# Patient Record
Sex: MALE | Race: BLACK OR AFRICAN AMERICAN | NOT HISPANIC OR LATINO | Employment: OTHER | URBAN - METROPOLITAN AREA
[De-identification: names, ages, dates, MRNs, and addresses within clinical notes are randomized per-mention and may not be internally consistent; named-entity substitution may affect disease eponyms.]

---

## 2017-07-17 ENCOUNTER — HISTORICAL (OUTPATIENT)
Dept: ADMINISTRATIVE | Facility: HOSPITAL | Age: 67
End: 2017-07-17

## 2017-07-17 LAB
ABS NEUT (OLG): 2.11 X10(3)/MCL (ref 2.1–9.2)
ALBUMIN SERPL-MCNC: 3.4 GM/DL (ref 3.4–5)
ALBUMIN/GLOB SERPL: 1 RATIO (ref 1–2)
ALP SERPL-CCNC: 128 UNIT/L (ref 45–117)
ALT SERPL-CCNC: 29 UNIT/L (ref 12–78)
APPEARANCE, UA: CLEAR
AST SERPL-CCNC: 21 UNIT/L (ref 15–37)
BACTERIA #/AREA URNS AUTO: ABNORMAL /[HPF]
BASOPHILS # BLD AUTO: 0.03 X10(3)/MCL
BASOPHILS NFR BLD AUTO: 0 % (ref 0–1)
BILIRUB SERPL-MCNC: 0.3 MG/DL (ref 0.2–1)
BILIRUB UR QL STRIP: NEGATIVE
BILIRUBIN DIRECT+TOT PNL SERPL-MCNC: <0.1 MG/DL
BILIRUBIN DIRECT+TOT PNL SERPL-MCNC: >0.2 MG/DL
BUN SERPL-MCNC: 12 MG/DL (ref 7–18)
CALCIUM SERPL-MCNC: 8.9 MG/DL (ref 8.5–10.1)
CHLORIDE SERPL-SCNC: 104 MMOL/L (ref 98–107)
CHOLEST SERPL-MCNC: 192 MG/DL
CHOLEST/HDLC SERPL: 3.1 {RATIO} (ref 0–5)
CO2 SERPL-SCNC: 25 MMOL/L (ref 21–32)
COLOR UR: ABNORMAL
CREAT SERPL-MCNC: 1.1 MG/DL (ref 0.6–1.3)
CREAT UR-MCNC: 81 MG/DL
EOSINOPHIL # BLD AUTO: 0.09 X10(3)/MCL
EOSINOPHIL NFR BLD AUTO: 1 % (ref 0–5)
ERYTHROCYTE [DISTWIDTH] IN BLOOD BY AUTOMATED COUNT: 11.9 % (ref 11.5–14.5)
EST. AVERAGE GLUCOSE BLD GHB EST-MCNC: 266 MG/DL
GLOBULIN SER-MCNC: 4.5 GM/ML (ref 2.3–3.5)
GLUCOSE (UA): >1000 MG/DL
GLUCOSE SERPL-MCNC: 353 MG/DL (ref 74–106)
HBA1C MFR BLD: 10.9 % (ref 4.2–6.3)
HCT VFR BLD AUTO: 37.1 % (ref 40–51)
HDLC SERPL-MCNC: 61 MG/DL
HGB BLD-MCNC: 13.2 GM/DL (ref 13.5–17.5)
HGB UR QL STRIP: NEGATIVE
HYALINE CASTS #/AREA URNS LPF: ABNORMAL /[LPF]
IMM GRANULOCYTES # BLD AUTO: 0.02 10*3/UL
IMM GRANULOCYTES NFR BLD AUTO: 0 %
KETONES UR QL STRIP: NEGATIVE
LDLC SERPL CALC-MCNC: 81 MG/DL (ref 0–130)
LEUKOCYTE ESTERASE UR QL STRIP: NEGATIVE
LYMPHOCYTES # BLD AUTO: 3.68 X10(3)/MCL
LYMPHOCYTES NFR BLD AUTO: 57 % (ref 15–40)
MCH RBC QN AUTO: 31.4 PG (ref 26–34)
MCHC RBC AUTO-ENTMCNC: 35.6 GM/DL (ref 31–37)
MCV RBC AUTO: 88.1 FL (ref 80–100)
MICROALBUMIN UR-MCNC: 12.7 MG/L (ref 0–19)
MICROALBUMIN/CREAT RATIO PNL UR: 15.7 MCG/MG CR (ref 0–29)
MONOCYTES # BLD AUTO: 0.51 X10(3)/MCL
MONOCYTES NFR BLD AUTO: 8 % (ref 4–12)
NEUTROPHILS # BLD AUTO: 2.11 X10(3)/MCL
NEUTROPHILS NFR BLD AUTO: 33 X10(3)/MCL
NITRITE UR QL STRIP: NEGATIVE
PH UR STRIP: 6 [PH] (ref 4.5–8)
PLATELET # BLD AUTO: 311 X10(3)/MCL (ref 130–400)
PMV BLD AUTO: 10 FL (ref 7.4–10.4)
POTASSIUM SERPL-SCNC: 4 MMOL/L (ref 3.5–5.1)
PROT SERPL-MCNC: 7.9 GM/DL (ref 6.4–8.2)
PROT UR QL STRIP: NEGATIVE
RBC # BLD AUTO: 4.21 X10(6)/MCL (ref 4.5–5.9)
RBC #/AREA URNS AUTO: ABNORMAL /[HPF]
SODIUM SERPL-SCNC: 137 MMOL/L (ref 136–145)
SP GR UR STRIP: 1.03 (ref 1–1.03)
SQUAMOUS #/AREA URNS LPF: ABNORMAL /[LPF]
TRIGL SERPL-MCNC: 250 MG/DL
TSH SERPL-ACNC: 1.55 MIU/L (ref 0.36–3.74)
UROBILINOGEN UR STRIP-ACNC: NORMAL
VLDLC SERPL CALC-MCNC: 50 MG/DL
WBC # SPEC AUTO: 6.4 X10(3)/MCL (ref 4.5–11)
WBC #/AREA URNS AUTO: ABNORMAL /HPF

## 2017-10-04 ENCOUNTER — HISTORICAL (OUTPATIENT)
Dept: INTERNAL MEDICINE | Facility: CLINIC | Age: 67
End: 2017-10-04

## 2017-10-04 LAB
BUN SERPL-MCNC: 12 MG/DL (ref 7–18)
CALCIUM SERPL-MCNC: 8.7 MG/DL (ref 8.5–10.1)
CHLORIDE SERPL-SCNC: 107 MMOL/L (ref 98–107)
CHOLEST SERPL-MCNC: 187 MG/DL
CHOLEST/HDLC SERPL: 3 {RATIO} (ref 0–5)
CO2 SERPL-SCNC: 26 MMOL/L (ref 21–32)
CREAT SERPL-MCNC: 1 MG/DL (ref 0.6–1.3)
EST. AVERAGE GLUCOSE BLD GHB EST-MCNC: 229 MG/DL
GLUCOSE SERPL-MCNC: 234 MG/DL (ref 74–106)
HBA1C MFR BLD: 9.6 % (ref 4.2–6.3)
HDLC SERPL-MCNC: 62 MG/DL
LDLC SERPL CALC-MCNC: 82 MG/DL (ref 0–130)
POTASSIUM SERPL-SCNC: 3.7 MMOL/L (ref 3.5–5.1)
SODIUM SERPL-SCNC: 139 MMOL/L (ref 136–145)
TRIGL SERPL-MCNC: 216 MG/DL
TSH SERPL-ACNC: 1.59 MIU/L (ref 0.36–3.74)
VLDLC SERPL CALC-MCNC: 43 MG/DL

## 2018-01-16 ENCOUNTER — HISTORICAL (OUTPATIENT)
Dept: INTERNAL MEDICINE | Facility: CLINIC | Age: 68
End: 2018-01-16

## 2018-01-16 LAB
ABS NEUT (OLG): 2.08 X10(3)/MCL (ref 2.1–9.2)
ALBUMIN SERPL-MCNC: 3.4 GM/DL (ref 3.4–5)
ALBUMIN/GLOB SERPL: 1 RATIO (ref 1–2)
ALP SERPL-CCNC: 117 UNIT/L (ref 45–117)
ALT SERPL-CCNC: 23 UNIT/L (ref 12–78)
AST SERPL-CCNC: 18 UNIT/L (ref 15–37)
BASOPHILS # BLD AUTO: 0.03 X10(3)/MCL
BASOPHILS NFR BLD AUTO: 0 % (ref 0–1)
BILIRUB SERPL-MCNC: 0.2 MG/DL (ref 0.2–1)
BILIRUBIN DIRECT+TOT PNL SERPL-MCNC: 0.1 MG/DL
BILIRUBIN DIRECT+TOT PNL SERPL-MCNC: 0.1 MG/DL
BUN SERPL-MCNC: 10 MG/DL (ref 7–18)
CALCIUM SERPL-MCNC: 8.8 MG/DL (ref 8.5–10.1)
CHLORIDE SERPL-SCNC: 106 MMOL/L (ref 98–107)
CO2 SERPL-SCNC: 26 MMOL/L (ref 21–32)
CREAT SERPL-MCNC: 0.9 MG/DL (ref 0.6–1.3)
CREAT UR-MCNC: 238 MG/DL
EOSINOPHIL # BLD AUTO: 0.15 10*3/UL
EOSINOPHIL NFR BLD AUTO: 2 % (ref 0–5)
ERYTHROCYTE [DISTWIDTH] IN BLOOD BY AUTOMATED COUNT: 12.2 % (ref 11.5–14.5)
EST. AVERAGE GLUCOSE BLD GHB EST-MCNC: 258 MG/DL
GLOBULIN SER-MCNC: 4.4 GM/ML (ref 2.3–3.5)
GLUCOSE SERPL-MCNC: 217 MG/DL (ref 74–106)
HBA1C MFR BLD: 10.6 % (ref 4.2–6.3)
HCT VFR BLD AUTO: 36.6 % (ref 40–51)
HGB BLD-MCNC: 13.1 GM/DL (ref 13.5–17.5)
IMM GRANULOCYTES # BLD AUTO: 0.01 10*3/UL
IMM GRANULOCYTES NFR BLD AUTO: 0 %
LYMPHOCYTES # BLD AUTO: 3.9 X10(3)/MCL
LYMPHOCYTES NFR BLD AUTO: 58 % (ref 15–40)
MCH RBC QN AUTO: 32.2 PG (ref 26–34)
MCHC RBC AUTO-ENTMCNC: 35.8 GM/DL (ref 31–37)
MCV RBC AUTO: 89.9 FL (ref 80–100)
MICROALBUMIN UR-MCNC: 55.7 MG/L (ref 0–19)
MICROALBUMIN/CREAT RATIO PNL UR: 23.4 MCG/MG CR (ref 0–29)
MONOCYTES # BLD AUTO: 0.51 X10(3)/MCL
MONOCYTES NFR BLD AUTO: 8 % (ref 4–12)
NEUTROPHILS # BLD AUTO: 2.08 X10(3)/MCL
NEUTROPHILS NFR BLD AUTO: 31 X10(3)/MCL
PLATELET # BLD AUTO: 360 X10(3)/MCL (ref 130–400)
PMV BLD AUTO: 9.6 FL (ref 7.4–10.4)
POTASSIUM SERPL-SCNC: 3.8 MMOL/L (ref 3.5–5.1)
PROT SERPL-MCNC: 7.8 GM/DL (ref 6.4–8.2)
PSA SERPL-MCNC: 1.2 NG/ML
RBC # BLD AUTO: 4.07 X10(6)/MCL (ref 4.5–5.9)
SODIUM SERPL-SCNC: 139 MMOL/L (ref 136–145)
WBC # SPEC AUTO: 6.7 X10(3)/MCL (ref 4.5–11)

## 2018-06-01 ENCOUNTER — HISTORICAL (OUTPATIENT)
Dept: INTERNAL MEDICINE | Facility: CLINIC | Age: 68
End: 2018-06-01

## 2018-06-01 LAB
ABS NEUT (OLG): 1.52 X10(3)/MCL
BASOPHILS NFR BLD MANUAL: 1 %
CHOLEST SERPL-MCNC: 183 MG/DL
CHOLEST/HDLC SERPL: 3.3 {RATIO} (ref 0–5)
EOSINOPHIL NFR BLD MANUAL: 3 %
ERYTHROCYTE [DISTWIDTH] IN BLOOD BY AUTOMATED COUNT: 12 % (ref 11.5–14.5)
EST. AVERAGE GLUCOSE BLD GHB EST-MCNC: 275 MG/DL
FOLATE SERPL-MCNC: 19.7 NG/ML (ref 3.1–17.5)
GRANULOCYTES NFR BLD MANUAL: 41 % (ref 43–75)
HBA1C MFR BLD: 11.2 % (ref 4.2–6.3)
HCT VFR BLD AUTO: 36.9 % (ref 40–51)
HDLC SERPL-MCNC: 56 MG/DL
HGB BLD-MCNC: 13.2 GM/DL (ref 13.5–17.5)
HYPOCHROMIA BLD QL SMEAR: ABNORMAL
IRON SATN MFR SERPL: 27.6 % (ref 15–50)
IRON SERPL-MCNC: 94 MCG/DL (ref 65–175)
LDLC SERPL CALC-MCNC: 93 MG/DL (ref 0–130)
LYMPHOCYTES NFR BLD MANUAL: 51 % (ref 20.5–51.1)
MCH RBC QN AUTO: 32.4 PG (ref 26–34)
MCHC RBC AUTO-ENTMCNC: 35.8 GM/DL (ref 31–37)
MCV RBC AUTO: 90.7 FL (ref 80–100)
MONOCYTES NFR BLD MANUAL: 3 % (ref 2–9)
NEUTS BAND NFR BLD MANUAL: 1 % (ref 0–10)
PLATELET # BLD AUTO: 316 X10(3)/MCL (ref 130–400)
PLATELET # BLD EST: NORMAL 10*3/UL
PMV BLD AUTO: 9.7 FL (ref 7.4–10.4)
RBC # BLD AUTO: 4.07 X10(6)/MCL (ref 4.5–5.9)
RBC MORPH BLD: ABNORMAL
RET# (OHS): 0.05
RETICULOCYTE COUNT AUTOMATED (OLG): 1.1 % (ref 0.5–1.5)
TIBC SERPL-MCNC: 340 MCG/DL (ref 250–450)
TRANSFERRIN SERPL-MCNC: 274 MG/DL (ref 200–360)
TRIGL SERPL-MCNC: 172 MG/DL
VIT B12 SERPL-MCNC: 397 PG/ML (ref 193–986)
VLDLC SERPL CALC-MCNC: 34 MG/DL
WBC # SPEC AUTO: 5.7 X10(3)/MCL (ref 4.5–11)

## 2018-06-06 ENCOUNTER — HISTORICAL (OUTPATIENT)
Dept: ADMINISTRATIVE | Facility: HOSPITAL | Age: 68
End: 2018-06-06

## 2018-06-06 LAB
CREAT UR-MCNC: 132 MG/DL
MICROALBUMIN UR-MCNC: 37 MG/L (ref 0–19)
MICROALBUMIN/CREAT RATIO PNL UR: 28 MCG/MG CR (ref 0–29)

## 2018-09-04 ENCOUNTER — HISTORICAL (OUTPATIENT)
Dept: INTERNAL MEDICINE | Facility: CLINIC | Age: 68
End: 2018-09-04

## 2018-09-04 LAB
ALBUMIN SERPL-MCNC: 3.5 GM/DL (ref 3.4–5)
ALBUMIN/GLOB SERPL: 1 RATIO (ref 1–2)
ALP SERPL-CCNC: 88 UNIT/L (ref 45–117)
ALT SERPL-CCNC: 21 UNIT/L (ref 12–78)
APPEARANCE, UA: CLEAR
AST SERPL-CCNC: 18 UNIT/L (ref 15–37)
BACTERIA #/AREA URNS AUTO: ABNORMAL /[HPF]
BILIRUB SERPL-MCNC: 0.3 MG/DL (ref 0.2–1)
BILIRUB UR QL STRIP: NEGATIVE
BILIRUBIN DIRECT+TOT PNL SERPL-MCNC: 0.1 MG/DL
BILIRUBIN DIRECT+TOT PNL SERPL-MCNC: 0.2 MG/DL
BUN SERPL-MCNC: 7 MG/DL (ref 7–18)
CALCIUM SERPL-MCNC: 8.6 MG/DL (ref 8.5–10.1)
CHLORIDE SERPL-SCNC: 108 MMOL/L (ref 98–107)
CO2 SERPL-SCNC: 31 MMOL/L (ref 21–32)
COLOR UR: YELLOW
CREAT SERPL-MCNC: 0.9 MG/DL (ref 0.6–1.3)
CREAT UR-MCNC: 205 MG/DL
EST. AVERAGE GLUCOSE BLD GHB EST-MCNC: 177 MG/DL
GLOBULIN SER-MCNC: 4.2 GM/ML (ref 2.3–3.5)
GLUCOSE (UA): NORMAL
GLUCOSE SERPL-MCNC: 104 MG/DL (ref 74–106)
HAV IGM SERPL QL IA: NONREACTIVE
HBA1C MFR BLD: 7.8 % (ref 4.2–6.3)
HBV CORE IGM SERPL QL IA: NONREACTIVE
HBV SURFACE AG SERPL QL IA: NEGATIVE
HCV AB SERPL QL IA: NONREACTIVE
HGB UR QL STRIP: NEGATIVE
HIV 1+2 AB+HIV1 P24 AG SERPL QL IA: NONREACTIVE
HYALINE CASTS #/AREA URNS LPF: ABNORMAL /[LPF]
KETONES UR QL STRIP: NEGATIVE
LEUKOCYTE ESTERASE UR QL STRIP: NEGATIVE
MICROALBUMIN UR-MCNC: 45 MG/L (ref 0–19)
MICROALBUMIN/CREAT RATIO PNL UR: 22 MCG/MG CR (ref 0–29)
NITRITE UR QL STRIP: NEGATIVE
PH UR STRIP: 6.5 [PH] (ref 4.5–8)
POTASSIUM SERPL-SCNC: 3.7 MMOL/L (ref 3.5–5.1)
PROT SERPL-MCNC: 7.7 GM/DL (ref 6.4–8.2)
PROT UR QL STRIP: 10 MG/DL
RBC #/AREA URNS AUTO: ABNORMAL /[HPF]
SODIUM SERPL-SCNC: 143 MMOL/L (ref 136–145)
SP GR UR STRIP: 1.02 (ref 1–1.03)
SQUAMOUS #/AREA URNS LPF: ABNORMAL /[LPF]
UROBILINOGEN UR STRIP-ACNC: 2 MG/DL
WBC #/AREA URNS AUTO: ABNORMAL /HPF

## 2019-01-03 ENCOUNTER — HISTORICAL (OUTPATIENT)
Dept: INTERNAL MEDICINE | Facility: CLINIC | Age: 69
End: 2019-01-03

## 2019-01-03 LAB
BUN SERPL-MCNC: 11 MG/DL (ref 7–18)
CALCIUM SERPL-MCNC: 9 MG/DL (ref 8.5–10.1)
CHLORIDE SERPL-SCNC: 103 MMOL/L (ref 98–107)
CHOLEST SERPL-MCNC: 189 MG/DL
CHOLEST/HDLC SERPL: 3.1 {RATIO} (ref 0–5)
CO2 SERPL-SCNC: 29 MMOL/L (ref 21–32)
CREAT SERPL-MCNC: 1 MG/DL (ref 0.6–1.3)
CREAT UR-MCNC: 209 MG/DL
CREAT/UREA NIT SERPL: 11
EST. AVERAGE GLUCOSE BLD GHB EST-MCNC: 258 MG/DL
GLUCOSE SERPL-MCNC: 170 MG/DL (ref 74–106)
HBA1C MFR BLD: 10.6 % (ref 4.2–6.3)
HDLC SERPL-MCNC: 61 MG/DL
LDLC SERPL CALC-MCNC: 84 MG/DL (ref 0–130)
MICROALBUMIN UR-MCNC: 71.8 MG/L (ref 0–19)
MICROALBUMIN/CREAT RATIO PNL UR: 34.4 MCG/MG CR (ref 0–29)
POTASSIUM SERPL-SCNC: 3.5 MMOL/L (ref 3.5–5.1)
SODIUM SERPL-SCNC: 139 MMOL/L (ref 136–145)
TRIGL SERPL-MCNC: 218 MG/DL
TSH SERPL-ACNC: 2.61 MIU/L (ref 0.36–3.74)
VLDLC SERPL CALC-MCNC: 44 MG/DL

## 2019-04-03 ENCOUNTER — HISTORICAL (OUTPATIENT)
Dept: INTERNAL MEDICINE | Facility: CLINIC | Age: 69
End: 2019-04-03

## 2019-04-03 LAB
ABS NEUT (OLG): 3.43 X10(3)/MCL (ref 2.1–9.2)
ALBUMIN SERPL-MCNC: 3.5 GM/DL (ref 3.4–5)
ALBUMIN/GLOB SERPL: 0.8 RATIO (ref 1.1–2)
ALP SERPL-CCNC: 95 UNIT/L (ref 45–117)
ALT SERPL-CCNC: 24 UNIT/L (ref 12–78)
AST SERPL-CCNC: 17 UNIT/L (ref 15–37)
BASOPHILS # BLD AUTO: 0.02 X10(3)/MCL
BASOPHILS NFR BLD AUTO: 0 %
BILIRUB SERPL-MCNC: 0.4 MG/DL (ref 0.2–1)
BILIRUBIN DIRECT+TOT PNL SERPL-MCNC: 0.1 MG/DL
BILIRUBIN DIRECT+TOT PNL SERPL-MCNC: 0.3 MG/DL
BUN SERPL-MCNC: 8 MG/DL (ref 7–18)
CALCIUM SERPL-MCNC: 8.5 MG/DL (ref 8.5–10.1)
CHLORIDE SERPL-SCNC: 110 MMOL/L (ref 98–107)
CHOLEST SERPL-MCNC: 137 MG/DL
CHOLEST/HDLC SERPL: 2.3 {RATIO} (ref 0–5)
CO2 SERPL-SCNC: 28 MMOL/L (ref 21–32)
CREAT SERPL-MCNC: 1 MG/DL (ref 0.6–1.3)
CRP SERPL-MCNC: 0.3 MG/DL
EOSINOPHIL # BLD AUTO: 0.12 X10(3)/MCL
EOSINOPHIL NFR BLD AUTO: 1 %
ERYTHROCYTE [DISTWIDTH] IN BLOOD BY AUTOMATED COUNT: 12.6 % (ref 11.5–14.5)
ERYTHROCYTE [SEDIMENTATION RATE] IN BLOOD: 20 MM/HR (ref 0–15)
EST. AVERAGE GLUCOSE BLD GHB EST-MCNC: 226 MG/DL
GLOBULIN SER-MCNC: 4.3 GM/ML (ref 2.3–3.5)
GLUCOSE SERPL-MCNC: 73 MG/DL (ref 74–106)
HBA1C MFR BLD: 9.5 % (ref 4.2–6.3)
HCT VFR BLD AUTO: 36.8 % (ref 40–51)
HDLC SERPL-MCNC: 59 MG/DL
HGB BLD-MCNC: 12.9 GM/DL (ref 13.5–17.5)
IMM GRANULOCYTES # BLD AUTO: 0.02 10*3/UL
IMM GRANULOCYTES NFR BLD AUTO: 0 %
LDLC SERPL CALC-MCNC: 58 MG/DL (ref 0–130)
LYMPHOCYTES # BLD AUTO: 4.26 X10(3)/MCL
LYMPHOCYTES NFR BLD AUTO: 50 % (ref 13–40)
MCH RBC QN AUTO: 32.2 PG (ref 26–34)
MCHC RBC AUTO-ENTMCNC: 35.1 GM/DL (ref 31–37)
MCV RBC AUTO: 91.8 FL (ref 80–100)
MONOCYTES # BLD AUTO: 0.65 X10(3)/MCL
MONOCYTES NFR BLD AUTO: 8 % (ref 4–12)
NEUTROPHILS # BLD AUTO: 3.43 X10(3)/MCL
NEUTROPHILS NFR BLD AUTO: 40 X10(3)/MCL
PLATELET # BLD AUTO: 330 X10(3)/MCL (ref 130–400)
PMV BLD AUTO: 9.6 FL (ref 7.4–10.4)
POTASSIUM SERPL-SCNC: 3.5 MMOL/L (ref 3.5–5.1)
PROT SERPL-MCNC: 7.3 GM/DL
PROT SERPL-MCNC: 7.8 GM/DL (ref 6.4–8.2)
PSA SERPL-MCNC: 1.6 NG/ML
RBC # BLD AUTO: 4.01 X10(6)/MCL (ref 4.5–5.9)
RET# (OHS): 0.07 X10(6)/MCL (ref 0.02–0.09)
RETICULOCYTE COUNT AUTOMATED (OLG): 1.7 % (ref 0.5–1.5)
SODIUM SERPL-SCNC: 142 MMOL/L (ref 136–145)
TRIGL SERPL-MCNC: 99 MG/DL
VLDLC SERPL CALC-MCNC: 20 MG/DL
WBC # SPEC AUTO: 8.5 X10(3)/MCL (ref 4.5–11)

## 2019-07-03 ENCOUNTER — HISTORICAL (OUTPATIENT)
Dept: INTERNAL MEDICINE | Facility: CLINIC | Age: 69
End: 2019-07-03

## 2019-07-03 LAB
BUN SERPL-MCNC: 13 MG/DL (ref 7–18)
CALCIUM SERPL-MCNC: 9.2 MG/DL (ref 8.5–10.1)
CHLORIDE SERPL-SCNC: 108 MMOL/L (ref 98–107)
CO2 SERPL-SCNC: 26 MMOL/L (ref 21–32)
CREAT SERPL-MCNC: 1.1 MG/DL (ref 0.6–1.3)
CREAT UR-MCNC: 101 MG/DL
CREAT/UREA NIT SERPL: 12
EST. AVERAGE GLUCOSE BLD GHB EST-MCNC: 194 MG/DL
GLUCOSE SERPL-MCNC: 219 MG/DL (ref 74–106)
HBA1C MFR BLD: 8.4 % (ref 4.2–6.3)
MICROALBUMIN UR-MCNC: 30 MG/L (ref 0–19)
MICROALBUMIN/CREAT RATIO PNL UR: 29.7 MCG/MG CR (ref 0–29)
POTASSIUM SERPL-SCNC: 3.8 MMOL/L (ref 3.5–5.1)
SODIUM SERPL-SCNC: 140 MMOL/L (ref 136–145)

## 2019-09-09 ENCOUNTER — HISTORICAL (OUTPATIENT)
Dept: CARDIOLOGY | Facility: HOSPITAL | Age: 69
End: 2019-09-09

## 2019-10-09 ENCOUNTER — HISTORICAL (OUTPATIENT)
Dept: RADIOLOGY | Facility: HOSPITAL | Age: 69
End: 2019-10-09

## 2019-10-15 ENCOUNTER — HISTORICAL (OUTPATIENT)
Dept: CARDIOLOGY | Facility: HOSPITAL | Age: 69
End: 2019-10-15

## 2019-10-15 LAB
ABS NEUT (OLG): 2.25 X10(3)/MCL (ref 2.1–9.2)
APTT PPP: 30.5 SECOND(S) (ref 23.3–37)
BASOPHILS # BLD AUTO: 0 X10(3)/MCL (ref 0–0.2)
BASOPHILS NFR BLD AUTO: 0 %
BUN SERPL-MCNC: 11 MG/DL (ref 7–18)
CALCIUM SERPL-MCNC: 8.7 MG/DL (ref 8.5–10.1)
CHLORIDE SERPL-SCNC: 106 MMOL/L (ref 98–107)
CO2 SERPL-SCNC: 26 MMOL/L (ref 21–32)
CREAT SERPL-MCNC: 0.9 MG/DL (ref 0.6–1.3)
CREAT/UREA NIT SERPL: 12
EOSINOPHIL # BLD AUTO: 0.1 X10(3)/MCL (ref 0–0.9)
EOSINOPHIL NFR BLD AUTO: 2 %
ERYTHROCYTE [DISTWIDTH] IN BLOOD BY AUTOMATED COUNT: 12.8 % (ref 11.5–14.5)
GLUCOSE SERPL-MCNC: 176 MG/DL (ref 74–106)
HCT VFR BLD AUTO: 36.3 % (ref 40–51)
HGB BLD-MCNC: 12.2 GM/DL (ref 13.5–17.5)
IMM GRANULOCYTES # BLD AUTO: 0.02 10*3/UL
IMM GRANULOCYTES NFR BLD AUTO: 0 %
INR PPP: 1.08 (ref 0.9–1.2)
LYMPHOCYTES # BLD AUTO: 3 X10(3)/MCL (ref 0.6–4.6)
LYMPHOCYTES NFR BLD AUTO: 50 %
MCH RBC QN AUTO: 31.7 PG (ref 26–34)
MCHC RBC AUTO-ENTMCNC: 33.6 GM/DL (ref 31–37)
MCV RBC AUTO: 94.3 FL (ref 80–100)
MONOCYTES # BLD AUTO: 0.6 X10(3)/MCL (ref 0.1–1.3)
MONOCYTES NFR BLD AUTO: 10 %
NEUTROPHILS # BLD AUTO: 2.25 X10(3)/MCL (ref 2.1–9.2)
NEUTROPHILS NFR BLD AUTO: 37 %
PLATELET # BLD AUTO: 277 X10(3)/MCL (ref 130–400)
PMV BLD AUTO: 9.6 FL (ref 7.4–10.4)
POTASSIUM SERPL-SCNC: 3.8 MMOL/L (ref 3.5–5.1)
PROTHROMBIN TIME: 13.9 SECOND(S) (ref 11.9–14.4)
RBC # BLD AUTO: 3.85 X10(6)/MCL (ref 4.5–5.9)
SODIUM SERPL-SCNC: 138 MMOL/L (ref 136–145)
WBC # SPEC AUTO: 6.1 X10(3)/MCL (ref 4.5–11)

## 2019-10-28 ENCOUNTER — HISTORICAL (OUTPATIENT)
Dept: CARDIOLOGY | Facility: HOSPITAL | Age: 69
End: 2019-10-28

## 2019-12-04 ENCOUNTER — HISTORICAL (OUTPATIENT)
Dept: INTERNAL MEDICINE | Facility: CLINIC | Age: 69
End: 2019-12-04

## 2019-12-04 LAB
APPEARANCE, UA: CLEAR
BACTERIA #/AREA URNS AUTO: ABNORMAL /HPF
BILIRUB UR QL STRIP: NEGATIVE
COLOR UR: ABNORMAL
CREAT UR-MCNC: 138 MG/DL
EST. AVERAGE GLUCOSE BLD GHB EST-MCNC: 183 MG/DL
GLUCOSE (UA): NEGATIVE
HAV IGM SERPL QL IA: NONREACTIVE
HBA1C MFR BLD: 8 % (ref 4.2–6.3)
HBV CORE IGM SERPL QL IA: NONREACTIVE
HBV SURFACE AG SERPL QL IA: NEGATIVE
HCV AB SERPL QL IA: NONREACTIVE
HGB UR QL STRIP: NEGATIVE
HIV 1+2 AB+HIV1 P24 AG SERPL QL IA: NONREACTIVE
HYALINE CASTS #/AREA URNS LPF: ABNORMAL /LPF
KETONES UR QL STRIP: NEGATIVE
LEUKOCYTE ESTERASE UR QL STRIP: NEGATIVE
MICROALBUMIN UR-MCNC: 67.8 MG/L (ref 0–19)
MICROALBUMIN/CREAT RATIO PNL UR: 49.1 MCG/MG CR (ref 0–29)
NITRITE UR QL STRIP: NEGATIVE
PH UR STRIP: 6 [PH] (ref 4.5–8)
PROT UR QL STRIP: 10 MG/DL
RBC #/AREA URNS AUTO: ABNORMAL /HPF
SP GR UR STRIP: 1.01 (ref 1–1.03)
SQUAMOUS #/AREA URNS LPF: ABNORMAL /LPF
UROBILINOGEN UR STRIP-ACNC: NORMAL
WBC #/AREA URNS AUTO: ABNORMAL /HPF

## 2020-03-06 ENCOUNTER — HISTORICAL (OUTPATIENT)
Dept: INTERNAL MEDICINE | Facility: CLINIC | Age: 70
End: 2020-03-06

## 2020-03-06 LAB
ABS NEUT (OLG): 2.26 X10(3)/MCL (ref 2.1–9.2)
ALBUMIN SERPL-MCNC: 3.4 GM/DL (ref 3.4–5)
ALBUMIN/GLOB SERPL: 0.8 RATIO (ref 1.1–2)
ALP SERPL-CCNC: 98 UNIT/L (ref 45–117)
ALT SERPL-CCNC: 20 UNIT/L (ref 12–78)
AST SERPL-CCNC: 15 UNIT/L (ref 15–37)
BASOPHILS # BLD AUTO: 0 X10(3)/MCL (ref 0–0.2)
BASOPHILS NFR BLD AUTO: 1 %
BILIRUB SERPL-MCNC: 0.3 MG/DL (ref 0.2–1)
BILIRUBIN DIRECT+TOT PNL SERPL-MCNC: <0.1 MG/DL (ref 0–0.2)
BILIRUBIN DIRECT+TOT PNL SERPL-MCNC: ABNORMAL MG/DL
BUN SERPL-MCNC: 12 MG/DL (ref 7–18)
CALCIUM SERPL-MCNC: 8.6 MG/DL (ref 8.5–10.1)
CHLORIDE SERPL-SCNC: 109 MMOL/L (ref 98–107)
CO2 SERPL-SCNC: 24 MMOL/L (ref 21–32)
CREAT SERPL-MCNC: 0.9 MG/DL (ref 0.6–1.3)
CREAT UR-MCNC: 172 MG/DL
EOSINOPHIL # BLD AUTO: 0.1 X10(3)/MCL (ref 0–0.9)
EOSINOPHIL NFR BLD AUTO: 2 %
ERYTHROCYTE [DISTWIDTH] IN BLOOD BY AUTOMATED COUNT: 14 % (ref 11.5–14.5)
EST. AVERAGE GLUCOSE BLD GHB EST-MCNC: 169 MG/DL
GLOBULIN SER-MCNC: 4.4 GM/ML (ref 2.3–3.5)
GLUCOSE SERPL-MCNC: 82 MG/DL (ref 74–106)
HBA1C MFR BLD: 7.5 % (ref 4.2–6.3)
HCT VFR BLD AUTO: 30.8 % (ref 40–51)
HGB BLD-MCNC: 9.9 GM/DL (ref 13.5–17.5)
IMM GRANULOCYTES # BLD AUTO: 0.01 10*3/UL
IMM GRANULOCYTES NFR BLD AUTO: 0 %
LYMPHOCYTES # BLD AUTO: 4 X10(3)/MCL (ref 0.6–4.6)
LYMPHOCYTES NFR BLD AUTO: 57 %
MCH RBC QN AUTO: 27.3 PG (ref 26–34)
MCHC RBC AUTO-ENTMCNC: 32.1 GM/DL (ref 31–37)
MCV RBC AUTO: 84.8 FL (ref 80–100)
MICROALBUMIN UR-MCNC: 140 MG/L (ref 0–19)
MICROALBUMIN/CREAT RATIO PNL UR: 81.4 MCG/MG CR (ref 0–29)
MONOCYTES # BLD AUTO: 0.6 X10(3)/MCL (ref 0.1–1.3)
MONOCYTES NFR BLD AUTO: 8 %
NEUTROPHILS # BLD AUTO: 2.26 X10(3)/MCL (ref 2.1–9.2)
NEUTROPHILS NFR BLD AUTO: 32 %
PLATELET # BLD AUTO: 342 X10(3)/MCL (ref 130–400)
PMV BLD AUTO: 9.4 FL (ref 7.4–10.4)
POTASSIUM SERPL-SCNC: 3.6 MMOL/L (ref 3.5–5.1)
PROT SERPL-MCNC: 7.8 GM/DL (ref 6.4–8.2)
RBC # BLD AUTO: 3.63 X10(6)/MCL (ref 4.5–5.9)
SODIUM SERPL-SCNC: 139 MMOL/L (ref 136–145)
TSH SERPL-ACNC: 1.78 MIU/L (ref 0.36–3.74)
WBC # SPEC AUTO: 7 X10(3)/MCL (ref 4.5–11)

## 2020-05-19 ENCOUNTER — HISTORICAL (OUTPATIENT)
Dept: SLEEP MEDICINE | Facility: HOSPITAL | Age: 70
End: 2020-05-19

## 2020-05-28 ENCOUNTER — HISTORICAL (OUTPATIENT)
Dept: ADMINISTRATIVE | Facility: HOSPITAL | Age: 70
End: 2020-05-28

## 2020-05-28 ENCOUNTER — HISTORICAL (OUTPATIENT)
Dept: SLEEP MEDICINE | Facility: HOSPITAL | Age: 70
End: 2020-05-28

## 2020-09-04 ENCOUNTER — HISTORICAL (OUTPATIENT)
Dept: INTERNAL MEDICINE | Facility: CLINIC | Age: 70
End: 2020-09-04

## 2020-09-04 LAB
ABS NEUT (OLG): 1.88 X10(3)/MCL (ref 2.1–9.2)
ALBUMIN SERPL-MCNC: 3.4 GM/DL (ref 3.4–5)
ALBUMIN/GLOB SERPL: 0.8 RATIO (ref 1.1–2)
ALP SERPL-CCNC: 99 UNIT/L (ref 45–117)
ALT SERPL-CCNC: 26 UNIT/L (ref 12–78)
AST SERPL-CCNC: 20 UNIT/L (ref 15–37)
BASOPHILS # BLD AUTO: 0 X10(3)/MCL (ref 0–0.2)
BASOPHILS NFR BLD AUTO: 0 %
BILIRUB SERPL-MCNC: 0.4 MG/DL (ref 0.2–1)
BILIRUBIN DIRECT+TOT PNL SERPL-MCNC: 0.1 MG/DL (ref 0–0.2)
BILIRUBIN DIRECT+TOT PNL SERPL-MCNC: 0.3 MG/DL
BUN SERPL-MCNC: 16 MG/DL (ref 7–18)
CALCIUM SERPL-MCNC: 8.6 MG/DL (ref 8.5–10.1)
CHLORIDE SERPL-SCNC: 108 MMOL/L (ref 98–107)
CHOLEST SERPL-MCNC: 133 MG/DL
CHOLEST/HDLC SERPL: 2.6 {RATIO} (ref 0–5)
CO2 SERPL-SCNC: 23 MMOL/L (ref 21–32)
CREAT SERPL-MCNC: 1.1 MG/DL (ref 0.6–1.3)
CREAT UR-MCNC: 185 MG/DL
EOSINOPHIL # BLD AUTO: 0.1 X10(3)/MCL (ref 0–0.9)
EOSINOPHIL NFR BLD AUTO: 2 %
ERYTHROCYTE [DISTWIDTH] IN BLOOD BY AUTOMATED COUNT: 14.8 % (ref 11.5–14.5)
EST. AVERAGE GLUCOSE BLD GHB EST-MCNC: 186 MG/DL
GLOBULIN SER-MCNC: 4.5 GM/ML (ref 2.3–3.5)
GLUCOSE SERPL-MCNC: 166 MG/DL (ref 74–106)
HBA1C MFR BLD: 8.1 % (ref 4.2–6.3)
HCT VFR BLD AUTO: 30.6 % (ref 40–51)
HDLC SERPL-MCNC: 51 MG/DL (ref 40–59)
HGB BLD-MCNC: 9.7 GM/DL (ref 13.5–17.5)
IMM GRANULOCYTES # BLD AUTO: 0.01 10*3/UL
IMM GRANULOCYTES NFR BLD AUTO: 0 %
LDLC SERPL CALC-MCNC: 59 MG/DL
LYMPHOCYTES # BLD AUTO: 3.3 X10(3)/MCL (ref 0.6–4.6)
LYMPHOCYTES NFR BLD AUTO: 55 %
MCH RBC QN AUTO: 27.5 PG (ref 26–34)
MCHC RBC AUTO-ENTMCNC: 31.7 GM/DL (ref 31–37)
MCV RBC AUTO: 86.7 FL (ref 80–100)
MICROALBUMIN UR-MCNC: 92.8 MG/L (ref 0–19)
MICROALBUMIN/CREAT RATIO PNL UR: 50.2 MCG/MG CR (ref 0–29)
MONOCYTES # BLD AUTO: 0.6 X10(3)/MCL (ref 0.1–1.3)
MONOCYTES NFR BLD AUTO: 10 %
NEUTROPHILS # BLD AUTO: 1.88 X10(3)/MCL (ref 2.1–9.2)
NEUTROPHILS NFR BLD AUTO: 31 %
PLATELET # BLD AUTO: 301 X10(3)/MCL (ref 130–400)
PMV BLD AUTO: 9.6 FL (ref 7.4–10.4)
POTASSIUM SERPL-SCNC: 4 MMOL/L (ref 3.5–5.1)
PROT SERPL-MCNC: 7.9 GM/DL (ref 6.4–8.2)
RBC # BLD AUTO: 3.53 X10(6)/MCL (ref 4.5–5.9)
SODIUM SERPL-SCNC: 139 MMOL/L (ref 136–145)
TRIGL SERPL-MCNC: 116 MG/DL
VLDLC SERPL CALC-MCNC: 23 MG/DL
WBC # SPEC AUTO: 6 X10(3)/MCL (ref 4.5–11)

## 2020-11-02 ENCOUNTER — HISTORICAL (OUTPATIENT)
Dept: GASTROENTEROLOGY | Facility: CLINIC | Age: 70
End: 2020-11-02

## 2020-12-30 ENCOUNTER — HISTORICAL (OUTPATIENT)
Dept: INTERNAL MEDICINE | Facility: CLINIC | Age: 70
End: 2020-12-30

## 2020-12-30 LAB
ALBUMIN SERPL-MCNC: 3.6 GM/DL (ref 3.4–4.8)
ALBUMIN/GLOB SERPL: 0.9 RATIO (ref 1.1–2)
ALP SERPL-CCNC: 98 UNIT/L (ref 40–150)
ALT SERPL-CCNC: 21 UNIT/L (ref 0–55)
AST SERPL-CCNC: 20 UNIT/L (ref 5–34)
BILIRUB SERPL-MCNC: 0.4 MG/DL
BILIRUBIN DIRECT+TOT PNL SERPL-MCNC: 0.2 MG/DL (ref 0–0.5)
BILIRUBIN DIRECT+TOT PNL SERPL-MCNC: 0.2 MG/DL (ref 0–0.8)
BUN SERPL-MCNC: 12 MG/DL (ref 8.4–25.7)
CALCIUM SERPL-MCNC: 9.1 MG/DL (ref 8.8–10)
CHLORIDE SERPL-SCNC: 105 MMOL/L (ref 98–107)
CO2 SERPL-SCNC: 25 MMOL/L (ref 23–31)
CREAT SERPL-MCNC: 0.98 MG/DL (ref 0.73–1.18)
CREAT UR-MCNC: 207 MG/DL (ref 58–161)
EST. AVERAGE GLUCOSE BLD GHB EST-MCNC: 185.8 MG/DL
GLOBULIN SER-MCNC: 4 GM/DL (ref 2.4–3.5)
GLUCOSE SERPL-MCNC: 129 MG/DL (ref 82–115)
HBA1C MFR BLD: 8.1 %
MICROALBUMIN UR-MCNC: 164.7 UG/ML
MICROALBUMIN/CREAT RATIO PNL UR: 79.6 MG/GM CR (ref 0–30)
POTASSIUM SERPL-SCNC: 4.1 MMOL/L (ref 3.5–5.1)
PROT SERPL-MCNC: 7.6 GM/DL (ref 5.8–7.6)
PSA SERPL-MCNC: 2.57 NG/ML
SODIUM SERPL-SCNC: 140 MMOL/L (ref 136–145)

## 2021-04-01 ENCOUNTER — HISTORICAL (OUTPATIENT)
Dept: INTERNAL MEDICINE | Facility: CLINIC | Age: 71
End: 2021-04-01

## 2021-04-01 LAB
ABS NEUT (OLG): 2.46 X10(3)/MCL (ref 2.1–9.2)
ALBUMIN SERPL-MCNC: 3.5 GM/DL (ref 3.4–4.8)
ALBUMIN/GLOB SERPL: 0.8 RATIO (ref 1.1–2)
ALP SERPL-CCNC: 121 UNIT/L (ref 40–150)
ALT SERPL-CCNC: 15 UNIT/L (ref 0–55)
APPEARANCE, UA: CLEAR
AST SERPL-CCNC: 14 UNIT/L (ref 5–34)
BACTERIA #/AREA URNS AUTO: ABNORMAL /HPF
BASOPHILS # BLD AUTO: 0 X10(3)/MCL (ref 0–0.2)
BASOPHILS NFR BLD AUTO: 1 %
BILIRUB SERPL-MCNC: 0.4 MG/DL
BILIRUB UR QL STRIP: NEGATIVE
BILIRUBIN DIRECT+TOT PNL SERPL-MCNC: 0.2 MG/DL (ref 0–0.5)
BILIRUBIN DIRECT+TOT PNL SERPL-MCNC: 0.2 MG/DL (ref 0–0.8)
BUN SERPL-MCNC: 8.4 MG/DL (ref 8.4–25.7)
CALCIUM SERPL-MCNC: 8.6 MG/DL (ref 8.8–10)
CHLORIDE SERPL-SCNC: 103 MMOL/L (ref 98–107)
CHOLEST SERPL-MCNC: 151 MG/DL
CHOLEST/HDLC SERPL: 3 {RATIO} (ref 0–5)
CO2 SERPL-SCNC: 25 MMOL/L (ref 23–31)
COLOR UR: ABNORMAL
CREAT SERPL-MCNC: 1.02 MG/DL (ref 0.73–1.18)
EOSINOPHIL # BLD AUTO: 0.2 X10(3)/MCL (ref 0–0.9)
EOSINOPHIL NFR BLD AUTO: 3 %
ERYTHROCYTE [DISTWIDTH] IN BLOOD BY AUTOMATED COUNT: 15.3 % (ref 11.5–14.5)
EST. AVERAGE GLUCOSE BLD GHB EST-MCNC: 254.6 MG/DL
GLOBULIN SER-MCNC: 4.2 GM/DL (ref 2.4–3.5)
GLUCOSE (UA): >1000 MG/DL
GLUCOSE SERPL-MCNC: 246 MG/DL (ref 82–115)
HBA1C MFR BLD: 10.5 %
HCT VFR BLD AUTO: 32.2 % (ref 40–51)
HDLC SERPL-MCNC: 49 MG/DL (ref 35–60)
HGB BLD-MCNC: 9.8 GM/DL (ref 13.5–17.5)
HGB UR QL STRIP: NEGATIVE
HYALINE CASTS #/AREA URNS LPF: ABNORMAL /LPF
IMM GRANULOCYTES # BLD AUTO: 0.02 10*3/UL
IMM GRANULOCYTES NFR BLD AUTO: 0 %
KETONES UR QL STRIP: NEGATIVE
LDLC SERPL CALC-MCNC: 74 MG/DL (ref 50–140)
LEUKOCYTE ESTERASE UR QL STRIP: NEGATIVE
LYMPHOCYTES # BLD AUTO: 3.3 X10(3)/MCL (ref 0.6–4.6)
LYMPHOCYTES NFR BLD AUTO: 50 %
MCH RBC QN AUTO: 24.7 PG (ref 26–34)
MCHC RBC AUTO-ENTMCNC: 30.4 GM/DL (ref 31–37)
MCV RBC AUTO: 81.1 FL (ref 80–100)
MONOCYTES # BLD AUTO: 0.6 X10(3)/MCL (ref 0.1–1.3)
MONOCYTES NFR BLD AUTO: 9 %
NEUTROPHILS # BLD AUTO: 2.46 X10(3)/MCL (ref 2.1–9.2)
NEUTROPHILS NFR BLD AUTO: 37 %
NITRITE UR QL STRIP: NEGATIVE
PH UR STRIP: 6.5 [PH] (ref 4.5–8)
PLATELET # BLD AUTO: 335 X10(3)/MCL (ref 130–400)
PMV BLD AUTO: 9.6 FL (ref 7.4–10.4)
POTASSIUM SERPL-SCNC: 3.7 MMOL/L (ref 3.5–5.1)
PROT SERPL-MCNC: 7.7 GM/DL (ref 5.8–7.6)
PROT UR QL STRIP: 30 MG/DL
RBC # BLD AUTO: 3.97 X10(6)/MCL (ref 4.5–5.9)
RBC #/AREA URNS AUTO: ABNORMAL /HPF
SODIUM SERPL-SCNC: 138 MMOL/L (ref 136–145)
SP GR UR STRIP: 1.02 (ref 1–1.03)
SQUAMOUS #/AREA URNS LPF: ABNORMAL /LPF
TRIGL SERPL-MCNC: 140 MG/DL (ref 34–140)
UROBILINOGEN UR STRIP-ACNC: 2 MG/DL
VLDLC SERPL CALC-MCNC: 28 MG/DL
WBC # SPEC AUTO: 6.6 X10(3)/MCL (ref 4.5–11)
WBC #/AREA URNS AUTO: ABNORMAL /HPF

## 2021-05-25 ENCOUNTER — HISTORICAL (OUTPATIENT)
Dept: ADMINISTRATIVE | Facility: HOSPITAL | Age: 71
End: 2021-05-25

## 2021-05-25 LAB — SARS-COV-2 RNA RESP QL NAA+PROBE: NEGATIVE

## 2021-05-27 LAB — FINAL CULTURE: NORMAL

## 2021-06-25 ENCOUNTER — HISTORICAL (OUTPATIENT)
Dept: INTERNAL MEDICINE | Facility: CLINIC | Age: 71
End: 2021-06-25

## 2021-06-25 LAB
ABS NEUT (OLG): 2.67 X10(3)/MCL (ref 2.1–9.2)
ALBUMIN SERPL-MCNC: 3.4 GM/DL (ref 3.4–4.8)
ALBUMIN/GLOB SERPL: 0.8 RATIO (ref 1.1–2)
ALP SERPL-CCNC: 85 UNIT/L (ref 40–150)
ALT SERPL-CCNC: 13 UNIT/L (ref 0–55)
APPEARANCE, UA: CLEAR
AST SERPL-CCNC: 18 UNIT/L (ref 5–34)
BACTERIA #/AREA URNS AUTO: ABNORMAL /HPF
BASOPHILS # BLD AUTO: 0 X10(3)/MCL (ref 0–0.2)
BASOPHILS NFR BLD AUTO: 0 %
BILIRUB SERPL-MCNC: 0.4 MG/DL
BILIRUB UR QL STRIP: NEGATIVE
BILIRUBIN DIRECT+TOT PNL SERPL-MCNC: 0.2 MG/DL (ref 0–0.5)
BILIRUBIN DIRECT+TOT PNL SERPL-MCNC: 0.2 MG/DL (ref 0–0.8)
BUN SERPL-MCNC: 9 MG/DL (ref 8.4–25.7)
CALCIUM SERPL-MCNC: 9 MG/DL (ref 8.8–10)
CHLORIDE SERPL-SCNC: 107 MMOL/L (ref 98–107)
CHOLEST SERPL-MCNC: 126 MG/DL
CHOLEST/HDLC SERPL: 3 {RATIO} (ref 0–5)
CO2 SERPL-SCNC: 27 MMOL/L (ref 23–31)
COLOR UR: ABNORMAL
CREAT SERPL-MCNC: 0.94 MG/DL (ref 0.73–1.18)
CREAT UR-MCNC: 99 MG/DL (ref 58–161)
EOSINOPHIL # BLD AUTO: 0.1 X10(3)/MCL (ref 0–0.9)
EOSINOPHIL NFR BLD AUTO: 2 %
ERYTHROCYTE [DISTWIDTH] IN BLOOD BY AUTOMATED COUNT: 16.8 % (ref 11.5–14.5)
EST. AVERAGE GLUCOSE BLD GHB EST-MCNC: 214.5 MG/DL
GLOBULIN SER-MCNC: 4.3 GM/DL (ref 2.4–3.5)
GLUCOSE (UA): NEGATIVE
GLUCOSE SERPL-MCNC: 70 MG/DL (ref 82–115)
HBA1C MFR BLD: 9.1 %
HCT VFR BLD AUTO: 31.4 % (ref 40–51)
HDLC SERPL-MCNC: 48 MG/DL (ref 35–60)
HGB BLD-MCNC: 9.6 GM/DL (ref 13.5–17.5)
HGB UR QL STRIP: NEGATIVE
HYALINE CASTS #/AREA URNS LPF: ABNORMAL /LPF
IMM GRANULOCYTES # BLD AUTO: 0.01 10*3/UL
IMM GRANULOCYTES NFR BLD AUTO: 0 %
KETONES UR QL STRIP: NEGATIVE
LDLC SERPL CALC-MCNC: 61 MG/DL (ref 50–140)
LEUKOCYTE ESTERASE UR QL STRIP: NEGATIVE
LYMPHOCYTES # BLD AUTO: 3.4 X10(3)/MCL (ref 0.6–4.6)
LYMPHOCYTES NFR BLD AUTO: 50 %
MCH RBC QN AUTO: 25.1 PG (ref 26–34)
MCHC RBC AUTO-ENTMCNC: 30.6 GM/DL (ref 31–37)
MCV RBC AUTO: 82 FL (ref 80–100)
MICROALBUMIN UR-MCNC: 78.2 MG/L
MICROALBUMIN/CREAT RATIO PNL UR: 79 MG/GM CR (ref 0–30)
MONOCYTES # BLD AUTO: 0.6 X10(3)/MCL (ref 0.1–1.3)
MONOCYTES NFR BLD AUTO: 8 %
NEUTROPHILS # BLD AUTO: 2.67 X10(3)/MCL (ref 2.1–9.2)
NEUTROPHILS NFR BLD AUTO: 39 %
NITRITE UR QL STRIP: NEGATIVE
NRBC BLD AUTO-RTO: 0 % (ref 0–0.2)
PH UR STRIP: 6.5 [PH] (ref 4.5–8)
PLATELET # BLD AUTO: 312 X10(3)/MCL (ref 130–400)
PMV BLD AUTO: 9.6 FL (ref 7.4–10.4)
POTASSIUM SERPL-SCNC: 3.9 MMOL/L (ref 3.5–5.1)
PROT SERPL-MCNC: 7.7 GM/DL (ref 5.8–7.6)
PROT UR QL STRIP: 10 MG/DL
RBC # BLD AUTO: 3.83 X10(6)/MCL (ref 4.5–5.9)
RBC #/AREA URNS AUTO: ABNORMAL /HPF
SODIUM SERPL-SCNC: 142 MMOL/L (ref 136–145)
SP GR UR STRIP: 1.01 (ref 1–1.03)
SQUAMOUS #/AREA URNS LPF: ABNORMAL /LPF
T4 FREE SERPL-MCNC: 0.77 NG/DL (ref 0.7–1.48)
TRIGL SERPL-MCNC: 87 MG/DL (ref 34–140)
TSH SERPL-ACNC: 1.32 UIU/ML (ref 0.35–4.94)
UROBILINOGEN UR STRIP-ACNC: NORMAL
VLDLC SERPL CALC-MCNC: 17 MG/DL
WBC # SPEC AUTO: 6.8 X10(3)/MCL (ref 4.5–11)
WBC #/AREA URNS AUTO: ABNORMAL /HPF

## 2021-07-20 ENCOUNTER — HISTORICAL (OUTPATIENT)
Dept: CARDIOLOGY | Facility: HOSPITAL | Age: 71
End: 2021-07-20

## 2021-08-31 ENCOUNTER — HISTORICAL (OUTPATIENT)
Dept: ADMINISTRATIVE | Facility: HOSPITAL | Age: 71
End: 2021-08-31

## 2021-08-31 LAB
FERRITIN SERPL-MCNC: 26.59 NG/ML (ref 21.81–274.66)
IRON SATN MFR SERPL: 7 % (ref 20–50)
IRON SERPL-MCNC: 26 UG/DL (ref 65–175)
TIBC SERPL-MCNC: 325 UG/DL (ref 69–240)
TIBC SERPL-MCNC: 351 UG/DL (ref 250–450)
TRANSFERRIN SERPL-MCNC: 308 MG/DL (ref 163–344)

## 2021-09-05 ENCOUNTER — HISTORICAL (OUTPATIENT)
Dept: SLEEP MEDICINE | Facility: HOSPITAL | Age: 71
End: 2021-09-05

## 2021-09-15 ENCOUNTER — HISTORICAL (OUTPATIENT)
Dept: INTERNAL MEDICINE | Facility: CLINIC | Age: 71
End: 2021-09-15

## 2021-09-15 LAB
ALBUMIN SERPL-MCNC: 3.4 GM/DL (ref 3.4–4.8)
ALBUMIN/GLOB SERPL: 0.8 RATIO (ref 1.1–2)
ALP SERPL-CCNC: 98 UNIT/L (ref 40–150)
ALT SERPL-CCNC: 16 UNIT/L (ref 0–55)
AST SERPL-CCNC: 16 UNIT/L (ref 5–34)
BILIRUB SERPL-MCNC: 0.2 MG/DL
BILIRUBIN DIRECT+TOT PNL SERPL-MCNC: 0.1 MG/DL (ref 0–0.5)
BILIRUBIN DIRECT+TOT PNL SERPL-MCNC: 0.1 MG/DL (ref 0–0.8)
BUN SERPL-MCNC: 9.6 MG/DL (ref 8.4–25.7)
CALCIUM SERPL-MCNC: 9.5 MG/DL (ref 8.8–10)
CHLORIDE SERPL-SCNC: 108 MMOL/L (ref 98–107)
CO2 SERPL-SCNC: 26 MMOL/L (ref 23–31)
CREAT SERPL-MCNC: 0.91 MG/DL (ref 0.73–1.18)
GLOBULIN SER-MCNC: 4.4 GM/DL (ref 2.4–3.5)
GLUCOSE SERPL-MCNC: 104 MG/DL (ref 82–115)
POTASSIUM SERPL-SCNC: 3.8 MMOL/L (ref 3.5–5.1)
PROT SERPL-MCNC: 7.8 GM/DL (ref 5.8–7.6)
SODIUM SERPL-SCNC: 141 MMOL/L (ref 136–145)

## 2021-10-12 ENCOUNTER — HISTORICAL (OUTPATIENT)
Dept: ADMINISTRATIVE | Facility: HOSPITAL | Age: 71
End: 2021-10-12

## 2021-10-12 LAB
ABS NEUT (OLG): 1.92 X10(3)/MCL (ref 2.1–9.2)
ALBUMIN SERPL-MCNC: 3.4 GM/DL (ref 3.4–4.8)
ALBUMIN/GLOB SERPL: 0.9 RATIO (ref 1.1–2)
ALP SERPL-CCNC: 91 UNIT/L (ref 40–150)
ALT SERPL-CCNC: 12 UNIT/L (ref 0–55)
AST SERPL-CCNC: 15 UNIT/L (ref 5–34)
BASOPHILS # BLD AUTO: 0 X10(3)/MCL (ref 0–0.2)
BASOPHILS NFR BLD AUTO: 0 %
BILIRUB SERPL-MCNC: 0.2 MG/DL
BILIRUBIN DIRECT+TOT PNL SERPL-MCNC: 0.1 MG/DL (ref 0–0.5)
BILIRUBIN DIRECT+TOT PNL SERPL-MCNC: 0.1 MG/DL (ref 0–0.8)
BUN SERPL-MCNC: 11.6 MG/DL (ref 8.4–25.7)
CALCIUM SERPL-MCNC: 9.1 MG/DL (ref 8.8–10)
CHLORIDE SERPL-SCNC: 109 MMOL/L (ref 98–107)
CO2 SERPL-SCNC: 24 MMOL/L (ref 23–31)
CREAT SERPL-MCNC: 1 MG/DL (ref 0.73–1.18)
CREAT UR-MCNC: 282.4 MG/DL (ref 58–161)
EOSINOPHIL # BLD AUTO: 0.2 X10(3)/MCL (ref 0–0.9)
EOSINOPHIL NFR BLD AUTO: 3 %
ERYTHROCYTE [DISTWIDTH] IN BLOOD BY AUTOMATED COUNT: 16.5 % (ref 11.5–14.5)
EST. AVERAGE GLUCOSE BLD GHB EST-MCNC: 157.1 MG/DL
GLOBULIN SER-MCNC: 3.8 GM/DL (ref 2.4–3.5)
GLUCOSE SERPL-MCNC: 145 MG/DL (ref 82–115)
HBA1C MFR BLD: 7.1 %
HCT VFR BLD AUTO: 31.9 % (ref 40–51)
HGB BLD-MCNC: 10 GM/DL (ref 13.5–17.5)
IMM GRANULOCYTES # BLD AUTO: 0.01 10*3/UL
IMM GRANULOCYTES NFR BLD AUTO: 0 %
LYMPHOCYTES # BLD AUTO: 2.7 X10(3)/MCL (ref 0.6–4.6)
LYMPHOCYTES NFR BLD AUTO: 51 %
MCH RBC QN AUTO: 27 PG (ref 26–34)
MCHC RBC AUTO-ENTMCNC: 31.3 GM/DL (ref 31–37)
MCV RBC AUTO: 86.2 FL (ref 80–100)
MICROALBUMIN UR-MCNC: 275.5 MG/L
MICROALBUMIN/CREAT RATIO PNL UR: 97.6 MG/GM CR (ref 0–30)
MONOCYTES # BLD AUTO: 0.5 X10(3)/MCL (ref 0.1–1.3)
MONOCYTES NFR BLD AUTO: 10 %
NEUTROPHILS # BLD AUTO: 1.92 X10(3)/MCL (ref 2.1–9.2)
NEUTROPHILS NFR BLD AUTO: 36 %
NRBC BLD AUTO-RTO: 0 % (ref 0–0.2)
PLATELET # BLD AUTO: 295 X10(3)/MCL (ref 130–400)
PMV BLD AUTO: 9.6 FL (ref 7.4–10.4)
POTASSIUM SERPL-SCNC: 3.9 MMOL/L (ref 3.5–5.1)
PROT SERPL-MCNC: 7.2 GM/DL (ref 5.8–7.6)
RBC # BLD AUTO: 3.7 X10(6)/MCL (ref 4.5–5.9)
SODIUM SERPL-SCNC: 139 MMOL/L (ref 136–145)
WBC # SPEC AUTO: 5.4 X10(3)/MCL (ref 4.5–11)

## 2021-10-20 LAB — CRC RECOMMENDATION EXT: NORMAL

## 2022-01-12 ENCOUNTER — HISTORICAL (OUTPATIENT)
Dept: ADMINISTRATIVE | Facility: HOSPITAL | Age: 72
End: 2022-01-12

## 2022-01-12 LAB
ABS NEUT (OLG): 2.68 X10(3)/MCL (ref 2.1–9.2)
ALBUMIN SERPL-MCNC: 3.4 GM/DL (ref 3.4–4.8)
ALBUMIN/GLOB SERPL: 0.7 RATIO (ref 1.1–2)
ALP SERPL-CCNC: 95 UNIT/L (ref 40–150)
ALT SERPL-CCNC: 15 UNIT/L (ref 0–55)
AST SERPL-CCNC: 16 UNIT/L (ref 5–34)
BASOPHILS # BLD AUTO: 0 X10(3)/MCL (ref 0–0.2)
BASOPHILS NFR BLD AUTO: 1 %
BILIRUB SERPL-MCNC: 0.3 MG/DL
BILIRUBIN DIRECT+TOT PNL SERPL-MCNC: 0.1 MG/DL (ref 0–0.5)
BILIRUBIN DIRECT+TOT PNL SERPL-MCNC: 0.2 MG/DL (ref 0–0.8)
BUN SERPL-MCNC: 10.2 MG/DL (ref 8.4–25.7)
CALCIUM SERPL-MCNC: 9.1 MG/DL (ref 8.7–10.5)
CHLORIDE SERPL-SCNC: 110 MMOL/L (ref 98–107)
CO2 SERPL-SCNC: 24 MMOL/L (ref 23–31)
CREAT SERPL-MCNC: 1.05 MG/DL (ref 0.73–1.18)
CREAT UR-MCNC: 267.3 MG/DL (ref 58–161)
EOSINOPHIL # BLD AUTO: 0.1 X10(3)/MCL (ref 0–0.9)
EOSINOPHIL NFR BLD AUTO: 1 %
ERYTHROCYTE [DISTWIDTH] IN BLOOD BY AUTOMATED COUNT: 14.5 % (ref 11.5–14.5)
EST. AVERAGE GLUCOSE BLD GHB EST-MCNC: 197.2 MG/DL
GLOBULIN SER-MCNC: 4.6 GM/DL (ref 2.4–3.5)
GLUCOSE SERPL-MCNC: 61 MG/DL (ref 82–115)
HBA1C MFR BLD: 8.5 %
HCT VFR BLD AUTO: 34.7 % (ref 40–51)
HGB BLD-MCNC: 11.1 GM/DL (ref 13.5–17.5)
IMM GRANULOCYTES # BLD AUTO: 0.02 10*3/UL
IMM GRANULOCYTES NFR BLD AUTO: 0 %
LYMPHOCYTES # BLD AUTO: 4.1 X10(3)/MCL (ref 0.6–4.6)
LYMPHOCYTES NFR BLD AUTO: 55 %
MCH RBC QN AUTO: 28.5 PG (ref 26–34)
MCHC RBC AUTO-ENTMCNC: 32 GM/DL (ref 31–37)
MCV RBC AUTO: 89.2 FL (ref 80–100)
MICROALBUMIN UR-MCNC: 412.7 MG/L
MICROALBUMIN/CREAT RATIO PNL UR: 154.4 MG/GM CR (ref 0–30)
MONOCYTES # BLD AUTO: 0.6 X10(3)/MCL (ref 0.1–1.3)
MONOCYTES NFR BLD AUTO: 8 %
NEUTROPHILS # BLD AUTO: 2.68 X10(3)/MCL (ref 2.1–9.2)
NEUTROPHILS NFR BLD AUTO: 35 %
NRBC BLD AUTO-RTO: 0 % (ref 0–0.2)
PLATELET # BLD AUTO: 546 X10(3)/MCL (ref 130–400)
PMV BLD AUTO: 9 FL (ref 7.4–10.4)
POTASSIUM SERPL-SCNC: 3.6 MMOL/L (ref 3.5–5.1)
PROT SERPL-MCNC: 8 GM/DL (ref 5.8–7.6)
PSA SERPL-MCNC: 5.13 NG/ML
RBC # BLD AUTO: 3.89 X10(6)/MCL (ref 4.5–5.9)
SODIUM SERPL-SCNC: 143 MMOL/L (ref 136–145)
WBC # SPEC AUTO: 7.6 X10(3)/MCL (ref 4.5–11)

## 2022-01-18 ENCOUNTER — HISTORICAL (OUTPATIENT)
Dept: ADMINISTRATIVE | Facility: HOSPITAL | Age: 72
End: 2022-01-18

## 2022-01-22 LAB — FINAL CULTURE: NORMAL

## 2022-01-24 ENCOUNTER — HISTORICAL (OUTPATIENT)
Dept: ADMINISTRATIVE | Facility: HOSPITAL | Age: 72
End: 2022-01-24

## 2022-01-24 LAB
APPEARANCE, UA: CLEAR
BACTERIA SPEC CULT: ABNORMAL /HPF
BILIRUB UR QL STRIP: NEGATIVE
COLOR UR: ABNORMAL
GLUCOSE (UA): ABNORMAL /UL
HGB UR QL STRIP: NEGATIVE /HPF
HYALINE CASTS #/AREA URNS LPF: ABNORMAL /LPF
KETONES UR QL STRIP: NEGATIVE /UL
LEUKOCYTE ESTERASE UR QL STRIP: NEGATIVE
NITRITE UR QL STRIP: NEGATIVE
PH UR STRIP: 6.5 /UL (ref 4.5–8)
PROT UR QL STRIP: ABNORMAL /UL
RBC #/AREA URNS HPF: ABNORMAL /HPF
SP GR UR STRIP: 1.03 (ref 1–1.03)
SQUAMOUS EPITHELIAL, UA: ABNORMAL /HPF
UROBILINOGEN UR STRIP-ACNC: NORMAL /HPF
WBC #/AREA URNS HPF: ABNORMAL /HPF

## 2022-04-10 ENCOUNTER — HISTORICAL (OUTPATIENT)
Dept: ADMINISTRATIVE | Facility: HOSPITAL | Age: 72
End: 2022-04-10
Payer: MEDICARE

## 2022-04-18 ENCOUNTER — HISTORICAL (OUTPATIENT)
Dept: WOUND CARE | Facility: HOSPITAL | Age: 72
End: 2022-04-18

## 2022-04-18 LAB
ABS NEUT (OLG): 3.27 (ref 2.1–9.2)
ALBUMIN SERPL-MCNC: 3.5 G/DL (ref 3.4–4.8)
ALBUMIN/GLOB SERPL: 0.8 {RATIO} (ref 1.1–2)
ALP SERPL-CCNC: 97 U/L (ref 40–150)
ALT SERPL-CCNC: 14 U/L (ref 0–55)
AST SERPL-CCNC: 17 U/L (ref 5–34)
BASOPHILS # BLD AUTO: 0 10*3/UL (ref 0–0.2)
BASOPHILS NFR BLD AUTO: 0 %
BILIRUB SERPL-MCNC: 0.7 MG/DL
BILIRUBIN DIRECT+TOT PNL SERPL-MCNC: 0.3 (ref 0–0.5)
BILIRUBIN DIRECT+TOT PNL SERPL-MCNC: 0.4 (ref 0–0.8)
BUN SERPL-MCNC: 15.9 MG/DL (ref 8.4–25.7)
CALCIUM SERPL-MCNC: 9.1 MG/DL (ref 8.7–10.5)
CHLORIDE SERPL-SCNC: 108 MMOL/L (ref 98–107)
CO2 SERPL-SCNC: 18 MMOL/L (ref 23–31)
CREAT SERPL-MCNC: 1.15 MG/DL (ref 0.73–1.18)
EOSINOPHIL # BLD AUTO: 0 10*3/UL (ref 0–0.9)
EOSINOPHIL NFR BLD AUTO: 1 %
ERYTHROCYTE [DISTWIDTH] IN BLOOD BY AUTOMATED COUNT: 14.7 % (ref 11.5–14.5)
EST. AVERAGE GLUCOSE BLD GHB EST-MCNC: 131.2 MG/DL
FLAG2 (OHS): 70
FLAG3 (OHS): 80
FLAGS (OHS): 80
GLOBULIN SER-MCNC: 4.6 G/DL (ref 2.4–3.5)
GLUCOSE SERPL-MCNC: 172 MG/DL (ref 82–115)
HBA1C MFR BLD: 6.2 %
HCT VFR BLD AUTO: 41.9 % (ref 40–51)
HEMOLYSIS INTERF INDEX SERPL-ACNC: 1
HGB BLD-MCNC: 13.7 G/DL (ref 13.5–17.5)
ICTERIC INTERF INDEX SERPL-ACNC: 1
IMM GRANULOCYTES # BLD AUTO: 0.03 10*3/UL
IMM GRANULOCYTES NFR BLD AUTO: 0 %
IMM. NE 1 SUSPECT FLAG (OHS): 10
LIPEMIC INTERF INDEX SERPL-ACNC: 3
LOW EVENT # SUSPECT FLAG (OHS): 80
LYMPHOCYTES # BLD AUTO: 2.2 10*3/UL (ref 0.6–4.6)
LYMPHOCYTES NFR BLD AUTO: 36 %
MANUAL DIFF? (OHS): NO
MCH RBC QN AUTO: 29.5 PG (ref 26–34)
MCHC RBC AUTO-ENTMCNC: 32.7 G/DL (ref 31–37)
MCV RBC AUTO: 90.1 FL (ref 80–100)
MO BLASTS SUSPECT FLAG (OHS): 100
MONOCYTES # BLD AUTO: 0.5 10*3/UL (ref 0.1–1.3)
MONOCYTES NFR BLD AUTO: 8 %
NEUTROPHILS # BLD AUTO: 3.27 10*3/UL (ref 2.1–9.2)
NEUTROPHILS NFR BLD AUTO: 54 %
NRBC BLD AUTO-RTO: 0 % (ref 0–0.2)
PLATELET # BLD AUTO: 296 10*3/UL (ref 130–400)
PLATELET CLUMPS SUSPECT FLAG (OHS): 20
PMV BLD AUTO: 10.1 FL (ref 7.4–10.4)
POTASSIUM SERPL-SCNC: 3.2 MMOL/L (ref 3.5–5.1)
PROT SERPL-MCNC: 8.1 G/DL (ref 5.8–7.6)
RBC # BLD AUTO: 4.65 10*6/UL (ref 4.5–5.9)
SODIUM SERPL-SCNC: 136 MMOL/L (ref 136–145)
WBC # SPEC AUTO: 6 10*3/UL (ref 4.5–11)

## 2022-04-26 VITALS
OXYGEN SATURATION: 100 % | BODY MASS INDEX: 28.9 KG/M2 | SYSTOLIC BLOOD PRESSURE: 169 MMHG | WEIGHT: 213.38 LBS | HEIGHT: 72 IN | DIASTOLIC BLOOD PRESSURE: 78 MMHG

## 2022-05-04 NOTE — HISTORICAL [facility] CERNER
This is a historical note converted from [employer]. Formatting and pictures may have been removed.  Please reference [employer] for original formatting and attached multimedia. Chief Complaint  boil on back, denies drainage, states painful  History of Present Illness  Patient is a 71-year-old male, here today for abscess to back x3 days.? Patient states?his daughters are in the medical field, tried squeezing it?but did not get any drainage out. ?States that the area is painful. ?States he does have some oxycodone at home?from her previous surgery, has not taken anything for pain today.? Rates pain as 9/10.  Review of Systems  Constitutional: negative except as stated in HPI  Eye: negative except as stated in HPI  ENT: negative except as stated in HPI  Respiratory: negative except as stated in HPI  Cardiovascular: negative except as stated in HPI  Gastrointestinal: negative except as stated in HPI  Genitourinary: negative except as stated in HPI  Hema/Lymph: negative except as stated in HPI  Endocrine: negative except as stated in HPI  Immunologic: negative except as stated in HPI  Musculoskeletal: negative except as stated in HPI  Integumentary: negative except as stated in HPI  Neurologic: negative except as stated in HPI  All Other ROS_ negative except as stated in HPI  ?  ?  Physical Exam  Vitals & Measurements  T:?36.8? ?C (Oral)? HR:?70(Peripheral)? RR:?18? BP:?169/78? SpO2:?100%?  HT:?183.00?cm? WT:?96.800?kg? BMI:?28.91?  General: Alert and oriented, No acute distress.  HENT: Normocephalic.?  Respiratory: Lungs are clear to auscultation, Respirations are non-labored, Breath sounds are equal, Symmetrical chest wall expansion.  Cardiovascular: Normal rate, Regular rhythm, No murmur.  Musculoskeletal: Normal range of motion.  Integumentary: Warm, Dry, abscess noted to L upper back, approx 3.5 cm x 3.5 cm, fluctuant.  Neurologic: No focal deficits, Cranial Nerves II-XII are grossly  intact.  ??????Description?  ??????Location:? L upper back  ??????Anesthesia: 1% lidocaine.?  ??????Preparation: sterile field established, skin prepped with betadine.?  ??????Incision: 1 cm incision was made, using a # 11 blade scalpel.?  ??????Technique: wound probed, loculations decompressed.?  ??????Drainage:?moderate amount, purulent.?  ??????Wound: packing placed in wound cavity,?iodoform gauze.?  ?????Post procedure exam:?Circulation, motor, sensory examination intact. ?  ?????Patient tolerated:?Well. ?  ?????Complications:?None. ?  ?????Follow-up:?PCP, In 2 days, Antibiotic:?Bactrim  ?????Performed by:?Self. ?  ?  ?  ?  Assessment/Plan  1.?Abscess of back?L02.212  ?Medication as ordered.??Leave iodoform packing in?for 24 to 48 hours, then take out. ?Cleanse area twice a day,?leave open to air to dry completely.? May cover?as needed,?change any dressings twice daily?and as needed.? If no improvement in symptoms in 2 to 3 days or if symptoms worsen?then return to clinic or go to ER.  Ordered:  sulfamethoxazole-trimethoprim, 1 tab(s), Oral, BID, X 10 day(s), # 20 tab(s), 0 Refill(s), Pharmacy: [facility]/pharmacy #[id number], 183, cm, Height/Length Dosing, 01/18/22 20:00:00 CST, 96.8, kg, Weight Dosing, 01/18/22 20:00:00 CST  I&D SubQ Abscess simple - Incision/Drain [id number] PC, 01/18/22 20:44:00 CST, 01/18/22 20:44:00 CST, Abscess of back  Office/Outpatient Visit Level 4 Established 64890 PC, Abscess of back  Back pain, 01/18/22 20:44:00 CST  Wound Culture, Stat collect, 01/18/22 20:41:00 CST, Abscess, Back, Nurse collect, Stop date 01/18/22 20:41:00 CST, Abscess of back  ?  2.?Back pain?M54.9  Continue oxycodone as previously prescribed, may also use Tylenol over-the-counter as directed. ?POC as above  Ordered:  Office/Outpatient Visit Level 4 Established 14164 PC, Abscess of back  Back pain, 01/18/22 20:44:00 CST  ?   Problem List/Past Medical History  Ongoing  AVB - Atrioventricular block  Benign prostate  hyperplasia  CAD - Coronary artery disease  Chronic low back pain  Colon polyps  Diabetes  Diabetes  Dyslipidemia  Erectile dysfunction(  Confirmed  )  HTN (hypertension)  HTN (hypertension)  Normocytic normochromic anemia  TRIP (obstructive sleep apnea)  Historical  CABG x 4 - Coronary artery bypass grafts x 4  CAD - Coronary artery disease  Hypercholesterolemia  Procedure/Surgical History  Basic metabolic panel This panel must include the following: Calcium (68679) Carbon dioxide (92175) Chloride (99786) Creatinine (66126) Glucose (30278) Potassium (80879) Sodium (24135) Urea nitrogen (BUN) (97793) (12/31/2021)  Blood count; complete (CBC), automated (Hgb, Hct, RBC, WBC and platelet count) and automated differential WBC count (12/31/2021)  CBC AND DIFFERENTIAL (12/31/2021)  Glucose, blood by glucose monitoring device(s) cleared by the FDA specifically for home use (12/31/2021)  Basic metabolic panel This panel must include the following: Calcium (28727) Carbon dioxide (40162) Chloride (13819) Creatinine (69012) Glucose (05035) Potassium (82967) Sodium (01744) Urea nitrogen (BUN) (94201) (12/30/2021)  Blood count; complete (CBC), automated (Hgb, Hct, RBC, WBC and platelet count) and automated differential WBC count (12/30/2021)  CBC AND DIFFERENTIAL (12/30/2021)  Glucose, blood by glucose monitoring device(s) cleared by the FDA specifically for home use (12/30/2021)  Basic metabolic panel This panel must include the following: Calcium (90196) Carbon dioxide (89367) Chloride (38143) Creatinine (49452) Glucose (25518) Potassium (66995) Sodium (46476) Urea nitrogen (BUN) (91875) (12/29/2021)  Blood count; complete (CBC), automated (Hgb, Hct, RBC, WBC and platelet count) and automated differential WBC count (12/29/2021)  CBC AND DIFFERENTIAL (12/29/2021)  Glucose, blood by glucose monitoring device(s) cleared by the FDA specifically for home use (12/29/2021)  ANESTHESIA INTUBATION (12/28/2021)  Antibody screen, RBC,  each serum technique (2021)  BIOPSY/SURGICAL PATHOLOGY EXAMBIOPSY/SURGICAL - PATHOLOGY REQUEST (2021)  Blood count; hemoglobin (Hgb) (2021)  Blood typing; ABO (2021)  Blood typing; Rh (D) (2021)  Colon Resection Right (2021)  Glucose, blood by glucose monitoring device(s) cleared by the FDA specifically for home use (2021)  HEMOGLOBIN; GLYCATED (2021)  Level VI - Surgical pathology, gross and microscopic examination Bone Resection Breast, Mastectomy - with Regional Lymph Nodes Colon, Segmental Resection for Tumor Colon, Total Resection Esophagus, Partial/Total Resection Extremity, Disarticulation Fetus, (2021)  RESECTION LAPAROSCOPY LARGE INTESTINESURGICAL IMAGING (2021)  Basic metabolic panel This panel must include the following: Calcium (09075) Carbon dioxide (15985) Chloride (74876) Creatinine (31943) Glucose (82418) Potassium (92151) Sodium (41524) Urea nitrogen (BUN) (02118) (2021)  Blood count; complete (CBC), automated (Hgb, Hct, RBC, WBC and platelet count) and automated differential WBC count (2021)  CBC AND DIFFERENTIAL (2021)  Electrocardiogram, routine ECG with at least 12 leads; interpretation and report only (2021)  Electrocardiogram, routine ECG with at least 12 leads; tracing only, without interpretation and report (2021)  Radiologic examination, chest; 2 views (2021)  Colonoscopy, flexible, proximal to splenic flexure; diagnostic, with or without collection of specimen(s) by brushing or washing, with or without colon decompression (separate procedure) (10/20/2021)  Colonoscopy, flexible, proximal to splenic flexure; with directed submucosal injection(s), any substance (10/20/2021)  Glucose, blood by glucose monitoring device(s) cleared by the FDA specifically for home use (10/20/2021)  Level IV - Surgical pathology, gross and microscopic examination  - Spontaneous/Missed Artery, Biopsy Bone  Marrow, Biopsy Bone Exostosis Brain/Meninges, Other than for Tumor Resection Breast, Biopsy, Not Requiring Microscopic Evaluation of Surgica (10/20/2021)  Colonoscopy (09/01/2021)  Catheter placement in coronary artery(s) for coronary angiography, including intraprocedural injection(s) for coronary angiography, imaging supervision and interpretation; with left heart catheterization including intraprocedural injection(s) for left abdulaziz (07/20/2021)  Fluoroscopy of Left Heart using Low Osmolar Contrast (07/20/2021)  Fluoroscopy of Left Internal Mammary Bypass Graft using Low Osmolar Contrast (07/20/2021)  Fluoroscopy of Multiple Coronary Artery Bypass Grafts using Low Osmolar Contrast (07/20/2021)  Measurement of Cardiac Sampling and Pressure, Left Heart, Percutaneous Approach (07/20/2021)  Bypass CABG (.) (11/04/2019)  Bypass Coronary Artery, One Artery from Left Internal Mammary, Open Approach (11/04/2019)  Bypass Coronary Artery, Two Arteries from Aorta with Autologous Venous Tissue, Open Approach (11/04/2019)  Excision of Left Saphenous Vein, Percutaneous Endoscopic Approach (11/04/2019)  Excision of Right Saphenous Vein, Percutaneous Endoscopic Approach (11/04/2019)  Performance of Cardiac Output, Continuous (11/04/2019)  Catheter placement in coronary artery(s) for coronary angiography, including intraprocedural injection(s) for coronary angiography, imaging supervision and interpretation; with left heart catheterization including intraprocedural injection(s) for left abdulaziz (10/28/2019)  Fluoroscopy of Left Internal Mammary Bypass Graft using Low Osmolar Contrast (10/28/2019)  Fluoroscopy of Other Bypass Graft using Low Osmolar Contrast (10/28/2019)  Measurement of Cardiac Sampling and Pressure, Left Heart, Percutaneous Approach (10/28/2019)  Colonoscopy, flexible; with removal of tumor(s), polyp(s), or other lesion(s) by hot biopsy forceps (07/05/2016)  Excision of Ascending Colon, Via Natural or Artificial Opening  Endoscopic, Diagnostic (07/05/2016)  Excision of Rectum, Via Natural or Artificial Opening Endoscopic, Diagnostic (07/05/2016)  Excision of Sigmoid Colon, Via Natural or Artificial Opening Endoscopic, Diagnostic (07/05/2016)  Angiogram   Medications  acetaminophen-oxycodone 325 mg-5 mg oral tablet, 1 tab(s), Oral, q6hr  amlodipine 5 mg oral tablet, See Instructions, 2 refills  aspirin 81 mg oral tablet, 81 mg= 1 tab(s), Oral, Daily, 1 refills  atorvastatin 40 mg oral tablet, 40 mg= 1 tab(s), Oral, At Bedtime, 2 refills  Bactrim  mg-160 mg oral tablet, 1 tab(s), Oral, BID  Dulcolax Stool Softener 100 mg oral capsule, 100 mg= 1 cap(s), Oral, Daily, PRN, 3 refills  empagliflozin 10 mg oral tablet, 10 mg= 1 tab(s), Oral, qAM, 1 refills  ferrous sulfate 325 mg (65 mg elemental iron) oral delayed release tablet, 325 mg= 1 tab(s), Oral, Daily  finasteride 5 mg oral tablet, 5 mg= 1 tab(s), Oral, Daily, 2 refills  Fish Oil 1200 mg oral capsule, 1200 mg= 1 cap(s), Oral, BID, 1 refills  Glucometer, See Instructions  Glucometer Test Strips, See Instructions, 3 refills  Lancets, See Instructions, 3 refills  Levemir FlexPen 100 units/mL subcutaneous solution, See Instructions, 2 refills  losartan 100 mg oral tablet, 100 mg= 1 tab(s), Oral, Daily, 2 refills  metFORMIN 1000 mg oral tablet, 1000 mg= 1 tab(s), Oral, BID, 2 refills  nitroglycerin 0.4 mg sublingual TAB, 0.4 mg= 1 tab(s), SL, q5min  ondansetron 4 mg oral tablet, disintegrating, 4 mg= 1 tab(s), Oral, q8hr  Pen Needles, See Instructions, 3 refills  Tradjenta 5 mg oral tablet, 5 mg= 1 tab(s), Oral, Daily, 2 refills  Allergies  No Known Allergies  No Known Medication Allergies  Social History  Abuse/Neglect  No, 01/12/2022  Alcohol - Denies Alcohol Use, 08/06/2014  Liquor, 1-2 times per year, 06/30/2016  Employment/School  Retired, Highest education level: High school., 07/14/2015  Exercise  Exercise duration: 60. Exercise frequency: 3-4 times/week. Self assessment:  Fair condition. Exercise type: Weight lifting., 07/14/2015  Home/Environment  Lives with Spouse. Living situation: Home/Independent. Alcohol abuse in household: No. Substance abuse in household: No. Injuries/Abuse/Neglect in household: No. Safe place to go: Yes. Agency(s)/Others notified: No. Family/Friends available for support: No. Concern for family members at home: No. Major illness in household: No. Financial concerns: No. TV/Computer concerns: No., 07/14/2015  Nutrition/Health  Type of diet: low sodium. Diabetic, Caffeine intake amount: one cups of coffee. Wants to lose weight: No. Sleeping concerns: No. Feels highly stressed: No., 06/20/2018  Sexual  Gender Identity Identifies as male., 09/19/2019  Spiritual/Cultural  [employer], 05/25/2021  Substance Use - Denies Substance Abuse, 08/06/2014  Never, 07/06/2021  Tobacco - Denies Tobacco Use, 08/06/2014  Former smoker, quit more than 30 days ago, N/A, 01/12/2022  Family History  Cataract.: Father.  Colon cancer: Mother.  Diabetes mellitus type 1.: Mother.  Heart attack: Father.  Heart disease: Father.  Hypertension.: Mother.  Immunizations  Vaccine Date Status Comments   COVID-19 mRNA, LNP-S, PF - Moderna 12/14/2021 Recorded    influenza virus vaccine, inactivated 11/05/2021 Given    zoster vaccine, inactivated 10/12/2021 Given    zoster vaccine, inactivated 07/16/2021 Given    zoster vaccine, inactivated - Not Given Patient Refuses  Patient report he has cardiology appointment soon   COVID-19 mRNA, LNP-S, PF - Moderna 03/02/2021 Recorded    COVID-19 mRNA, LNP-S, PF - Moderna 02/02/2021 Recorded    influenza virus vaccine, inactivated 11/16/2020 Recorded    influenza virus vaccine, inactivated - Not Given Patient Refuses     tetanus/diphtheria/pertussis, acel(Tdap) 06/06/2018 Given    pneumococcal 13-valent conjugate vaccine 06/06/2018 Given    influenza virus vaccine, inactivated 10/17/2017 Given    influenza virus vaccine, inactivated 10/11/2016 Given     pneumococcal 23-polyvalent vaccine 10/11/2016 Given    influenza virus vaccine, inactivated 11/20/2015 Given    influenza virus vaccine, inactivated 10/06/2011 Recorded    Health Maintenance  Health Maintenance  ???Pending?(in the next year)  ??? ??Due?  ??? ? ? ?Alcohol Misuse Screening due??01/02/22??and every 1??year(s)  ??? ? ? ?Medicare Annual Wellness Exam due??01/18/22??and every 1??year(s)  ??? ??Due In Future?  ??? ? ? ?Aspirin Therapy for CVD Prevention not due until??04/06/22??and every 1??year(s)  ??? ? ? ?Diabetes Maintenance-Fasting Lipid Profile not due until??06/25/22??and every 1??year(s)  ??? ? ? ?Diabetes Maintenance-Foot Exam not due until??07/06/22??and every 1??year(s)  ??? ? ? ?Coronary Artery Disease Maintenance-Electrocardiogram not due until??07/20/22??and every 1??year(s)  ??? ? ? ?Influenza Vaccine not due until??10/01/22??and every 1??day(s)  ??? ? ? ?ADL Screening not due until??10/12/22??and every 1??year(s)  ??? ? ? ?Diabetes Maintenance-Eye Exam not due until??11/05/22??and every 1??year(s)  ??? ? ? ?Obesity Screening not due until??01/01/23??and every 1??year(s)  ??? ? ? ?Advance Directive not due until??01/02/23??and every 1??year(s)  ??? ? ? ?Cognitive Screening not due until??01/02/23??and every 1??year(s)  ??? ? ? ?Fall Risk Assessment not due until??01/02/23??and every 1??year(s)  ??? ? ? ?Functional Assessment not due until??01/02/23??and every 1??year(s)  ??? ? ? ?Diabetes Maintenance-HgbA1c not due until??01/12/23??and every 1??year(s)  ??? ? ? ?Hypertension Management-BMP not due until??01/12/23??and every 1??year(s)  ??? ? ? ?Coronary Artery Disease Maintenance-BMP not due until??01/12/23??and every 1??year(s)  ??? ? ? ?Diabetes Maintenance-Serum Creatinine not due until??01/12/23??and every 1??year(s)  ???Satisfied?(in the past 1 year)  ??? ??Satisfied?  ??? ? ? ?ADL Screening on??10/12/21.??Satisfied by Neris Lucio LPN  ??? ? ? ?Advance Directive  on??01/12/22.??Satisfied by Neris Lucio LPN  ??? ? ? ?Alcohol Misuse Screening on??04/06/21.??Satisfied by Deepa Pascual  ??? ? ? ?Aspirin Therapy for CVD Prevention on??04/06/21.??Satisfied by Deepa Pascual  ??? ? ? ?Blood Pressure Screening on??01/18/22.??Satisfied by Agustin Roche LPN  ??? ? ? ?Body Mass Index Check on??01/18/22.??Satisfied by Agustin Roche LPN  ??? ? ? ?Cognitive Screening on??01/12/22.??Satisfied by Neris Lucio LPN  ??? ? ? ?Colorectal Screening on??09/01/21.??Satisfied by Iva Muniz  ??? ? ? ?Coronary Artery Disease Maintenance-BMP on??01/12/22.??Satisfied by Jerri Robb MT  ??? ? ? ?Depression Screening on??01/18/22.??Satisfied by Agustin Roche LPN  ??? ? ? ?Diabetes Maintenance-Medication Prescribed on??01/18/22.??Satisfied by Yolanda Marques  ??? ? ? ?Diabetes Maintenance-HgbA1c on??01/12/22.??Satisfied by Jerri Robb MT  ??? ? ? ?Diabetes Maintenance-Serum Creatinine on??01/12/22.??Satisfied by Jerri Robb MT  ??? ? ? ?Diabetes Maintenance-Microalbumin on??01/12/22.??Satisfied by Letitia Danielle  ??? ? ? ?Diabetes Maintenance-Eye Exam on??11/06/21.??Satisfied by Benson Hernandez MD  ??? ? ? ?Diabetes Maintenance-Foot Exam on??07/06/21.??Satisfied by Yolanda Marques  ??? ? ? ?Diabetes Maintenance-Fasting Lipid Profile on??06/25/21.??Satisfied by Richard Rowland  ??? ? ? ?Diabetes Screening on??01/12/22.??Satisfied by Jerri Robb MT.  ??? ? ? ?Fall Risk Assessment on??01/18/22.??Satisfied by Agustin Roche LPN  ??? ? ? ?Functional Assessment on??01/12/22.??Satisfied by Neris Lucio LPN  ??? ? ? ?Hypertension Management-Blood Pressure on??01/18/22.??Satisfied by Agustin Roche LPN  ??? ? ? ?Hypertension Management-BMP on??01/12/22.??Satisfied by Jerri Robb MT.  ??? ? ? ?Influenza Vaccine on??11/05/21.??Satisfied by Neris Lucio LPN  ??? ? ? ?Lipid Screening on??06/25/21.??Satisfied by Richard Rowland  ??? ? ? ?Obesity  Screening on??01/18/22.??Satisfied by Agustin Roche LPN  ??? ? ? ?Zoster Vaccine on??10/12/21.??Satisfied by Neris Lucio LPN  ?

## 2022-10-07 ENCOUNTER — LAB VISIT (OUTPATIENT)
Dept: LAB | Facility: HOSPITAL | Age: 72
End: 2022-10-07
Attending: NURSE PRACTITIONER
Payer: MEDICARE

## 2022-10-07 DIAGNOSIS — E11.9 DM (DIABETES MELLITUS): ICD-10-CM

## 2022-10-07 DIAGNOSIS — G47.33 OSA (OBSTRUCTIVE SLEEP APNEA): Primary | ICD-10-CM

## 2022-10-07 DIAGNOSIS — I10 HYPERTENSION, UNSPECIFIED TYPE: ICD-10-CM

## 2022-10-07 DIAGNOSIS — I25.10 CORONARY ARTERY DISEASE, UNSPECIFIED VESSEL OR LESION TYPE, UNSPECIFIED WHETHER ANGINA PRESENT, UNSPECIFIED WHETHER NATIVE OR TRANSPLANTED HEART: ICD-10-CM

## 2022-10-07 DIAGNOSIS — E78.5 DYSLIPIDEMIA: ICD-10-CM

## 2022-10-07 LAB
BASOPHILS # BLD AUTO: 0.03 X10(3)/MCL (ref 0–0.2)
BASOPHILS NFR BLD AUTO: 0.5 %
CHOLEST SERPL-MCNC: 141 MG/DL
CHOLEST/HDLC SERPL: 3 {RATIO} (ref 0–5)
CREAT UR-MCNC: 145.8 MG/DL (ref 63–166)
EOSINOPHIL # BLD AUTO: 0.1 X10(3)/MCL (ref 0–0.9)
EOSINOPHIL NFR BLD AUTO: 1.5 %
ERYTHROCYTE [DISTWIDTH] IN BLOOD BY AUTOMATED COUNT: 13 % (ref 11.5–17)
EST. AVERAGE GLUCOSE BLD GHB EST-MCNC: 122.6 MG/DL
HBA1C MFR BLD: 5.9 %
HCT VFR BLD AUTO: 42.4 % (ref 42–52)
HDLC SERPL-MCNC: 47 MG/DL (ref 35–60)
HGB BLD-MCNC: 14.3 GM/DL (ref 14–18)
IMM GRANULOCYTES # BLD AUTO: 0.01 X10(3)/MCL (ref 0–0.04)
IMM GRANULOCYTES NFR BLD AUTO: 0.2 %
LDLC SERPL CALC-MCNC: 70 MG/DL (ref 50–140)
LYMPHOCYTES # BLD AUTO: 2.9 X10(3)/MCL (ref 0.6–4.6)
LYMPHOCYTES NFR BLD AUTO: 44.6 %
MCH RBC QN AUTO: 31.4 PG (ref 27–31)
MCHC RBC AUTO-ENTMCNC: 33.7 MG/DL (ref 33–36)
MCV RBC AUTO: 93.2 FL (ref 80–94)
MICROALBUMIN UR-MCNC: 279.8 UG/ML
MICROALBUMIN/CREAT RATIO PNL UR: 191.9 MG/GM CR (ref 0–30)
MONOCYTES # BLD AUTO: 0.58 X10(3)/MCL (ref 0.1–1.3)
MONOCYTES NFR BLD AUTO: 8.9 %
NEUTROPHILS # BLD AUTO: 2.9 X10(3)/MCL (ref 2.1–9.2)
NEUTROPHILS NFR BLD AUTO: 44.3 %
NRBC BLD AUTO-RTO: 0 %
PLATELET # BLD AUTO: 316 X10(3)/MCL (ref 130–400)
PMV BLD AUTO: 9.6 FL (ref 7.4–10.4)
RBC # BLD AUTO: 4.55 X10(6)/MCL (ref 4.7–6.1)
TRIGL SERPL-MCNC: 118 MG/DL (ref 34–140)
TSH SERPL-ACNC: 1.51 UIU/ML (ref 0.35–4.94)
VLDLC SERPL CALC-MCNC: 24 MG/DL
WBC # SPEC AUTO: 6.5 X10(3)/MCL (ref 4.5–11.5)

## 2022-10-07 PROCEDURE — 82043 UR ALBUMIN QUANTITATIVE: CPT

## 2022-10-07 PROCEDURE — 36415 COLL VENOUS BLD VENIPUNCTURE: CPT

## 2022-10-07 PROCEDURE — 83036 HEMOGLOBIN GLYCOSYLATED A1C: CPT

## 2022-10-07 PROCEDURE — 85025 COMPLETE CBC W/AUTO DIFF WBC: CPT

## 2022-10-07 PROCEDURE — 84443 ASSAY THYROID STIM HORMONE: CPT

## 2022-10-07 PROCEDURE — 80061 LIPID PANEL: CPT

## 2022-10-10 ENCOUNTER — TELEPHONE (OUTPATIENT)
Dept: INTERNAL MEDICINE | Facility: CLINIC | Age: 72
End: 2022-10-10

## 2022-10-10 ENCOUNTER — CLINICAL SUPPORT (OUTPATIENT)
Dept: INTERNAL MEDICINE | Facility: CLINIC | Age: 72
End: 2022-10-10
Attending: NURSE PRACTITIONER
Payer: MEDICARE

## 2022-10-10 ENCOUNTER — OFFICE VISIT (OUTPATIENT)
Dept: INTERNAL MEDICINE | Facility: CLINIC | Age: 72
End: 2022-10-10
Payer: MEDICARE

## 2022-10-10 VITALS
SYSTOLIC BLOOD PRESSURE: 139 MMHG | HEART RATE: 60 BPM | TEMPERATURE: 98 F | BODY MASS INDEX: 27.85 KG/M2 | HEIGHT: 72 IN | WEIGHT: 205.63 LBS | RESPIRATION RATE: 20 BRPM | DIASTOLIC BLOOD PRESSURE: 71 MMHG

## 2022-10-10 DIAGNOSIS — Z23 FLU VACCINE NEED: ICD-10-CM

## 2022-10-10 DIAGNOSIS — Z79.4 TYPE 2 DIABETES MELLITUS WITHOUT COMPLICATION, WITH LONG-TERM CURRENT USE OF INSULIN: ICD-10-CM

## 2022-10-10 DIAGNOSIS — I25.10 ARTERIOSCLEROSIS OF CORONARY ARTERY: ICD-10-CM

## 2022-10-10 DIAGNOSIS — E11.9 TYPE 2 DIABETES MELLITUS WITHOUT COMPLICATION, WITH LONG-TERM CURRENT USE OF INSULIN: Primary | ICD-10-CM

## 2022-10-10 DIAGNOSIS — Z79.4 TYPE 2 DIABETES MELLITUS WITHOUT COMPLICATION, WITH LONG-TERM CURRENT USE OF INSULIN: Primary | ICD-10-CM

## 2022-10-10 DIAGNOSIS — E11.9 TYPE 2 DIABETES MELLITUS WITHOUT COMPLICATION, WITH LONG-TERM CURRENT USE OF INSULIN: ICD-10-CM

## 2022-10-10 DIAGNOSIS — I10 HYPERTENSION, UNSPECIFIED TYPE: ICD-10-CM

## 2022-10-10 DIAGNOSIS — D50.9 IRON DEFICIENCY ANEMIA, UNSPECIFIED IRON DEFICIENCY ANEMIA TYPE: ICD-10-CM

## 2022-10-10 DIAGNOSIS — E78.5 DYSLIPIDEMIA: ICD-10-CM

## 2022-10-10 PROBLEM — K63.5 POLYP OF COLON: Status: ACTIVE | Noted: 2022-10-10

## 2022-10-10 PROBLEM — M54.50 CHRONIC LOW BACK PAIN: Status: ACTIVE | Noted: 2022-10-10

## 2022-10-10 PROBLEM — N40.0 BENIGN PROSTATIC HYPERPLASIA: Status: ACTIVE | Noted: 2022-10-10

## 2022-10-10 PROBLEM — G89.29 CHRONIC LOW BACK PAIN: Status: ACTIVE | Noted: 2022-10-10

## 2022-10-10 PROBLEM — N52.9 ERECTILE DYSFUNCTION: Status: ACTIVE | Noted: 2022-10-10

## 2022-10-10 PROBLEM — G47.33 OBSTRUCTIVE SLEEP APNEA SYNDROME: Status: ACTIVE | Noted: 2022-10-10

## 2022-10-10 PROBLEM — I44.30 ATRIOVENTRICULAR BLOCK: Status: ACTIVE | Noted: 2022-10-10

## 2022-10-10 PROBLEM — D64.9 NORMOCYTIC NORMOCHROMIC ANEMIA: Status: ACTIVE | Noted: 2022-10-10

## 2022-10-10 PROBLEM — D12.2 ADENOMATOUS POLYP OF ASCENDING COLON: Status: ACTIVE | Noted: 2021-10-20

## 2022-10-10 PROCEDURE — 4010F PR ACE/ARB THEARPY RXD/TAKEN: ICD-10-PCS | Mod: CPTII,,, | Performed by: NURSE PRACTITIONER

## 2022-10-10 PROCEDURE — 3078F PR MOST RECENT DIASTOLIC BLOOD PRESSURE < 80 MM HG: ICD-10-PCS | Mod: CPTII,,, | Performed by: NURSE PRACTITIONER

## 2022-10-10 PROCEDURE — 1159F PR MEDICATION LIST DOCUMENTED IN MEDICAL RECORD: ICD-10-PCS | Mod: CPTII,,, | Performed by: NURSE PRACTITIONER

## 2022-10-10 PROCEDURE — 3060F POS MICROALBUMINURIA REV: CPT | Mod: CPTII,,, | Performed by: NURSE PRACTITIONER

## 2022-10-10 PROCEDURE — 3008F BODY MASS INDEX DOCD: CPT | Mod: CPTII,,, | Performed by: NURSE PRACTITIONER

## 2022-10-10 PROCEDURE — 1101F PR PT FALLS ASSESS DOC 0-1 FALLS W/OUT INJ PAST YR: ICD-10-PCS | Mod: CPTII,,, | Performed by: NURSE PRACTITIONER

## 2022-10-10 PROCEDURE — 3060F PR POS MICROALBUMINURIA RESULT DOCUMENTED/REVIEW: ICD-10-PCS | Mod: CPTII,,, | Performed by: NURSE PRACTITIONER

## 2022-10-10 PROCEDURE — 99214 OFFICE O/P EST MOD 30 MIN: CPT | Mod: PBBFAC | Performed by: NURSE PRACTITIONER

## 2022-10-10 PROCEDURE — 3066F NEPHROPATHY DOC TX: CPT | Mod: CPTII,,, | Performed by: NURSE PRACTITIONER

## 2022-10-10 PROCEDURE — 1160F PR REVIEW ALL MEDS BY PRESCRIBER/CLIN PHARMACIST DOCUMENTED: ICD-10-PCS | Mod: CPTII,,, | Performed by: NURSE PRACTITIONER

## 2022-10-10 PROCEDURE — 1160F RVW MEDS BY RX/DR IN RCRD: CPT | Mod: CPTII,,, | Performed by: NURSE PRACTITIONER

## 2022-10-10 PROCEDURE — 1126F AMNT PAIN NOTED NONE PRSNT: CPT | Mod: CPTII,,, | Performed by: NURSE PRACTITIONER

## 2022-10-10 PROCEDURE — 1159F MED LIST DOCD IN RCRD: CPT | Mod: CPTII,,, | Performed by: NURSE PRACTITIONER

## 2022-10-10 PROCEDURE — 3078F DIAST BP <80 MM HG: CPT | Mod: CPTII,,, | Performed by: NURSE PRACTITIONER

## 2022-10-10 PROCEDURE — 3288F PR FALLS RISK ASSESSMENT DOCUMENTED: ICD-10-PCS | Mod: CPTII,,, | Performed by: NURSE PRACTITIONER

## 2022-10-10 PROCEDURE — 3075F SYST BP GE 130 - 139MM HG: CPT | Mod: CPTII,,, | Performed by: NURSE PRACTITIONER

## 2022-10-10 PROCEDURE — 3008F PR BODY MASS INDEX (BMI) DOCUMENTED: ICD-10-PCS | Mod: CPTII,,, | Performed by: NURSE PRACTITIONER

## 2022-10-10 PROCEDURE — 3066F PR DOCUMENTATION OF TREATMENT FOR NEPHROPATHY: ICD-10-PCS | Mod: CPTII,,, | Performed by: NURSE PRACTITIONER

## 2022-10-10 PROCEDURE — G0008 ADMIN INFLUENZA VIRUS VAC: HCPCS | Mod: PBBFAC

## 2022-10-10 PROCEDURE — 99214 PR OFFICE/OUTPT VISIT, EST, LEVL IV, 30-39 MIN: ICD-10-PCS | Mod: S$PBB,,, | Performed by: NURSE PRACTITIONER

## 2022-10-10 PROCEDURE — 1101F PT FALLS ASSESS-DOCD LE1/YR: CPT | Mod: CPTII,,, | Performed by: NURSE PRACTITIONER

## 2022-10-10 PROCEDURE — 3075F PR MOST RECENT SYSTOLIC BLOOD PRESS GE 130-139MM HG: ICD-10-PCS | Mod: CPTII,,, | Performed by: NURSE PRACTITIONER

## 2022-10-10 PROCEDURE — 4010F ACE/ARB THERAPY RXD/TAKEN: CPT | Mod: CPTII,,, | Performed by: NURSE PRACTITIONER

## 2022-10-10 PROCEDURE — 99214 OFFICE O/P EST MOD 30 MIN: CPT | Mod: S$PBB,,, | Performed by: NURSE PRACTITIONER

## 2022-10-10 PROCEDURE — 3288F FALL RISK ASSESSMENT DOCD: CPT | Mod: CPTII,,, | Performed by: NURSE PRACTITIONER

## 2022-10-10 PROCEDURE — 92228 IMG RTA DETC/MNTR DS PHY/QHP: CPT | Mod: 59,PBBFAC

## 2022-10-10 PROCEDURE — 1126F PR PAIN SEVERITY QUANTIFIED, NO PAIN PRESENT: ICD-10-PCS | Mod: CPTII,,, | Performed by: NURSE PRACTITIONER

## 2022-10-10 RX ORDER — EMPAGLIFLOZIN 10 MG/1
10 TABLET, FILM COATED ORAL EVERY MORNING
COMMUNITY
Start: 2022-07-19 | End: 2022-10-10 | Stop reason: SDUPTHER

## 2022-10-10 RX ORDER — FINASTERIDE 5 MG/1
5 TABLET, FILM COATED ORAL DAILY
Qty: 90 TABLET | Refills: 2 | Status: SHIPPED | OUTPATIENT
Start: 2022-10-10 | End: 2023-05-03 | Stop reason: SDUPTHER

## 2022-10-10 RX ORDER — MELATONIN 3 MG
3 CAPSULE ORAL
Status: ON HOLD | COMMUNITY
End: 2023-10-13

## 2022-10-10 RX ORDER — ATORVASTATIN CALCIUM 40 MG/1
40 TABLET, FILM COATED ORAL NIGHTLY
Qty: 90 TABLET | Refills: 2 | Status: SHIPPED | OUTPATIENT
Start: 2022-10-10 | End: 2023-05-03 | Stop reason: SDUPTHER

## 2022-10-10 RX ORDER — AMOXICILLIN 500 MG
2 CAPSULE ORAL
Status: ON HOLD | COMMUNITY
End: 2023-10-13

## 2022-10-10 RX ORDER — ASPIRIN 81 MG/1
81 TABLET ORAL
Status: ON HOLD | COMMUNITY
End: 2023-10-15 | Stop reason: SDUPTHER

## 2022-10-10 RX ORDER — AMLODIPINE BESYLATE 5 MG/1
5 TABLET ORAL DAILY
Qty: 90 TABLET | Refills: 2 | Status: SHIPPED | OUTPATIENT
Start: 2022-10-10 | End: 2023-05-03 | Stop reason: SDUPTHER

## 2022-10-10 RX ORDER — AMLODIPINE BESYLATE 5 MG/1
5 TABLET ORAL DAILY
COMMUNITY
Start: 2022-08-05 | End: 2022-10-10 | Stop reason: SDUPTHER

## 2022-10-10 RX ORDER — INSULIN DETEMIR 100 [IU]/ML
INJECTION, SOLUTION SUBCUTANEOUS
COMMUNITY
Start: 2022-06-22 | End: 2022-10-10 | Stop reason: SDUPTHER

## 2022-10-10 RX ORDER — LINAGLIPTIN 5 MG/1
5 TABLET, FILM COATED ORAL DAILY
COMMUNITY
Start: 2022-08-05 | End: 2022-10-10 | Stop reason: SDUPTHER

## 2022-10-10 RX ORDER — LINAGLIPTIN 5 MG/1
5 TABLET, FILM COATED ORAL DAILY
Qty: 90 TABLET | Refills: 2 | Status: SHIPPED | OUTPATIENT
Start: 2022-10-10 | End: 2023-05-03 | Stop reason: SDUPTHER

## 2022-10-10 RX ORDER — EMPAGLIFLOZIN 10 MG/1
10 TABLET, FILM COATED ORAL EVERY MORNING
Qty: 90 TABLET | Refills: 2 | Status: SHIPPED | OUTPATIENT
Start: 2022-10-10 | End: 2023-05-03 | Stop reason: SDUPTHER

## 2022-10-10 RX ORDER — POTASSIUM CHLORIDE 20 MEQ/1
TABLET, EXTENDED RELEASE ORAL
COMMUNITY
Start: 2022-04-18 | End: 2022-10-10

## 2022-10-10 RX ORDER — BACLOFEN 10 MG/1
10 TABLET ORAL
Status: ON HOLD | COMMUNITY
Start: 2022-04-07 | End: 2023-10-13

## 2022-10-10 RX ORDER — NITROGLYCERIN 0.4 MG/1
0.4 TABLET SUBLINGUAL
Status: ON HOLD | COMMUNITY
End: 2023-10-13

## 2022-10-10 RX ORDER — LOSARTAN POTASSIUM 50 MG/1
100 TABLET ORAL DAILY
COMMUNITY
Start: 2022-08-05 | End: 2022-10-10 | Stop reason: SDUPTHER

## 2022-10-10 RX ORDER — LOSARTAN POTASSIUM 50 MG/1
100 TABLET ORAL DAILY
Qty: 90 TABLET | Refills: 2 | Status: SHIPPED | OUTPATIENT
Start: 2022-10-10 | End: 2023-05-03 | Stop reason: SDUPTHER

## 2022-10-10 RX ORDER — DOCUSATE SODIUM 100 MG/1
100 CAPSULE, LIQUID FILLED ORAL
COMMUNITY

## 2022-10-10 RX ORDER — CARVEDILOL 6.25 MG/1
6.25 TABLET ORAL 2 TIMES DAILY
Status: ON HOLD | COMMUNITY
Start: 2022-08-24 | End: 2023-10-13

## 2022-10-10 RX ORDER — FERROUS SULFATE 325(65) MG
325 TABLET ORAL
COMMUNITY
End: 2022-10-10 | Stop reason: SDUPTHER

## 2022-10-10 RX ORDER — FERROUS SULFATE 325(65) MG
325 TABLET ORAL DAILY
Qty: 90 TABLET | Refills: 2 | Status: SHIPPED | OUTPATIENT
Start: 2022-10-10 | End: 2023-05-03 | Stop reason: SDUPTHER

## 2022-10-10 RX ORDER — INSULIN DETEMIR 100 [IU]/ML
INJECTION, SOLUTION SUBCUTANEOUS
Qty: 120 ML | Refills: 6 | Status: SHIPPED | OUTPATIENT
Start: 2022-10-10 | End: 2023-05-03 | Stop reason: SDUPTHER

## 2022-10-10 RX ORDER — ATORVASTATIN CALCIUM 40 MG/1
40 TABLET, FILM COATED ORAL NIGHTLY
COMMUNITY
Start: 2022-07-19 | End: 2022-10-10 | Stop reason: SDUPTHER

## 2022-10-10 RX ORDER — FINASTERIDE 5 MG/1
5 TABLET, FILM COATED ORAL DAILY
COMMUNITY
Start: 2022-09-01 | End: 2022-10-10 | Stop reason: SDUPTHER

## 2022-10-10 RX ORDER — METFORMIN HYDROCHLORIDE 1000 MG/1
1000 TABLET ORAL 2 TIMES DAILY WITH MEALS
Qty: 180 TABLET | Refills: 2 | Status: SHIPPED | OUTPATIENT
Start: 2022-10-10 | End: 2023-05-03 | Stop reason: SDUPTHER

## 2022-10-10 RX ORDER — METFORMIN HYDROCHLORIDE 1000 MG/1
1000 TABLET ORAL 2 TIMES DAILY WITH MEALS
COMMUNITY
Start: 2022-07-19 | End: 2022-10-10 | Stop reason: SDUPTHER

## 2022-10-10 NOTE — TELEPHONE ENCOUNTER
Please let patient know diabetic eye results did not show any findings of retinopathy. It is recommended to repeat screening in a year.     Thank you

## 2022-10-10 NOTE — ASSESSMENT & PLAN NOTE
LDL 70, Trig 118, HLD 47, Total 141  Avoid tobacco/ alcohol  Follow low fat/low cholesterol diet such as avoid/ decrease bean, sausage, fried foods, cookies, cakes, chips, cheese, whole milk, butter, mayonnaise. Add olive oil, avocados, lean meats, fresh fruits/ vegetables, heart healthy nuts to diet.  Educated on health benefits of exercise 5 days/ week of at least 30 minutes moderate intensity exercise (brisk walking) and 2 days/ week of muscle strength activities  Daily ASA 81 mg for CV prevention  Continue current medication regimen

## 2022-10-10 NOTE — ASSESSMENT & PLAN NOTE
/71  Follow low sodium diet, < 2 gm/day (avoid high salty foods such as processed meats/ sausage/bean/ sandwich meat, chips, pickles, cheese, crackers and soft drinks/ electrolyte replacement drinks).  Avoid tobacco/ alcohol use  Educated on health benefits of at least 5 days/ week of 30 minutes moderate intensity exercise (brisk walking) and 2 or more days/ week of muscle strength activities  Daily ASA 81 mg for CV prevention  Continue current medication regimen

## 2022-10-10 NOTE — PROGRESS NOTES
Rogelio Johnson is a 72 y.o. male here for a diabetic eye screening with non-dilated fundus photos per KIMBERLY Rivera.    Patient cooperative?: Yes  Small pupils?: No  Last eye exam: unknown    For exam results, see Encounter Report.

## 2022-10-10 NOTE — ASSESSMENT & PLAN NOTE
A1c improved 5.9%; Prior A1c 6.2%  Hypoglycemia: denies symptoms  Microalbumin/ Cr ratio: 191.9  Educated on ADA diet:  1. Avoid/ decrease high carbohydrate foods (rice, pasta, bread, candy, sweets, carbonated beverages)  Educated on health benefits of at least 5 days/ week of 30 minutes moderate intensity exercise (brisk walking) and 2 or more days/ week of muscle strength activities  Avoid alcohol or tobacco use if applicable  Eye exam: 10/12/21 no DR, order placed today   Foot exam: 10/10/22  ACEI d/c s/t cough; ARB  Continue daily ASA and statin  Continue with current regimen

## 2022-10-10 NOTE — PROGRESS NOTES
Yolanda L Reji, NP   [facility]  [address] [facility] [address]      PATIENT NAME: Rogelio Johnson  : 1950  DATE: 10/10/22  MRN: [id number]      Billing Provider: Yolanda Mendoza NP  Level of Service: [facility] OFFICE/OUTPT VISIT, EST, LEVL IV, 30-39 MIN  Patient PCP Information       Provider PCP Type    Yolanda Mendoza NP General            Reason for Visit / Chief Complaint: Follow-up (Lab result), Diabetes, and Flu Vaccine       History of Present Illness / Problem Focused Workflow     Rogelio Johnson presents to the clinic with Follow-up (Lab result), Diabetes, and Flu Vaccine     71 yo AAM for 6 mo follow up/ lab results. PMH includes CAD, CABG 19, type 2 AV block, HTN, HLD, type 2 DM, BPH, elevated PSA, ED, anemia, colon polyps, TRIP, and obesity. /71. Labs completed with improved A1c 5.9%. LDL 70. Medication refills needed. Wants flu vaccine today. Denies any fever, chills, HA, dizziness, sore throat, cough, SOB, CP, abdominal pain, n/v/d, hematochezia.    Other providers   Dr. Mathew, Cardiology- was referred to Dr. Sánchez  [facility]  [facility] Dr. DONATO Rodriguez, Dr. Quintana      Review of Systems     Review of Systems   Constitutional:  Negative for appetite change, chills, fatigue, fever and unexpected weight change.   HENT:  Negative for congestion, ear pain, hearing loss, sinus pressure, sore throat and tinnitus.    Respiratory:  Negative for cough, chest tightness, shortness of breath and wheezing.    Cardiovascular:  Negative for chest pain, palpitations and leg swelling.   Gastrointestinal:  Negative for abdominal distention, abdominal pain, blood in stool, constipation, diarrhea, nausea and vomiting.   Genitourinary:  Negative for dysuria and hematuria.   Musculoskeletal:  Negative for arthralgias and myalgias.   Skin:  Negative for pallor.   Allergic/Immunologic: Negative for environmental allergies.   Neurological:   Negative for dizziness, syncope, weakness, numbness and headaches.   Hematological:  Negative for adenopathy. Does not bruise/bleed easily.   Psychiatric/Behavioral:  Negative for agitation, behavioral problems, confusion, hallucinations, sleep disturbance and suicidal ideas. The patient is not nervous/anxious.      Medical / Social / Family History   History reviewed. No pertinent past medical history.    Past Surgical History:   Procedure Laterality Date    COLONOSCOPY      CORONARY ARTERY BYPASS GRAFT (CABG)         Social History  Mr. Mercado  reports that he has never smoked. He has never used smokeless tobacco. He reports that he does not currently use alcohol. He reports that he does not use drugs.    Family History  Mr. Mercado's family history includes Cataracts in his father; Colon cancer in his mother; Diabetes Mellitus in his mother; Heart attack in his father; Heart disease in his father; Hypertension in his mother.    Medications and Allergies     Medications  Medication List with Changes/Refills   Current Medications    ASPIRIN (ECOTRIN) 81 MG EC TABLET    Take 81 mg by mouth.    BACLOFEN (LIORESAL) 10 MG TABLET    Take 10 mg by mouth.    CARVEDILOL (COREG) 6.25 MG TABLET    Take 6.25 mg by mouth 2 (two) times daily.    CYANOCOBALAMIN, VITAMIN B-12, 1,000 MCG/ML DROP    Take 5 drops by mouth.    DOCUSATE SODIUM (COLACE) 100 MG CAPSULE    Take 100 mg by mouth.    MELATONIN 3 MG CAP    Take 3 mg by mouth.    NITROGLYCERIN (NITROSTAT) 0.4 MG SL TABLET    Place 0.4 mg under the tongue.    OMEGA-3 FATTY ACIDS/FISH OIL (FISH OIL-OMEGA-3 FATTY ACIDS) 300-1,000 MG CAPSULE    Take 2 g by mouth.   Changed and/or Refilled Medications    Modified Medication Previous Medication    AMLODIPINE (NORVASC) 5 MG TABLET amLODIPine (NORVASC) 5 MG tablet       Take 1 tablet (5 mg total) by mouth once daily.    Take 5 mg by mouth once daily.    ATORVASTATIN (LIPITOR) 40 MG TABLET atorvastatin (LIPITOR) 40 MG tablet       Take  1 tablet (40 mg total) by mouth every evening.    Take 40 mg by mouth every evening.    FERROUS SULFATE (FEROSUL) 325 MG (65 MG IRON) TAB TABLET FEROSUL 325 mg (65 mg iron) Tab tablet       Take 1 tablet (325 mg total) by mouth once daily.    TAKE 1 TABLET BY MOUTH DAILY; MAY take WITH FOOD TO minimize abdominal DISCOMFORT    FINASTERIDE (PROSCAR) 5 MG TABLET finasteride (PROSCAR) 5 mg tablet       Take 1 tablet (5 mg total) by mouth once daily.    Take 5 mg by mouth once daily.    JARDIANCE 10 MG TABLET JARDIANCE 10 mg tablet       Take 1 tablet (10 mg total) by mouth every morning.    Take 10 mg by mouth every morning.    LEVEMIR FLEXTOUCH U-100 INSULN 100 UNIT/ML (3 ML) INPN PEN LEVEMIR FLEXTOUCH U-100 INSULN 100 unit/mL (3 mL) InPn pen       Administer 68 units SC q AM and 62 units SC q HS. Rotate injection sites.    INJECT 68 UNITS UNDER THE SKIN EVERY MORNING AND 62 UNITS AT BEDTIME DAILY (ROTATE INJECTION SITES)    LOSARTAN (COZAAR) 50 MG TABLET losartan (COZAAR) 50 MG tablet       Take 2 tablets (100 mg total) by mouth once daily.    Take 100 mg by mouth once daily.    METFORMIN (GLUCOPHAGE) 1000 MG TABLET metFORMIN (GLUCOPHAGE) 1000 MG tablet       Take 1 tablet (1,000 mg total) by mouth 2 (two) times daily with meals.    Take 1,000 mg by mouth 2 (two) times daily with meals.    TRADJENTA 5 MG TAB TABLET TRADJENTA 5 mg Tab tablet       Take 1 tablet (5 mg total) by mouth once daily.    Take 5 mg by mouth once daily.   Discontinued Medications    FERROUS SULFATE (FEOSOL) 325 MG (65 MG IRON) TAB TABLET    Take 325 mg by mouth.    POTASSIUM CHLORIDE SA (K-DUR,KLOR-CON) 20 MEQ TABLET    TAKE 1 TABLET BY MOUTH TWICE DAILY WITH FOOD AND FULL GLASS OF WATER DO NOT CRUSH OR CHEW         Allergies  Review of patient's allergies indicates:  No Known Allergies    Physical Examination     Vitals:    10/10/22 0907   BP: 139/71   Pulse: 60   Resp: 20   Temp: 97.5 °F (36.4 °C)     Physical Exam  Vitals and nursing note  reviewed.   Constitutional:       Appearance: Normal appearance. He is not ill-appearing.   HENT:      Head: Normocephalic.      Right Ear: Tympanic membrane normal.      Left Ear: Tympanic membrane normal.      Nose: Nose normal.      Mouth/Throat:      Mouth: Mucous membranes are moist.   Eyes:      Extraocular Movements: Extraocular movements intact.      Conjunctiva/sclera: Conjunctivae normal.      Pupils: Pupils are equal, round, and reactive to light.   Cardiovascular:      Rate and Rhythm: Normal rate and regular rhythm.      Pulses: Normal pulses.           Dorsalis pedis pulses are 2+ on the right side and 2+ on the left side.        Posterior tibial pulses are 2+ on the right side and 2+ on the left side.   Pulmonary:      Effort: Pulmonary effort is normal. No respiratory distress.      Breath sounds: Normal breath sounds.   Abdominal:      General: Bowel sounds are normal. There is no distension.      Palpations: Abdomen is soft. There is no mass.      Tenderness: There is no abdominal tenderness.      Hernia: No hernia is present.   Musculoskeletal:         General: Normal range of motion.      Cervical back: Normal range of motion and neck supple.      Right lower leg: No edema.      Left lower leg: No edema.   Feet:      Right foot:      Protective Sensation: 5 sites tested.  5 sites sensed.      Skin integrity: Skin integrity normal.      Toenail Condition: Right toenails are normal.      Left foot:      Protective Sensation: 5 sites tested.  5 sites sensed.      Skin integrity: Skin integrity normal.      Toenail Condition: Left toenails are normal.   Skin:     General: Skin is warm and dry.      Capillary Refill: Capillary refill takes less than 2 seconds.   Neurological:      Mental Status: He is alert and oriented to person, place, and time. Mental status is at baseline.      Motor: No weakness.   Psychiatric:         Mood and Affect: Mood normal.         Behavior: Behavior normal.          Thought Content: Thought content normal.         Judgment: Judgment normal.         Results     Lab Results   Component Value Date    WBC 6.5 10/07/2022    RBC 4.55 (L) 10/07/2022    HGB 14.3 10/07/2022    HCT 42.4 10/07/2022    MCV 93.2 10/07/2022    MCH 31.4 (H) 10/07/2022    MCHC 33.7 10/07/2022    RDW 13.0 10/07/2022     10/07/2022    MPV 9.6 10/07/2022     Sodium Level   Date Value Ref Range Status   04/18/2022 136 136 - 145      Potassium Level   Date Value Ref Range Status   04/18/2022 3.2 3.5 - 5.1      Carbon Dioxide   Date Value Ref Range Status   04/18/2022 18 23 - 31      Blood Urea Nitrogen   Date Value Ref Range Status   04/18/2022 15.9 8.4 - 25.7      Creatinine   Date Value Ref Range Status   04/18/2022 1.15 0.73 - 1.18      Calcium Level Total   Date Value Ref Range Status   04/18/2022 9.1 8.7 - 10.5      Albumin Level   Date Value Ref Range Status   04/18/2022 3.5 3.4 - 4.8      Bilirubin Total   Date Value Ref Range Status   04/18/2022 0.7 <=1.5      Alkaline Phosphatase   Date Value Ref Range Status   04/18/2022 97 40 - 150      Aspartate Aminotransferase   Date Value Ref Range Status   04/18/2022 17 5 - 34      Alanine Aminotransferase   Date Value Ref Range Status   04/18/2022 14 0 - 55      Estimated GFR-Non    Date Value Ref Range Status   04/18/2022 66 >=90      Lab Results   Component Value Date    CHOL 141 10/07/2022     Lab Results   Component Value Date    HDL 47 10/07/2022     No results found for: LDLCALC  Lab Results   Component Value Date    TRIG 118 10/07/2022     No results found for: CHOLHDL  Lab Results   Component Value Date    TSH 1.5150 10/07/2022     Lab Results   Component Value Date    PHUR 6.5 01/24/2022    PROTEINUA Trace (A) 01/24/2022    GLUCUA 4+ (A) 01/24/2022    KETONESU Negative 01/24/2022    OCCULTUA Negative 01/24/2022    NITRITE Negative 01/24/2022    LEUKOCYTESUR Negative 01/24/2022     Lab Results   Component Value Date    HGBA1C 5.9  10/07/2022    HGBA1C 6.2 04/18/2022    HGBA1C 8.5 (H) 01/12/2022     No results found for: MEGAN, [id number]   No results found for this or any previous visit.         Assessment and Plan (including Health Maintenance)     Plan:         Health Maintenance Due   Topic Date Due    COVID-19 Vaccine (1) 1950    Influenza Vaccine (1) 09/01/2022       Problem List Items Addressed This Visit          Cardiac/Vascular    Arteriosclerosis of coronary artery    Current Assessment & Plan     Avoid tobacco/ alcohol.  Regular exercise as tolerated.  Continue medications as prescribed, anticoagulation as prescribed, keep f/u with Cardiologist.  ER precautions for CP, SOB, palpitations, dizziness, syncope, weakness.            Relevant Orders    CBC Auto Differential    Comprehensive Metabolic Panel    Hemoglobin A1C    Dyslipidemia    Current Assessment & Plan     LDL 70, Trig 118, HLD 47, Total 141  Avoid tobacco/ alcohol  Follow low fat/low cholesterol diet such as avoid/ decrease bean, sausage, fried foods, cookies, cakes, chips, cheese, whole milk, butter, mayonnaise. Add olive oil, avocados, lean meats, fresh fruits/ vegetables, heart healthy nuts to diet.  Educated on health benefits of exercise 5 days/ week of at least 30 minutes moderate intensity exercise (brisk walking) and 2 days/ week of muscle strength activities  Daily ASA 81 mg for CV prevention  Continue current medication regimen           Relevant Medications    atorvastatin (LIPITOR) 40 MG tablet    Other Relevant Orders    CBC Auto Differential    Comprehensive Metabolic Panel    Hemoglobin A1C    Hypertension    Current Assessment & Plan     /71  Follow low sodium diet, < 2 gm/day (avoid high salty foods such as processed meats/ sausage/bean/ sandwich meat, chips, pickles, cheese, crackers and soft drinks/ electrolyte replacement drinks).  Avoid tobacco/ alcohol use  Educated on health benefits of at least 5 days/ week of 30 minutes moderate  intensity exercise (brisk walking) and 2 or more days/ week of muscle strength activities  Daily ASA 81 mg for CV prevention  Continue current medication regimen           Relevant Medications    losartan (COZAAR) 50 MG tablet    amLODIPine (NORVASC) 5 MG tablet    Other Relevant Orders    CBC Auto Differential    Comprehensive Metabolic Panel    Hemoglobin A1C    Microalbumin/Creatinine Ratio, Urine       Endocrine    Type 2 diabetes mellitus without complication, with long-term current use of insulin - Primary    Current Assessment & Plan     A1c improved 5.9%; Prior A1c 6.2%  Hypoglycemia: denies symptoms  Microalbumin/ Cr ratio: 191.9  Educated on ADA diet:  1. Avoid/ decrease high carbohydrate foods (rice, pasta, bread, candy, sweets, carbonated beverages)  Educated on health benefits of at least 5 days/ week of 30 minutes moderate intensity exercise (brisk walking) and 2 or more days/ week of muscle strength activities  Avoid alcohol or tobacco use if applicable  Eye exam: 10/12/21 no DR, order placed today   Foot exam: 10/10/22  ACEI d/c s/t cough; ARB  Continue daily ASA and statin  Continue with current regimen             Relevant Medications    atorvastatin (LIPITOR) 40 MG tablet    JARDIANCE 10 mg tablet    LEVEMIR FLEXTOUCH U-100 INSULN 100 unit/mL (3 mL) InPn pen    losartan (COZAAR) 50 MG tablet    metFORMIN (GLUCOPHAGE) 1000 MG tablet    TRADJENTA 5 mg Tab tablet    Other Relevant Orders    Diabetic Eye Screening Photo    CBC Auto Differential    Comprehensive Metabolic Panel    Hemoglobin A1C    Microalbumin/Creatinine Ratio, Urine     Other Visit Diagnoses       Flu vaccine need        Relevant Orders    Influenza (FLUAD) - Quadrivalent (Adjuvanted) *Preferred* (65+) (PF)    Anemia, unspecified        Relevant Medications    ferrous sulfate (FEROSUL) 325 mg (65 mg iron) Tab tablet            Health Maintenance Topics with due status: Not Due       Topic Last Completion Date    TETANUS VACCINE  06/06/2018    Colorectal Cancer Screening 10/20/2021    Lipid Panel 10/07/2022    High Dose Statin 10/10/2022       Follow up 6 months virtual audio with labs.        Signature:  Yolanda Mendoza NP  [facility]  [address] [facility] [address]    Date of encounter: 10/10/22

## 2022-12-10 ENCOUNTER — HOSPITAL ENCOUNTER (EMERGENCY)
Facility: HOSPITAL | Age: 72
Discharge: HOME | End: 2022-12-10
Attending: INTERNAL MEDICINE
Payer: MEDICARE

## 2022-12-10 VITALS
HEART RATE: 85 BPM | TEMPERATURE: 98 F | BODY MASS INDEX: 27.8 KG/M2 | WEIGHT: 205 LBS | SYSTOLIC BLOOD PRESSURE: 161 MMHG | OXYGEN SATURATION: 100 % | RESPIRATION RATE: 18 BRPM | DIASTOLIC BLOOD PRESSURE: 79 MMHG

## 2022-12-10 DIAGNOSIS — K13.70 MOUTH LESION: Primary | ICD-10-CM

## 2022-12-10 DIAGNOSIS — Z97.2: ICD-10-CM

## 2022-12-10 DIAGNOSIS — K12.1: ICD-10-CM

## 2022-12-10 PROCEDURE — 99282 EMERGENCY DEPT VISIT SF MDM: CPT

## 2022-12-10 NOTE — ED PROVIDER NOTES
"Encounter Date: 12/10/2022       History     Chief Complaint   Patient presents with    Mouth Lesions     Reports "canker sores" to mouth x several months     Pt is a 71 y/o male presenting to [facility] ED with c/o "canker sore" in his mouth for the past several months. Pt wears upper and lower dentures, but has had to stop wearing his lower dentures due to an intermittent sore on his lower gum line. This area is painful, but does not drain or bleed. States he has tried OTC ulcer medications without relief. Has not been to the dentist in several years.    The history is provided by the patient.   Review of patient's allergies indicates:  No Known Allergies  No past medical history on file.  Past Surgical History:   Procedure Laterality Date    COLONOSCOPY      CORONARY ARTERY BYPASS GRAFT (CABG)       Family History   Problem Relation Age of Onset    Colon cancer Mother     Diabetes Mellitus Mother     Hypertension Mother     Cataracts Father     Heart attack Father     Heart disease Father      Social History     Tobacco Use    Smoking status: Never    Smokeless tobacco: Never   Substance Use Topics    Alcohol use: Not Currently    Drug use: Never     Review of Systems   Constitutional:  Negative for chills, fever and unexpected weight change.   HENT:  Positive for dental problem and mouth sores. Negative for facial swelling, rhinorrhea, sinus pain, sore throat, tinnitus and trouble swallowing.    Respiratory:  Negative for cough, chest tightness and shortness of breath.    Cardiovascular:  Negative for chest pain.   Gastrointestinal:  Negative for abdominal pain, blood in stool, diarrhea, nausea and vomiting.   Genitourinary:  Negative for difficulty urinating and hematuria.   Musculoskeletal:  Negative for arthralgias and myalgias.   Skin:  Negative for rash.   Neurological:  Negative for dizziness, light-headedness and headaches.   All other systems reviewed and are negative.    Physical Exam     Initial Vitals " [12/10/22 1355]   BP Pulse Resp Temp SpO2   (!) 161/79 85 18 98.2 °F (36.8 °C) 100 %      MAP       --         Physical Exam    Nursing note and vitals reviewed.  Constitutional: He appears well-developed.   HENT:   Head: Normocephalic and atraumatic.   Mouth/Throat: Oropharynx is clear and moist. No oropharyngeal exudate.   Two polypoid lesions present at lower gum line, no erythema, bleeding or purulent drainage from either lesion. Pt wearing upper dentures.   Eyes: Conjunctivae and EOM are normal. Pupils are equal, round, and reactive to light.   Neck: Neck supple.   Normal range of motion.  Cardiovascular:  Normal rate, regular rhythm and intact distal pulses.           Pulmonary/Chest: Breath sounds normal. No respiratory distress.   Abdominal: Abdomen is soft. He exhibits no distension. There is no abdominal tenderness. There is no rebound and no guarding.   Musculoskeletal:         General: No edema. Normal range of motion.      Cervical back: Normal range of motion and neck supple.     Neurological: He is alert and oriented to person, place, and time. He has normal strength.   Skin: Skin is warm and dry. No rash noted.   Psychiatric: His behavior is normal.       ED Course   Procedures  Labs Reviewed - No data to display       Imaging Results    None          Medications - No data to display                    Provided pt with list of dentists in the area, discussed the importance of regular follow-up with a dentist when using dentures.       Clinical Impression:   Final diagnoses:  [K13.70] Mouth lesion (Primary)  [K12.1, Z97.2] Irritant contact stomatitis due to denture      ED Disposition Condition    Discharge Stable          ED Prescriptions       Medication Sig Dispense Start Date End Date Auth. Provider    diphenhydrAMINE-aluminum-magnesium hydroxide-simethicone-LIDOcaine HCl 2% Swish and spit 15 mLs every 6 (six) hours as needed (Pain). 420 each 12/10/2022 12/17/2022 Boston Fonseca MD           Follow-up Information       Follow up With Specialties Details Why Contact Info    Yolanda Mendoza NP Nurse Practitioner In 2 weeks  [address] [facility] [address]  [phone]      [facility] Emergency Medicine  If symptoms worsen [address] [facility] [address] [phone]             Boston Fonseca MD  12/10/22 1534       Boston Fonseca MD  12/10/22 1534

## 2022-12-15 ENCOUNTER — DOCUMENTATION ONLY (OUTPATIENT)
Dept: ADMINISTRATIVE | Facility: HOSPITAL | Age: 72
End: 2022-12-15
Payer: MEDICARE

## 2023-05-03 ENCOUNTER — OFFICE VISIT (OUTPATIENT)
Dept: INTERNAL MEDICINE | Facility: CLINIC | Age: 73
End: 2023-05-03
Payer: MEDICARE

## 2023-05-03 VITALS
HEIGHT: 72 IN | TEMPERATURE: 98 F | DIASTOLIC BLOOD PRESSURE: 70 MMHG | BODY MASS INDEX: 27.68 KG/M2 | WEIGHT: 204.38 LBS | RESPIRATION RATE: 20 BRPM | HEART RATE: 69 BPM | SYSTOLIC BLOOD PRESSURE: 144 MMHG

## 2023-05-03 DIAGNOSIS — E11.9 TYPE 2 DIABETES MELLITUS WITHOUT COMPLICATION, WITH LONG-TERM CURRENT USE OF INSULIN: ICD-10-CM

## 2023-05-03 DIAGNOSIS — N40.0 BENIGN PROSTATIC HYPERPLASIA, UNSPECIFIED WHETHER LOWER URINARY TRACT SYMPTOMS PRESENT: ICD-10-CM

## 2023-05-03 DIAGNOSIS — Z79.4 TYPE 2 DIABETES MELLITUS WITHOUT COMPLICATION, WITH LONG-TERM CURRENT USE OF INSULIN: ICD-10-CM

## 2023-05-03 DIAGNOSIS — I25.10 ARTERIOSCLEROSIS OF CORONARY ARTERY: ICD-10-CM

## 2023-05-03 DIAGNOSIS — D50.9 IRON DEFICIENCY ANEMIA, UNSPECIFIED IRON DEFICIENCY ANEMIA TYPE: ICD-10-CM

## 2023-05-03 DIAGNOSIS — G47.33 OBSTRUCTIVE SLEEP APNEA SYNDROME: ICD-10-CM

## 2023-05-03 DIAGNOSIS — F43.9 STRESS AT HOME: ICD-10-CM

## 2023-05-03 DIAGNOSIS — E78.5 DYSLIPIDEMIA: ICD-10-CM

## 2023-05-03 DIAGNOSIS — I10 HYPERTENSION, UNSPECIFIED TYPE: Primary | ICD-10-CM

## 2023-05-03 PROCEDURE — 4010F PR ACE/ARB THEARPY RXD/TAKEN: ICD-10-PCS | Mod: CPTII,,, | Performed by: NURSE PRACTITIONER

## 2023-05-03 PROCEDURE — 3077F PR MOST RECENT SYSTOLIC BLOOD PRESSURE >= 140 MM HG: ICD-10-PCS | Mod: CPTII,,, | Performed by: NURSE PRACTITIONER

## 2023-05-03 PROCEDURE — 1126F AMNT PAIN NOTED NONE PRSNT: CPT | Mod: CPTII,,, | Performed by: NURSE PRACTITIONER

## 2023-05-03 PROCEDURE — 99215 OFFICE O/P EST HI 40 MIN: CPT | Mod: PBBFAC | Performed by: NURSE PRACTITIONER

## 2023-05-03 PROCEDURE — 4010F ACE/ARB THERAPY RXD/TAKEN: CPT | Mod: CPTII,,, | Performed by: NURSE PRACTITIONER

## 2023-05-03 PROCEDURE — 99214 PR OFFICE/OUTPT VISIT, EST, LEVL IV, 30-39 MIN: ICD-10-PCS | Mod: S$PBB,,, | Performed by: NURSE PRACTITIONER

## 2023-05-03 PROCEDURE — 3288F PR FALLS RISK ASSESSMENT DOCUMENTED: ICD-10-PCS | Mod: CPTII,,, | Performed by: NURSE PRACTITIONER

## 2023-05-03 PROCEDURE — 1159F PR MEDICATION LIST DOCUMENTED IN MEDICAL RECORD: ICD-10-PCS | Mod: CPTII,,, | Performed by: NURSE PRACTITIONER

## 2023-05-03 PROCEDURE — 1160F RVW MEDS BY RX/DR IN RCRD: CPT | Mod: CPTII,,, | Performed by: NURSE PRACTITIONER

## 2023-05-03 PROCEDURE — 3077F SYST BP >= 140 MM HG: CPT | Mod: CPTII,,, | Performed by: NURSE PRACTITIONER

## 2023-05-03 PROCEDURE — 3078F PR MOST RECENT DIASTOLIC BLOOD PRESSURE < 80 MM HG: ICD-10-PCS | Mod: CPTII,,, | Performed by: NURSE PRACTITIONER

## 2023-05-03 PROCEDURE — 1126F PR PAIN SEVERITY QUANTIFIED, NO PAIN PRESENT: ICD-10-PCS | Mod: CPTII,,, | Performed by: NURSE PRACTITIONER

## 2023-05-03 PROCEDURE — 99214 OFFICE O/P EST MOD 30 MIN: CPT | Mod: S$PBB,,, | Performed by: NURSE PRACTITIONER

## 2023-05-03 PROCEDURE — 3078F DIAST BP <80 MM HG: CPT | Mod: CPTII,,, | Performed by: NURSE PRACTITIONER

## 2023-05-03 PROCEDURE — 1159F MED LIST DOCD IN RCRD: CPT | Mod: CPTII,,, | Performed by: NURSE PRACTITIONER

## 2023-05-03 PROCEDURE — 1101F PR PT FALLS ASSESS DOC 0-1 FALLS W/OUT INJ PAST YR: ICD-10-PCS | Mod: CPTII,,, | Performed by: NURSE PRACTITIONER

## 2023-05-03 PROCEDURE — 3008F BODY MASS INDEX DOCD: CPT | Mod: CPTII,,, | Performed by: NURSE PRACTITIONER

## 2023-05-03 PROCEDURE — 3288F FALL RISK ASSESSMENT DOCD: CPT | Mod: CPTII,,, | Performed by: NURSE PRACTITIONER

## 2023-05-03 PROCEDURE — 1101F PT FALLS ASSESS-DOCD LE1/YR: CPT | Mod: CPTII,,, | Performed by: NURSE PRACTITIONER

## 2023-05-03 PROCEDURE — 1160F PR REVIEW ALL MEDS BY PRESCRIBER/CLIN PHARMACIST DOCUMENTED: ICD-10-PCS | Mod: CPTII,,, | Performed by: NURSE PRACTITIONER

## 2023-05-03 PROCEDURE — 3008F PR BODY MASS INDEX (BMI) DOCUMENTED: ICD-10-PCS | Mod: CPTII,,, | Performed by: NURSE PRACTITIONER

## 2023-05-03 RX ORDER — ATORVASTATIN CALCIUM 40 MG/1
40 TABLET, FILM COATED ORAL NIGHTLY
Qty: 90 TABLET | Refills: 2 | Status: SHIPPED | OUTPATIENT
Start: 2023-05-03 | End: 2023-11-07 | Stop reason: SDUPTHER

## 2023-05-03 RX ORDER — LOSARTAN POTASSIUM 50 MG/1
100 TABLET ORAL DAILY
Qty: 90 TABLET | Refills: 2 | Status: ON HOLD | OUTPATIENT
Start: 2023-05-03 | End: 2023-11-09 | Stop reason: HOSPADM

## 2023-05-03 RX ORDER — LINAGLIPTIN 5 MG/1
5 TABLET, FILM COATED ORAL DAILY
Qty: 90 TABLET | Refills: 2 | Status: ON HOLD | OUTPATIENT
Start: 2023-05-03 | End: 2023-10-13

## 2023-05-03 RX ORDER — FINASTERIDE 5 MG/1
5 TABLET, FILM COATED ORAL DAILY
Qty: 90 TABLET | Refills: 2 | Status: SHIPPED | OUTPATIENT
Start: 2023-05-03 | End: 2023-11-07 | Stop reason: SDUPTHER

## 2023-05-03 RX ORDER — FERROUS SULFATE 325(65) MG
325 TABLET ORAL DAILY
Qty: 90 TABLET | Refills: 2 | Status: SHIPPED | OUTPATIENT
Start: 2023-05-03 | End: 2023-11-07 | Stop reason: SDUPTHER

## 2023-05-03 RX ORDER — AMLODIPINE BESYLATE 5 MG/1
5 TABLET ORAL DAILY
Qty: 90 TABLET | Refills: 2 | Status: SHIPPED | OUTPATIENT
Start: 2023-05-03 | End: 2023-11-01 | Stop reason: SDUPTHER

## 2023-05-03 RX ORDER — METFORMIN HYDROCHLORIDE 1000 MG/1
1000 TABLET ORAL 2 TIMES DAILY WITH MEALS
Qty: 180 TABLET | Refills: 2 | Status: SHIPPED | OUTPATIENT
Start: 2023-05-03 | End: 2023-11-07 | Stop reason: SDUPTHER

## 2023-05-03 RX ORDER — INSULIN DETEMIR 100 [IU]/ML
INJECTION, SOLUTION SUBCUTANEOUS
Qty: 120 ML | Refills: 6 | Status: SHIPPED | OUTPATIENT
Start: 2023-05-03 | End: 2023-11-07 | Stop reason: SDUPTHER

## 2023-05-03 NOTE — PROGRESS NOTES
Yolanda Mendoza, NP   [facility]  [address] [facility] [address]      PATIENT NAME: Rogelio Johnson  : 1950  DATE: 5/3/23  MRN: [id number]        Reason for Visit / Chief Complaint: Follow-up (Lab results)       History of Present Illness / Problem Focused Workflow     Rogelio Johnson presents to the clinic with Follow-up (Lab results)     73 yo AAM for 6 mo follow up/ lab results/ medication refills. PMH includes CAD, CABG 19, type 2 AV block, HTN, HLD, type 2 DM, BPH, elevated PSA, ED, anemia, colon polyps, TRIP, and obesity. /70. Asymptomatic. Took medications about 1 hour ago. Completed labs this morning before visit; unable to review results at this time. He needs medication refills. He has upcoming visit with Dr. Whyte's office for eye exam. He states he has a lot going on personally with his family. He denies any SI/HI. Unable to tolerate CPAP due to sinus congestion with application. He otherwise, denies any other concerns/ complaints.    Other providers   Dr. Mathew, Cardiology- was referred to Dr. Sánchez  [facility]  [facility] Dr. DONATO Rodriguez, Dr. Quintana      Review of Systems     Review of Systems   Constitutional:  Negative for appetite change, chills, fatigue, fever and unexpected weight change.   HENT:  Negative for congestion, ear pain, hearing loss, sinus pressure, sore throat and tinnitus.    Respiratory:  Negative for cough, chest tightness, shortness of breath and wheezing.    Cardiovascular:  Negative for chest pain, palpitations and leg swelling.   Gastrointestinal:  Negative for abdominal distention, abdominal pain, blood in stool, constipation, diarrhea, nausea and vomiting.   Genitourinary:  Negative for dysuria and hematuria.   Musculoskeletal:  Negative for arthralgias and myalgias.   Skin:  Negative for pallor.   Allergic/Immunologic: Negative for environmental allergies.   Neurological:  Negative for  dizziness, syncope, weakness, numbness and headaches.   Hematological:  Negative for adenopathy. Does not bruise/bleed easily.   Psychiatric/Behavioral:  Positive for sleep disturbance. Negative for agitation, behavioral problems, confusion, hallucinations and suicidal ideas. The patient is not nervous/anxious.         Stressed     Medical / Social / Family History     ----------------------------  Coronary artery disease  Hyperlipidemia  Hypertension  Personal history of colonic polyps  Sleep apnea, unspecified     Past Surgical History:   Procedure Laterality Date    COLONOSCOPY      CORONARY ARTERY BYPASS GRAFT (CABG)         Social History     Socioeconomic History    Marital status:    Tobacco Use    Smoking status: Never    Smokeless tobacco: Never   Substance and Sexual Activity    Alcohol use: Not Currently    Drug use: Never     Social Determinants of Health     Physical Activity: Sufficiently Active    Days of Exercise per Week: 5 days    Minutes of Exercise per Session: 30 min        Family History   Problem Relation Age of Onset    Colon cancer Mother     Diabetes Mellitus Mother     Hypertension Mother     Cataracts Father     Heart attack Father     Heart disease Father         Medications and Allergies     Medications  Current Outpatient Medications   Medication Instructions    amLODIPine (NORVASC) 5 mg, Oral, Daily    aspirin (ECOTRIN) 81 mg, Oral    atorvastatin (LIPITOR) 40 mg, Oral, Nightly    baclofen (LIORESAL) 10 mg, Oral    carvediloL (COREG) 6.25 mg, Oral, 2 times daily    cyanocobalamin, vitamin B-12, 1,000 mcg/mL Drop 5 drops, Oral    docusate sodium (COLACE) 100 mg, Oral    ferrous sulfate (FEROSUL) 325 mg, Oral, Daily    finasteride (PROSCAR) 5 mg, Oral, Daily    JARDIANCE 10 mg, Oral, Every morning    LEVEMIR FLEXTOUCH U-100 INSULN 100 unit/mL (3 mL) InPn pen Administer 68 units SC q AM and 62 units SC q HS. Rotate injection sites.    losartan (COZAAR) 100 mg, Oral, Daily     melatonin 3 mg, Oral    metFORMIN (GLUCOPHAGE) 1,000 mg, Oral, 2 times daily with meals    nitroGLYCERIN (NITROSTAT) 0.4 mg, Sublingual    omega-3 fatty acids/fish oil (FISH OIL-OMEGA-3 FATTY ACIDS) 300-1,000 mg capsule 2 g, Oral    TRADJENTA 5 mg, Oral, Daily       Allergies  Review of patient's allergies indicates:  No Known Allergies    Physical Examination     Visit Vitals  BP (!) 144/70 (BP Location: Left arm, Patient Position: Sitting, BP Method: X-Large (Manual))   Pulse 69   Temp 97.8 °F (36.6 °C) (Oral)   Resp 20   Ht 6' (1.829 m)   Wt 92.7 kg (204 lb 6.4 oz)   BMI 27.72 kg/m²       Physical Exam  Vitals and nursing note reviewed.   Constitutional:       Appearance: Normal appearance. He is not ill-appearing.   HENT:      Head: Normocephalic.   Neck:      Vascular: No carotid bruit.   Cardiovascular:      Rate and Rhythm: Normal rate and regular rhythm.      Pulses: Normal pulses.           Dorsalis pedis pulses are 2+ on the right side and 2+ on the left side.        Posterior tibial pulses are 2+ on the right side and 2+ on the left side.   Pulmonary:      Effort: Pulmonary effort is normal. No respiratory distress.      Breath sounds: Normal breath sounds.   Abdominal:      General: Bowel sounds are normal. There is no distension.      Palpations: Abdomen is soft. There is no mass.      Tenderness: There is no abdominal tenderness.      Hernia: No hernia is present.   Musculoskeletal:         General: Normal range of motion.      Cervical back: Normal range of motion and neck supple. No tenderness.      Right lower leg: No edema.      Left lower leg: No edema.   Lymphadenopathy:      Cervical: No cervical adenopathy.   Skin:     General: Skin is warm and dry.      Capillary Refill: Capillary refill takes less than 2 seconds.   Neurological:      Mental Status: He is alert and oriented to person, place, and time. Mental status is at baseline.      Motor: No weakness.   Psychiatric:         Attention and  Perception: Attention and perception normal.         Mood and Affect: Mood normal.         Speech: Speech normal.         Behavior: Behavior normal. Behavior is cooperative.         Thought Content: Thought content normal.         Cognition and Memory: Cognition and memory normal.         Judgment: Judgment normal.         Results     Lab Results   Component Value Date    WBC 6.09 05/03/2023    RBC 4.31 (L) 05/03/2023    HGB 13.9 (L) 05/03/2023    HCT 40.3 (L) 05/03/2023    MCV 93.5 05/03/2023    MCH 32.3 (H) 05/03/2023    MCHC 34.5 05/03/2023    RDW 12.3 05/03/2023     05/03/2023    MPV 10.3 05/03/2023     Sodium Level   Date Value Ref Range Status   05/03/2023 143 136 - 145 mmol/L Final     Potassium Level   Date Value Ref Range Status   05/03/2023 3.5 3.5 - 5.1 mmol/L Final     Carbon Dioxide   Date Value Ref Range Status   05/03/2023 24 23 - 31 mmol/L Final     Blood Urea Nitrogen   Date Value Ref Range Status   05/03/2023 9.4 8.4 - 25.7 mg/dL Final     Creatinine   Date Value Ref Range Status   05/03/2023 0.99 0.73 - 1.18 mg/dL Final     Calcium Level Total   Date Value Ref Range Status   05/03/2023 9.1 8.8 - 10.0 mg/dL Final     Albumin Level   Date Value Ref Range Status   05/03/2023 3.8 3.4 - 4.8 g/dL Final     Bilirubin Total   Date Value Ref Range Status   05/03/2023 0.4 <=1.5 mg/dL Final     Alkaline Phosphatase   Date Value Ref Range Status   05/03/2023 112 40 - 150 unit/L Final     Aspartate Aminotransferase   Date Value Ref Range Status   05/03/2023 16 5 - 34 unit/L Final     Alanine Aminotransferase   Date Value Ref Range Status   05/03/2023 12 0 - 55 unit/L Final     Estimated GFR-Non    Date Value Ref Range Status   04/18/2022 66 >=90      Lab Results   Component Value Date    CHOL 141 10/07/2022     Lab Results   Component Value Date    HDL 47 10/07/2022     No results found for: LDLCALC  Lab Results   Component Value Date    TRIG 118 10/07/2022     No results found for:  CHOLHDL  Lab Results   Component Value Date    TSH 1.5150 10/07/2022     Lab Results   Component Value Date    PHUR 6.5 01/24/2022    PROTEINUA Trace (A) 01/24/2022    GLUCUA 4+ (A) 01/24/2022    KETONESU Negative 01/24/2022    OCCULTUA Negative 01/24/2022    NITRITE Negative 01/24/2022    LEUKOCYTESUR Negative 01/24/2022     Lab Results   Component Value Date    HGBA1C 6.0 05/03/2023    HGBA1C 5.9 10/07/2022    HGBA1C 6.2 04/18/2022     No results found for: MICROALBUR, [id number]   Results for orders placed in visit on 10/10/22    Diabetic Eye Screening Photo         Assessment       ICD-10-CM ICD-9-CM   1. Hypertension, unspecified type  I10 401.9   2. Dyslipidemia  E78.5 272.4   3. Arteriosclerosis of coronary artery  I25.10 414.00   4. Benign prostatic hyperplasia, unspecified whether lower urinary tract symptoms present  N40.0 600.00   5. Type 2 diabetes mellitus without complication, with long-term current use of insulin  E11.9 250.00    Z79.4 V58.67   6. Obstructive sleep apnea syndrome  G47.33 327.23       Plan       1. Hypertension, unspecified type  /70. Asymptomatic.  Follow low sodium diet, < 2 gm/day (avoid high salty foods such as processed meats/ sausage/bean/ sandwich meat, chips, pickles, cheese, crackers and soft drinks/ electrolyte replacement drinks).  Avoid tobacco/ alcohol use  Educated on health benefits of at least 5 days/ week of 30 minutes moderate intensity exercise (brisk walking) and 2 or more days/ week of muscle strength activities  Daily ASA 81 mg for CV prevention  Continue current medication regimen      2. Dyslipidemia  LDL 70, Trig 118, HLD 47, Total 141- October 2022   Avoid tobacco/ alcohol  Follow low fat/low cholesterol diet such as avoid/ decrease bean, sausage, fried foods, cookies, cakes, chips, cheese, whole milk, butter, mayonnaise. Add olive oil, avocados, lean meats, fresh fruits/ vegetables, heart healthy nuts to diet.  Educated on health benefits of exercise 5  days/ week of at least 30 minutes moderate intensity exercise (brisk walking) and 2 days/ week of muscle strength activities  Daily ASA 81 mg for CV prevention  Continue current medication regimen      3. Arteriosclerosis of coronary artery  Avoid tobacco/ alcohol.  Regular exercise as tolerated.  Continue medications as prescribed, anticoagulation as prescribed, keep f/u with Cardiologist.  ER precautions for CP, SOB, palpitations, dizziness, syncope, weakness.       4. Benign prostatic hyperplasia, unspecified whether lower urinary tract symptoms present  Established with [facility]; keep f/u and continue medications.    5. Type 2 diabetes mellitus without complication, with long-term current use of insulin    A1c 5.9% October 2022; pending results from today   Hypoglycemia: denies symptoms  Microalbumin/ Cr ratio: 191.9  Educated on ADA diet:  1. Avoid/ decrease high carbohydrate foods (rice, pasta, bread, candy, sweets, carbonated beverages)  Educated on health benefits of at least 5 days/ week of 30 minutes moderate intensity exercise (brisk walking) and 2 or more days/ week of muscle strength activities  Avoid alcohol or tobacco use if applicable  Eye exam: 10/12/21 no DR- poor image; consider re-imaging; he has appt with Dr. Whyte's office  Foot exam: 10/10/22  ACEI d/c s/t cough; ARB  Continue daily ASA and statin  Continue with current regimen     6. Obstructive sleep apnea syndrome  Patient continues with TRIP symptoms and would benefit from use.   Referral for sleep lab for titration and ENT for further evaluation due to intolerance and sinusitis symptoms associated with use          Follow up in about 2 weeks (around 5/17/2023).    Signature:  Yolanda Mendoza NP  [facility]  [address] [facility] [address]    Date of encounter: 5/3/23

## 2023-05-09 ENCOUNTER — TELEPHONE (OUTPATIENT)
Dept: INTERNAL MEDICINE | Facility: CLINIC | Age: 73
End: 2023-05-09
Payer: MEDICARE

## 2023-05-09 NOTE — TELEPHONE ENCOUNTER
Please let patient know lab results reviewed and overall with stable findings.  Continue medications as prescribed.    Thank you

## 2023-05-12 LAB
LEFT EYE DM RETINOPATHY: NEGATIVE
RIGHT EYE DM RETINOPATHY: NEGATIVE

## 2023-07-12 ENCOUNTER — OFFICE VISIT (OUTPATIENT)
Dept: OTOLARYNGOLOGY | Facility: CLINIC | Age: 73
End: 2023-07-12
Payer: MEDICARE

## 2023-07-12 VITALS
RESPIRATION RATE: 16 BRPM | WEIGHT: 204 LBS | SYSTOLIC BLOOD PRESSURE: 101 MMHG | HEIGHT: 72 IN | DIASTOLIC BLOOD PRESSURE: 63 MMHG | TEMPERATURE: 98 F | HEART RATE: 67 BPM | BODY MASS INDEX: 27.63 KG/M2

## 2023-07-12 DIAGNOSIS — G47.33 OBSTRUCTIVE SLEEP APNEA SYNDROME: ICD-10-CM

## 2023-07-12 DIAGNOSIS — J31.0 CHRONIC RHINITIS: Primary | ICD-10-CM

## 2023-07-12 PROCEDURE — 99214 OFFICE O/P EST MOD 30 MIN: CPT | Mod: PBBFAC | Performed by: OTOLARYNGOLOGY

## 2023-07-12 RX ORDER — FLUTICASONE PROPIONATE 50 MCG
2 SPRAY, SUSPENSION (ML) NASAL DAILY
Qty: 15.8 ML | Refills: 3 | Status: SHIPPED | OUTPATIENT
Start: 2023-07-12 | End: 2023-09-13 | Stop reason: SDUPTHER

## 2023-07-12 NOTE — PROGRESS NOTES
Patient Name: Rogelio Johnson   YOB: 1950     Chief Complaint: Mucous in the nose       History of Present Illness:  July 12, 2023:   73-year-old male with obstructive sleep apnea which was diagnosed initially in May of 2020 which has been treated with nasal CPAP is referred because of the development of mucus and postnasal drainage when he uses the nasal CPAP.  This is not really associated with any significant nasal obstruction.  The patient does not have any of these symptoms during the day.  Patient does not feel that this is a very bothersome problem.  Patient does have a humidifier attached to his nasal CPAP.  The patient did not really feel that he had any sleeping problems prior to being diagnosed with obstructive sleep apnea.  Patient does have a history of snoring and also has a history of cardiac disease.  Review of his sleep study from May 20, 2020 showed him to have an apnea-hypopnea index of 36 with is lowest O2 saturation being 86%.  He does not have history of chronic rhinosinusitis otherwise.  He is a nonsmoker.    Past Medical History:  Past Medical History:   Diagnosis Date    Coronary artery disease     Hyperlipidemia     Hypertension     Personal history of colonic polyps     Sleep apnea, unspecified      Past Surgical History:   Procedure Laterality Date    COLONOSCOPY      CORONARY ARTERY BYPASS GRAFT (CABG)         Review of Systems:  Unremarkable except as mentioned above.    Current Medications:  Current Outpatient Medications   Medication Sig    amLODIPine (NORVASC) 5 MG tablet Take 1 tablet (5 mg total) by mouth once daily.    aspirin (ECOTRIN) 81 MG EC tablet Take 81 mg by mouth.    atorvastatin (LIPITOR) 40 MG tablet Take 1 tablet (40 mg total) by mouth every evening.    baclofen (LIORESAL) 10 MG tablet Take 10 mg by mouth.    carvediloL (COREG) 6.25 MG tablet Take 6.25 mg by mouth 2 (two) times daily.    cyanocobalamin, vitamin B-12, 1,000 mcg/mL Drop Take 5 drops by  mouth.    docusate sodium (COLACE) 100 MG capsule Take 100 mg by mouth.    empagliflozin (JARDIANCE) 10 mg tablet Take 1 tablet (10 mg total) by mouth every morning.    ferrous sulfate (FEROSUL) 325 mg (65 mg iron) Tab tablet Take 1 tablet (325 mg total) by mouth once daily.    finasteride (PROSCAR) 5 mg tablet Take 1 tablet (5 mg total) by mouth once daily.    LEVEMIR FLEXTOUCH U-100 INSULN 100 unit/mL (3 mL) InPn pen Administer 68 units SC q AM and 62 units SC q HS. Rotate injection sites.    linaGLIPtin (TRADJENTA) 5 mg Tab tablet Take 1 tablet (5 mg total) by mouth once daily.    losartan (COZAAR) 50 MG tablet Take 2 tablets (100 mg total) by mouth once daily.    melatonin 3 mg Cap Take 3 mg by mouth.    metFORMIN (GLUCOPHAGE) 1000 MG tablet Take 1 tablet (1,000 mg total) by mouth 2 (two) times daily with meals.    omega-3 fatty acids/fish oil (FISH OIL-OMEGA-3 FATTY ACIDS) 300-1,000 mg capsule Take 2 g by mouth.    fluticasone propionate (FLONASE) 50 mcg/actuation nasal spray 2 sprays (100 mcg total) by Each Nostril route once daily.    nitroGLYCERIN (NITROSTAT) 0.4 MG SL tablet Place 0.4 mg under the tongue.     No current facility-administered medications for this visit.        Allergies:  Review of patient's allergies indicates:  No Known Allergies     Physical Exam:  Vital signs:   Vitals:    07/12/23 1015   BP: 101/63   BP Location: Right arm   Patient Position: Sitting   BP Method: Medium (Automatic)   Pulse: 67   Resp: 16   Temp: 97.5 °F (36.4 °C)   TempSrc: Oral   Weight: 92.5 kg (204 lb)   Height: 6' (1.829 m)   General:  Well-developed well-nourished male in no acute distress.  Voice is normal.  Head and face:  Normocephalic.  No facial lesions.  No temporomandibular joint tenderness or click.  Ears:  Right ear-auricle is normally developed.  External auditory canal is clear.  Tympanic membrane is nonerythematous.  No middle ear effusion.  Left ear-auricle is normally developed.  External auditory  canal is clear.  Tympanic membrane is nonerythematous.  No middle ear effusion.  Nose:  Nasal dorsum unremarkable.  He has mild congestion with mucoid secretions in the nose.  There is some spurring of the left maxillary crest with partial obstruction.    Oral cavity and oropharynx:  Tongue floor mouth without lesions.  He does have some elongation of the soft palate with some webbing of the posterior tonsillar pillars.  Mallampati class 3.    Neck:  Supple without adenopathy or thyromegaly.  Trachea is in the midline.  Parotid and submandibular glands are normal to palpation without masses or tenderness.  Eyes:  Extraocular muscles intact.  No nystagmus.  No exophthalmos or enophthalmos.  Neurologic:  Alert and oriented.  Cranial nerves 2-12 are grossly normal.      Assessment/Plan:  73-year-old male with obstructive sleep apnea and rhinitis.      Plan:  Continue nasal CPAP.    Fluticasone nasal spray 2 puffs each nostril q.h.s. on a routine basis.    Saline nasal spray 2 puffs each nostril 2-3 times per day.    Follow-up in 6 weeks.      Salvador Sinclair M.D.

## 2023-08-09 DIAGNOSIS — G47.33 OSA ON CPAP: Primary | ICD-10-CM

## 2023-09-08 ENCOUNTER — PROCEDURE VISIT (OUTPATIENT)
Dept: SLEEP MEDICINE | Facility: HOSPITAL | Age: 73
End: 2023-09-08
Attending: NURSE PRACTITIONER
Payer: MEDICARE

## 2023-09-08 DIAGNOSIS — G47.33 OSA ON CPAP: ICD-10-CM

## 2023-09-08 PROCEDURE — 95811 POLYSOM 6/>YRS CPAP 4/> PARM: CPT

## 2023-09-13 ENCOUNTER — OFFICE VISIT (OUTPATIENT)
Dept: OTOLARYNGOLOGY | Facility: CLINIC | Age: 73
End: 2023-09-13
Payer: MEDICARE

## 2023-09-13 DIAGNOSIS — J31.0 CHRONIC RHINITIS: Primary | ICD-10-CM

## 2023-09-13 DIAGNOSIS — G47.33 OBSTRUCTIVE SLEEP APNEA SYNDROME: ICD-10-CM

## 2023-09-13 PROCEDURE — 99213 OFFICE O/P EST LOW 20 MIN: CPT | Mod: PBBFAC | Performed by: OTOLARYNGOLOGY

## 2023-09-13 RX ORDER — FLUTICASONE PROPIONATE 50 MCG
2 SPRAY, SUSPENSION (ML) NASAL DAILY
Qty: 15.8 ML | Refills: 6 | Status: SHIPPED | OUTPATIENT
Start: 2023-09-13

## 2023-09-13 NOTE — PROGRESS NOTES
Patient Name: Rogelio Johnson   YOB: 1950     Chief Complaint: Mucous in the nose       History of Present Illness:  July 12, 2023:   73-year-old male with obstructive sleep apnea which was diagnosed initially in May of 2020 which has been treated with nasal CPAP is referred because of the development of mucus and postnasal drainage when he uses the nasal CPAP.  This is not really associated with any significant nasal obstruction.  The patient does not have any of these symptoms during the day.  Patient does not feel that this is a very bothersome problem.  Patient does have a humidifier attached to his nasal CPAP.  The patient did not really feel that he had any sleeping problems prior to being diagnosed with obstructive sleep apnea.  Patient does have a history of snoring and also has a history of cardiac disease.  Review of his sleep study from May 20, 2020 showed him to have an apnea-hypopnea index of 36 with is lowest O2 saturation being 86%.  He does not have history of chronic rhinosinusitis otherwise.  He is a nonsmoker.    September 13, 2023:   Patient presents today for follow-up of his rhinitis and obstructive sleep apnea.  Since his last appointment the patient had undergone a retitration sleep study and his face mass has been changed to what sounds to be nasal pillows.  He does tolerate this better.  The patient has found that the fluticasone nasal spray on a routine basis does help with his postnasal drainage.  He feels that it is adequately controlled at this time.  He would not been using saline nasal spray.  He is not had any problems as far as nosebleeds her headaches in regards to so the fluticasone nasal spray.  He has no other new problems today.    Past Medical History:  Past Medical History:   Diagnosis Date    Coronary artery disease     Hyperlipidemia     Hypertension     Personal history of colonic polyps     Sleep apnea, unspecified      Past Surgical History:   Procedure  Laterality Date    COLONOSCOPY      CORONARY ARTERY BYPASS GRAFT (CABG)         Review of Systems:  Unremarkable except as mentioned above.    Current Medications:  Current Outpatient Medications   Medication Sig    amLODIPine (NORVASC) 5 MG tablet Take 1 tablet (5 mg total) by mouth once daily.    aspirin (ECOTRIN) 81 MG EC tablet Take 81 mg by mouth.    atorvastatin (LIPITOR) 40 MG tablet Take 1 tablet (40 mg total) by mouth every evening.    baclofen (LIORESAL) 10 MG tablet Take 10 mg by mouth.    carvediloL (COREG) 6.25 MG tablet Take 6.25 mg by mouth 2 (two) times daily.    cyanocobalamin, vitamin B-12, 1,000 mcg/mL Drop Take 5 drops by mouth.    docusate sodium (COLACE) 100 MG capsule Take 100 mg by mouth.    empagliflozin (JARDIANCE) 10 mg tablet Take 1 tablet (10 mg total) by mouth every morning.    ferrous sulfate (FEROSUL) 325 mg (65 mg iron) Tab tablet Take 1 tablet (325 mg total) by mouth once daily.    finasteride (PROSCAR) 5 mg tablet Take 1 tablet (5 mg total) by mouth once daily.    fluticasone propionate (FLONASE) 50 mcg/actuation nasal spray 2 sprays (100 mcg total) by Each Nostril route once daily.    LEVEMIR FLEXTOUCH U-100 INSULN 100 unit/mL (3 mL) InPn pen Administer 68 units SC q AM and 62 units SC q HS. Rotate injection sites.    linaGLIPtin (TRADJENTA) 5 mg Tab tablet Take 1 tablet (5 mg total) by mouth once daily.    losartan (COZAAR) 50 MG tablet Take 2 tablets (100 mg total) by mouth once daily.    melatonin 3 mg Cap Take 3 mg by mouth.    metFORMIN (GLUCOPHAGE) 1000 MG tablet Take 1 tablet (1,000 mg total) by mouth 2 (two) times daily with meals.    nitroGLYCERIN (NITROSTAT) 0.4 MG SL tablet Place 0.4 mg under the tongue.    omega-3 fatty acids/fish oil (FISH OIL-OMEGA-3 FATTY ACIDS) 300-1,000 mg capsule Take 2 g by mouth.     No current facility-administered medications for this visit.        Allergies:  Review of patient's allergies indicates:  No Known Allergies     Physical  Exam:  Vital signs:   There were no vitals filed for this visit.  General:  Well-developed well-nourished male in no acute distress.  Voice is normal.  Head and face:  Normocephalic.  No facial lesions.  No temporomandibular joint tenderness or click.  Nose:  Nasal dorsum unremarkable.  He has mild congestion with mucoid secretions in the nose.  There is some spurring of the left maxillary crest with partial obstruction.    Oral cavity and oropharynx:  Tongue floor mouth without lesions.  He does have some elongation of the soft palate with some webbing of the posterior tonsillar pillars.  Mallampati class 3.    Eyes:  Extraocular muscles intact.  No nystagmus.  No exophthalmos or enophthalmos.  Neurologic:  Alert and oriented.  Cranial nerves 2-12 are grossly normal.      Assessment/Plan:  73-year-old male with obstructive sleep apnea and rhinitis-improved.      Plan:  Continue nasal CPAP.    Continue fluticasone nasal spray on a routine basis.    Follow-up in 6 months.    Salvador Sinclair M.D.

## 2023-09-27 ENCOUNTER — PATIENT MESSAGE (OUTPATIENT)
Dept: INTERNAL MEDICINE | Facility: CLINIC | Age: 73
End: 2023-09-27
Payer: MEDICARE

## 2023-10-02 ENCOUNTER — OFFICE VISIT (OUTPATIENT)
Dept: INTERNAL MEDICINE | Facility: CLINIC | Age: 73
End: 2023-10-02
Payer: MEDICARE

## 2023-10-02 VITALS
WEIGHT: 204.19 LBS | BODY MASS INDEX: 27.66 KG/M2 | HEART RATE: 67 BPM | DIASTOLIC BLOOD PRESSURE: 62 MMHG | RESPIRATION RATE: 20 BRPM | TEMPERATURE: 98 F | HEIGHT: 72 IN | SYSTOLIC BLOOD PRESSURE: 118 MMHG

## 2023-10-02 DIAGNOSIS — I10 PRIMARY HYPERTENSION: ICD-10-CM

## 2023-10-02 DIAGNOSIS — F43.9 STRESS AT HOME: Primary | ICD-10-CM

## 2023-10-02 DIAGNOSIS — E11.9 TYPE 2 DIABETES MELLITUS WITHOUT COMPLICATION, WITH LONG-TERM CURRENT USE OF INSULIN: ICD-10-CM

## 2023-10-02 DIAGNOSIS — D64.9 NORMOCYTIC NORMOCHROMIC ANEMIA: ICD-10-CM

## 2023-10-02 DIAGNOSIS — Z79.4 TYPE 2 DIABETES MELLITUS WITHOUT COMPLICATION, WITH LONG-TERM CURRENT USE OF INSULIN: ICD-10-CM

## 2023-10-02 PROCEDURE — 1159F PR MEDICATION LIST DOCUMENTED IN MEDICAL RECORD: ICD-10-PCS | Mod: CPTII,,, | Performed by: NURSE PRACTITIONER

## 2023-10-02 PROCEDURE — 99213 PR OFFICE/OUTPT VISIT, EST, LEVL III, 20-29 MIN: ICD-10-PCS | Mod: S$PBB,,, | Performed by: NURSE PRACTITIONER

## 2023-10-02 PROCEDURE — 1159F MED LIST DOCD IN RCRD: CPT | Mod: CPTII,,, | Performed by: NURSE PRACTITIONER

## 2023-10-02 PROCEDURE — 3044F HG A1C LEVEL LT 7.0%: CPT | Mod: CPTII,,, | Performed by: NURSE PRACTITIONER

## 2023-10-02 PROCEDURE — 3044F PR MOST RECENT HEMOGLOBIN A1C LEVEL <7.0%: ICD-10-PCS | Mod: CPTII,,, | Performed by: NURSE PRACTITIONER

## 2023-10-02 PROCEDURE — 4010F ACE/ARB THERAPY RXD/TAKEN: CPT | Mod: CPTII,,, | Performed by: NURSE PRACTITIONER

## 2023-10-02 PROCEDURE — 1101F PT FALLS ASSESS-DOCD LE1/YR: CPT | Mod: CPTII,,, | Performed by: NURSE PRACTITIONER

## 2023-10-02 PROCEDURE — 3074F PR MOST RECENT SYSTOLIC BLOOD PRESSURE < 130 MM HG: ICD-10-PCS | Mod: CPTII,,, | Performed by: NURSE PRACTITIONER

## 2023-10-02 PROCEDURE — 3008F BODY MASS INDEX DOCD: CPT | Mod: CPTII,,, | Performed by: NURSE PRACTITIONER

## 2023-10-02 PROCEDURE — 3074F SYST BP LT 130 MM HG: CPT | Mod: CPTII,,, | Performed by: NURSE PRACTITIONER

## 2023-10-02 PROCEDURE — 3008F PR BODY MASS INDEX (BMI) DOCUMENTED: ICD-10-PCS | Mod: CPTII,,, | Performed by: NURSE PRACTITIONER

## 2023-10-02 PROCEDURE — 99215 OFFICE O/P EST HI 40 MIN: CPT | Mod: PBBFAC | Performed by: NURSE PRACTITIONER

## 2023-10-02 PROCEDURE — 3078F PR MOST RECENT DIASTOLIC BLOOD PRESSURE < 80 MM HG: ICD-10-PCS | Mod: CPTII,,, | Performed by: NURSE PRACTITIONER

## 2023-10-02 PROCEDURE — 3288F FALL RISK ASSESSMENT DOCD: CPT | Mod: CPTII,,, | Performed by: NURSE PRACTITIONER

## 2023-10-02 PROCEDURE — 4010F PR ACE/ARB THEARPY RXD/TAKEN: ICD-10-PCS | Mod: CPTII,,, | Performed by: NURSE PRACTITIONER

## 2023-10-02 PROCEDURE — 99213 OFFICE O/P EST LOW 20 MIN: CPT | Mod: S$PBB,,, | Performed by: NURSE PRACTITIONER

## 2023-10-02 PROCEDURE — 3288F PR FALLS RISK ASSESSMENT DOCUMENTED: ICD-10-PCS | Mod: CPTII,,, | Performed by: NURSE PRACTITIONER

## 2023-10-02 PROCEDURE — 3078F DIAST BP <80 MM HG: CPT | Mod: CPTII,,, | Performed by: NURSE PRACTITIONER

## 2023-10-02 PROCEDURE — 1101F PR PT FALLS ASSESS DOC 0-1 FALLS W/OUT INJ PAST YR: ICD-10-PCS | Mod: CPTII,,, | Performed by: NURSE PRACTITIONER

## 2023-10-02 RX ORDER — KETOROLAC TROMETHAMINE 5 MG/ML
1 SOLUTION OPHTHALMIC 2 TIMES DAILY
Status: ON HOLD | COMMUNITY
Start: 2023-07-26 | End: 2023-10-13

## 2023-10-02 RX ORDER — CARVEDILOL 12.5 MG/1
12.5 TABLET ORAL 2 TIMES DAILY
Status: ON HOLD | COMMUNITY
Start: 2023-07-03 | End: 2023-10-13

## 2023-10-02 NOTE — PROGRESS NOTES
Yolanda L Reji, NP   [facility]  [address] [facility] [address]      PATIENT NAME: Rogelio Johnson  : 1950  DATE: 10/2/23  MRN: [id number]        Reason for Visit / Chief Complaint: Anxiety and Flu Vaccine       History of Present Illness / Problem Focused Workflow     Rogelio Johnson presents to the clinic with Anxiety and Flu Vaccine     74 yo AAM here upon request by his daughter to discuss anxiety. He states he has a lot going on at home. Personal things. Legal issues. Financial issues. He states he is stressed but he feels he is managing things well. His primary concern is taking care of his family, he and his wife, and keeping them safe. He states he has set boundaries with his children/ grandchildren recently and thinks this has brought on concerns by his daughter. He states he has been working a lot on repairs on his home. He is staying busy. He states he is sleeping fair/ his normal state. He states he is an early riser and wakes up around same time daily despite what time he may fall asleep at night. He is eating. He denies any outbursts or difficulty managing his stress. He takes a break/ deep breaths and rests when he feels overwhelmed. He denies any thoughts of SI/HI. He does not feel he needs medications at this time. He overall feels well and in good health. He declined flu vaccine today as he plans to receive with his wife at their local pharmacy. No other concerns at this time. He is due for routine follow up and will return in a few weeks with labs.     Other providers:   Dr. Mathew/Dr. Sánchez  [facility]  [facility] Dr. DONATO Rodriguez, Dr. Quintana        Review of Systems     Review of Systems   Constitutional:  Negative for appetite change, chills, fatigue, fever and unexpected weight change.   HENT:  Negative for congestion, ear pain, hearing loss, sinus pressure, sore throat and tinnitus.    Respiratory:  Negative for cough,  chest tightness, shortness of breath and wheezing.    Cardiovascular:  Negative for chest pain, palpitations and leg swelling.   Gastrointestinal:  Negative for abdominal distention, abdominal pain, blood in stool, constipation, diarrhea, nausea and vomiting.   Genitourinary:  Negative for dysuria and hematuria.   Musculoskeletal:  Negative for arthralgias and myalgias.   Skin:  Negative for pallor.   Allergic/Immunologic: Negative for environmental allergies.   Neurological:  Negative for dizziness, syncope, weakness, numbness and headaches.   Hematological:  Negative for adenopathy. Does not bruise/bleed easily.   Psychiatric/Behavioral:  Negative for agitation, behavioral problems, confusion, hallucinations, sleep disturbance and suicidal ideas. The patient is not nervous/anxious.        Medical / Social / Family History     ----------------------------  Coronary artery disease  Hyperlipidemia  Hypertension  Personal history of colonic polyps  Sleep apnea, unspecified     Past Surgical History:   Procedure Laterality Date    COLONOSCOPY      CORONARY ARTERY BYPASS GRAFT (CABG)         Social History     Socioeconomic History    Marital status:    Tobacco Use    Smoking status: Never    Smokeless tobacco: Never   Substance and Sexual Activity    Alcohol use: Not Currently    Drug use: Never     Social Determinants of Health     Physical Activity: Sufficiently Active (5/3/2023)    Exercise Vital Sign     Days of Exercise per Week: 5 days     Minutes of Exercise per Session: 30 min        Family History   Problem Relation Age of Onset    Colon cancer Mother     Diabetes Mellitus Mother     Hypertension Mother     Cataracts Father     Heart attack Father     Heart disease Father         Medications and Allergies     Medications  Current Outpatient Medications   Medication Instructions    amLODIPine (NORVASC) 5 mg, Oral, Daily    aspirin (ECOTRIN) 81 mg, Oral    atorvastatin (LIPITOR) 40 mg, Oral, Nightly     baclofen (LIORESAL) 10 mg, Oral    carvediloL (COREG) 6.25 mg, Oral, 2 times daily    carvediloL (COREG) 12.5 mg, Oral, 2 times daily    cyanocobalamin, vitamin B-12, 1,000 mcg/mL Drop 5 drops, Oral    docusate sodium (COLACE) 100 mg, Oral    empagliflozin (JARDIANCE) 10 mg, Oral, Every morning    ferrous sulfate (FEROSUL) 325 mg, Oral, Daily    finasteride (PROSCAR) 5 mg, Oral, Daily    fluticasone propionate (FLONASE) 100 mcg, Each Nostril, Daily    ketorolac 0.5% (ACULAR) 0.5 % Drop 1 drop, Left Eye, 2 times daily    LEVEMIR FLEXTOUCH U-100 INSULN 100 unit/mL (3 mL) InPn pen Administer 68 units SC q AM and 62 units SC q HS. Rotate injection sites.    losartan (COZAAR) 100 mg, Oral, Daily    melatonin 3 mg, Oral    metFORMIN (GLUCOPHAGE) 1,000 mg, Oral, 2 times daily with meals    nitroGLYCERIN (NITROSTAT) 0.4 mg, Sublingual    omega-3 fatty acids/fish oil (FISH OIL-OMEGA-3 FATTY ACIDS) 300-1,000 mg capsule 2 g, Oral    TRADJENTA 5 mg, Oral, Daily       Allergies  Review of patient's allergies indicates:  No Known Allergies    Physical Examination     Visit Vitals  /62 (BP Location: Left arm, Patient Position: Sitting, BP Method: Large (Manual))   Pulse 67   Temp 97.6 °F (36.4 °C) (Oral)   Resp 20   Ht 6' (1.829 m)   Wt 92.6 kg (204 lb 3.2 oz)   BMI 27.69 kg/m²       Physical Exam  Vitals and nursing note reviewed.   Constitutional:       Appearance: Normal appearance. He is not ill-appearing.   HENT:      Head: Normocephalic.   Skin:     General: Skin is warm and dry.   Neurological:      Mental Status: He is alert and oriented to person, place, and time. Mental status is at baseline.      Motor: No weakness.   Psychiatric:         Mood and Affect: Mood normal.         Behavior: Behavior normal.         Thought Content: Thought content normal.         Judgment: Judgment normal.           Results   to         Assessment       ICD-10-CM ICD-9-CM   1. Stress at home  F43.9 V61.9   2. Normocytic normochromic  anemia  D64.9 285.9   3. Type 2 diabetes mellitus without complication, with long-term current use of insulin  E11.9 250.00    Z79.4 V58.67   4. Primary hypertension  I10 401.9       Plan       1. Stress at home  Patient with personal stressors at home but feels he is managing things well. He does not feel his stress is overwhelming him. He denies any anger outbursts, physical aggression or SI/HI. He does not feel he needs medications. He states his daughters are concerned because he recently set boundaries and expectations with what he expects. He does not feel he needs any medication or treatment. Advised if stress worsens and patient feels he needs assistance, contact provider/ call 911 or report to ER if needed.    2. Normocytic normochromic anemia  - CBC Auto Differential; Future  - Comprehensive Metabolic Panel; Future  - Hemoglobin A1C; Future  - TSH; Future    3. Type 2 diabetes mellitus without complication, with long-term current use of insulin  - CBC Auto Differential; Future  - Comprehensive Metabolic Panel; Future  - Hemoglobin A1C; Future  - Lipid Panel; Future  - Microalbumin/Creatinine Ratio, Urine; Future  - TSH; Future    4. Primary hypertension  - CBC Auto Differential; Future  - Comprehensive Metabolic Panel; Future  - Hemoglobin A1C; Future  - Lipid Panel; Future  - Microalbumin/Creatinine Ratio, Urine; Future  - TSH; Future      Future Appointments   Date Time Provider Department Center   12/6/2023  8:15 AM Yolanda Mendoza NP [facility]   3/13/2024  8:00 AM Salvador Sinclair MD [facility]        Follow up in about 6 weeks (around 11/13/2023) for with fasting labs before visit.    Signature:  Yolanda Mendoza NP  [facility]  [address] [facility] [address]    Date of encounter: 10/2/23

## 2023-10-12 ENCOUNTER — HOSPITAL ENCOUNTER (INPATIENT)
Facility: HOSPITAL | Age: 73
LOS: 2 days | Discharge: HOME | DRG: 310 | End: 2023-10-15
Attending: EMERGENCY MEDICINE | Admitting: INTERNAL MEDICINE
Payer: MEDICARE

## 2023-10-12 DIAGNOSIS — R07.9 CHEST PAIN: ICD-10-CM

## 2023-10-12 DIAGNOSIS — E11.9 TYPE 2 DIABETES MELLITUS WITHOUT COMPLICATION, WITH LONG-TERM CURRENT USE OF INSULIN: ICD-10-CM

## 2023-10-12 DIAGNOSIS — F43.9 STRESS AT HOME: ICD-10-CM

## 2023-10-12 DIAGNOSIS — R00.1 SYMPTOMATIC BRADYCARDIA: Primary | ICD-10-CM

## 2023-10-12 DIAGNOSIS — Z79.4 TYPE 2 DIABETES MELLITUS WITHOUT COMPLICATION, WITH LONG-TERM CURRENT USE OF INSULIN: ICD-10-CM

## 2023-10-12 DIAGNOSIS — R00.1 BRADYCARDIA: ICD-10-CM

## 2023-10-12 DIAGNOSIS — I44.1 MOBITZ TYPE 1 SECOND DEGREE AV BLOCK: ICD-10-CM

## 2023-10-12 DIAGNOSIS — I49.9 ABNORMAL HEART RHYTHM: ICD-10-CM

## 2023-10-12 DIAGNOSIS — R07.89 CHEST DISCOMFORT: ICD-10-CM

## 2023-10-12 LAB
ALBUMIN SERPL-MCNC: 3.6 G/DL (ref 3.4–4.8)
ALBUMIN/GLOB SERPL: 0.9 RATIO (ref 1.1–2)
ALP SERPL-CCNC: 97 UNIT/L (ref 40–150)
ALT SERPL-CCNC: 18 UNIT/L (ref 0–55)
APPEARANCE UR: CLEAR
APTT PPP: 31.6 SECONDS (ref 23.4–33.9)
AST SERPL-CCNC: 15 UNIT/L (ref 5–34)
BASOPHILS # BLD AUTO: 0.03 X10(3)/MCL
BASOPHILS NFR BLD AUTO: 0.5 %
BILIRUB SERPL-MCNC: 0.3 MG/DL
BILIRUB UR QL STRIP.AUTO: NEGATIVE
BNP BLD-MCNC: 153.5 PG/ML
BUN SERPL-MCNC: 10.6 MG/DL (ref 8.4–25.7)
CALCIUM SERPL-MCNC: 9 MG/DL (ref 8.8–10)
CHLORIDE SERPL-SCNC: 106 MMOL/L (ref 98–107)
CO2 SERPL-SCNC: 22 MMOL/L (ref 23–31)
COLOR UR AUTO: ABNORMAL
CREAT SERPL-MCNC: 1.01 MG/DL (ref 0.73–1.18)
EOSINOPHIL # BLD AUTO: 0.13 X10(3)/MCL (ref 0–0.9)
EOSINOPHIL NFR BLD AUTO: 2 %
ERYTHROCYTE [DISTWIDTH] IN BLOOD BY AUTOMATED COUNT: 12.5 % (ref 11.5–17)
GFR SERPLBLD CREATININE-BSD FMLA CKD-EPI: >60 MLS/MIN/1.73/M2
GLOBULIN SER-MCNC: 4.2 GM/DL (ref 2.4–3.5)
GLUCOSE SERPL-MCNC: 156 MG/DL (ref 82–115)
GLUCOSE UR QL STRIP.AUTO: >=1000
HCT VFR BLD AUTO: 37.7 % (ref 42–52)
HGB BLD-MCNC: 13.2 G/DL (ref 14–18)
IMM GRANULOCYTES # BLD AUTO: 0.01 X10(3)/MCL (ref 0–0.04)
IMM GRANULOCYTES NFR BLD AUTO: 0.2 %
INR PPP: 1 (ref 2–3)
KETONES UR QL STRIP.AUTO: NEGATIVE
LEUKOCYTE ESTERASE UR QL STRIP.AUTO: NEGATIVE
LYMPHOCYTES # BLD AUTO: 3.82 X10(3)/MCL (ref 0.6–4.6)
LYMPHOCYTES NFR BLD AUTO: 59 %
MAGNESIUM SERPL-MCNC: 1.7 MG/DL (ref 1.6–2.6)
MCH RBC QN AUTO: 32.9 PG (ref 27–31)
MCHC RBC AUTO-ENTMCNC: 35 G/DL (ref 33–36)
MCV RBC AUTO: 94 FL (ref 80–94)
MONOCYTES # BLD AUTO: 0.58 X10(3)/MCL (ref 0.1–1.3)
MONOCYTES NFR BLD AUTO: 9 %
NEUTROPHILS # BLD AUTO: 1.91 X10(3)/MCL (ref 2.1–9.2)
NEUTROPHILS NFR BLD AUTO: 29.3 %
NITRITE UR QL STRIP.AUTO: NEGATIVE
NRBC BLD AUTO-RTO: 0 %
PH UR STRIP.AUTO: 6.5 [PH]
PLATELET # BLD AUTO: 319 X10(3)/MCL (ref 130–400)
PMV BLD AUTO: 9.7 FL (ref 7.4–10.4)
POCT GLUCOSE: 165 MG/DL (ref 70–110)
POTASSIUM SERPL-SCNC: 3.7 MMOL/L (ref 3.5–5.1)
PROT SERPL-MCNC: 7.8 GM/DL (ref 5.8–7.6)
PROT UR QL STRIP.AUTO: NEGATIVE
PROTHROMBIN TIME: 13.5 SECONDS (ref 11.7–14.5)
RBC # BLD AUTO: 4.01 X10(6)/MCL (ref 4.7–6.1)
RBC UR QL AUTO: NEGATIVE
SODIUM SERPL-SCNC: 145 MMOL/L (ref 136–145)
SP GR UR STRIP.AUTO: 1.01 (ref 1–1.03)
TROPONIN I SERPL-MCNC: <0.01 NG/ML (ref 0–0.04)
TSH SERPL-ACNC: 1.57 UIU/ML (ref 0.35–4.94)
UROBILINOGEN UR STRIP-ACNC: 1
WBC # SPEC AUTO: 6.48 X10(3)/MCL (ref 4.5–11.5)

## 2023-10-12 PROCEDURE — 80053 COMPREHEN METABOLIC PANEL: CPT | Performed by: EMERGENCY MEDICINE

## 2023-10-12 PROCEDURE — G0378 HOSPITAL OBSERVATION PER HR: HCPCS

## 2023-10-12 PROCEDURE — 93010 EKG 12-LEAD: ICD-10-PCS | Mod: ,,, | Performed by: INTERNAL MEDICINE

## 2023-10-12 PROCEDURE — 93005 ELECTROCARDIOGRAM TRACING: CPT

## 2023-10-12 PROCEDURE — 83735 ASSAY OF MAGNESIUM: CPT | Performed by: EMERGENCY MEDICINE

## 2023-10-12 PROCEDURE — 82962 GLUCOSE BLOOD TEST: CPT

## 2023-10-12 PROCEDURE — 96372 THER/PROPH/DIAG INJ SC/IM: CPT | Performed by: EMERGENCY MEDICINE

## 2023-10-12 PROCEDURE — 96365 THER/PROPH/DIAG IV INF INIT: CPT

## 2023-10-12 PROCEDURE — 81003 URINALYSIS AUTO W/O SCOPE: CPT | Performed by: EMERGENCY MEDICINE

## 2023-10-12 PROCEDURE — 96375 TX/PRO/DX INJ NEW DRUG ADDON: CPT

## 2023-10-12 PROCEDURE — 84443 ASSAY THYROID STIM HORMONE: CPT | Performed by: EMERGENCY MEDICINE

## 2023-10-12 PROCEDURE — 99285 EMERGENCY DEPT VISIT HI MDM: CPT | Mod: 25

## 2023-10-12 PROCEDURE — 25000003 PHARM REV CODE 250: Performed by: EMERGENCY MEDICINE

## 2023-10-12 PROCEDURE — 83880 ASSAY OF NATRIURETIC PEPTIDE: CPT | Performed by: EMERGENCY MEDICINE

## 2023-10-12 PROCEDURE — 85730 THROMBOPLASTIN TIME PARTIAL: CPT | Performed by: EMERGENCY MEDICINE

## 2023-10-12 PROCEDURE — 93010 ELECTROCARDIOGRAM REPORT: CPT | Mod: ,,, | Performed by: INTERNAL MEDICINE

## 2023-10-12 PROCEDURE — 85025 COMPLETE CBC W/AUTO DIFF WBC: CPT | Performed by: EMERGENCY MEDICINE

## 2023-10-12 PROCEDURE — 84484 ASSAY OF TROPONIN QUANT: CPT | Performed by: EMERGENCY MEDICINE

## 2023-10-12 PROCEDURE — 85610 PROTHROMBIN TIME: CPT | Performed by: EMERGENCY MEDICINE

## 2023-10-12 PROCEDURE — 63600175 PHARM REV CODE 636 W HCPCS: Performed by: EMERGENCY MEDICINE

## 2023-10-12 RX ORDER — TALC
6 POWDER (GRAM) TOPICAL NIGHTLY PRN
Status: DISCONTINUED | OUTPATIENT
Start: 2023-10-13 | End: 2023-10-15 | Stop reason: HOSPADM

## 2023-10-12 RX ORDER — GLUCAGON 1 MG
1 KIT INJECTION
Status: DISCONTINUED | OUTPATIENT
Start: 2023-10-13 | End: 2023-10-15 | Stop reason: HOSPADM

## 2023-10-12 RX ORDER — IBUPROFEN 200 MG
16 TABLET ORAL
Status: DISCONTINUED | OUTPATIENT
Start: 2023-10-13 | End: 2023-10-15 | Stop reason: HOSPADM

## 2023-10-12 RX ORDER — GLUCAGON 1 MG
1 KIT INJECTION
Status: COMPLETED | OUTPATIENT
Start: 2023-10-12 | End: 2023-10-12

## 2023-10-12 RX ORDER — MAG HYDROX/ALUMINUM HYD/SIMETH 200-200-20
30 SUSPENSION, ORAL (FINAL DOSE FORM) ORAL 4 TIMES DAILY PRN
Status: DISCONTINUED | OUTPATIENT
Start: 2023-10-13 | End: 2023-10-15 | Stop reason: HOSPADM

## 2023-10-12 RX ORDER — ATROPINE SULFATE 0.1 MG/ML
0.5 INJECTION INTRAVENOUS
Status: COMPLETED | OUTPATIENT
Start: 2023-10-12 | End: 2023-10-12

## 2023-10-12 RX ORDER — ONDANSETRON 2 MG/ML
4 INJECTION INTRAMUSCULAR; INTRAVENOUS EVERY 4 HOURS PRN
Status: DISCONTINUED | OUTPATIENT
Start: 2023-10-13 | End: 2023-10-15 | Stop reason: HOSPADM

## 2023-10-12 RX ORDER — ACETAMINOPHEN 325 MG/1
650 TABLET ORAL EVERY 6 HOURS PRN
Status: DISCONTINUED | OUTPATIENT
Start: 2023-10-13 | End: 2023-10-15 | Stop reason: HOSPADM

## 2023-10-12 RX ORDER — IBUPROFEN 200 MG
24 TABLET ORAL
Status: DISCONTINUED | OUTPATIENT
Start: 2023-10-13 | End: 2023-10-15 | Stop reason: HOSPADM

## 2023-10-12 RX ORDER — PROCHLORPERAZINE EDISYLATE 5 MG/ML
5 INJECTION INTRAMUSCULAR; INTRAVENOUS EVERY 6 HOURS PRN
Status: DISCONTINUED | OUTPATIENT
Start: 2023-10-13 | End: 2023-10-15 | Stop reason: HOSPADM

## 2023-10-12 RX ORDER — POLYETHYLENE GLYCOL 3350 17 G/17G
17 POWDER, FOR SOLUTION ORAL 2 TIMES DAILY PRN
Status: DISCONTINUED | OUTPATIENT
Start: 2023-10-13 | End: 2023-10-15 | Stop reason: HOSPADM

## 2023-10-12 RX ORDER — NALOXONE HCL 0.4 MG/ML
0.02 VIAL (ML) INJECTION
Status: DISCONTINUED | OUTPATIENT
Start: 2023-10-13 | End: 2023-10-15 | Stop reason: HOSPADM

## 2023-10-12 RX ORDER — SIMETHICONE 80 MG
1 TABLET,CHEWABLE ORAL 4 TIMES DAILY PRN
Status: DISCONTINUED | OUTPATIENT
Start: 2023-10-13 | End: 2023-10-15 | Stop reason: HOSPADM

## 2023-10-12 RX ORDER — ENOXAPARIN SODIUM 100 MG/ML
40 INJECTION SUBCUTANEOUS EVERY 24 HOURS
Status: DISCONTINUED | OUTPATIENT
Start: 2023-10-13 | End: 2023-10-15 | Stop reason: HOSPADM

## 2023-10-12 RX ORDER — ASPIRIN 325 MG
325 TABLET, DELAYED RELEASE (ENTERIC COATED) ORAL
Status: COMPLETED | OUTPATIENT
Start: 2023-10-12 | End: 2023-10-12

## 2023-10-12 RX ORDER — INSULIN ASPART 100 [IU]/ML
0-10 INJECTION, SOLUTION INTRAVENOUS; SUBCUTANEOUS
Status: DISCONTINUED | OUTPATIENT
Start: 2023-10-12 | End: 2023-10-15 | Stop reason: HOSPADM

## 2023-10-12 RX ADMIN — GLUCAGON 1 MG: KIT at 08:10

## 2023-10-12 RX ADMIN — CALCIUM GLUCONATE 1 G: 98 INJECTION, SOLUTION INTRAVENOUS at 08:10

## 2023-10-12 RX ADMIN — ASPIRIN 325 MG: 325 TABLET, COATED ORAL at 07:10

## 2023-10-12 RX ADMIN — ATROPINE SULFATE 0.5 MG: 0.1 INJECTION INTRAVENOUS at 08:10

## 2023-10-12 NOTE — Clinical Note
Diagnosis: Mobitz type 1 second degree AV block [486894]   Future Attending Provider: KESHAWN TILLEY [[id number]]   Admitting Provider:: KESHAWN TILLEY [[id number]]   Special Needs:: No Special Needs [1]

## 2023-10-13 LAB
ALBUMIN SERPL-MCNC: 3.4 G/DL (ref 3.4–4.8)
ALBUMIN/GLOB SERPL: 1 RATIO (ref 1.1–2)
ALP SERPL-CCNC: 102 UNIT/L (ref 40–150)
ALT SERPL-CCNC: 16 UNIT/L (ref 0–55)
AST SERPL-CCNC: 16 UNIT/L (ref 5–34)
AV INDEX (PROSTH): 0.55
AV MEAN GRADIENT: 3 MMHG
AV PEAK GRADIENT: 6 MMHG
AV VELOCITY RATIO: 0.7
BASOPHILS # BLD AUTO: 0.03 X10(3)/MCL
BASOPHILS NFR BLD AUTO: 0.4 %
BILIRUB SERPL-MCNC: 0.3 MG/DL
BSA FOR ECHO PROCEDURE: 2.17 M2
BUN SERPL-MCNC: 11.2 MG/DL (ref 8.4–25.7)
CALCIUM SERPL-MCNC: 9 MG/DL (ref 8.8–10)
CHLORIDE SERPL-SCNC: 109 MMOL/L (ref 98–107)
CK MB SERPL-MCNC: 1.3 NG/ML
CK MB SERPL-MCNC: 1.3 NG/ML
CK MB SERPL-MCNC: 1.4 NG/ML
CK SERPL-CCNC: 121 U/L (ref 30–200)
CK SERPL-CCNC: 129 U/L (ref 30–200)
CK SERPL-CCNC: 130 U/L (ref 30–200)
CO2 SERPL-SCNC: 22 MMOL/L (ref 23–31)
CREAT SERPL-MCNC: 0.93 MG/DL (ref 0.73–1.18)
CV ECHO LV RWT: 0.44 CM
DOP CALC AO PEAK VEL: 1.2 M/S
DOP CALC AO VTI: 34.1 CM
DOP CALC LVOT PEAK VEL: 0.84 M/S
DOP CALCLVOT PEAK VEL VTI: 18.8 CM
E WAVE DECELERATION TIME: 217 MSEC
E/A RATIO: 1.09
E/E' RATIO: 14.59 M/S
ECHO LV POSTERIOR WALL: 1.14 CM (ref 0.6–1.1)
EOSINOPHIL # BLD AUTO: 0.09 X10(3)/MCL (ref 0–0.9)
EOSINOPHIL NFR BLD AUTO: 1.2 %
ERYTHROCYTE [DISTWIDTH] IN BLOOD BY AUTOMATED COUNT: 12.6 % (ref 11.5–17)
EST. AVERAGE GLUCOSE BLD GHB EST-MCNC: 128.4 MG/DL
FRACTIONAL SHORTENING: 29 % (ref 28–44)
GFR SERPLBLD CREATININE-BSD FMLA CKD-EPI: >60 MLS/MIN/1.73/M2
GLOBULIN SER-MCNC: 3.4 GM/DL (ref 2.4–3.5)
GLUCOSE SERPL-MCNC: 163 MG/DL (ref 82–115)
HBA1C MFR BLD: 6.1 %
HCT VFR BLD AUTO: 36.2 % (ref 42–52)
HGB BLD-MCNC: 12.7 G/DL (ref 14–18)
IMM GRANULOCYTES # BLD AUTO: 0.03 X10(3)/MCL (ref 0–0.04)
IMM GRANULOCYTES NFR BLD AUTO: 0.4 %
INTERVENTRICULAR SEPTUM: 1.16 CM (ref 0.6–1.1)
LEFT ATRIUM SIZE: 3.8 CM
LEFT ATRIUM VOLUME INDEX MOD: 32.6 ML/M2
LEFT ATRIUM VOLUME MOD: 70.1 CM3
LEFT INTERNAL DIMENSION IN SYSTOLE: 3.71 CM (ref 2.1–4)
LEFT VENTRICLE DIASTOLIC VOLUME INDEX: 60 ML/M2
LEFT VENTRICLE DIASTOLIC VOLUME: 129 ML
LEFT VENTRICLE MASS INDEX: 109 G/M2
LEFT VENTRICLE SYSTOLIC VOLUME INDEX: 27.2 ML/M2
LEFT VENTRICLE SYSTOLIC VOLUME: 58.5 ML
LEFT VENTRICULAR INTERNAL DIMENSION IN DIASTOLE: 5.19 CM (ref 3.5–6)
LEFT VENTRICULAR MASS: 233.89 G
LV LATERAL E/E' RATIO: 12.4 M/S
LV SEPTAL E/E' RATIO: 17.71 M/S
LVOT MG: 1 MMHG
LVOT MV: 0.52 CM/S
LYMPHOCYTES # BLD AUTO: 3.68 X10(3)/MCL (ref 0.6–4.6)
LYMPHOCYTES NFR BLD AUTO: 51 %
MAGNESIUM SERPL-MCNC: 1.8 MG/DL (ref 1.6–2.6)
MCH RBC QN AUTO: 32.4 PG (ref 27–31)
MCHC RBC AUTO-ENTMCNC: 35.1 G/DL (ref 33–36)
MCV RBC AUTO: 92.3 FL (ref 80–94)
MONOCYTES # BLD AUTO: 0.56 X10(3)/MCL (ref 0.1–1.3)
MONOCYTES NFR BLD AUTO: 7.8 %
MV PEAK A VEL: 1.14 M/S
MV PEAK E VEL: 1.24 M/S
NEUTROPHILS # BLD AUTO: 2.83 X10(3)/MCL (ref 2.1–9.2)
NEUTROPHILS NFR BLD AUTO: 39.2 %
NRBC BLD AUTO-RTO: 0 %
OHS LV EJECTION FRACTION SIMPSONS BIPLANE MOD: 65 %
PHOSPHATE SERPL-MCNC: 4.1 MG/DL (ref 2.3–4.7)
PLATELET # BLD AUTO: 313 X10(3)/MCL (ref 130–400)
PMV BLD AUTO: 9.9 FL (ref 7.4–10.4)
POCT GLUCOSE: 128 MG/DL (ref 70–110)
POCT GLUCOSE: 169 MG/DL (ref 70–110)
POCT GLUCOSE: 76 MG/DL (ref 70–110)
POCT GLUCOSE: 94 MG/DL (ref 70–110)
POTASSIUM SERPL-SCNC: 3.6 MMOL/L (ref 3.5–5.1)
PROT SERPL-MCNC: 6.8 GM/DL (ref 5.8–7.6)
PV PEAK GRADIENT: 2 MMHG
PV PEAK VELOCITY: 0.77 M/S
RBC # BLD AUTO: 3.92 X10(6)/MCL (ref 4.7–6.1)
RIGHT VENTRICULAR END-DIASTOLIC DIMENSION: 4.14 CM
SODIUM SERPL-SCNC: 142 MMOL/L (ref 136–145)
TDI LATERAL: 0.1 M/S
TDI SEPTAL: 0.07 M/S
TDI: 0.09 M/S
TRICUSPID ANNULAR PLANE SYSTOLIC EXCURSION: 2.09 CM
TROPONIN I SERPL-MCNC: <0.01 NG/ML (ref 0–0.04)
WBC # SPEC AUTO: 7.22 X10(3)/MCL (ref 4.5–11.5)
Z-SCORE OF LEFT VENTRICULAR DIMENSION IN END DIASTOLE: -2.96
Z-SCORE OF LEFT VENTRICULAR DIMENSION IN END SYSTOLE: -1.08

## 2023-10-13 PROCEDURE — 93005 ELECTROCARDIOGRAM TRACING: CPT

## 2023-10-13 PROCEDURE — 25000003 PHARM REV CODE 250: Performed by: NURSE PRACTITIONER

## 2023-10-13 PROCEDURE — 82553 CREATINE MB FRACTION: CPT | Performed by: EMERGENCY MEDICINE

## 2023-10-13 PROCEDURE — 83036 HEMOGLOBIN GLYCOSYLATED A1C: CPT | Performed by: NURSE PRACTITIONER

## 2023-10-13 PROCEDURE — 93010 EKG 12-LEAD: ICD-10-PCS | Mod: ,,, | Performed by: INTERNAL MEDICINE

## 2023-10-13 PROCEDURE — 85025 COMPLETE CBC W/AUTO DIFF WBC: CPT | Performed by: NURSE PRACTITIONER

## 2023-10-13 PROCEDURE — 63600175 PHARM REV CODE 636 W HCPCS: Performed by: NURSE PRACTITIONER

## 2023-10-13 PROCEDURE — 84484 ASSAY OF TROPONIN QUANT: CPT | Performed by: NURSE PRACTITIONER

## 2023-10-13 PROCEDURE — 93010 ELECTROCARDIOGRAM REPORT: CPT | Mod: ,,, | Performed by: INTERNAL MEDICINE

## 2023-10-13 PROCEDURE — 84484 ASSAY OF TROPONIN QUANT: CPT | Mod: 91 | Performed by: EMERGENCY MEDICINE

## 2023-10-13 PROCEDURE — 21400001 HC TELEMETRY ROOM

## 2023-10-13 PROCEDURE — 82550 ASSAY OF CK (CPK): CPT | Performed by: EMERGENCY MEDICINE

## 2023-10-13 PROCEDURE — 84100 ASSAY OF PHOSPHORUS: CPT | Performed by: NURSE PRACTITIONER

## 2023-10-13 PROCEDURE — 83735 ASSAY OF MAGNESIUM: CPT | Performed by: NURSE PRACTITIONER

## 2023-10-13 PROCEDURE — 80053 COMPREHEN METABOLIC PANEL: CPT | Performed by: NURSE PRACTITIONER

## 2023-10-13 RX ORDER — ATROPINE SULFATE 0.1 MG/ML
0.5 INJECTION INTRAVENOUS EVERY 12 HOURS PRN
Status: DISCONTINUED | OUTPATIENT
Start: 2023-10-13 | End: 2023-10-15 | Stop reason: HOSPADM

## 2023-10-13 RX ORDER — ONDANSETRON 2 MG/ML
4 INJECTION INTRAMUSCULAR; INTRAVENOUS EVERY 8 HOURS PRN
Status: DISCONTINUED | OUTPATIENT
Start: 2023-10-13 | End: 2023-10-15 | Stop reason: HOSPADM

## 2023-10-13 RX ORDER — AMLODIPINE BESYLATE 5 MG/1
5 TABLET ORAL DAILY
Status: DISCONTINUED | OUTPATIENT
Start: 2023-10-13 | End: 2023-10-15 | Stop reason: HOSPADM

## 2023-10-13 RX ORDER — LOSARTAN POTASSIUM 50 MG/1
100 TABLET ORAL DAILY
Status: DISCONTINUED | OUTPATIENT
Start: 2023-10-13 | End: 2023-10-15 | Stop reason: HOSPADM

## 2023-10-13 RX ORDER — ATORVASTATIN CALCIUM 40 MG/1
40 TABLET, FILM COATED ORAL NIGHTLY
Status: DISCONTINUED | OUTPATIENT
Start: 2023-10-13 | End: 2023-10-15 | Stop reason: HOSPADM

## 2023-10-13 RX ORDER — HYDRALAZINE HYDROCHLORIDE 50 MG/1
50 TABLET, FILM COATED ORAL EVERY 8 HOURS
Status: DISCONTINUED | OUTPATIENT
Start: 2023-10-13 | End: 2023-10-15 | Stop reason: HOSPADM

## 2023-10-13 RX ORDER — ASPIRIN 81 MG/1
81 TABLET ORAL DAILY
Status: DISCONTINUED | OUTPATIENT
Start: 2023-10-13 | End: 2023-10-15 | Stop reason: HOSPADM

## 2023-10-13 RX ORDER — SODIUM CHLORIDE 0.9 % (FLUSH) 0.9 %
10 SYRINGE (ML) INJECTION
Status: DISCONTINUED | OUTPATIENT
Start: 2023-10-13 | End: 2023-10-15 | Stop reason: HOSPADM

## 2023-10-13 RX ADMIN — LOSARTAN POTASSIUM 100 MG: 50 TABLET, FILM COATED ORAL at 09:10

## 2023-10-13 RX ADMIN — ATORVASTATIN CALCIUM 40 MG: 40 TABLET, FILM COATED ORAL at 08:10

## 2023-10-13 RX ADMIN — AMLODIPINE BESYLATE 5 MG: 5 TABLET ORAL at 09:10

## 2023-10-13 RX ADMIN — HYDRALAZINE HYDROCHLORIDE 50 MG: 50 TABLET, FILM COATED ORAL at 08:10

## 2023-10-13 RX ADMIN — INSULIN ASPART 1 UNITS: 100 INJECTION, SOLUTION INTRAVENOUS; SUBCUTANEOUS at 08:10

## 2023-10-13 RX ADMIN — ENOXAPARIN SODIUM 40 MG: 40 INJECTION SUBCUTANEOUS at 05:10

## 2023-10-13 RX ADMIN — ASPIRIN 81 MG: 81 TABLET, COATED ORAL at 09:10

## 2023-10-13 RX ADMIN — HYDRALAZINE HYDROCHLORIDE 50 MG: 50 TABLET, FILM COATED ORAL at 02:10

## 2023-10-13 NOTE — PROGRESS NOTES
8:03 pm I returned a page to Dr. Celestin at [facility] ortho ER regarding this 72 yo male who is known to [facility], sees Jazmyn Sánchez and [place] with history of CAD, CABG, HTN, HLD, DM, TRIP, 2nd degree AVB type I presents to ER with c/o vibration in chest and weakness; no CP or SOB; no distress; HR as low as 37 in 2nd degree AVB type I, /74. Takes coreg 12.5 mg po BID. Dr. Celestin will give calcium, glucagon and atropine and hold coreg. [facility] consulted for bradycardia.

## 2023-10-13 NOTE — NURSING
Nurses Note -- 4 Eyes      10/13/2023   12:08 AM      Skin assessed during: Transfer      [x] No Altered Skin Integrity Present    []Prevention Measures Documented      [] Yes- Altered Skin Integrity Present or Discovered   [] LDA Added if Not in Epic (Describe Wound)   [] New Altered Skin Integrity was Present on Admit and Documented in LDA   [] Wound Image Taken    Wound Care Consulted? No    Attending Nurse:  Sameera Lombardo RN/Staff Member:   LINA Pham

## 2023-10-13 NOTE — H&P
[facility] Hospital Medicine History & Physical Examination       Patient Name: Rogelio Johnson  MRN: [id number]  Patient Class: OP- Observation   Admission Date: 10/12/2023   Admitting Physician: Dileep Abraham MD   Length of Stay: 0  Attending Physician: Adonis Chan MD  Primary Care Provider: Yolanda Mendoza NP  Face-to-Face encounter date: 10/13/2023  Code Status: Full Code  Chief Complaint: Spasms (Pt to er c/o spasm type pain to left upper chest onset today.)        Patient information was obtained from patient, patient's family, past medical records and ER records.     HISTORY OF PRESENT ILLNESS:   Rogelio Johnson is a 73 y.o. Black or  male with a past medical history of hypertension, hyperlipidemia, coronary artery disease status post CABG, diabetes mellitus type 2, obstructive sleep apnea. The patient presented to [facility] on 10/12/2023 with a primary complaint of vibrating sensation to the left upper chest which has been ongoing for the last 3 days.  Patient reports yesterday sensation became more frequent.  He denies complaints of chest pain who reports similar vibration sensation prior to having his CABG.  Patient denies complaints of lightheadedness care syncope, shortness a breath, fever, chills, abdominal pain, nausea, vomiting and diarrhea.  Patient denies history of any cardiac arrhythmias.    Upon presentation to the ED, temperature 97.9° F, heart rate 69, blood pressure 150/77, respiratory rate 18 and SpO2 100% on room air.  Labs significant for normocytic anemia, glucose 156, .5, troponin less than 0.01 x 3.  UA negative for infection.  Initial EKG with second-degree AV block Mobitz type 1 with a 2-1 AV conduction, ventricular rate of 37 and unable to rule out age-undetermined infarct.  Repeat EKG same as initial but ventricular rate of 44. Chest x-ray with no acute cardiopulmonary process.  [facility] was consulted and patient was given calcium  glucagon, glucagon and atropine.  Coreg was held. Heart rate increased to the 40-50s. Patient also received full-dose aspirin.  Patient is to hospital medicine services for further medical management.    PAST MEDICAL HISTORY:   Hypertension  Hyperlipidemia  Coronary artery disease status post CABG   Diabetes mellitus type 2   Obstructive sleep apnea    PAST SURGICAL HISTORY:     Past Surgical History:   Procedure Laterality Date    COLONOSCOPY      CORONARY ARTERY BYPASS GRAFT (CABG)         ALLERGIES:   Patient has no known allergies.    FAMILY HISTORY:   Mother: Gallbladder/bile cancer, hypertension, diabetes mellitus  Father: Hypertension, diabetes mellitus    SOCIAL HISTORY:   Denies tobacco, alcohol and illicit drugs    HOME MEDICATIONS:     Prior to Admission medications    Medication Sig Start Date End Date Taking? Authorizing Provider   amLODIPine (NORVASC) 5 MG tablet Take 1 tablet (5 mg total) by mouth once daily. 5/3/23   Yolanda Mendoza NP   aspirin (ECOTRIN) 81 MG EC tablet Take 81 mg by mouth.    Provider, Historical   atorvastatin (LIPITOR) 40 MG tablet Take 1 tablet (40 mg total) by mouth every evening. 5/3/23   Yolanda Mendoza NP   baclofen (LIORESAL) 10 MG tablet Take 10 mg by mouth. 4/7/22   Provider, Historical   carvediloL (COREG) 12.5 MG tablet Take 12.5 mg by mouth 2 (two) times daily. 7/3/23   Provider, Historical   carvediloL (COREG) 6.25 MG tablet Take 6.25 mg by mouth 2 (two) times daily. 8/24/22   Provider, Historical   cyanocobalamin, vitamin B-12, 1,000 mcg/mL Drop Take 5 drops by mouth.    Provider, Historical   docusate sodium (COLACE) 100 MG capsule Take 100 mg by mouth.    Provider, Historical   empagliflozin (JARDIANCE) 10 mg tablet Take 1 tablet (10 mg total) by mouth every morning. 5/3/23   Yolanda Mendoza NP   ferrous sulfate (FEROSUL) 325 mg (65 mg iron) Tab tablet Take 1 tablet (325 mg total) by mouth once daily. 5/3/23   Yolanda Mendoza NP   finasteride (PROSCAR) 5 mg  tablet Take 1 tablet (5 mg total) by mouth once daily. 5/3/23   Yolanda Mendoza NP   fluticasone propionate (FLONASE) 50 mcg/actuation nasal spray 2 sprays (100 mcg total) by Each Nostril route once daily. 9/13/23   Salvador Sinclair MD   ketorolac 0.5% (ACULAR) 0.5 % Drop Place 1 drop into the left eye 2 (two) times daily. 7/26/23   Provider, Historical   LEVEMIR FLEXTOUCH U-100 INSULN 100 unit/mL (3 mL) InPn pen Administer 68 units SC q AM and 62 units SC q HS. Rotate injection sites. 5/3/23   Yolanda Mendoza NP   linaGLIPtin (TRADJENTA) 5 mg Tab tablet Take 1 tablet (5 mg total) by mouth once daily. 5/3/23   Yolanda Mendoaz NP   losartan (COZAAR) 50 MG tablet Take 2 tablets (100 mg total) by mouth once daily. 5/3/23   Yolanda Mendoza NP   melatonin 3 mg Cap Take 3 mg by mouth.    Provider, Historical   metFORMIN (GLUCOPHAGE) 1000 MG tablet Take 1 tablet (1,000 mg total) by mouth 2 (two) times daily with meals. 5/3/23   Yolanda Mendoza NP   nitroGLYCERIN (NITROSTAT) 0.4 MG SL tablet Place 0.4 mg under the tongue.    Provider, Historical   omega-3 fatty acids/fish oil (FISH OIL-OMEGA-3 FATTY ACIDS) 300-1,000 mg capsule Take 2 g by mouth.    Provider, Historical       REVIEW OF SYSTEMS:   Except as documented, all other systems reviewed and negative     PHYSICAL EXAM:     VITAL SIGNS: 24 HRS MIN & MAX LAST   Temp  Min: 97.6 °F (36.4 °C)  Max: 97.9 °F (36.6 °C) 97.6 °F (36.4 °C)   BP  Min: 141/71  Max: 171/78 (!) 141/71   Pulse  Min: 39  Max: 69  (!) 41   Resp  Min: 16  Max: 25 20   SpO2  Min: 96 %  Max: 100 % 99 %       General appearance: Well-developed, well-nourished male in no apparent distress. Daughter at bedside.  HEENT: Atraumatic head. Moist mucous membranes of oral cavity.  Lungs: Clear to auscultation bilaterally.   Heart: Bradycardic rate and rhythm. No edema to bilateral lower extremities.   Abdomen: Soft, non-distended, non-tender. Bowel sounds are normal.   Extremities: No cyanosis,  clubbing. No deformities.  Skin: No Rash. Warm and dry.  Neuro: Awake, alert and oriented. Motor and sensory exams grossly intact.  Psych/mental status: Appropriate mood and affect. Cooperative. Responds appropriately to questions.       LABS AND IMAGING:     Recent Labs   Lab 10/12/23  1938 10/13/23  0216   WBC 6.48 7.22   RBC 4.01* 3.92*   HGB 13.2* 12.7*   HCT 37.7* 36.2*   MCV 94.0 92.3   MCH 32.9* 32.4*   MCHC 35.0 35.1   RDW 12.5 12.6    313   MPV 9.7 9.9       Recent Labs   Lab 10/12/23  1938 10/13/23  0216    142   K 3.7 3.6   CO2 22* 22*   BUN 10.6 11.2   CREATININE 1.01 0.93   CALCIUM 9.0 9.0   MG 1.70 1.80   ALBUMIN 3.6 3.4   ALKPHOS 97 102   ALT 18 16   AST 15 16   BILITOT 0.3 0.3       Microbiology Results (last 7 days)       ** No results found for the last 168 hours. **             X-Ray Chest 1 View  Narrative: EXAMINATION:  XR CHEST 1 VIEW    CLINICAL HISTORY:  chest discomfort;    TECHNIQUE:  One    COMPARISON:  December 26, 2021.    FINDINGS:  Cardiopericardial silhouette is within upper limits of normal.  Sternotomy changes.  Lungs are without dense focal or segmental consolidation, congestive process, pleural effusions or pneumothorax.  Impression: NO ACUTE CARDIOPULMONARY PROCESS IDENTIFIED.    Electronically signed by: Hari Singh  Date:    10/12/2023  Time:    19:34        ASSESSMENT & PLAN:   Assessment:  Symptomatic bradycardia secondary to second degree AV block Mobitz type 1  Normocytic anemia, stable   History of hypertension, hyperlipidemia, coronary artery disease status post CABG, diabetes mellitus type 2, obstructive sleep apnea    Plan:  - Cardiology consulted.  Appreciate recommendations  - Telemetry monitoring   - Accu-Cheks and sliding scale  - Resume appropriate home medications when deemed necessary   - Labs in AM    VTE Prophylaxis: will be placed on Lovenox for DVT prophylaxis and will be advised to be as mobile as possible and sit in a chair as  tolerated      __________________________________________________________________________  INPATIENT LIST OF MEDICATIONS     Current Facility-Administered Medications:     acetaminophen tablet 650 mg, 650 mg, Oral, Q6H PRN, Nela Perez, AGACNP-BC    aluminum-magnesium hydroxide-simethicone 200-200-20 mg/5 mL suspension 30 mL, 30 mL, Oral, QID PRN, Nela Perez, AGACNP-BC    atropine injection 0.5 mg, 0.5 mg, Intravenous, Q12H PRN, Samantha Celestin MD    dextrose 10% bolus 125 mL 125 mL, 12.5 g, Intravenous, PRN, Nela Perez, AGACNP-BC    dextrose 10% bolus 250 mL 250 mL, 25 g, Intravenous, PRN, Nela Perez, AGACNP-BC    enoxaparin injection 40 mg, 40 mg, Subcutaneous, Daily, Nela Perez, AGACNP-BC    glucagon (human recombinant) injection 1 mg, 1 mg, Intramuscular, PRN, Nela Perez, AGACNP-BC    glucose chewable tablet 16 g, 16 g, Oral, PRN, Nela Perez, AGACNP-BC    glucose chewable tablet 24 g, 24 g, Oral, PRN, Nela Perez, AGACNP-BC    insulin aspart U-100 injection 0-10 Units, 0-10 Units, Subcutaneous, QID (AC + HS) PRN, Nela Perez, AGACNP-BC    melatonin tablet 6 mg, 6 mg, Oral, Nightly PRN, Nela Perez, AGACNP-BC    naloxone 0.4 mg/mL injection 0.02 mg, 0.02 mg, Intravenous, PRN, Nela Perez, AGACNP-BC    ondansetron injection 4 mg, 4 mg, Intravenous, Q8H PRN, Samantha Celestin MD    ondansetron injection 4 mg, 4 mg, Intravenous, Q4H PRN, Nela Perez AGACNP-BC    polyethylene glycol packet 17 g, 17 g, Oral, BID PRN, Nela Perez AGACNP-BC    prochlorperazine injection Soln 5 mg, 5 mg, Intravenous, Q6H PRN, Nela Perez AGACNP-BC    simethicone chewable tablet 80 mg, 1 tablet, Oral, QID PRN, Nela Perez AGACNP-BC    sodium chloride 0.9% flush 10 mL, 10 mL, Intravenous, PRN, Samantha Celestin MD      Scheduled Meds:   enoxparin  40 mg Subcutaneous Daily     Continuous Infusions:  PRN Meds:.acetaminophen, aluminum-magnesium  hydroxide-simethicone, atropine, dextrose 10%, dextrose 10%, glucagon (human recombinant), glucose, glucose, insulin aspart U-100, melatonin, naloxone, ondansetron, ondansetron, polyethylene glycol, prochlorperazine, simethicone, sodium chloride 0.9%      Discharge Planning and Disposition: Anticipated discharge to be determined.    IIsabella PA, have reviewed and discussed the case with Dr. Adonis Chan.    Please see the following addendum for further assessment and plan from there attending MD.    Isabella Abraham PA-C  10/13/2023

## 2023-10-13 NOTE — ED PROVIDER NOTES
Encounter Date: 10/12/2023       History     Chief Complaint   Patient presents with    Spasms     Pt to er c/o spasm type pain to left upper chest onset today.     73 year male with a history of CAD status post CABG 4 years ago, HTN, HLD, sleep apnea, complains spasm or vibrating type sensation in his left chest that has been off and on for 3 days.  Today it has been more continuous.  He states that this is the sensation he had in his chest before he had to have open heart surgery.  He said it was not really a pain or pressure sensation but more of a vibrating type discomfort.  He denies fever, chills, cough, shortness of breath.  Denies any falls, injuries, or activity changes.  He states he is otherwise feeling well.        Review of patient's allergies indicates:  No Known Allergies  Past Medical History:   Diagnosis Date    Coronary artery disease     Hyperlipidemia     Hypertension     Personal history of colonic polyps     Sleep apnea, unspecified      Past Surgical History:   Procedure Laterality Date    COLONOSCOPY      CORONARY ARTERY BYPASS GRAFT (CABG)       Family History   Problem Relation Age of Onset    Colon cancer Mother     Diabetes Mellitus Mother     Hypertension Mother     Cataracts Father     Heart attack Father     Heart disease Father      Social History     Tobacco Use    Smoking status: Never    Smokeless tobacco: Never   Substance Use Topics    Alcohol use: Not Currently    Drug use: Never     Review of Systems   Cardiovascular:  Positive for chest pain.   All other systems reviewed and are negative.      Physical Exam     Initial Vitals [10/12/23 1845]   BP Pulse Resp Temp SpO2   (!) 150/77 69 18 97.9 °F (36.6 °C) 100 %      MAP       --         Physical Exam    Nursing note and vitals reviewed.  Constitutional: Vital signs are normal. He appears well-developed and well-nourished.   HENT:   Head: Normocephalic and atraumatic.   Mouth/Throat: Oropharynx is clear and moist.   Eyes: Pupils  are equal, round, and reactive to light.   Neck: Neck supple. No JVD present.   Cardiovascular:  Normal rate, regular rhythm and normal heart sounds.           Pulmonary/Chest: Breath sounds normal. No respiratory distress.   Abdominal: Abdomen is soft. There is no abdominal tenderness.   Musculoskeletal:         General: No edema.      Cervical back: Neck supple. No edema or erythema.     Lymphadenopathy:     He has no cervical adenopathy.   Neurological: He is alert and oriented to person, place, and time. No cranial nerve deficit. GCS score is 15. GCS eye subscore is 4. GCS verbal subscore is 5. GCS motor subscore is 6.   Skin: Skin is warm and dry. Capillary refill takes less than 2 seconds.         ED Course   Critical Care    Date/Time: 10/12/2023 8:59 PM    Performed by: Samantha Celestin MD  Authorized by: Dileep Abraham MD  Direct patient critical care time: 40 minutes  Additional history critical care time: 5 minutes  Ordering / reviewing critical care time: 6 minutes  Documentation critical care time: 6 minutes  Consulting other physicians critical care time: 6 minutes  Consult with family critical care time: 5 minutes  Total critical care time (exclusive of procedural time) : 68 minutes  Critical care time was exclusive of separately billable procedures and treating other patients.  Critical care was necessary to treat or prevent imminent or life-threatening deterioration of the following conditions: cardiac failure.  Critical care was time spent personally by me on the following activities: blood draw for specimens, development of treatment plan with patient or surrogate, discussions with consultants, interpretation of cardiac output measurements, evaluation of patient's response to treatment, examination of patient, obtaining history from patient or surrogate, ordering and performing treatments and interventions, ordering and review of laboratory studies, ordering and review of radiographic  studies, pulse oximetry, re-evaluation of patient's condition and review of old charts.        Labs Reviewed   CBC WITH DIFFERENTIAL - Abnormal; Notable for the following components:       Result Value    RBC 4.01 (*)     Hgb 13.2 (*)     Hct 37.7 (*)     MCH 32.9 (*)     Neut # 1.91 (*)     All other components within normal limits   COMPREHENSIVE METABOLIC PANEL - Abnormal; Notable for the following components:    Carbon Dioxide 22 (*)     Glucose Level 156 (*)     Protein Total 7.8 (*)     Globulin 4.2 (*)     Albumin/Globulin Ratio 0.9 (*)     All other components within normal limits   B-TYPE NATRIURETIC PEPTIDE - Abnormal; Notable for the following components:    Natriuretic Peptide 153.5 (*)     All other components within normal limits   PROTIME-INR - Abnormal; Notable for the following components:    INR 1.0 (*)     All other components within normal limits   POCT GLUCOSE - Abnormal; Notable for the following components:    POCT Glucose 165 (*)     All other components within normal limits   TROPONIN I - Normal   TSH - Normal   APTT - Normal   MAGNESIUM - Normal   URINALYSIS, REFLEX TO URINE CULTURE   POCT GLUCOSE, HAND-HELD DEVICE     EKG Readings: (Independently Interpreted)   Initial Reading: No STEMI. Heart Rate: 44. Ectopy: No Ectopy. Conduction: 2nd Degree AV Block - Type I. ST Segments: Normal ST Segments. T Waves: Normal. Clinical Impression: AV Block - 2nd Degree - Mobitz I Other Impression: Second-degree AV block type 1, rate is 44, only old EKG I can find in the computer was normal sinus rhythm with a rate of 72       Imaging Results              X-Ray Chest 1 View (Final result)  Result time 10/12/23 19:34:22      Final result by Hari Singh MD (10/12/23 19:34:22)                   Impression:      NO ACUTE CARDIOPULMONARY PROCESS IDENTIFIED.      Electronically signed by: Hari Singh  Date:    10/12/2023  Time:    19:34               Narrative:    EXAMINATION:  XR CHEST 1 VIEW    CLINICAL  HISTORY:  chest discomfort;    TECHNIQUE:  One    COMPARISON:  December 26, 2021.    FINDINGS:  Cardiopericardial silhouette is within upper limits of normal.  Sternotomy changes.  Lungs are without dense focal or segmental consolidation, congestive process, pleural effusions or pneumothorax.                                       Medications   calcium gluconate 1 g in dextrose 5 % in water (D5W) 50 mL IVPB (MB+) (1 g Intravenous New Bag 10/12/23 2014)   aspirin EC tablet 325 mg (325 mg Oral Given 10/12/23 1951)   glucagon (human recombinant) injection 1 mg (1 mg Intramuscular Given 10/12/23 2014)   atropine injection 0.5 mg (0.5 mg Intravenous Given 10/12/23 2009)     Medical Decision Making  73 year male with a history of CAD status post CABG 4 years ago, HTN, HLD, sleep apnea, complains spasm or vibrating type sensation in his left chest that has been off and on for 3 days.  Today it has been more continuous.  He states that this is the sensation he had in his chest before he had to have open heart surgery.  He said it was not really a pain or pressure sensation but more of a vibrating type discomfort.  He denies fever, chills, cough, shortness of breath.  Denies any falls, injuries, or activity changes.  He states he is otherwise feeling well.      Differential diagnosis includes but is not limited to acute coronary syndrome, cardiac arrhythmia, pneumonia    Amount and/or Complexity of Data Reviewed  Labs: ordered.  Radiology: ordered.  Discussion of management or test interpretation with external provider(s): 2010 Case discussed with Samantha Buckley, nurse practitioner for [facility].  She states that this patient does have a history of Mobitz type 1 second-degree AV block but the heart rate usually is not this low.  He has no history of tachyarrhythmias so she feels that should be safe to the atropine, glucagon, calcium that I have ordered.  She requested admission to the hospitalist service with consultation to  [facility]. Troponin and Chemistry tests are still pending    2045  Case discussed with Nela FERRARA on call for Dr Abraham and pt will be admitted to the Hospitalist service for further evaluation and treatment.  Cardiology consultation placed.    Risk  Drug therapy requiring intensive monitoring for toxicity.  Decision regarding hospitalization.               ED Course as of 10/12/23 2059   Thu Oct 12, 2023   1945 Patient noted to have second-degree AV block, Mobitz type 1, rate in the 40s.  His heart rate fluctuates briefly down to 37 and mostly stays in the lower 40s.  Is asymptomatic other than occasionally having that abnormal sensation his chest which I suspect is feeling the arrhythmia in his chest.  He has no chest pain, shortness of breath, tightness, heaviness.  He is not yet taken an aspirin today so will give him an aspirin.  As soon as his lab work is back I will contact the cardiology service for hospital admission [SH]   2001 HR staying in the upper 30s.  Pt is asymptomatic other than feeling the palpitations which he says feels like vibration in his chest.  Calcium, glucagon and atropine ordered and [facility] paged. [SH]   2021 Heart rate now is fluctuating between 48 and 65, still Mobitz type 1 second-degree AV block.  Patient is asymptomatic. [SH]   2034 HR staying in the 60s now. Still Mobitz Type 2 Second degree AV block.  /74 O2sat 95% on RA, asymptomatic [SH]      ED Course User Index  [SH] Samantha Celestin MD                    Clinical Impression:   Final diagnoses:  [R07.89] Chest discomfort  [I44.1] Mobitz type 1 second degree AV block        ED Disposition Condition    Transfer to Another Facility Stable                Samantha Celestin MD  10/12/23 2059

## 2023-10-13 NOTE — CLINICAL REVIEW
pa review       In Progress   Last Updated by Keisha Valerio MD on 10/13/2023 1152     Review Status Created By   In Primary Keisha Valerio MD      Criteria Review      73 year old male with CAD with CABG, hypertension, hyperlipidemia, presented with chest pain.  It was noted the patient was bradycardic.  Heart rate was in the 30s.  The patient did receive atropine, calcium gluconate.  EKG demonstrated second-degree AV block..  Cardiology were consulted for Mobitz type 1.  The patient continues to be bradycardic on hospital day 2.  Continues on telemetry, electrolyte monitoring, kidney function monitoring.  In the setting of worsening bradycardia, necessitating atropine, continued need for telemetry monitoring, continued bradycardia, multiple comorbidities, electrolyte monitoring, kidney function monitoring, the patient is at a increased risk for major adverse outcomes, would pursue inpatient level of care           Keisha Valerio MD  Utilization Management  Physician Advisor  [facility]  10/13/2023 '

## 2023-10-13 NOTE — CONSULTS
Inpatient consult to Cardiology  Consult performed by: Divya Casey FNP  Consult ordered by: Samantha Celestin MD      [facility] - [facility] Tele[facility]    Cardiology  Consult Note    Patient Name: Rogelio Johnson  MRN: [id number]  Admission Date: 10/12/2023  Hospital Length of Stay: 0 days  Code Status: Full Code   Attending Provider: Dileep Abraham MD   Consulting Provider: KIMBERLY Lawton  Primary Care Physician: Yolanda Mendoza NP  Principal Problem:<principal problem not specified>    Patient information was obtained from patient, past medical records, and ER records.     Subjective:     Chief Complaint:  chest pain     HPI:   MATTHEW Johnson is a 72y/o male, followed by Dr. Mathew/Gonzalo, who presented to ED with c/o 3 day hx of spasm in left chest. . Troponin negative x 3 . Hypertensive with SBP up to 160s. SB 40s with Mobitz 1, (Known hx). He was treated with aspirin, atropine, and calcium gluconate and admitted to hospital medicine. CIS consulted for Mobitz I.       PMH: HTN, HLD, CAD, T2DM, TRIP, Family Hx of CAD, Mobitz I, chronic anemia, aortic root dilation  PSH: LHC, CABG 11/2019 (LIMA-LAD, rSVG-D1, rSVG-OM2, rSVG-OM3), EVH Bilateral GSVs, colonoscopy  Family History: father- MI; Mother- HTN, DM type I, colon cancer  Social History: Denies tobacco use; occasional ETOH and marijuana    Previous Cardiac Diagnostics:   TTE 06.26.23:  The left ventricle is normal in size.  Global left ventricular systolic function is normal.  The left ventricular ejection fraction is 60%. The left ventricle diastolic function is impaired (Grade I) with normal left atrial pressure. Mild Concentric left ventricular hypertrophy is present. Right ventricular systolic function is mildly decreased.  Mild (1+) mitral regurgitation.     ETT 05.11.22:  1. Stress EKG is abnormal. Cortez treadmill score is 1, this is suggestive of moderate risk treadmill test. During peak exercise and  recovery there was 1.0 mm horizontal ST depression is noted in the Lead 2, Lead 3, AVF, V4, V5 and V6.   2. Maximal exercise treadmill test (MPHR : 87 %).  3. The functional capacity is fair (7.1 METs).  4. The heart rate recovery is normal.   5. The study quality is good.     [facility] 07.20.21:  Severe native CAD. LM patent. Flows into proximal LAD which supplies septal branches along the proximal circumflex. Competitive flow is noted in the circumflex system  RCA nondominant  SVG-Diagonal 1 widely patent  SVG-OM1 widely patent  SVG-OM2 widely patent   LIMA-LAD widely patent  LVEDP 13mmHg  No AS        [facility] 10.28.19:  LM- no LM, has separate ostia coming off aorta for the LAD and circumflex  LAD: large vessel; 80% stenosis. Sequential 30% distal stenosis.  D1: tiny vessel. D2: Small vessel  Lcx: large vessel. 20% proximal stenosis. 80% distal stenosis  OM1 and 2: small to tiny vessels. OM3: small to medium sized vessel  L PDA: medium sized vessel  RCA: nondominant. Medium to small sized vessel. 90% proximal stenosis  Collaterals from RCA to distal LAD  Recommendations: CABG evaluation    Past Medical History:   Diagnosis Date    Coronary artery disease     Hyperlipidemia     Hypertension     Personal history of colonic polyps     Sleep apnea, unspecified        Past Surgical History:   Procedure Laterality Date    COLONOSCOPY      CORONARY ARTERY BYPASS GRAFT (CABG)         Review of patient's allergies indicates:  No Known Allergies    No current facility-administered medications on file prior to encounter.     Current Outpatient Medications on File Prior to Encounter   Medication Sig    amLODIPine (NORVASC) 5 MG tablet Take 1 tablet (5 mg total) by mouth once daily.    aspirin (ECOTRIN) 81 MG EC tablet Take 81 mg by mouth.    atorvastatin (LIPITOR) 40 MG tablet Take 1 tablet (40 mg total) by mouth every evening.    baclofen (LIORESAL) 10 MG tablet Take 10 mg by mouth.    carvediloL (COREG) 12.5 MG tablet Take 12.5 mg  by mouth 2 (two) times daily.    carvediloL (COREG) 6.25 MG tablet Take 6.25 mg by mouth 2 (two) times daily.    cyanocobalamin, vitamin B-12, 1,000 mcg/mL Drop Take 5 drops by mouth.    docusate sodium (COLACE) 100 MG capsule Take 100 mg by mouth.    empagliflozin (JARDIANCE) 10 mg tablet Take 1 tablet (10 mg total) by mouth every morning.    ferrous sulfate (FEROSUL) 325 mg (65 mg iron) Tab tablet Take 1 tablet (325 mg total) by mouth once daily.    finasteride (PROSCAR) 5 mg tablet Take 1 tablet (5 mg total) by mouth once daily.    fluticasone propionate (FLONASE) 50 mcg/actuation nasal spray 2 sprays (100 mcg total) by Each Nostril route once daily.    ketorolac 0.5% (ACULAR) 0.5 % Drop Place 1 drop into the left eye 2 (two) times daily.    LEVEMIR FLEXTOUCH U-100 INSULN 100 unit/mL (3 mL) InPn pen Administer 68 units SC q AM and 62 units SC q HS. Rotate injection sites.    linaGLIPtin (TRADJENTA) 5 mg Tab tablet Take 1 tablet (5 mg total) by mouth once daily.    losartan (COZAAR) 50 MG tablet Take 2 tablets (100 mg total) by mouth once daily.    melatonin 3 mg Cap Take 3 mg by mouth.    metFORMIN (GLUCOPHAGE) 1000 MG tablet Take 1 tablet (1,000 mg total) by mouth 2 (two) times daily with meals.    nitroGLYCERIN (NITROSTAT) 0.4 MG SL tablet Place 0.4 mg under the tongue.    omega-3 fatty acids/fish oil (FISH OIL-OMEGA-3 FATTY ACIDS) 300-1,000 mg capsule Take 2 g by mouth.     Family History       Problem Relation (Age of Onset)    Cataracts Father    Colon cancer Mother    Diabetes Mellitus Mother    Heart attack Father    Heart disease Father    Hypertension Mother          Tobacco Use    Smoking status: Never    Smokeless tobacco: Never   Substance and Sexual Activity    Alcohol use: Not Currently    Drug use: Never    Sexual activity: Not on file       Review of Systems   Constitutional: Negative.    Respiratory:  Negative for shortness of breath.    Cardiovascular:  Positive for chest pain.    Gastrointestinal: Negative.    Musculoskeletal: Negative.    Psychiatric/Behavioral: Negative.         Objective:     Vital Signs (Most Recent):  Temp: 97.6 °F (36.4 °C) (10/12/23 2245)  Pulse: 65 (10/12/23 2245)  Resp: 18 (10/12/23 2245)  BP: (!) 171/78 (10/12/23 2245)  SpO2: 100 % (10/12/23 2245) Vital Signs (24h Range):  Temp:  [97.6 °F (36.4 °C)-97.9 °F (36.6 °C)] 97.6 °F (36.4 °C)  Pulse:  [39-69] 65  Resp:  [16-25] 18  SpO2:  [96 %-100 %] 100 %  BP: (142-171)/(60-80) 171/78     Weight: 92.5 kg (203 lb 14.8 oz)  Body mass index is 27.66 kg/m².    SpO2: 100 %         Intake/Output Summary (Last 24 hours) at 10/13/2023 0618  Last data filed at 10/12/2023 2123  Gross per 24 hour   Intake 50 ml   Output --   Net 50 ml       Lines/Drains/Airways       Peripheral Intravenous Line  Duration                  Peripheral IV - Single Lumen 10/12/23 1938 20 G Anterior;Distal;Right Forearm <1 day         Peripheral IV - Single Lumen 10/12/23 2012 18 G Anterior;Left Forearm <1 day                    Significant Labs:  Recent Results (from the past 72 hour(s))   CBC with Differential    Collection Time: 10/12/23  7:38 PM   Result Value Ref Range    WBC 6.48 4.50 - 11.50 x10(3)/mcL    RBC 4.01 (L) 4.70 - 6.10 x10(6)/mcL    Hgb 13.2 (L) 14.0 - 18.0 g/dL    Hct 37.7 (L) 42.0 - 52.0 %    MCV 94.0 80.0 - 94.0 fL    MCH 32.9 (H) 27.0 - 31.0 pg    MCHC 35.0 33.0 - 36.0 g/dL    RDW 12.5 11.5 - 17.0 %    Platelet 319 130 - 400 x10(3)/mcL    MPV 9.7 7.4 - 10.4 fL    Neut % 29.3 %    Lymph % 59.0 %    Mono % 9.0 %    Eos % 2.0 %    Basophil % 0.5 %    Lymph # 3.82 0.6 - 4.6 x10(3)/mcL    Neut # 1.91 (L) 2.1 - 9.2 x10(3)/mcL    Mono # 0.58 0.1 - 1.3 x10(3)/mcL    Eos # 0.13 0 - 0.9 x10(3)/mcL    Baso # 0.03 <=0.2 x10(3)/mcL    IG# 0.01 0 - 0.04 x10(3)/mcL    IG% 0.2 %    NRBC% 0.0 %   Comprehensive Metabolic Panel    Collection Time: 10/12/23  7:38 PM   Result Value Ref Range    Sodium Level 145 136 - 145 mmol/L    Potassium Level 3.7  3.5 - 5.1 mmol/L    Chloride 106 98 - 107 mmol/L    Carbon Dioxide 22 (L) 23 - 31 mmol/L    Glucose Level 156 (H) 82 - 115 mg/dL    Blood Urea Nitrogen 10.6 8.4 - 25.7 mg/dL    Creatinine 1.01 0.73 - 1.18 mg/dL    Calcium Level Total 9.0 8.8 - 10.0 mg/dL    Protein Total 7.8 (H) 5.8 - 7.6 gm/dL    Albumin Level 3.6 3.4 - 4.8 g/dL    Globulin 4.2 (H) 2.4 - 3.5 gm/dL    Albumin/Globulin Ratio 0.9 (L) 1.1 - 2.0 ratio    Bilirubin Total 0.3 <=1.5 mg/dL    Alkaline Phosphatase 97 40 - 150 unit/L    Alanine Aminotransferase 18 0 - 55 unit/L    Aspartate Aminotransferase 15 5 - 34 unit/L    eGFR >60 mls/min/1.73/m2   BNP    Collection Time: 10/12/23  7:38 PM   Result Value Ref Range    Natriuretic Peptide 153.5 (H) <=100.0 pg/mL   Troponin I    Collection Time: 10/12/23  7:38 PM   Result Value Ref Range    Troponin-I <0.010 0.000 - 0.045 ng/mL   TSH    Collection Time: 10/12/23  7:38 PM   Result Value Ref Range    TSH 1.574 0.350 - 4.940 uIU/mL   Protime-INR    Collection Time: 10/12/23  7:38 PM   Result Value Ref Range    PT 13.5 11.7 - 14.5 seconds    INR 1.0 (L) 2.0 - 3.0   APTT    Collection Time: 10/12/23  7:38 PM   Result Value Ref Range    PTT 31.6 23.4 - 33.9 seconds   Magnesium    Collection Time: 10/12/23  7:38 PM   Result Value Ref Range    Magnesium Level 1.70 1.60 - 2.60 mg/dL   POCT glucose    Collection Time: 10/12/23  7:38 PM   Result Value Ref Range    POCT Glucose 165 (H) 70 - 110 mg/dL   Urinalysis, Reflex to Urine Culture    Collection Time: 10/12/23  9:58 PM    Specimen: Urine   Result Value Ref Range    Color, UA Light-Yellow Yellow, Light-Yellow, Dark Yellow, Melany, Straw    Appearance, UA Clear Clear    Specific Gravity, UA 1.010 1.005 - 1.030    pH, UA 6.5 5.0 - 8.5    Protein, UA Negative Negative    Glucose, UA >=1000 (A) Negative, Normal    Ketones, UA Negative Negative    Blood, UA Negative Negative    Bilirubin, UA Negative Negative    Urobilinogen, UA 1.0 0.2, 1.0, Normal    Nitrites, UA  Negative Negative    Leukocyte Esterase, UA Negative Negative   CK    Collection Time: 10/13/23  2:16 AM   Result Value Ref Range    Creatine Kinase 129 30 - 200 U/L   CK-MB    Collection Time: 10/13/23  2:16 AM   Result Value Ref Range    Creatine Kinase MB 1.4 <=7.2 ng/mL   Troponin I    Collection Time: 10/13/23  2:16 AM   Result Value Ref Range    Troponin-I <0.010 0.000 - 0.045 ng/mL   Hemoglobin A1c if not done in past 3 months    Collection Time: 10/13/23  2:16 AM   Result Value Ref Range    Hemoglobin A1c 6.1 <=7.0 %    Estimated Average Glucose 128.4 mg/dL   Comprehensive Metabolic Panel (CMP)    Collection Time: 10/13/23  2:16 AM   Result Value Ref Range    Sodium Level 142 136 - 145 mmol/L    Potassium Level 3.6 3.5 - 5.1 mmol/L    Chloride 109 (H) 98 - 107 mmol/L    Carbon Dioxide 22 (L) 23 - 31 mmol/L    Glucose Level 163 (H) 82 - 115 mg/dL    Blood Urea Nitrogen 11.2 8.4 - 25.7 mg/dL    Creatinine 0.93 0.73 - 1.18 mg/dL    Calcium Level Total 9.0 8.8 - 10.0 mg/dL    Protein Total 6.8 5.8 - 7.6 gm/dL    Albumin Level 3.4 3.4 - 4.8 g/dL    Globulin 3.4 2.4 - 3.5 gm/dL    Albumin/Globulin Ratio 1.0 (L) 1.1 - 2.0 ratio    Bilirubin Total 0.3 <=1.5 mg/dL    Alkaline Phosphatase 102 40 - 150 unit/L    Alanine Aminotransferase 16 0 - 55 unit/L    Aspartate Aminotransferase 16 5 - 34 unit/L    eGFR >60 mls/min/1.73/m2   Magnesium    Collection Time: 10/13/23  2:16 AM   Result Value Ref Range    Magnesium Level 1.80 1.60 - 2.60 mg/dL   Phosphorus    Collection Time: 10/13/23  2:16 AM   Result Value Ref Range    Phosphorus Level 4.1 2.3 - 4.7 mg/dL   Troponin I    Collection Time: 10/13/23  2:16 AM   Result Value Ref Range    Troponin-I <0.010 0.000 - 0.045 ng/mL   CBC with Differential    Collection Time: 10/13/23  2:16 AM   Result Value Ref Range    WBC 7.22 4.50 - 11.50 x10(3)/mcL    RBC 3.92 (L) 4.70 - 6.10 x10(6)/mcL    Hgb 12.7 (L) 14.0 - 18.0 g/dL    Hct 36.2 (L) 42.0 - 52.0 %    MCV 92.3 80.0 - 94.0 fL     MCH 32.4 (H) 27.0 - 31.0 pg    MCHC 35.1 33.0 - 36.0 g/dL    RDW 12.6 11.5 - 17.0 %    Platelet 313 130 - 400 x10(3)/mcL    MPV 9.9 7.4 - 10.4 fL    Neut % 39.2 %    Lymph % 51.0 %    Mono % 7.8 %    Eos % 1.2 %    Basophil % 0.4 %    Lymph # 3.68 0.6 - 4.6 x10(3)/mcL    Neut # 2.83 2.1 - 9.2 x10(3)/mcL    Mono # 0.56 0.1 - 1.3 x10(3)/mcL    Eos # 0.09 0 - 0.9 x10(3)/mcL    Baso # 0.03 <=0.2 x10(3)/mcL    IG# 0.03 0 - 0.04 x10(3)/mcL    IG% 0.4 %    NRBC% 0.0 %       Significant Imaging:  Imaging Results              X-Ray Chest 1 View (Final result)  Result time 10/12/23 19:34:22      Final result by Hari Singh MD (10/12/23 19:34:22)                   Impression:      NO ACUTE CARDIOPULMONARY PROCESS IDENTIFIED.      Electronically signed by: Hari Singh  Date:    10/12/2023  Time:    19:34               Narrative:    EXAMINATION:  XR CHEST 1 VIEW    CLINICAL HISTORY:  chest discomfort;    TECHNIQUE:  One    COMPARISON:  December 26, 2021.    FINDINGS:  Cardiopericardial silhouette is within upper limits of normal.  Sternotomy changes.  Lungs are without dense focal or segmental consolidation, congestive process, pleural effusions or pneumothorax.                                      EKG:          Physical Exam  HENT:      Mouth/Throat:      Mouth: Mucous membranes are moist.   Cardiovascular:      Rate and Rhythm: Bradycardia present. Rhythm irregular.      Heart sounds: Normal heart sounds.   Pulmonary:      Effort: Pulmonary effort is normal.      Breath sounds: Normal breath sounds.   Abdominal:      Palpations: Abdomen is soft.   Musculoskeletal:         General: Normal range of motion.   Skin:     General: Skin is warm.      Capillary Refill: Capillary refill takes less than 2 seconds.   Neurological:      General: No focal deficit present.      Mental Status: He is alert.   Psychiatric:         Mood and Affect: Mood normal.         Home Medications:   No current facility-administered medications on  file prior to encounter.     Current Outpatient Medications on File Prior to Encounter   Medication Sig Dispense Refill    amLODIPine (NORVASC) 5 MG tablet Take 1 tablet (5 mg total) by mouth once daily. 90 tablet 2    aspirin (ECOTRIN) 81 MG EC tablet Take 81 mg by mouth.      atorvastatin (LIPITOR) 40 MG tablet Take 1 tablet (40 mg total) by mouth every evening. 90 tablet 2    baclofen (LIORESAL) 10 MG tablet Take 10 mg by mouth.      carvediloL (COREG) 12.5 MG tablet Take 12.5 mg by mouth 2 (two) times daily.      carvediloL (COREG) 6.25 MG tablet Take 6.25 mg by mouth 2 (two) times daily.      cyanocobalamin, vitamin B-12, 1,000 mcg/mL Drop Take 5 drops by mouth.      docusate sodium (COLACE) 100 MG capsule Take 100 mg by mouth.      empagliflozin (JARDIANCE) 10 mg tablet Take 1 tablet (10 mg total) by mouth every morning. 90 tablet 2    ferrous sulfate (FEROSUL) 325 mg (65 mg iron) Tab tablet Take 1 tablet (325 mg total) by mouth once daily. 90 tablet 2    finasteride (PROSCAR) 5 mg tablet Take 1 tablet (5 mg total) by mouth once daily. 90 tablet 2    fluticasone propionate (FLONASE) 50 mcg/actuation nasal spray 2 sprays (100 mcg total) by Each Nostril route once daily. 15.8 mL 6    ketorolac 0.5% (ACULAR) 0.5 % Drop Place 1 drop into the left eye 2 (two) times daily.      LEVEMIR FLEXTOUCH U-100 INSULN 100 unit/mL (3 mL) InPn pen Administer 68 units SC q AM and 62 units SC q HS. Rotate injection sites. 120 mL 6    linaGLIPtin (TRADJENTA) 5 mg Tab tablet Take 1 tablet (5 mg total) by mouth once daily. 90 tablet 2    losartan (COZAAR) 50 MG tablet Take 2 tablets (100 mg total) by mouth once daily. 90 tablet 2    melatonin 3 mg Cap Take 3 mg by mouth.      metFORMIN (GLUCOPHAGE) 1000 MG tablet Take 1 tablet (1,000 mg total) by mouth 2 (two) times daily with meals. 180 tablet 2    nitroGLYCERIN (NITROSTAT) 0.4 MG SL tablet Place 0.4 mg under the tongue.      omega-3 fatty acids/fish oil (FISH OIL-OMEGA-3 FATTY  ACIDS) 300-1,000 mg capsule Take 2 g by mouth.         Current Inpatient Medications:    Current Facility-Administered Medications:     acetaminophen tablet 650 mg, 650 mg, Oral, Q6H PRN, Nela Perez, AGACNP-BC    aluminum-magnesium hydroxide-simethicone 200-200-20 mg/5 mL suspension 30 mL, 30 mL, Oral, QID PRN, Nela Perez, AGACNP-BC    atropine injection 0.5 mg, 0.5 mg, Intravenous, Q12H PRN, Samantha Celestin MD    dextrose 10% bolus 125 mL 125 mL, 12.5 g, Intravenous, PRN, Nela Perez, AGACNP-BC    dextrose 10% bolus 250 mL 250 mL, 25 g, Intravenous, PRN, Nela Perez, AGACNP-BC    enoxaparin injection 40 mg, 40 mg, Subcutaneous, Daily, Nela Perez AGACNP-BC    glucagon (human recombinant) injection 1 mg, 1 mg, Intramuscular, PRN, Nela Perez, AGACNP-BC    glucose chewable tablet 16 g, 16 g, Oral, PRN, Nela Perez, AGACNP-BC    glucose chewable tablet 24 g, 24 g, Oral, PRN, Nela Perez, AGACNP-BC    insulin aspart U-100 injection 0-10 Units, 0-10 Units, Subcutaneous, QID (AC + HS) PRN, Nela Perez, AGACNP-BC    melatonin tablet 6 mg, 6 mg, Oral, Nightly PRN, Nela Perez AGACNP-BC    naloxone 0.4 mg/mL injection 0.02 mg, 0.02 mg, Intravenous, PRN, Nela Perez, AGACNP-BC    ondansetron injection 4 mg, 4 mg, Intravenous, Q8H PRN, Samantha Celestin MD    ondansetron injection 4 mg, 4 mg, Intravenous, Q4H PRN, Nela Perez, AGACNP-BC    polyethylene glycol packet 17 g, 17 g, Oral, BID PRN, Nela Perez, AGACNP-BC    prochlorperazine injection Soln 5 mg, 5 mg, Intravenous, Q6H PRN, Nela Perez AGACNP-BC    simethicone chewable tablet 80 mg, 1 tablet, Oral, QID PRN, Nela Perez AGACNP-BC    sodium chloride 0.9% flush 10 mL, 10 mL, Intravenous, PRN, Samantha Celestin MD         VTE Risk Mitigation (From admission, onward)           Ordered     enoxaparin injection 40 mg  Daily         10/12/23 2344     Place sequential compression  device  Until discontinued         10/13/23 0013     IP VTE HIGH RISK PATIENT  Once         10/13/23 0013     Place sequential compression device  Until discontinued         10/12/23 [id number]                    Assessment:   Mobitz I  Bradycardia  Chest pain  Native CAD  --4V CABG 11/2019 CABG 11/2019 (LIMA-LAD, rSVG-D1, rSVG-OM2, rSVG-OM3)  --[facility] 07.21: Patent grafts x 3  HTN  HLD  T2DM  TRIP    Plan:   Known hx of Cristofer I. He is on Coreg 12.5 mg outpatient. Hold BB for now. Will plan for 1 week MCT monitor upon discharge  Resume aspirin 81 mg daily and Lipitor 40 mg daily  Resume amlodipine 5mg daily and losartan 50 mg daily  Start hydralazine 50 mg tid.   Diabetes management per primary      Thank you for your consult.     Divya Casey, KIMBERLY  Cardiology  [facility] - [facility] Tele[facility]  10/13/2023 6:18 AM

## 2023-10-14 LAB
ANION GAP SERPL CALC-SCNC: 8 MEQ/L
BUN SERPL-MCNC: 8.1 MG/DL (ref 8.4–25.7)
CALCIUM SERPL-MCNC: 8.8 MG/DL (ref 8.8–10)
CHLORIDE SERPL-SCNC: 109 MMOL/L (ref 98–107)
CO2 SERPL-SCNC: 25 MMOL/L (ref 23–31)
CREAT SERPL-MCNC: 0.9 MG/DL (ref 0.73–1.18)
CREAT/UREA NIT SERPL: 9
GFR SERPLBLD CREATININE-BSD FMLA CKD-EPI: >60 MLS/MIN/1.73/M2
GLUCOSE SERPL-MCNC: 123 MG/DL (ref 82–115)
MAGNESIUM SERPL-MCNC: 1.6 MG/DL (ref 1.6–2.6)
POCT GLUCOSE: 151 MG/DL (ref 70–110)
POCT GLUCOSE: 156 MG/DL (ref 70–110)
POTASSIUM SERPL-SCNC: 4 MMOL/L (ref 3.5–5.1)
SODIUM SERPL-SCNC: 142 MMOL/L (ref 136–145)

## 2023-10-14 PROCEDURE — 63600175 PHARM REV CODE 636 W HCPCS: Performed by: INTERNAL MEDICINE

## 2023-10-14 PROCEDURE — 83735 ASSAY OF MAGNESIUM: CPT | Performed by: INTERNAL MEDICINE

## 2023-10-14 PROCEDURE — 25000003 PHARM REV CODE 250: Performed by: NURSE PRACTITIONER

## 2023-10-14 PROCEDURE — 80048 BASIC METABOLIC PNL TOTAL CA: CPT | Performed by: INTERNAL MEDICINE

## 2023-10-14 PROCEDURE — 21400001 HC TELEMETRY ROOM

## 2023-10-14 PROCEDURE — 63600175 PHARM REV CODE 636 W HCPCS: Performed by: NURSE PRACTITIONER

## 2023-10-14 RX ORDER — MAGNESIUM SULFATE HEPTAHYDRATE 40 MG/ML
2 INJECTION, SOLUTION INTRAVENOUS ONCE
Status: COMPLETED | OUTPATIENT
Start: 2023-10-14 | End: 2023-10-14

## 2023-10-14 RX ADMIN — HYDRALAZINE HYDROCHLORIDE 50 MG: 50 TABLET, FILM COATED ORAL at 09:10

## 2023-10-14 RX ADMIN — HYDRALAZINE HYDROCHLORIDE 50 MG: 50 TABLET, FILM COATED ORAL at 02:10

## 2023-10-14 RX ADMIN — MAGNESIUM SULFATE HEPTAHYDRATE 2 G: 40 INJECTION, SOLUTION INTRAVENOUS at 08:10

## 2023-10-14 RX ADMIN — AMLODIPINE BESYLATE 5 MG: 5 TABLET ORAL at 08:10

## 2023-10-14 RX ADMIN — ATORVASTATIN CALCIUM 40 MG: 40 TABLET, FILM COATED ORAL at 09:10

## 2023-10-14 RX ADMIN — HYDRALAZINE HYDROCHLORIDE 50 MG: 50 TABLET, FILM COATED ORAL at 06:10

## 2023-10-14 RX ADMIN — LOSARTAN POTASSIUM 100 MG: 50 TABLET, FILM COATED ORAL at 08:10

## 2023-10-14 RX ADMIN — ASPIRIN 81 MG: 81 TABLET, COATED ORAL at 08:10

## 2023-10-14 RX ADMIN — ENOXAPARIN SODIUM 40 MG: 40 INJECTION SUBCUTANEOUS at 04:10

## 2023-10-14 NOTE — PROGRESS NOTES
[facility] Hospital Medicine Progress Note        Chief Complaint: Inpatient Follow-up for symptomatic bradycardia    HPI: Rogelio Johnson is a 73 y.o. Black or  male with a past medical history of hypertension, hyperlipidemia, coronary artery disease status post CABG, diabetes mellitus type 2, obstructive sleep apnea. The patient presented to [facility] on 10/12/2023 with a primary complaint of vibrating sensation to the left upper chest which has been ongoing for the last 3 days.  Patient reports yesterday sensation became more frequent.  He denies complaints of chest pain who reports similar vibration sensation prior to having his CABG.  Patient denies complaints of lightheadedness care syncope, shortness a breath, fever, chills, abdominal pain, nausea, vomiting and diarrhea.  Patient denies history of any cardiac arrhythmias.  Upon presentation to the ED, temperature 97.9° F, heart rate 69, blood pressure 150/77, respiratory rate 18 and SpO2 100% on room air.  Labs significant for normocytic anemia, glucose 156, .5, troponin less than 0.01 x 3.  UA negative for infection.  Initial EKG with second-degree AV block Mobitz type 1 with a 2-1 AV conduction, ventricular rate of 37 and unable to rule out age-undetermined infarct.  Repeat EKG same as initial but ventricular rate of 44. Chest x-ray with no acute cardiopulmonary process.  Cis was consulted and patient was given calcium glucagon, glucagon and atropine.  Coreg was held. Heart rate increased to the 40-50s. Patient also received full-dose aspirin.  Patient is to hospital medicine services for further medical management.    Interval Hx:   Patient is sitting in bed, daughter is at bedside.  Patient is concerned about going home with his bradycardic symptoms.  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  Discussed cardiology will stop by and he needs to discuss all his concerns with them.  Also informed him that if I see Cardiology team, I will ask on his behalf as well  Patient feels that it is very unsafe for him to go home without the Holter and get it as outpatient or without pacemaker if it is indicated for him to get one.  I answered their questions to the best of my knowledge and their satisfaction but they are waiting for Cardiology to give them all the detailed answers  Case was discussed with patient's nurse on the floor.    Objective/physical exam:  General: In no acute distress, afebrile  Chest: Clear to auscultation bilaterally  Heart: S1, S2, bradycardic rate and rhythm, no appreciable murmur  Abdomen: Soft, nontender, BS +  MSK: Warm, no lower extremity edema, no clubbing or cyanosis  Neurologic: Alert and oriented x4, moving all extremities with good strength     VITAL SIGNS: 24 HRS MIN & MAX LAST   Temp  Min: 97.8 °F (36.6 °C)  Max: 99.3 °F (37.4 °C) 99.3 °F (37.4 °C)   BP  Min: 134/72  Max: 167/70 (!) 163/83   Pulse  Min: 39  Max: 69  (!) 54   Resp  Min: 16  Max: 20 18   SpO2  Min: 95 %  Max: 100 % 100 %     I reviewed the labs below:  Recent Labs   Lab 10/12/23  1938 10/13/23  0216   WBC 6.48 7.22   RBC 4.01* 3.92*   HGB 13.2* 12.7*   HCT 37.7* 36.2*   MCV 94.0 92.3   MCH 32.9* 32.4*   MCHC 35.0 35.1   RDW 12.5 12.6    313   MPV 9.7 9.9     Recent Labs   Lab 10/12/23  1938 10/13/23  0216 10/14/23  0522    142 142   K 3.7 3.6 4.0   CO2 22* 22* 25   BUN 10.6 11.2 8.1*   CREATININE 1.01 0.93 0.90   CALCIUM 9.0 9.0 8.8   MG 1.70 1.80 1.60   ALBUMIN 3.6 3.4  --    ALKPHOS 97 102  --    ALT 18 16  --    AST 15 16  --    BILITOT 0.3 0.3  --      Microbiology Results (last 7 days)       ** No results found for the last 168 hours. **             See below for Radiology    Scheduled Med:   amLODIPine  5 mg Oral Daily    aspirin  81 mg Oral Daily    atorvastatin  40 mg Oral QHS     enoxparin  40 mg Subcutaneous Daily    hydrALAZINE  50 mg Oral Q8H    losartan  100 mg Oral Daily      Continuous Infusions:     PRN Meds:  acetaminophen, aluminum-magnesium hydroxide-simethicone, atropine, dextrose 10%, dextrose 10%, glucagon (human recombinant), glucose, glucose, insulin aspart U-100, melatonin, naloxone, ondansetron, ondansetron, polyethylene glycol, prochlorperazine, simethicone, sodium chloride 0.9%     Assessment/Plan:  Symptomatic bradycardia secondary to second degree AV block Mobitz type 1  Normocytic anemia, stable   History of hypertension, hyperlipidemia, coronary artery disease status post CABG, diabetes mellitus type 2, obstructive sleep apnea  Hypomagnesemia     Admitted to hospital medicine team as inpatient on 10/13/2023   Cardiology on board.  Appreciate recommendations, recommended to hold off on beta-blocker for now, plan for 1 week MCT monitor upon discharge.   Resumed amlodipine 5 mg daily, losartan 100 mg daily, hydralazine 50 mg t.i.d., continue atorvastatin 40 mg HS, aspirin 81 mg daily  Continue Telemetry monitoring   Troponin negative x 5  Mag 1.6, ordered med sulfate 2 g IV x1 dose  Accu-Cheks and sliding scale for diabetes management  Resumed appropriate home medications for chronic medical conditions  Morning BMP, Mag ordered     VTE Prophylaxis:    Lovenox 40 mg subQ daily     Patient condition:   Fair     Discharge Planning and Disposition/Anticipated discharge: TBD, once cleared by Cardiology      All diagnosis and differential diagnosis have been reviewed; assessment and plan has been documented; I have personally reviewed the labs and test results that are presently available; I have reviewed the patients medication list; I have reviewed the consulting providers response and recommendations. I have reviewed or attempted to review medical records based upon their availability    All of the patient's questions have been  addressed and answered. Patient's is agreeable  to the above stated plan. I will continue to monitor closely and make adjustments to medical management as needed.  _____________________________________________________________________    Nutrition Status:    Radiology:   I have personally reviewed the images and agree with radiologist report  Echo    Left Ventricle: The left ventricle is mildly dilated. Mildly increased   wall thickness. Normal wall motion. There is normal systolic function with   a visually estimated ejection fraction of 60 - 65%. Grade I diastolic   dysfunction.    Right Ventricle: Normal right ventricular cavity size. Wall thickness   is normal. Right ventricle wall motion  is normal. Systolic function is   normal.    Aortic Valve: There is moderate aortic valve sclerosis. There is normal   leaflet mobility. There is no stenosis. Aortic valve peak velocity is 1.20   m/s. Mean gradient is 3 mmHg. There is no significant regurgitation.    Mitral Valve: Mildly thickened leaflets. There is no stenosis. There is   mild regurgitation.    Tricuspid Valve: There is no stenosis. There is trace regurgitation.    Pulmonic Valve: There is no stenosis.      Adonis Chan MD  Department of Hospital Medicine   [facility]   10/14/2023

## 2023-10-14 NOTE — PROGRESS NOTES
[facility] - [facility] Medical Telemetry    Cardiology  Progress Note    Patient Name: Rogelio Johnson  MRN: [id number]  Admission Date: 10/12/2023  Hospital Length of Stay: 1 days  Code Status: Full Code   Attending Physician: Adonis Chan MD   Primary Care Physician: Yolanda Mendoza NP  Expected Discharge Date:   Principal Problem:<principal problem not specified>    Subjective:     Brief HPI: Mr. Johnson is a 74y/o male, followed by Dr. Mathew/Gonzalo, who presented to ED with c/o 3 day hx of spasm in left chest. . Troponin negative x 3 . Hypertensive with SBP up to 160s. SB 40s with Mobitz 1, (Known hx). He was treated with aspirin, atropine, and calcium gluconate and admitted to hospital medicine. CIS consulted for Mobitz I.     Hospital Course:   10.14.23: Resting comfortably in bed, NAD. Daughter is with him at bedside. BP elevated today. HR 50-60s- overall improved from yesterday. Labs stable. 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 Answered all families questions and addressed concerns. They voiced understanding.     PMH: HTN, HLD, CAD, T2DM, TRIP, Family Hx of CAD, Mobitz I, chronic anemia, aortic root dilation  PSH: [facility], CABG 11/2019 (LIMA-LAD, rSVG-D1, rSVG-OM2, rSVG-OM3), EVH Bilateral GSVs, colonoscopy  Family History: father- MI; Mother- HTN, DM type I, colon cancer  Social History: Denies tobacco use; occasional ETOH and marijuana    Previous Cardiac Diagnostics:   TTE 06.26.23:  The left ventricle is normal in size.  Global left ventricular systolic function is normal.  The left ventricular ejection fraction is 60%. The left ventricle diastolic function is impaired (Grade I) with normal left atrial pressure. Mild Concentric left ventricular hypertrophy is present. Right ventricular systolic function is mildly decreased.  Mild (1+) mitral regurgitation.      ETT 05.11.22:  1.             Stress EKG is  abnormal. Cortez treadmill score is 1, this is suggestive of moderate risk treadmill test. During peak exercise and recovery there was 1.0 mm horizontal ST depression is noted in the Lead 2, Lead 3, AVF, V4, V5 and V6.   2.             Maximal exercise treadmill test (MPHR : 87 %).  3.             The functional capacity is fair (7.1 METs).  4.             The heart rate recovery is normal.   5.             The study quality is good.      [facility] 07.20.21:  Severe native CAD. LM patent. Flows into proximal LAD which supplies septal branches along the proximal circumflex. Competitive flow is noted in the circumflex system  RCA nondominant  SVG-Diagonal 1 widely patent  SVG-OM1 widely patent  SVG-OM2 widely patent   LIMA-LAD widely patent  LVEDP 13mmHg  No AS     [facility] 10.28.19:  LM- no LM, has separate ostia coming off aorta for the LAD and circumflex  LAD: large vessel; 80% stenosis. Sequential 30% distal stenosis.  D1: tiny vessel. D2: Small vessel  Lcx: large vessel. 20% proximal stenosis. 80% distal stenosis  OM1 and 2: small to tiny vessels. OM3: small to medium sized vessel  L PDA: medium sized vessel  RCA: nondominant. Medium to small sized vessel. 90% proximal stenosis  Collaterals from RCA to distal LAD  Recommendations: CABG evaluation    Review of Systems   Constitutional: Negative for malaise/fatigue.   Cardiovascular:  Negative for chest pain, claudication, dyspnea on exertion, irregular heartbeat, near-syncope and palpitations.   Respiratory:  Negative for shortness of breath.    Gastrointestinal:  Negative for bloating.       Objective:     Vital Signs (Most Recent):  Temp: 97.9 °F (36.6 °C) (10/14/23 0825)  Pulse: 63 (10/14/23 0825)  Resp: 16 (10/14/23 0800)  BP: (!) 144/70 (10/14/23 0825)  SpO2: 97 % (10/14/23 0825) Vital Signs (24h Range):  Temp:  [97.8 °F (36.6 °C)-99.3 °F (37.4 °C)] 97.9 °F (36.6 °C)  Pulse:  [39-63] 63  Resp:  [16-20] 16  SpO2:  [95 %-100 %] 97 %  BP: (134-163)/(66-83) 144/70     Weight:  92.5 kg (203 lb 14.8 oz)  Body mass index is 27.66 kg/m².    SpO2: 97 %         Intake/Output Summary (Last 24 hours) at 10/14/2023 0941  Last data filed at 10/13/2023 1448  Gross per 24 hour   Intake 580 ml   Output --   Net 580 ml       Lines/Drains/Airways       Peripheral Intravenous Line  Duration                  Peripheral IV - Single Lumen 10/12/23 1938 20 G Anterior;Distal;Right Forearm 1 day         Peripheral IV - Single Lumen 10/12/23 2012 18 G Anterior;Left Forearm 1 day                    Significant Labs:   Recent Results (from the past 72 hour(s))   CBC with Differential    Collection Time: 10/12/23  7:38 PM   Result Value Ref Range    WBC 6.48 4.50 - 11.50 x10(3)/mcL    RBC 4.01 (L) 4.70 - 6.10 x10(6)/mcL    Hgb 13.2 (L) 14.0 - 18.0 g/dL    Hct 37.7 (L) 42.0 - 52.0 %    MCV 94.0 80.0 - 94.0 fL    MCH 32.9 (H) 27.0 - 31.0 pg    MCHC 35.0 33.0 - 36.0 g/dL    RDW 12.5 11.5 - 17.0 %    Platelet 319 130 - 400 x10(3)/mcL    MPV 9.7 7.4 - 10.4 fL    Neut % 29.3 %    Lymph % 59.0 %    Mono % 9.0 %    Eos % 2.0 %    Basophil % 0.5 %    Lymph # 3.82 0.6 - 4.6 x10(3)/mcL    Neut # 1.91 (L) 2.1 - 9.2 x10(3)/mcL    Mono # 0.58 0.1 - 1.3 x10(3)/mcL    Eos # 0.13 0 - 0.9 x10(3)/mcL    Baso # 0.03 <=0.2 x10(3)/mcL    IG# 0.01 0 - 0.04 x10(3)/mcL    IG% 0.2 %    NRBC% 0.0 %   Comprehensive Metabolic Panel    Collection Time: 10/12/23  7:38 PM   Result Value Ref Range    Sodium Level 145 136 - 145 mmol/L    Potassium Level 3.7 3.5 - 5.1 mmol/L    Chloride 106 98 - 107 mmol/L    Carbon Dioxide 22 (L) 23 - 31 mmol/L    Glucose Level 156 (H) 82 - 115 mg/dL    Blood Urea Nitrogen 10.6 8.4 - 25.7 mg/dL    Creatinine 1.01 0.73 - 1.18 mg/dL    Calcium Level Total 9.0 8.8 - 10.0 mg/dL    Protein Total 7.8 (H) 5.8 - 7.6 gm/dL    Albumin Level 3.6 3.4 - 4.8 g/dL    Globulin 4.2 (H) 2.4 - 3.5 gm/dL    Albumin/Globulin Ratio 0.9 (L) 1.1 - 2.0 ratio    Bilirubin Total 0.3 <=1.5 mg/dL    Alkaline Phosphatase 97 40 - 150 unit/L     Alanine Aminotransferase 18 0 - 55 unit/L    Aspartate Aminotransferase 15 5 - 34 unit/L    eGFR >60 mls/min/1.73/m2   BNP    Collection Time: 10/12/23  7:38 PM   Result Value Ref Range    Natriuretic Peptide 153.5 (H) <=100.0 pg/mL   Troponin I    Collection Time: 10/12/23  7:38 PM   Result Value Ref Range    Troponin-I <0.010 0.000 - 0.045 ng/mL   TSH    Collection Time: 10/12/23  7:38 PM   Result Value Ref Range    TSH 1.574 0.350 - 4.940 uIU/mL   Protime-INR    Collection Time: 10/12/23  7:38 PM   Result Value Ref Range    PT 13.5 11.7 - 14.5 seconds    INR 1.0 (L) 2.0 - 3.0   APTT    Collection Time: 10/12/23  7:38 PM   Result Value Ref Range    PTT 31.6 23.4 - 33.9 seconds   Magnesium    Collection Time: 10/12/23  7:38 PM   Result Value Ref Range    Magnesium Level 1.70 1.60 - 2.60 mg/dL   POCT glucose    Collection Time: 10/12/23  7:38 PM   Result Value Ref Range    POCT Glucose 165 (H) 70 - 110 mg/dL   Urinalysis, Reflex to Urine Culture    Collection Time: 10/12/23  9:58 PM    Specimen: Urine   Result Value Ref Range    Color, UA Light-Yellow Yellow, Light-Yellow, Dark Yellow, Melany, Straw    Appearance, UA Clear Clear    Specific Gravity, UA 1.010 1.005 - 1.030    pH, UA 6.5 5.0 - 8.5    Protein, UA Negative Negative    Glucose, UA >=1000 (A) Negative, Normal    Ketones, UA Negative Negative    Blood, UA Negative Negative    Bilirubin, UA Negative Negative    Urobilinogen, UA 1.0 0.2, 1.0, Normal    Nitrites, UA Negative Negative    Leukocyte Esterase, UA Negative Negative   POCT glucose    Collection Time: 10/13/23 12:22 AM   Result Value Ref Range    POCT Glucose 76 70 - 110 mg/dL   CK    Collection Time: 10/13/23  2:16 AM   Result Value Ref Range    Creatine Kinase 129 30 - 200 U/L   CK-MB    Collection Time: 10/13/23  2:16 AM   Result Value Ref Range    Creatine Kinase MB 1.4 <=7.2 ng/mL   Troponin I    Collection Time: 10/13/23  2:16 AM   Result Value Ref Range    Troponin-I <0.010 0.000 - 0.045 ng/mL    Hemoglobin A1c if not done in past 3 months    Collection Time: 10/13/23  2:16 AM   Result Value Ref Range    Hemoglobin A1c 6.1 <=7.0 %    Estimated Average Glucose 128.4 mg/dL   Comprehensive Metabolic Panel (CMP)    Collection Time: 10/13/23  2:16 AM   Result Value Ref Range    Sodium Level 142 136 - 145 mmol/L    Potassium Level 3.6 3.5 - 5.1 mmol/L    Chloride 109 (H) 98 - 107 mmol/L    Carbon Dioxide 22 (L) 23 - 31 mmol/L    Glucose Level 163 (H) 82 - 115 mg/dL    Blood Urea Nitrogen 11.2 8.4 - 25.7 mg/dL    Creatinine 0.93 0.73 - 1.18 mg/dL    Calcium Level Total 9.0 8.8 - 10.0 mg/dL    Protein Total 6.8 5.8 - 7.6 gm/dL    Albumin Level 3.4 3.4 - 4.8 g/dL    Globulin 3.4 2.4 - 3.5 gm/dL    Albumin/Globulin Ratio 1.0 (L) 1.1 - 2.0 ratio    Bilirubin Total 0.3 <=1.5 mg/dL    Alkaline Phosphatase 102 40 - 150 unit/L    Alanine Aminotransferase 16 0 - 55 unit/L    Aspartate Aminotransferase 16 5 - 34 unit/L    eGFR >60 mls/min/1.73/m2   Magnesium    Collection Time: 10/13/23  2:16 AM   Result Value Ref Range    Magnesium Level 1.80 1.60 - 2.60 mg/dL   Phosphorus    Collection Time: 10/13/23  2:16 AM   Result Value Ref Range    Phosphorus Level 4.1 2.3 - 4.7 mg/dL   Troponin I    Collection Time: 10/13/23  2:16 AM   Result Value Ref Range    Troponin-I <0.010 0.000 - 0.045 ng/mL   CBC with Differential    Collection Time: 10/13/23  2:16 AM   Result Value Ref Range    WBC 7.22 4.50 - 11.50 x10(3)/mcL    RBC 3.92 (L) 4.70 - 6.10 x10(6)/mcL    Hgb 12.7 (L) 14.0 - 18.0 g/dL    Hct 36.2 (L) 42.0 - 52.0 %    MCV 92.3 80.0 - 94.0 fL    MCH 32.4 (H) 27.0 - 31.0 pg    MCHC 35.1 33.0 - 36.0 g/dL    RDW 12.6 11.5 - 17.0 %    Platelet 313 130 - 400 x10(3)/mcL    MPV 9.9 7.4 - 10.4 fL    Neut % 39.2 %    Lymph % 51.0 %    Mono % 7.8 %    Eos % 1.2 %    Basophil % 0.4 %    Lymph # 3.68 0.6 - 4.6 x10(3)/mcL    Neut # 2.83 2.1 - 9.2 x10(3)/mcL    Mono # 0.56 0.1 - 1.3 x10(3)/mcL    Eos # 0.09 0 - 0.9 x10(3)/mcL    Baso # 0.03  <=0.2 x10(3)/mcL    IG# 0.03 0 - 0.04 x10(3)/mcL    IG% 0.4 %    NRBC% 0.0 %   CK    Collection Time: 10/13/23  6:57 AM   Result Value Ref Range    Creatine Kinase 121 30 - 200 U/L   CK-MB    Collection Time: 10/13/23  6:57 AM   Result Value Ref Range    Creatine Kinase MB 1.3 <=7.2 ng/mL   Troponin I    Collection Time: 10/13/23  8:07 AM   Result Value Ref Range    Troponin-I <0.010 0.000 - 0.045 ng/mL   CK-MB    Collection Time: 10/13/23  8:07 AM   Result Value Ref Range    Creatine Kinase MB 1.3 <=7.2 ng/mL   CK    Collection Time: 10/13/23  8:07 AM   Result Value Ref Range    Creatine Kinase 130 30 - 200 U/L   Troponin I    Collection Time: 10/13/23  8:07 AM   Result Value Ref Range    Troponin-I <0.010 0.000 - 0.045 ng/mL   Echo    Collection Time: 10/13/23  8:39 AM   Result Value Ref Range    BSA 2.17 m2    Roque's Biplane MOD Ejection Fraction 65 %    LVIDd 5.19 3.5 - 6.0 cm    LV Systolic Volume 58.50 mL    LV Systolic Volume Index 27.2 mL/m2    LVIDs 3.71 2.1 - 4.0 cm    LV Diastolic Volume 129.00 mL    LV Diastolic Volume Index 60.00 mL/m2    IVS 1.16 (A) 0.6 - 1.1 cm    FS 29 28 - 44 %    Left Ventricle Relative Wall Thickness 0.44 cm    Posterior Wall 1.14 (A) 0.6 - 1.1 cm    LV mass 233.89 g    LV Mass Index 109 g/m2    MV Peak E Fam 1.24 m/s    TDI LATERAL 0.10 m/s    TDI SEPTAL 0.07 m/s    E/E' ratio 14.59 m/s    MV Peak A Fam 1.14 m/s    E/A ratio 1.09     E wave deceleration time 217.00 msec    LV SEPTAL E/E' RATIO 17.71 m/s    LV LATERAL E/E' RATIO 12.40 m/s    LVOT peak fam 0.84 m/s    Left Ventricular Outflow Tract Mean Velocity 0.52 cm/s    Left Ventricular Outflow Tract Mean Gradient 1.00 mmHg    LA size 3.80 cm    LA volume (mod) 70.10 cm3    LA Volume Index (Mod) 32.6 mL/m2    RVDD 4.14 cm    TAPSE 2.09 cm    AV mean gradient 3 mmHg    AV peak gradient 6 mmHg    Ao peak fam 1.20 m/s    Ao VTI 34.10 cm    LVOT peak VTI 18.80 cm    AV Velocity Ratio 0.70     AV index (prosthetic) 0.55     PV  PEAK VELOCITY 0.77 m/s    PV peak gradient 2 mmHg    Mean e' 0.09 m/s    ZLVIDS -1.08     ZLVIDD -2.96    POCT glucose    Collection Time: 10/13/23 11:39 AM   Result Value Ref Range    POCT Glucose 128 (H) 70 - 110 mg/dL   Troponin I    Collection Time: 10/13/23  2:05 PM   Result Value Ref Range    Troponin-I <0.010 0.000 - 0.045 ng/mL   POCT glucose    Collection Time: 10/13/23  3:48 PM   Result Value Ref Range    POCT Glucose 94 70 - 110 mg/dL   POCT glucose    Collection Time: 10/13/23  8:19 PM   Result Value Ref Range    POCT Glucose 169 (H) 70 - 110 mg/dL   Basic Metabolic Panel    Collection Time: 10/14/23  5:22 AM   Result Value Ref Range    Sodium Level 142 136 - 145 mmol/L    Potassium Level 4.0 3.5 - 5.1 mmol/L    Chloride 109 (H) 98 - 107 mmol/L    Carbon Dioxide 25 23 - 31 mmol/L    Glucose Level 123 (H) 82 - 115 mg/dL    Blood Urea Nitrogen 8.1 (L) 8.4 - 25.7 mg/dL    Creatinine 0.90 0.73 - 1.18 mg/dL    BUN/Creatinine Ratio 9     Calcium Level Total 8.8 8.8 - 10.0 mg/dL    Anion Gap 8.0 mEq/L    eGFR >60 mls/min/1.73/m2   Magnesium    Collection Time: 10/14/23  5:22 AM   Result Value Ref Range    Magnesium Level 1.60 1.60 - 2.60 mg/dL     EKG       Telemetry:  Sinus bradycardia with Type I Block     Physical Exam  Vitals reviewed.   Constitutional:       General: He is not in acute distress.  HENT:      Head: Normocephalic and atraumatic.      Mouth/Throat:      Mouth: Mucous membranes are moist.   Eyes:      Extraocular Movements: Extraocular movements intact.   Neck:      Vascular: No carotid bruit.   Cardiovascular:      Rate and Rhythm: Regular rhythm. Bradycardia present.      Pulses: Normal pulses.      Heart sounds: Normal heart sounds. No murmur heard.  Pulmonary:      Effort: Pulmonary effort is normal. No respiratory distress.      Breath sounds: Normal breath sounds.   Abdominal:      General: There is no distension.   Musculoskeletal:      Left lower leg: No edema.   Skin:     General: Skin  is warm and dry.   Neurological:      Mental Status: He is alert and oriented to person, place, and time.   Psychiatric:         Mood and Affect: Mood normal.         Behavior: Behavior normal.         Current Inpatient Medications:    Current Facility-Administered Medications:     acetaminophen tablet 650 mg, 650 mg, Oral, Q6H PRN, Nela Perez, AGACNP-BC    aluminum-magnesium hydroxide-simethicone 200-200-20 mg/5 mL suspension 30 mL, 30 mL, Oral, QID PRN, Nela Perez, AGACNP-BC    amLODIPine tablet 5 mg, 5 mg, Oral, Daily, Divya Casey, FNP, 5 mg at 10/14/23 0828    aspirin EC tablet 81 mg, 81 mg, Oral, Daily, Divya Casey, FNP, 81 mg at 10/14/23 0828    atorvastatin tablet 40 mg, 40 mg, Oral, QHS, Divya Casey E, FNP, 40 mg at 10/13/23 2020    atropine injection 0.5 mg, 0.5 mg, Intravenous, Q12H PRN, Samantha Celestin MD    dextrose 10% bolus 125 mL 125 mL, 12.5 g, Intravenous, PRN, Anne Pereze ROSELYN, AGACNP-BC    dextrose 10% bolus 250 mL 250 mL, 25 g, Intravenous, PRN, Nela Perez, AGACNP-BC    enoxaparin injection 40 mg, 40 mg, Subcutaneous, Daily, Nela Perez, AGACNP-BC, 40 mg at 10/13/23 1702    glucagon (human recombinant) injection 1 mg, 1 mg, Intramuscular, PRN, Nela Perez, AGACNP-BC    glucose chewable tablet 16 g, 16 g, Oral, PRN, Nela Perez, AGACNP-BC    glucose chewable tablet 24 g, 24 g, Oral, PRN, Nela Perez, AGACNP-BC    hydrALAZINE tablet 50 mg, 50 mg, Oral, Q8H, Divya aCsey, FNP, 50 mg at 10/14/23 0607    insulin aspart U-100 injection 0-10 Units, 0-10 Units, Subcutaneous, QID (AC + HS) PRN, Nela Perez, AGACNP-BC, 1 Units at 10/13/23 2027    losartan tablet 100 mg, 100 mg, Oral, Daily, Divya Casey, FNP, 100 mg at 10/14/23 0828    magnesium sulfate 2g in water 50mL IVPB (premix), 2 g, Intravenous, Once, Adonis Chan MD, Last Rate: 25 mL/hr at 10/14/23 0828, 2 g at 10/14/23 0828    melatonin tablet 6 mg, 6 mg, Oral,  Nightly PRN, Nela Perez AGACNP-BC    naloxone 0.4 mg/mL injection 0.02 mg, 0.02 mg, Intravenous, PRN, Nela Perez AGACNP-BC    ondansetron injection 4 mg, 4 mg, Intravenous, Q8H PRN, Samantha Celestin MD    ondansetron injection 4 mg, 4 mg, Intravenous, Q4H PRN, Nela Perez AGACNP-BC    polyethylene glycol packet 17 g, 17 g, Oral, BID PRN, Nela Perez AGACNP-BC    prochlorperazine injection Soln 5 mg, 5 mg, Intravenous, Q6H PRN, Nela Perez AGACNP-BC    simethicone chewable tablet 80 mg, 1 tablet, Oral, QID PRN, Nela Perez AGACNP-BC    sodium chloride 0.9% flush 10 mL, 10 mL, Intravenous, PRN, Samantha Celestin MD    VTE Risk Mitigation (From admission, onward)           Ordered     enoxaparin injection 40 mg  Daily         10/12/23 2344     Place sequential compression device  Until discontinued         10/13/23 0013     IP VTE HIGH RISK PATIENT  Once         10/13/23 0013     Place sequential compression device  Until discontinued         10/12/23 2344                    Assessment:   Mobitz I  Bradycardia  Chest pain  Native CAD  --4V CABG 11/2019 CABG 11/2019 (LIMA-LAD, rSVG-D1, rSVG-OM2, rSVG-OM3)  --[facility] 07.21: Patent grafts x 3  HTN  HLD  T2DM  TRIP      Plan:   Known hx of Mobitz I.   He is on Coreg 12.5 mg outpatient. Hold BB for now.   Will plan for 1 week MCT monitor upon discharge  Continue Aspirin 81 mg daily  Continue Amlodipine 5mg daily and Losartan 100 mg daily  Start hydralazine 50 mg tid.   Continue Atorvastatin 40 MG  Diabetes management per primary  Daily labs, replace electrolytes as needed       Virginia Chappell PA-C  Cardiology  [facility] - [facility] Telemetry  10/14/2023     Physician addendum:  I have seen and examined this patient as a split-shared visit with the GEOFF d/t complicated medical management of above problems written in assessment and high acuity requiring physician expertise in medical decision-making. I  performed the substantive portion of the history and exam. Above medical decision-making is also formulated by me.    Cardiovascular exam:  S1, S2  Lungs:  fine crackles at bases.  Extremities:  1+ edema bilaterally    Plan:  Continue holding beta-blockers due to second-degree type 1 block.  Continue Hydralazine and other meds as above.  Plan for MCOT monitor as outpatient.      Demetris Koehler MD  Cardiologist

## 2023-10-15 VITALS
HEIGHT: 72 IN | DIASTOLIC BLOOD PRESSURE: 73 MMHG | OXYGEN SATURATION: 97 % | BODY MASS INDEX: 27.62 KG/M2 | HEART RATE: 67 BPM | TEMPERATURE: 98 F | SYSTOLIC BLOOD PRESSURE: 168 MMHG | WEIGHT: 203.94 LBS | RESPIRATION RATE: 18 BRPM

## 2023-10-15 PROBLEM — I44.1 MOBITZ TYPE 1 SECOND DEGREE AV BLOCK: Status: ACTIVE | Noted: 2023-10-15

## 2023-10-15 PROBLEM — R00.1 SYMPTOMATIC BRADYCARDIA: Status: RESOLVED | Noted: 2023-10-15 | Resolved: 2023-10-15

## 2023-10-15 PROBLEM — R00.1 SYMPTOMATIC BRADYCARDIA: Status: ACTIVE | Noted: 2023-10-15

## 2023-10-15 LAB
ANION GAP SERPL CALC-SCNC: 7 MEQ/L
BUN SERPL-MCNC: 8.8 MG/DL (ref 8.4–25.7)
CALCIUM SERPL-MCNC: 9.1 MG/DL (ref 8.8–10)
CHLORIDE SERPL-SCNC: 106 MMOL/L (ref 98–107)
CO2 SERPL-SCNC: 25 MMOL/L (ref 23–31)
CREAT SERPL-MCNC: 0.87 MG/DL (ref 0.73–1.18)
CREAT/UREA NIT SERPL: 10
GFR SERPLBLD CREATININE-BSD FMLA CKD-EPI: >60 MLS/MIN/1.73/M2
GLUCOSE SERPL-MCNC: 127 MG/DL (ref 82–115)
MAGNESIUM SERPL-MCNC: 1.8 MG/DL (ref 1.6–2.6)
POTASSIUM SERPL-SCNC: 4.2 MMOL/L (ref 3.5–5.1)
SODIUM SERPL-SCNC: 138 MMOL/L (ref 136–145)

## 2023-10-15 PROCEDURE — 80048 BASIC METABOLIC PNL TOTAL CA: CPT | Performed by: INTERNAL MEDICINE

## 2023-10-15 PROCEDURE — 25000003 PHARM REV CODE 250: Performed by: NURSE PRACTITIONER

## 2023-10-15 PROCEDURE — 83735 ASSAY OF MAGNESIUM: CPT | Performed by: INTERNAL MEDICINE

## 2023-10-15 RX ORDER — ASPIRIN 81 MG/1
81 TABLET ORAL DAILY
Refills: 0
Start: 2023-10-15

## 2023-10-15 RX ORDER — HYDRALAZINE HYDROCHLORIDE 50 MG/1
50 TABLET, FILM COATED ORAL EVERY 8 HOURS
Qty: 90 TABLET | Refills: 2 | Status: SHIPPED | OUTPATIENT
Start: 2023-10-15

## 2023-10-15 RX ADMIN — HYDRALAZINE HYDROCHLORIDE 50 MG: 50 TABLET, FILM COATED ORAL at 06:10

## 2023-10-15 RX ADMIN — AMLODIPINE BESYLATE 5 MG: 5 TABLET ORAL at 08:10

## 2023-10-15 RX ADMIN — HYDRALAZINE HYDROCHLORIDE 50 MG: 50 TABLET, FILM COATED ORAL at 03:10

## 2023-10-15 RX ADMIN — LOSARTAN POTASSIUM 100 MG: 50 TABLET, FILM COATED ORAL at 08:10

## 2023-10-15 RX ADMIN — ASPIRIN 81 MG: 81 TABLET, COATED ORAL at 08:10

## 2023-10-15 NOTE — PLAN OF CARE
Problem: Adult Inpatient Plan of Care  Goal: Plan of Care Review  Outcome: Ongoing, Progressing  Flowsheets (Taken 10/15/2023 0729)  Plan of Care Reviewed With: patient  Goal: Optimal Comfort and Wellbeing  Outcome: Ongoing, Progressing  Intervention: Monitor Pain and Promote Comfort  Flowsheets (Taken 10/15/2023 0729)  Pain Management Interventions:   pillow support provided   position adjusted  Intervention: Provide Person-Centered Care  Flowsheets (Taken 10/15/2023 0729)  Trust Relationship/Rapport:   care explained   questions encouraged   reassurance provided   emotional support provided   choices provided   thoughts/feelings acknowledged   empathic listening provided   questions answered

## 2023-10-15 NOTE — PROGRESS NOTES
[facility] - [facility] Medical Telemetry    Cardiology  Progress Note    Patient Name: Rogelio Johnson  MRN: [id number]  Admission Date: 10/12/2023  Hospital Length of Stay: 2 days  Code Status: Full Code   Attending Physician: Adonis Chan MD   Primary Care Physician: Yolanda Mendoza NP  Expected Discharge Date: 10/15/2023  Principal Problem:<principal problem not specified>    Subjective:     Brief HPI: Mr. Johnson is a 72y/o male, followed by Dr. Mathew/Gonzalo, who presented to ED with c/o 3 day hx of spasm in left chest. . Troponin negative x 3 . Hypertensive with SBP up to 160s. SB 40s with Mobitz 1, (Known hx). He was treated with aspirin, atropine, and calcium gluconate and admitted to hospital medicine. CIS consulted for Mobitz I.     Hospital Course:   10.14.23: Resting comfortably in bed, NAD. Daughter is with him at bedside. BP elevated today. HR 50-60s- overall improved from yesterday. Labs stable. 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 Answered all families questions and addressed concerns. They voiced understanding.   10.15.23: NAD. VSS. Labs stable. Patient ok from cardiology standpoint to discharge home. All questions answered.     PMH: HTN, HLD, CAD, T2DM, TRIP, Family Hx of CAD, Mobitz I, chronic anemia, aortic root dilation  PSH: LHC, CABG 11/2019 (LIMA-LAD, rSVG-D1, rSVG-OM2, rSVG-OM3), EVH Bilateral GSVs, colonoscopy  Family History: father- MI; Mother- HTN, DM type I, colon cancer  Social History: Denies tobacco use; occasional ETOH and marijuana    Previous Cardiac Diagnostics:   TTE 06.26.23:  The left ventricle is normal in size.  Global left ventricular systolic function is normal.  The left ventricular ejection fraction is 60%. The left ventricle diastolic function is impaired (Grade I) with normal left atrial pressure. Mild Concentric left ventricular hypertrophy is present. Right  ventricular systolic function is mildly decreased.  Mild (1+) mitral regurgitation.      ETT 05.11.22:  1. Stress EKG is abnormal. Cortez treadmill score is 1, this is suggestive of moderate risk treadmill test. During peak exercise and recovery there was 1.0 mm horizontal ST depression is noted in the Lead 2, Lead 3, AVF, V4, V5 and V6.   2. Maximal exercise treadmill test (MPHR : 87 %).  3. The functional capacity is fair (7.1 METs).  4. The heart rate recovery is normal.   5.  The study quality is good.      [facility] 07.20.21:  Severe native CAD. LM patent. Flows into proximal LAD which supplies septal branches along the proximal circumflex. Competitive flow is noted in the circumflex system  RCA nondominant  SVG-Diagonal 1 widely patent  SVG-OM1 widely patent  SVG-OM2 widely patent   LIMA-LAD widely patent  LVEDP 13mmHg  No AS     [facility] 10.28.19:  LM- no LM, has separate ostia coming off aorta for the LAD and circumflex  LAD: large vessel; 80% stenosis. Sequential 30% distal stenosis.  D1: tiny vessel. D2: Small vessel  Lcx: large vessel. 20% proximal stenosis. 80% distal stenosis  OM1 and 2: small to tiny vessels. OM3: small to medium sized vessel  L PDA: medium sized vessel  RCA: nondominant. Medium to small sized vessel. 90% proximal stenosis  Collaterals from RCA to distal LAD  Recommendations: CABG evaluation    Review of Systems   Constitutional: Negative for malaise/fatigue.   Cardiovascular:  Negative for chest pain, claudication, dyspnea on exertion, irregular heartbeat, near-syncope and palpitations.   Respiratory:  Negative for shortness of breath.    Gastrointestinal:  Negative for bloating.       Objective:     Vital Signs (Most Recent):  Temp: 98.4 °F (36.9 °C) (10/15/23 0605)  Pulse: 60 (10/15/23 0605)  Resp: 18 (10/15/23 0605)  BP: (!) 149/83 (10/15/23 0845)  SpO2: 97 % (10/15/23 0605) Vital Signs (24h Range):  Temp:  [97.7 °F (36.5 °C)-98.4 °F (36.9 °C)] 98.4 °F (36.9 °C)  Pulse:  [54-74] 60  Resp:   [16-20] 18  SpO2:  [94 %-100 %] 97 %  BP: (149-173)/(69-83) 149/83     Weight: 92.5 kg (203 lb 14.8 oz)  Body mass index is 27.66 kg/m².    SpO2: 97 %       No intake or output data in the 24 hours ending 10/15/23 1044      Lines/Drains/Airways       Peripheral Intravenous Line  Duration                  Peripheral IV - Single Lumen 10/12/23 1938 20 G Anterior;Distal;Right Forearm 2 days         Peripheral IV - Single Lumen 10/12/23 2012 18 G Anterior;Left Forearm 2 days                    Significant Labs:   Recent Results (from the past 72 hour(s))   CBC with Differential    Collection Time: 10/12/23  7:38 PM   Result Value Ref Range    WBC 6.48 4.50 - 11.50 x10(3)/mcL    RBC 4.01 (L) 4.70 - 6.10 x10(6)/mcL    Hgb 13.2 (L) 14.0 - 18.0 g/dL    Hct 37.7 (L) 42.0 - 52.0 %    MCV 94.0 80.0 - 94.0 fL    MCH 32.9 (H) 27.0 - 31.0 pg    MCHC 35.0 33.0 - 36.0 g/dL    RDW 12.5 11.5 - 17.0 %    Platelet 319 130 - 400 x10(3)/mcL    MPV 9.7 7.4 - 10.4 fL    Neut % 29.3 %    Lymph % 59.0 %    Mono % 9.0 %    Eos % 2.0 %    Basophil % 0.5 %    Lymph # 3.82 0.6 - 4.6 x10(3)/mcL    Neut # 1.91 (L) 2.1 - 9.2 x10(3)/mcL    Mono # 0.58 0.1 - 1.3 x10(3)/mcL    Eos # 0.13 0 - 0.9 x10(3)/mcL    Baso # 0.03 <=0.2 x10(3)/mcL    IG# 0.01 0 - 0.04 x10(3)/mcL    IG% 0.2 %    NRBC% 0.0 %   Comprehensive Metabolic Panel    Collection Time: 10/12/23  7:38 PM   Result Value Ref Range    Sodium Level 145 136 - 145 mmol/L    Potassium Level 3.7 3.5 - 5.1 mmol/L    Chloride 106 98 - 107 mmol/L    Carbon Dioxide 22 (L) 23 - 31 mmol/L    Glucose Level 156 (H) 82 - 115 mg/dL    Blood Urea Nitrogen 10.6 8.4 - 25.7 mg/dL    Creatinine 1.01 0.73 - 1.18 mg/dL    Calcium Level Total 9.0 8.8 - 10.0 mg/dL    Protein Total 7.8 (H) 5.8 - 7.6 gm/dL    Albumin Level 3.6 3.4 - 4.8 g/dL    Globulin 4.2 (H) 2.4 - 3.5 gm/dL    Albumin/Globulin Ratio 0.9 (L) 1.1 - 2.0 ratio    Bilirubin Total 0.3 <=1.5 mg/dL    Alkaline Phosphatase 97 40 - 150 unit/L    Alanine  Aminotransferase 18 0 - 55 unit/L    Aspartate Aminotransferase 15 5 - 34 unit/L    eGFR >60 mls/min/1.73/m2   BNP    Collection Time: 10/12/23  7:38 PM   Result Value Ref Range    Natriuretic Peptide 153.5 (H) <=100.0 pg/mL   Troponin I    Collection Time: 10/12/23  7:38 PM   Result Value Ref Range    Troponin-I <0.010 0.000 - 0.045 ng/mL   TSH    Collection Time: 10/12/23  7:38 PM   Result Value Ref Range    TSH 1.574 0.350 - 4.940 uIU/mL   Protime-INR    Collection Time: 10/12/23  7:38 PM   Result Value Ref Range    PT 13.5 11.7 - 14.5 seconds    INR 1.0 (L) 2.0 - 3.0   APTT    Collection Time: 10/12/23  7:38 PM   Result Value Ref Range    PTT 31.6 23.4 - 33.9 seconds   Magnesium    Collection Time: 10/12/23  7:38 PM   Result Value Ref Range    Magnesium Level 1.70 1.60 - 2.60 mg/dL   POCT glucose    Collection Time: 10/12/23  7:38 PM   Result Value Ref Range    POCT Glucose 165 (H) 70 - 110 mg/dL   Urinalysis, Reflex to Urine Culture    Collection Time: 10/12/23  9:58 PM    Specimen: Urine   Result Value Ref Range    Color, UA Light-Yellow Yellow, Light-Yellow, Dark Yellow, Melany, Straw    Appearance, UA Clear Clear    Specific Gravity, UA 1.010 1.005 - 1.030    pH, UA 6.5 5.0 - 8.5    Protein, UA Negative Negative    Glucose, UA >=1000 (A) Negative, Normal    Ketones, UA Negative Negative    Blood, UA Negative Negative    Bilirubin, UA Negative Negative    Urobilinogen, UA 1.0 0.2, 1.0, Normal    Nitrites, UA Negative Negative    Leukocyte Esterase, UA Negative Negative   POCT glucose    Collection Time: 10/13/23 12:22 AM   Result Value Ref Range    POCT Glucose 76 70 - 110 mg/dL   CK    Collection Time: 10/13/23  2:16 AM   Result Value Ref Range    Creatine Kinase 129 30 - 200 U/L   CK-MB    Collection Time: 10/13/23  2:16 AM   Result Value Ref Range    Creatine Kinase MB 1.4 <=7.2 ng/mL   Troponin I    Collection Time: 10/13/23  2:16 AM   Result Value Ref Range    Troponin-I <0.010 0.000 - 0.045 ng/mL    Hemoglobin A1c if not done in past 3 months    Collection Time: 10/13/23  2:16 AM   Result Value Ref Range    Hemoglobin A1c 6.1 <=7.0 %    Estimated Average Glucose 128.4 mg/dL   Comprehensive Metabolic Panel (CMP)    Collection Time: 10/13/23  2:16 AM   Result Value Ref Range    Sodium Level 142 136 - 145 mmol/L    Potassium Level 3.6 3.5 - 5.1 mmol/L    Chloride 109 (H) 98 - 107 mmol/L    Carbon Dioxide 22 (L) 23 - 31 mmol/L    Glucose Level 163 (H) 82 - 115 mg/dL    Blood Urea Nitrogen 11.2 8.4 - 25.7 mg/dL    Creatinine 0.93 0.73 - 1.18 mg/dL    Calcium Level Total 9.0 8.8 - 10.0 mg/dL    Protein Total 6.8 5.8 - 7.6 gm/dL    Albumin Level 3.4 3.4 - 4.8 g/dL    Globulin 3.4 2.4 - 3.5 gm/dL    Albumin/Globulin Ratio 1.0 (L) 1.1 - 2.0 ratio    Bilirubin Total 0.3 <=1.5 mg/dL    Alkaline Phosphatase 102 40 - 150 unit/L    Alanine Aminotransferase 16 0 - 55 unit/L    Aspartate Aminotransferase 16 5 - 34 unit/L    eGFR >60 mls/min/1.73/m2   Magnesium    Collection Time: 10/13/23  2:16 AM   Result Value Ref Range    Magnesium Level 1.80 1.60 - 2.60 mg/dL   Phosphorus    Collection Time: 10/13/23  2:16 AM   Result Value Ref Range    Phosphorus Level 4.1 2.3 - 4.7 mg/dL   Troponin I    Collection Time: 10/13/23  2:16 AM   Result Value Ref Range    Troponin-I <0.010 0.000 - 0.045 ng/mL   CBC with Differential    Collection Time: 10/13/23  2:16 AM   Result Value Ref Range    WBC 7.22 4.50 - 11.50 x10(3)/mcL    RBC 3.92 (L) 4.70 - 6.10 x10(6)/mcL    Hgb 12.7 (L) 14.0 - 18.0 g/dL    Hct 36.2 (L) 42.0 - 52.0 %    MCV 92.3 80.0 - 94.0 fL    MCH 32.4 (H) 27.0 - 31.0 pg    MCHC 35.1 33.0 - 36.0 g/dL    RDW 12.6 11.5 - 17.0 %    Platelet 313 130 - 400 x10(3)/mcL    MPV 9.9 7.4 - 10.4 fL    Neut % 39.2 %    Lymph % 51.0 %    Mono % 7.8 %    Eos % 1.2 %    Basophil % 0.4 %    Lymph # 3.68 0.6 - 4.6 x10(3)/mcL    Neut # 2.83 2.1 - 9.2 x10(3)/mcL    Mono # 0.56 0.1 - 1.3 x10(3)/mcL    Eos # 0.09 0 - 0.9 x10(3)/mcL    Baso # 0.03  <=0.2 x10(3)/mcL    IG# 0.03 0 - 0.04 x10(3)/mcL    IG% 0.4 %    NRBC% 0.0 %   CK    Collection Time: 10/13/23  6:57 AM   Result Value Ref Range    Creatine Kinase 121 30 - 200 U/L   CK-MB    Collection Time: 10/13/23  6:57 AM   Result Value Ref Range    Creatine Kinase MB 1.3 <=7.2 ng/mL   Troponin I    Collection Time: 10/13/23  8:07 AM   Result Value Ref Range    Troponin-I <0.010 0.000 - 0.045 ng/mL   CK-MB    Collection Time: 10/13/23  8:07 AM   Result Value Ref Range    Creatine Kinase MB 1.3 <=7.2 ng/mL   CK    Collection Time: 10/13/23  8:07 AM   Result Value Ref Range    Creatine Kinase 130 30 - 200 U/L   Troponin I    Collection Time: 10/13/23  8:07 AM   Result Value Ref Range    Troponin-I <0.010 0.000 - 0.045 ng/mL   Echo    Collection Time: 10/13/23  8:39 AM   Result Value Ref Range    BSA 2.17 m2    Roque's Biplane MOD Ejection Fraction 65 %    LVIDd 5.19 3.5 - 6.0 cm    LV Systolic Volume 58.50 mL    LV Systolic Volume Index 27.2 mL/m2    LVIDs 3.71 2.1 - 4.0 cm    LV Diastolic Volume 129.00 mL    LV Diastolic Volume Index 60.00 mL/m2    IVS 1.16 (A) 0.6 - 1.1 cm    FS 29 28 - 44 %    Left Ventricle Relative Wall Thickness 0.44 cm    Posterior Wall 1.14 (A) 0.6 - 1.1 cm    LV mass 233.89 g    LV Mass Index 109 g/m2    MV Peak E Fam 1.24 m/s    TDI LATERAL 0.10 m/s    TDI SEPTAL 0.07 m/s    E/E' ratio 14.59 m/s    MV Peak A Fam 1.14 m/s    E/A ratio 1.09     E wave deceleration time 217.00 msec    LV SEPTAL E/E' RATIO 17.71 m/s    LV LATERAL E/E' RATIO 12.40 m/s    LVOT peak fam 0.84 m/s    Left Ventricular Outflow Tract Mean Velocity 0.52 cm/s    Left Ventricular Outflow Tract Mean Gradient 1.00 mmHg    LA size 3.80 cm    LA volume (mod) 70.10 cm3    LA Volume Index (Mod) 32.6 mL/m2    RVDD 4.14 cm    TAPSE 2.09 cm    AV mean gradient 3 mmHg    AV peak gradient 6 mmHg    Ao peak fam 1.20 m/s    Ao VTI 34.10 cm    LVOT peak VTI 18.80 cm    AV Velocity Ratio 0.70     AV index (prosthetic) 0.55     PV  PEAK VELOCITY 0.77 m/s    PV peak gradient 2 mmHg    Mean e' 0.09 m/s    ZLVIDS -1.08     ZLVIDD -2.96    POCT glucose    Collection Time: 10/13/23 11:39 AM   Result Value Ref Range    POCT Glucose 128 (H) 70 - 110 mg/dL   Troponin I    Collection Time: 10/13/23  2:05 PM   Result Value Ref Range    Troponin-I <0.010 0.000 - 0.045 ng/mL   POCT glucose    Collection Time: 10/13/23  3:48 PM   Result Value Ref Range    POCT Glucose 94 70 - 110 mg/dL   POCT glucose    Collection Time: 10/13/23  8:19 PM   Result Value Ref Range    POCT Glucose 169 (H) 70 - 110 mg/dL   Basic Metabolic Panel    Collection Time: 10/14/23  5:22 AM   Result Value Ref Range    Sodium Level 142 136 - 145 mmol/L    Potassium Level 4.0 3.5 - 5.1 mmol/L    Chloride 109 (H) 98 - 107 mmol/L    Carbon Dioxide 25 23 - 31 mmol/L    Glucose Level 123 (H) 82 - 115 mg/dL    Blood Urea Nitrogen 8.1 (L) 8.4 - 25.7 mg/dL    Creatinine 0.90 0.73 - 1.18 mg/dL    BUN/Creatinine Ratio 9     Calcium Level Total 8.8 8.8 - 10.0 mg/dL    Anion Gap 8.0 mEq/L    eGFR >60 mls/min/1.73/m2   Magnesium    Collection Time: 10/14/23  5:22 AM   Result Value Ref Range    Magnesium Level 1.60 1.60 - 2.60 mg/dL   POCT glucose    Collection Time: 10/14/23 11:42 AM   Result Value Ref Range    POCT Glucose 156 (H) 70 - 110 mg/dL   POCT glucose    Collection Time: 10/14/23  4:34 PM   Result Value Ref Range    POCT Glucose 151 (H) 70 - 110 mg/dL   Basic Metabolic Panel    Collection Time: 10/15/23  4:26 AM   Result Value Ref Range    Sodium Level 138 136 - 145 mmol/L    Potassium Level 4.2 3.5 - 5.1 mmol/L    Chloride 106 98 - 107 mmol/L    Carbon Dioxide 25 23 - 31 mmol/L    Glucose Level 127 (H) 82 - 115 mg/dL    Blood Urea Nitrogen 8.8 8.4 - 25.7 mg/dL    Creatinine 0.87 0.73 - 1.18 mg/dL    BUN/Creatinine Ratio 10     Calcium Level Total 9.1 8.8 - 10.0 mg/dL    Anion Gap 7.0 mEq/L    eGFR >60 mls/min/1.73/m2   Magnesium    Collection Time: 10/15/23  4:26 AM   Result Value Ref  Range    Magnesium Level 1.80 1.60 - 2.60 mg/dL     EKG       Telemetry:  Sinus bradycardia with Type I Block, HR 67 BPM    Physical Exam  Vitals reviewed.   Constitutional:       General: He is not in acute distress.  HENT:      Head: Normocephalic and atraumatic.      Mouth/Throat:      Mouth: Mucous membranes are moist.   Eyes:      Extraocular Movements: Extraocular movements intact.   Neck:      Vascular: No carotid bruit.   Cardiovascular:      Rate and Rhythm: Regular rhythm. Bradycardia present.      Pulses: Normal pulses.      Heart sounds: Normal heart sounds. No murmur heard.  Pulmonary:      Effort: Pulmonary effort is normal. No respiratory distress.      Breath sounds: Normal breath sounds.   Abdominal:      General: There is no distension.   Musculoskeletal:      Left lower leg: No edema.   Skin:     General: Skin is warm and dry.   Neurological:      Mental Status: He is alert and oriented to person, place, and time.   Psychiatric:         Mood and Affect: Mood normal.         Behavior: Behavior normal.         Current Inpatient Medications:    Current Facility-Administered Medications:     acetaminophen tablet 650 mg, 650 mg, Oral, Q6H PRN, Nela Perez AGACNP-BC    aluminum-magnesium hydroxide-simethicone 200-200-20 mg/5 mL suspension 30 mL, 30 mL, Oral, QID PRN, Nela Perez AGACNP-BC    amLODIPine tablet 5 mg, 5 mg, Oral, Daily, Divya Casey, FNP, 5 mg at 10/15/23 0845    aspirin EC tablet 81 mg, 81 mg, Oral, Daily, Divya Casey, FNP, 81 mg at 10/15/23 0845    atorvastatin tablet 40 mg, 40 mg, Oral, QHS, Divya Casey, FNP, 40 mg at 10/14/23 2129    atropine injection 0.5 mg, 0.5 mg, Intravenous, Q12H PRN, Samantha Celestin MD    dextrose 10% bolus 125 mL 125 mL, 12.5 g, Intravenous, PRN, Nela Perez AGACNP-BC    dextrose 10% bolus 250 mL 250 mL, 25 g, Intravenous, PRN, Nela Perez AGACNP-BC    enoxaparin injection 40 mg, 40 mg, Subcutaneous, Daily,  Nela Perez, AGACNP-BC, 40 mg at 10/14/23 1635    glucagon (human recombinant) injection 1 mg, 1 mg, Intramuscular, PRN, Nela Perez, AGACNP-BC    glucose chewable tablet 16 g, 16 g, Oral, PRN, Nela Perez, AGACNP-BC    glucose chewable tablet 24 g, 24 g, Oral, PRN, Nela Perez, AGACNP-BC    hydrALAZINE tablet 50 mg, 50 mg, Oral, Q8H, Divya Casey, FNP, 50 mg at 10/15/23 0605    insulin aspart U-100 injection 0-10 Units, 0-10 Units, Subcutaneous, QID (AC + HS) PRN, Nela Perez, AGACNP-BC, 1 Units at 10/13/23 2027    losartan tablet 100 mg, 100 mg, Oral, Daily, Divya Casey, FNP, 100 mg at 10/15/23 0845    melatonin tablet 6 mg, 6 mg, Oral, Nightly PRN, Nela Perez AGACNP-BC    naloxone 0.4 mg/mL injection 0.02 mg, 0.02 mg, Intravenous, PRN, Nela Perez, AGACNP-BC    ondansetron injection 4 mg, 4 mg, Intravenous, Q8H PRN, Samantha Celestin MD    ondansetron injection 4 mg, 4 mg, Intravenous, Q4H PRN, Nela Perez, AGACNP-BC    polyethylene glycol packet 17 g, 17 g, Oral, BID PRN, Nela Perez, AGACNP-BC    prochlorperazine injection Soln 5 mg, 5 mg, Intravenous, Q6H PRN, Nela Perez AGACNP-BC    simethicone chewable tablet 80 mg, 1 tablet, Oral, QID PRN, Nela Perez, AGACNP-BC    sodium chloride 0.9% flush 10 mL, 10 mL, Intravenous, PRN, Samantha Celestin MD    VTE Risk Mitigation (From admission, onward)           Ordered     enoxaparin injection 40 mg  Daily         10/12/23 2344     Place sequential compression device  Until discontinued         10/13/23 0013     IP VTE HIGH RISK PATIENT  Once         10/13/23 0013     Place sequential compression device  Until discontinued         10/12/23 2344                    Assessment:   Cristofer I  Bradycardia  Chest pain  Native CAD  --4V CABG 11/2019 CABG 11/2019 (LIMA-LAD, rSVG-D1, rSVG-OM2, rSVG-OM3)  --[facility] 07.21: Patent grafts x 3  HTN  HLD  T2DM  TRIP      Plan:   Known hx of Mobitz I.   He is on  Coreg 12.5 mg outpatient. Hold BB for now.   Will plan for 1 week MCT monitor upon discharge  Continue Aspirin 81 mg daily  Continue Amlodipine 5mg daily and Losartan 100 mg daily  Start hydralazine 50 mg tid.   Continue Atorvastatin 40 MG  Diabetes management per primary  Daily labs, replace electrolytes as needed     Patient ok to discharge home from Cardiology standpoint. CIS will sign off at this time.       Virginia Chappell PA-C  Cardiology  [facility] - [facility] Medical Telemetry  10/15/2023     Physician addendum:  I have seen and examined this patient as a split-shared visit with the GEOFF d/t complicated medical management of above problems written in assessment and high acuity requiring physician expertise in medical decision-making. I performed the substantive portion of the history and exam. Above medical decision-making is also formulated by me.    Pt. Would like to be discharged.   Plan:   Medications reviewed. Discharge to home. Answered all questions prior to discharge.   F/U scheduled as above.     Demetris Koehler MD  Cardiologist

## 2023-10-15 NOTE — DISCHARGE SUMMARY
[facility] Hospital Medicine Discharge Summary    Admit Date: 10/12/2023  Discharge Date and Time: 10/15/63992:57 PM  Admitting Physician:  Team  Discharging Physician: Adonis Chan MD.  Primary Care Physician: Yolanda Mendoza NP  Consults: Cardiology    Discharge Diagnoses:  Symptomatic bradycardia secondary to second degree AV block Mobitz type 1/ Beta blocker- now resolved since stopping beta blockers   Normocytic anemia, stable   History of hypertension, hyperlipidemia, coronary artery disease status post CABG, diabetes mellitus type 2, obstructive sleep apnea  Hypomagnesemia- replaced     Hospital Course:   Rogelio Johnson is a 73 y.o. Black or  male with a past medical history of hypertension, hyperlipidemia, coronary artery disease status post CABG, diabetes mellitus type 2, obstructive sleep apnea. The patient presented to [facility] on 10/12/2023 with a primary complaint of vibrating sensation to the left upper chest which has been ongoing for the last 3 days.  Patient reports yesterday sensation became more frequent.  He denies complaints of chest pain who reports similar vibration sensation prior to having his CABG.  Patient denies complaints of lightheadedness care syncope, shortness a breath, fever, chills, abdominal pain, nausea, vomiting and diarrhea.  Patient denies history of any cardiac arrhythmias.  Upon presentation to the ED, temperature 97.9° F, heart rate 69, blood pressure 150/77, respiratory rate 18 and SpO2 100% on room air.  Labs significant for normocytic anemia, glucose 156, .5, troponin less than 0.01 x 3.  UA negative for infection.  Initial EKG with second-degree AV block Mobitz type 1 with a 2-1 AV conduction, ventricular rate of 37 and unable to rule out age-undetermined infarct.  Repeat EKG same as initial but ventricular rate of 44. Chest x-ray with no acute cardiopulmonary process.  Cis was consulted and patient was given  calcium glucagon, glucagon and atropine.  Coreg was held. Heart rate increased to the 40-50s. Patient also received full-dose aspirin.  Patient is to hospital medicine services for further medical management.  Admitted to hospital medicine team as inpatient on 10/13/2023   Cardiology on board.  Appreciate recommendations, recommended to hold off on beta-blocker for now, plan for 1 week MCT monitor upon discharge. Dr Koehler personally dicussed the plan with the pt around 1:40 pm, he will call cis tomorrow and get his Holter monitor arranged.  Patient was cleared for discharge to home today  Resumed amlodipine 5 mg daily, losartan 100 mg daily, hydralazine 50 mg t.i.d., continue atorvastatin 40 mg HS, aspirin 81 mg daily  Continue Telemetry monitoring   Troponin negative x 5  Mag improved to 1.8  Accu-Cheks and sliding scale for diabetes management  Resumed appropriate home medications for chronic medical conditions    Pt was seen and examined on the day of discharge  Vitals:  VITAL SIGNS: 24 HRS MIN & MAX LAST   Temp  Min: 97.7 °F (36.5 °C)  Max: 98.4 °F (36.9 °C) 98.2 °F (36.8 °C)   BP  Min: 121/65  Max: 172/73 121/65   Pulse  Min: 54  Max: 74  67   Resp  Min: 16  Max: 20 18   SpO2  Min: 94 %  Max: 97 % 97 %       Physical Exam:  General: In no acute distress, afebrile  Chest: Clear to auscultation bilaterally  Heart: S1, S2, bradycardic rate and rhythm, no appreciable murmur  Abdomen: Soft, nontender, BS +  MSK: Warm, no lower extremity edema, no clubbing or cyanosis  Neurologic: Alert and oriented x4, moving all extremities with good strength     Recent Labs   Lab 10/12/23  1938 10/13/23  0216   WBC 6.48 7.22   RBC 4.01* 3.92*   HGB 13.2* 12.7*   HCT 37.7* 36.2*   MCV 94.0 92.3   MCH 32.9* 32.4*   MCHC 35.0 35.1   RDW 12.5 12.6    313   MPV 9.7 9.9       Recent Labs   Lab 10/12/23  1938 10/13/23  0216 10/14/23  0522 10/15/23  0426    142 142 138   K 3.7 3.6 4.0 4.2   CO2 22* 22* 25 25   BUN 10.6 11.2  8.1* 8.8   CREATININE 1.01 0.93 0.90 0.87   CALCIUM 9.0 9.0 8.8 9.1   MG 1.70 1.80 1.60 1.80   ALBUMIN 3.6 3.4  --   --    ALKPHOS 97 102  --   --    ALT 18 16  --   --    AST 15 16  --   --    BILITOT 0.3 0.3  --   --         Microbiology Results (last 7 days)       ** No results found for the last 168 hours. **             Echo    Left Ventricle: The left ventricle is mildly dilated. Mildly increased   wall thickness. Normal wall motion. There is normal systolic function with   a visually estimated ejection fraction of 60 - 65%. Grade I diastolic   dysfunction.    Right Ventricle: Normal right ventricular cavity size. Wall thickness   is normal. Right ventricle wall motion  is normal. Systolic function is   normal.    Aortic Valve: There is moderate aortic valve sclerosis. There is normal   leaflet mobility. There is no stenosis. Aortic valve peak velocity is 1.20   m/s. Mean gradient is 3 mmHg. There is no significant regurgitation.    Mitral Valve: Mildly thickened leaflets. There is no stenosis. There is   mild regurgitation.    Tricuspid Valve: There is no stenosis. There is trace regurgitation.    Pulmonic Valve: There is no stenosis.         Medication List        START taking these medications      hydrALAZINE 50 MG tablet  Commonly known as: APRESOLINE  Take 1 tablet (50 mg total) by mouth every 8 (eight) hours.            CHANGE how you take these medications      aspirin 81 MG EC tablet  Commonly known as: ECOTRIN  Take 1 tablet (81 mg total) by mouth once daily.  What changed: when to take this     LEVEMIR FLEXTOUCH U100 INSULIN 100 unit/mL (3 mL) Inpn pen  Generic drug: insulin detemir U-100 (Levemir)  Administer 68 units SC q AM and 62 units SC q HS. Rotate injection sites.  What changed: additional instructions            CONTINUE taking these medications      amLODIPine 5 MG tablet  Commonly known as: NORVASC  Take 1 tablet (5 mg total) by mouth once daily.     atorvastatin 40 MG tablet  Commonly  known as: LIPITOR  Take 1 tablet (40 mg total) by mouth every evening.     docusate sodium 100 MG capsule  Commonly known as: COLACE     empagliflozin 10 mg tablet  Commonly known as: JARDIANCE  Take 1 tablet (10 mg total) by mouth every morning.     ferrous sulfate 325 mg (65 mg iron) Tab tablet  Commonly known as: FeroSuL  Take 1 tablet (325 mg total) by mouth once daily.     finasteride 5 mg tablet  Commonly known as: PROSCAR  Take 1 tablet (5 mg total) by mouth once daily.     fluticasone propionate 50 mcg/actuation nasal spray  Commonly known as: FLONASE  2 sprays (100 mcg total) by Each Nostril route once daily.     losartan 50 MG tablet  Commonly known as: COZAAR  Take 2 tablets (100 mg total) by mouth once daily.     metFORMIN 1000 MG tablet  Commonly known as: GLUCOPHAGE  Take 1 tablet (1,000 mg total) by mouth 2 (two) times daily with meals.               Where to Get Your Medications        These medications were sent to [facility]/pharmacy #[id number] - [facility] [address]  [address] [facility] [address]      Hours: 24-hours Phone: [phone]   hydrALAZINE 50 MG tablet       Information about where to get these medications is not yet available    Ask your nurse or doctor about these medications  aspirin 81 MG EC tablet          Explained in detail to the patient about the discharge plan, medications, and follow-up visits. Pt understands and agrees with the treatment plan  Discharge Disposition: Home or Self Care   Discharged Condition: stable  Diet- Diabetic/ cardiac     Medications Per [place] med rec  Activities as tolerated   Follow-up Information       Yolanda Mendoza NP Follow up in 2 week(s).    Specialty: Nurse Practitioner  Contact information:  [address] [facility] [address] [phone]               Baljit Sánchez MD Follow up in 1 week(s).    Specialties: Cardiology, Pathology  Why: 1 week MCT  Contact information:  [address] [place] [address]  [place]  [facility] [address]  [phone]               Baljit Sánchez MD Follow up.    Specialties: Cardiology, Pathology  Why: NEEDS TO GO TO OFFICE TOMORROW FOR HOLTER MONITOR PLACEMENT.  Contact information:  [address]  [facility] [address]  [phone]                           For further questions contact hospitalist office    Discharge time 34 minutes    For worsening symptoms, chest pain, shortness of breath, increased abdominal pain, high grade fever, stroke or stroke like symptoms, immediately go to the nearest Emergency Room or call 911 as soon as possible.      Adonis Chan MD  Department of Hospital Medicine   [facility]   10/15/2023 1:57 PM

## 2023-10-15 NOTE — NURSING
Pt AAOx4 standing in room. Discharge instructions and medication regimen discussed with patient. Patient verbalized understanding of teaching. New medication sent to [facility] for . Follow up appointments discussed with patient. Patient verbalized understanding. All questions and concerns answered. Signs and symptoms prompting ED visit discussed with patient. Patient verbalized understanding. IV and telemetry discontinued. Patient transported to main entrance via wheelchair in no acute distress.

## 2023-10-16 LAB
POCT GLUCOSE: 113 MG/DL (ref 70–110)
POCT GLUCOSE: 140 MG/DL (ref 70–110)

## 2023-10-18 ENCOUNTER — PATIENT OUTREACH (OUTPATIENT)
Dept: ADMINISTRATIVE | Facility: CLINIC | Age: 73
End: 2023-10-18
Payer: MEDICARE

## 2023-10-18 NOTE — PROGRESS NOTES
C3 nurse spoke with Rogelio Johnson   for a TCC post hospital discharge follow up call. The patient has a scheduled HOSFU appointment with Yolanda Mendoza NP (Primary Care) on 11/7/23 @ 9 :15 AM.

## 2023-11-01 DIAGNOSIS — I10 HYPERTENSION, UNSPECIFIED TYPE: ICD-10-CM

## 2023-11-06 ENCOUNTER — LAB VISIT (OUTPATIENT)
Dept: LAB | Facility: HOSPITAL | Age: 73
End: 2023-11-06
Attending: NURSE PRACTITIONER
Payer: MEDICARE

## 2023-11-06 DIAGNOSIS — D64.9 NORMOCYTIC NORMOCHROMIC ANEMIA: ICD-10-CM

## 2023-11-06 DIAGNOSIS — I10 PRIMARY HYPERTENSION: ICD-10-CM

## 2023-11-06 DIAGNOSIS — Z79.4 TYPE 2 DIABETES MELLITUS WITHOUT COMPLICATION, WITH LONG-TERM CURRENT USE OF INSULIN: ICD-10-CM

## 2023-11-06 DIAGNOSIS — E11.9 TYPE 2 DIABETES MELLITUS WITHOUT COMPLICATION, WITH LONG-TERM CURRENT USE OF INSULIN: ICD-10-CM

## 2023-11-06 LAB
ALBUMIN SERPL-MCNC: 3.6 G/DL (ref 3.4–4.8)
ALBUMIN/GLOB SERPL: 0.9 RATIO (ref 1.1–2)
ALP SERPL-CCNC: 91 UNIT/L (ref 40–150)
ALT SERPL-CCNC: 19 UNIT/L (ref 0–55)
AST SERPL-CCNC: 16 UNIT/L (ref 5–34)
BASOPHILS # BLD AUTO: 0.04 X10(3)/MCL
BASOPHILS NFR BLD AUTO: 0.6 %
BILIRUB SERPL-MCNC: 0.4 MG/DL
BUN SERPL-MCNC: 10.1 MG/DL (ref 8.4–25.7)
CALCIUM SERPL-MCNC: 9.1 MG/DL (ref 8.8–10)
CHLORIDE SERPL-SCNC: 109 MMOL/L (ref 98–107)
CHOLEST SERPL-MCNC: 133 MG/DL
CHOLEST/HDLC SERPL: 3 {RATIO} (ref 0–5)
CO2 SERPL-SCNC: 23 MMOL/L (ref 23–31)
CREAT SERPL-MCNC: 0.93 MG/DL (ref 0.73–1.18)
EOSINOPHIL # BLD AUTO: 0.14 X10(3)/MCL (ref 0–0.9)
EOSINOPHIL NFR BLD AUTO: 2.1 %
ERYTHROCYTE [DISTWIDTH] IN BLOOD BY AUTOMATED COUNT: 12.7 % (ref 11.5–17)
EST. AVERAGE GLUCOSE BLD GHB EST-MCNC: 125.5 MG/DL
GFR SERPLBLD CREATININE-BSD FMLA CKD-EPI: >60 MLS/MIN/1.73/M2
GLOBULIN SER-MCNC: 3.9 GM/DL (ref 2.4–3.5)
GLUCOSE SERPL-MCNC: 109 MG/DL (ref 82–115)
HBA1C MFR BLD: 6 %
HCT VFR BLD AUTO: 37.1 % (ref 42–52)
HDLC SERPL-MCNC: 48 MG/DL (ref 35–60)
HGB BLD-MCNC: 12.5 G/DL (ref 14–18)
IMM GRANULOCYTES # BLD AUTO: 0.02 X10(3)/MCL (ref 0–0.04)
IMM GRANULOCYTES NFR BLD AUTO: 0.3 %
LDLC SERPL CALC-MCNC: 39 MG/DL (ref 50–140)
LYMPHOCYTES # BLD AUTO: 3.64 X10(3)/MCL (ref 0.6–4.6)
LYMPHOCYTES NFR BLD AUTO: 54.6 %
MCH RBC QN AUTO: 32.1 PG (ref 27–31)
MCHC RBC AUTO-ENTMCNC: 33.7 G/DL (ref 33–36)
MCV RBC AUTO: 95.1 FL (ref 80–94)
MONOCYTES # BLD AUTO: 0.77 X10(3)/MCL (ref 0.1–1.3)
MONOCYTES NFR BLD AUTO: 11.5 %
NEUTROPHILS # BLD AUTO: 2.06 X10(3)/MCL (ref 2.1–9.2)
NEUTROPHILS NFR BLD AUTO: 30.9 %
NRBC BLD AUTO-RTO: 0 %
PLATELET # BLD AUTO: 266 X10(3)/MCL (ref 130–400)
PMV BLD AUTO: 10 FL (ref 7.4–10.4)
POTASSIUM SERPL-SCNC: 3.5 MMOL/L (ref 3.5–5.1)
PROT SERPL-MCNC: 7.5 GM/DL (ref 5.8–7.6)
RBC # BLD AUTO: 3.9 X10(6)/MCL (ref 4.7–6.1)
SODIUM SERPL-SCNC: 143 MMOL/L (ref 136–145)
TRIGL SERPL-MCNC: 228 MG/DL (ref 34–140)
TSH SERPL-ACNC: 1.12 UIU/ML (ref 0.35–4.94)
VLDLC SERPL CALC-MCNC: 46 MG/DL
WBC # SPEC AUTO: 6.67 X10(3)/MCL (ref 4.5–11.5)

## 2023-11-06 PROCEDURE — 80061 LIPID PANEL: CPT

## 2023-11-06 PROCEDURE — 80053 COMPREHEN METABOLIC PANEL: CPT

## 2023-11-06 PROCEDURE — 85025 COMPLETE CBC W/AUTO DIFF WBC: CPT

## 2023-11-06 PROCEDURE — 83036 HEMOGLOBIN GLYCOSYLATED A1C: CPT

## 2023-11-06 PROCEDURE — 84443 ASSAY THYROID STIM HORMONE: CPT

## 2023-11-06 PROCEDURE — 36415 COLL VENOUS BLD VENIPUNCTURE: CPT

## 2023-11-06 RX ORDER — AMLODIPINE BESYLATE 5 MG/1
5 TABLET ORAL DAILY
Qty: 90 TABLET | Refills: 2 | Status: SHIPPED | OUTPATIENT
Start: 2023-11-06

## 2023-11-07 ENCOUNTER — OFFICE VISIT (OUTPATIENT)
Dept: INTERNAL MEDICINE | Facility: CLINIC | Age: 73
End: 2023-11-07
Payer: MEDICARE

## 2023-11-07 VITALS
BODY MASS INDEX: 27.66 KG/M2 | DIASTOLIC BLOOD PRESSURE: 58 MMHG | SYSTOLIC BLOOD PRESSURE: 132 MMHG | TEMPERATURE: 98 F | HEART RATE: 64 BPM | WEIGHT: 204.19 LBS | RESPIRATION RATE: 20 BRPM | HEIGHT: 72 IN

## 2023-11-07 DIAGNOSIS — N40.0 BENIGN PROSTATIC HYPERPLASIA, UNSPECIFIED WHETHER LOWER URINARY TRACT SYMPTOMS PRESENT: ICD-10-CM

## 2023-11-07 DIAGNOSIS — R97.20 ELEVATED PSA: ICD-10-CM

## 2023-11-07 DIAGNOSIS — Z79.4 TYPE 2 DIABETES MELLITUS WITHOUT COMPLICATION, WITH LONG-TERM CURRENT USE OF INSULIN: ICD-10-CM

## 2023-11-07 DIAGNOSIS — E78.5 DYSLIPIDEMIA: ICD-10-CM

## 2023-11-07 DIAGNOSIS — I10 PRIMARY HYPERTENSION: Primary | ICD-10-CM

## 2023-11-07 DIAGNOSIS — I44.1 MOBITZ TYPE 1 SECOND DEGREE AV BLOCK: ICD-10-CM

## 2023-11-07 DIAGNOSIS — D50.9 IRON DEFICIENCY ANEMIA, UNSPECIFIED IRON DEFICIENCY ANEMIA TYPE: ICD-10-CM

## 2023-11-07 DIAGNOSIS — E11.9 TYPE 2 DIABETES MELLITUS WITHOUT COMPLICATION, WITH LONG-TERM CURRENT USE OF INSULIN: ICD-10-CM

## 2023-11-07 DIAGNOSIS — Z12.5 PROSTATE CANCER SCREENING: ICD-10-CM

## 2023-11-07 PROCEDURE — 3044F HG A1C LEVEL LT 7.0%: CPT | Mod: CPTII,,, | Performed by: NURSE PRACTITIONER

## 2023-11-07 PROCEDURE — 1160F RVW MEDS BY RX/DR IN RCRD: CPT | Mod: CPTII,,, | Performed by: NURSE PRACTITIONER

## 2023-11-07 PROCEDURE — 99215 OFFICE O/P EST HI 40 MIN: CPT | Mod: S$PBB,,, | Performed by: NURSE PRACTITIONER

## 2023-11-07 PROCEDURE — 1101F PT FALLS ASSESS-DOCD LE1/YR: CPT | Mod: CPTII,,, | Performed by: NURSE PRACTITIONER

## 2023-11-07 PROCEDURE — 1160F PR REVIEW ALL MEDS BY PRESCRIBER/CLIN PHARMACIST DOCUMENTED: ICD-10-PCS | Mod: CPTII,,, | Performed by: NURSE PRACTITIONER

## 2023-11-07 PROCEDURE — 1159F PR MEDICATION LIST DOCUMENTED IN MEDICAL RECORD: ICD-10-PCS | Mod: CPTII,,, | Performed by: NURSE PRACTITIONER

## 2023-11-07 PROCEDURE — 3075F SYST BP GE 130 - 139MM HG: CPT | Mod: CPTII,,, | Performed by: NURSE PRACTITIONER

## 2023-11-07 PROCEDURE — 3078F DIAST BP <80 MM HG: CPT | Mod: CPTII,,, | Performed by: NURSE PRACTITIONER

## 2023-11-07 PROCEDURE — 1159F MED LIST DOCD IN RCRD: CPT | Mod: CPTII,,, | Performed by: NURSE PRACTITIONER

## 2023-11-07 PROCEDURE — 3008F BODY MASS INDEX DOCD: CPT | Mod: CPTII,,, | Performed by: NURSE PRACTITIONER

## 2023-11-07 PROCEDURE — 3075F PR MOST RECENT SYSTOLIC BLOOD PRESS GE 130-139MM HG: ICD-10-PCS | Mod: CPTII,,, | Performed by: NURSE PRACTITIONER

## 2023-11-07 PROCEDURE — 3078F PR MOST RECENT DIASTOLIC BLOOD PRESSURE < 80 MM HG: ICD-10-PCS | Mod: CPTII,,, | Performed by: NURSE PRACTITIONER

## 2023-11-07 PROCEDURE — 3008F PR BODY MASS INDEX (BMI) DOCUMENTED: ICD-10-PCS | Mod: CPTII,,, | Performed by: NURSE PRACTITIONER

## 2023-11-07 PROCEDURE — 3044F PR MOST RECENT HEMOGLOBIN A1C LEVEL <7.0%: ICD-10-PCS | Mod: CPTII,,, | Performed by: NURSE PRACTITIONER

## 2023-11-07 PROCEDURE — 4010F PR ACE/ARB THEARPY RXD/TAKEN: ICD-10-PCS | Mod: CPTII,,, | Performed by: NURSE PRACTITIONER

## 2023-11-07 PROCEDURE — 3066F NEPHROPATHY DOC TX: CPT | Mod: CPTII,,, | Performed by: NURSE PRACTITIONER

## 2023-11-07 PROCEDURE — 4010F ACE/ARB THERAPY RXD/TAKEN: CPT | Mod: CPTII,,, | Performed by: NURSE PRACTITIONER

## 2023-11-07 PROCEDURE — 1111F PR DISCHARGE MEDS RECONCILED W/ CURRENT OUTPATIENT MED LIST: ICD-10-PCS | Mod: CPTII,,, | Performed by: NURSE PRACTITIONER

## 2023-11-07 PROCEDURE — 99215 PR OFFICE/OUTPT VISIT, EST, LEVL V, 40-54 MIN: ICD-10-PCS | Mod: S$PBB,,, | Performed by: NURSE PRACTITIONER

## 2023-11-07 PROCEDURE — 3288F FALL RISK ASSESSMENT DOCD: CPT | Mod: CPTII,,, | Performed by: NURSE PRACTITIONER

## 2023-11-07 PROCEDURE — 99215 OFFICE O/P EST HI 40 MIN: CPT | Mod: PBBFAC | Performed by: NURSE PRACTITIONER

## 2023-11-07 PROCEDURE — 3288F PR FALLS RISK ASSESSMENT DOCUMENTED: ICD-10-PCS | Mod: CPTII,,, | Performed by: NURSE PRACTITIONER

## 2023-11-07 PROCEDURE — 1111F DSCHRG MED/CURRENT MED MERGE: CPT | Mod: CPTII,,, | Performed by: NURSE PRACTITIONER

## 2023-11-07 PROCEDURE — 1101F PR PT FALLS ASSESS DOC 0-1 FALLS W/OUT INJ PAST YR: ICD-10-PCS | Mod: CPTII,,, | Performed by: NURSE PRACTITIONER

## 2023-11-07 PROCEDURE — 3060F PR POS MICROALBUMINURIA RESULT DOCUMENTED/REVIEW: ICD-10-PCS | Mod: CPTII,,, | Performed by: NURSE PRACTITIONER

## 2023-11-07 PROCEDURE — 3060F POS MICROALBUMINURIA REV: CPT | Mod: CPTII,,, | Performed by: NURSE PRACTITIONER

## 2023-11-07 PROCEDURE — 3066F PR DOCUMENTATION OF TREATMENT FOR NEPHROPATHY: ICD-10-PCS | Mod: CPTII,,, | Performed by: NURSE PRACTITIONER

## 2023-11-07 RX ORDER — FERROUS SULFATE 325(65) MG
325 TABLET ORAL DAILY
Qty: 90 TABLET | Refills: 2 | Status: SHIPPED | OUTPATIENT
Start: 2023-11-07

## 2023-11-07 RX ORDER — FINASTERIDE 5 MG/1
5 TABLET, FILM COATED ORAL DAILY
Qty: 90 TABLET | Refills: 2 | Status: SHIPPED | OUTPATIENT
Start: 2023-11-07

## 2023-11-07 RX ORDER — ATORVASTATIN CALCIUM 40 MG/1
40 TABLET, FILM COATED ORAL NIGHTLY
Qty: 90 TABLET | Refills: 2 | Status: SHIPPED | OUTPATIENT
Start: 2023-11-07

## 2023-11-07 RX ORDER — METFORMIN HYDROCHLORIDE 1000 MG/1
1000 TABLET ORAL 2 TIMES DAILY WITH MEALS
Qty: 180 TABLET | Refills: 2 | Status: SHIPPED | OUTPATIENT
Start: 2023-11-07

## 2023-11-07 RX ORDER — INSULIN DETEMIR 100 [IU]/ML
INJECTION, SOLUTION SUBCUTANEOUS
Qty: 120 ML | Refills: 6 | Status: SHIPPED | OUTPATIENT
Start: 2023-11-07

## 2023-11-07 RX ORDER — EZETIMIBE 10 MG/1
10 TABLET ORAL DAILY
Qty: 90 TABLET | Refills: 2 | Status: SHIPPED | OUTPATIENT
Start: 2023-11-07 | End: 2024-11-06

## 2023-11-07 RX ORDER — LOSARTAN POTASSIUM 100 MG/1
100 TABLET ORAL DAILY
COMMUNITY
Start: 2023-10-05

## 2023-11-07 NOTE — ASSESSMENT & PLAN NOTE
Last PSA level noted January 2022 at 5.13.  Chart notes reviewed with last urology clinic appointment April 2022 with plan for prostate biopsy however no appointment noted and patient denies completing.  PSA today 8.02, urgent referral to [facility]

## 2023-11-07 NOTE — ASSESSMENT & PLAN NOTE
LDL 39, Trig 228, HLD 48, Total 133  Avoid tobacco/ alcohol  Follow low fat/low cholesterol diet such as avoid/ decrease bean, sausage, fried foods, cookies, cakes, chips, cheese, whole milk, butter, mayonnaise. Add olive oil, avocados, lean meats, fresh fruits/ vegetables, heart healthy nuts to diet.  Educated on health benefits of exercise 5 days/ week of at least 30 minutes moderate intensity exercise (brisk walking) and 2 days/ week of muscle strength activities  Daily ASA 81 mg for CV prevention  Continue current medication regimen and add Zetia 10 mg once daily.  Educated on side effect of constipation and may add Metamucil

## 2023-11-07 NOTE — ASSESSMENT & PLAN NOTE
A1c 6%  Hypoglycemia: denies symptoms  Microalbumin/ Cr ratio: 190.7  Educated on ADA diet:  1. Avoid/ decrease high carbohydrate foods (rice, pasta, bread, candy, sweets, carbonated beverages)  Educated on health benefits of at least 5 days/ week of 30 minutes moderate intensity exercise (brisk walking) and 2 or more days/ week of muscle strength activities  Avoid alcohol or tobacco use if applicable  Eye exam: 10/12/21 no DR- poor image.  Patient establish with Dr. Whyte and Dr. Morales- need records  Foot exam: 11/7/23  ACEI d/c s/t cough; ARB  Continue daily ASA and statin  Continue with current regimen

## 2023-11-07 NOTE — ASSESSMENT & PLAN NOTE
Patient arrived to hospital 10/12/2023 with EKG revealing second-degree AV block type 1.  He is scheduled for pacemaker 11/09/2023.  Patient is not on any beta-blockers or calcium channel blockers at this time.  He reports feeling well today.

## 2023-11-07 NOTE — PROGRESS NOTES
Yolanda L Reji, NP   [facility]  [address] [id number]      PATIENT NAME: Rogelio Johnson  : 1950  DATE: 23  MRN: [id number]        Reason for Visit / Chief Complaint: Hospital Follow Up (Discharged 10/15/2023) and Follow-up (labs)       History of Present Illness / Problem Focused Workflow     Rogelio Johnson presents to the clinic with Hospital Follow Up (Discharged 10/15/2023) and Follow-up (labs)     72 yo AAM for hospital follow up and lab results. PMH includes CAD, CABG 19, type 2 AV block, HTN, HLD, type 2 DM, BPH, elevated PSA, ED, chronic anemia, colon polyps, TRIP, and obesity.     He was hospitalized on 10/12/23 for left sided chest discomfort. He states he felt vibrating sensation to left side of upper chest that was going on for the last few days prior to arrival. He states he never had any CP. EKG revealed second degree AV block Mobitz type 1 with a 2-1 AV conduction with ventricular rate of 37 and repeat EKG the same with ventricular rate of 44.   BP today 132/58 and HR 64. He states he feels fine. He denies any concerns today. He is scheduled for pacemaker with Dr. Sánchez on 23.     Labs completed with A1c 6%. LDL 39 and trig 228. He reports compliance with medications. It was noted last PSA was 2022 at 5.13. Chart reviewed with last [facility] clinic visit 2022 with plan for biopsy, however, pt denies having this completed.     Other providers  Dr. Morales/ Dr. Whyte  [facility]- last visit noted 2022  Dr. Mathew/Dr. Sánchez  [facility] Dr. DONATO Rodriguez, Dr. Quintana        Review of Systems     Review of Systems   Constitutional:  Negative for appetite change, chills, fatigue, fever and unexpected weight change.   HENT:  Negative for congestion, ear pain, hearing loss, sinus pressure, sore throat and tinnitus.    Respiratory:  Negative for cough, chest tightness, shortness of breath and  wheezing.    Cardiovascular:  Negative for chest pain, palpitations and leg swelling.   Gastrointestinal:  Negative for abdominal distention, abdominal pain, blood in stool, constipation, diarrhea, nausea and vomiting.   Genitourinary:  Negative for dysuria and hematuria.   Musculoskeletal:  Negative for arthralgias and myalgias.   Skin:  Negative for pallor.   Allergic/Immunologic: Negative for environmental allergies.   Neurological:  Negative for dizziness, syncope, weakness, numbness and headaches.   Hematological:  Negative for adenopathy. Does not bruise/bleed easily.   Psychiatric/Behavioral:  Negative for agitation, behavioral problems, confusion, hallucinations, sleep disturbance and suicidal ideas. The patient is not nervous/anxious.        Medical / Social / Family History     ----------------------------  Coronary artery disease  Hyperlipidemia  Hypertension  Personal history of colonic polyps  Sleep apnea, unspecified     Past Surgical History:   Procedure Laterality Date    COLONOSCOPY      CORONARY ARTERY BYPASS GRAFT (CABG)         Social History     Socioeconomic History    Marital status:    Tobacco Use    Smoking status: Never    Smokeless tobacco: Never   Substance and Sexual Activity    Alcohol use: Not Currently    Drug use: Never    Sexual activity: Yes     Partners: Male     Social Determinants of Health     Physical Activity: Sufficiently Active (5/3/2023)    Exercise Vital Sign     Days of Exercise per Week: 5 days     Minutes of Exercise per Session: 30 min        Family History   Problem Relation Age of Onset    Colon cancer Mother     Diabetes Mellitus Mother     Hypertension Mother     Cataracts Father     Heart attack Father     Heart disease Father         Medications and Allergies     Medications  Current Outpatient Medications   Medication Instructions    amLODIPine (NORVASC) 5 mg, Oral, Daily    aspirin (ECOTRIN) 81 mg, Oral, Daily    atorvastatin (LIPITOR) 40 mg, Oral,  Nightly    docusate sodium (COLACE) 100 mg, Oral    empagliflozin (JARDIANCE) 10 mg, Oral, Every morning    ezetimibe (ZETIA) 10 mg, Oral, Daily    ferrous sulfate (FEROSUL) 325 mg, Oral, Daily    finasteride (PROSCAR) 5 mg, Oral, Daily    fluticasone propionate (FLONASE) 100 mcg, Each Nostril, Daily    hydrALAZINE (APRESOLINE) 50 mg, Oral, Every 8 hours    LEVEMIR FLEXTOUCH U100 INSULIN 100 unit/mL (3 mL) InPn pen Administer 68 units SC q AM and 62 units SC q HS. Rotate injection sites.    losartan (COZAAR) 100 mg, Oral, Daily    losartan (COZAAR) 100 mg, Oral    metFORMIN (GLUCOPHAGE) 1,000 mg, Oral, 2 times daily with meals       Allergies  Review of patient's allergies indicates:  No Known Allergies    Physical Examination     Visit Vitals  BP (!) 132/58 (BP Location: Left arm, Patient Position: Sitting, BP Method: Large (Manual))   Pulse 64   Temp 97.6 °F (36.4 °C) (Oral)   Resp 20   Ht 6' (1.829 m)   Wt 92.6 kg (204 lb 3.2 oz)   BMI 27.69 kg/m²       Physical Exam  Constitutional:       General: He is not in acute distress.     Appearance: Normal appearance. He is not ill-appearing.   HENT:      Head: Normocephalic.   Neck:      Thyroid: No thyroid mass, thyromegaly or thyroid tenderness.      Vascular: No carotid bruit.   Cardiovascular:      Rate and Rhythm: Normal rate and regular rhythm.      Pulses: Normal pulses.           Dorsalis pedis pulses are 2+ on the right side and 2+ on the left side.        Posterior tibial pulses are 2+ on the right side and 2+ on the left side.      Heart sounds: Normal heart sounds. No murmur heard.  Pulmonary:      Effort: Pulmonary effort is normal. No respiratory distress.      Breath sounds: Normal breath sounds.   Musculoskeletal:         General: Normal range of motion.      Cervical back: Normal range of motion and neck supple. No tenderness.      Right lower leg: No edema.      Left lower leg: No edema.      Right foot: No deformity or bunion.      Left foot: No  deformity or bunion.   Feet:      Right foot:      Protective Sensation: 5 sites tested.  5 sites sensed.      Skin integrity: Skin integrity normal. No ulcer, blister, skin breakdown, erythema, warmth, callus, dry skin or fissure.      Left foot:      Protective Sensation: 5 sites tested.  5 sites sensed.      Skin integrity: Skin integrity normal. No ulcer, blister, skin breakdown, erythema, warmth, callus, dry skin or fissure.   Lymphadenopathy:      Cervical: No cervical adenopathy.   Skin:     General: Skin is warm and dry.      Capillary Refill: Capillary refill takes less than 2 seconds.   Neurological:      Mental Status: He is alert and oriented to person, place, and time.      Motor: No weakness.      Coordination: Coordination normal.      Gait: Gait normal.   Psychiatric:         Mood and Affect: Mood normal.         Behavior: Behavior normal.         Thought Content: Thought content normal.         Judgment: Judgment normal.           Results     Lab Results   Component Value Date    WBC 6.67 11/06/2023    RBC 3.90 (L) 11/06/2023    HGB 12.5 (L) 11/06/2023    HCT 37.1 (L) 11/06/2023    MCV 95.1 (H) 11/06/2023    MCH 32.1 (H) 11/06/2023    MCHC 33.7 11/06/2023    RDW 12.7 11/06/2023     11/06/2023    MPV 10.0 11/06/2023     Sodium   Date Value Ref Range Status   12/31/2021 137 136 - 145 mmol/L Final     Sodium Level   Date Value Ref Range Status   11/06/2023 143 136 - 145 mmol/L Final     Potassium   Date Value Ref Range Status   12/31/2021 3.7 3.5 - 5.1 mmol/L Final     Potassium Level   Date Value Ref Range Status   11/06/2023 3.5 3.5 - 5.1 mmol/L Final     Chloride   Date Value Ref Range Status   12/31/2021 110 (H) 100 - 109 mmol/L Final     Carbon Dioxide   Date Value Ref Range Status   11/06/2023 23 23 - 31 mmol/L Final   12/31/2021 20 (L) 22 - 33 mmol/L Final     Blood Urea Nitrogen   Date Value Ref Range Status   11/06/2023 10.1 8.4 - 25.7 mg/dL Final   12/31/2021 7 5 - 25 mg/dL Final  "    Creatinine   Date Value Ref Range Status   11/06/2023 0.93 0.73 - 1.18 mg/dL Final   12/31/2021 0.75 0.57 - 1.25 mg/dL Final     Calcium   Date Value Ref Range Status   12/31/2021 8.0 (L) 8.8 - 10.6 mg/dL Final     Calcium Level Total   Date Value Ref Range Status   11/06/2023 9.1 8.8 - 10.0 mg/dL Final     Albumin Level   Date Value Ref Range Status   11/06/2023 3.6 3.4 - 4.8 g/dL Final     Bilirubin Total   Date Value Ref Range Status   11/06/2023 0.4 <=1.5 mg/dL Final     Alkaline Phosphatase   Date Value Ref Range Status   11/06/2023 91 40 - 150 unit/L Final     Aspartate Aminotransferase   Date Value Ref Range Status   11/06/2023 16 5 - 34 unit/L Final     Alanine Aminotransferase   Date Value Ref Range Status   11/06/2023 19 0 - 55 unit/L Final     Anion Gap   Date Value Ref Range Status   12/31/2021 7 (L) 8 - 16 mmol/L Final     eGFR    Date Value Ref Range Status   12/31/2021 124 >=60 mL/min/1.73mSq Final     Estimated GFR-Non    Date Value Ref Range Status   04/18/2022 66 >=90      Lab Results   Component Value Date    CHOL 133 11/06/2023     Lab Results   Component Value Date    HDL 48 11/06/2023     No results found for: "LDLCALC"  Lab Results   Component Value Date    TRIG 228 (H) 11/06/2023     No results found for: "CHOLHDL"  Lab Results   Component Value Date    TSH 1.117 11/06/2023     Lab Results   Component Value Date    PHUR 6.5 01/24/2022    PROTEINUA Negative 10/12/2023    GLUCUA 4+ (A) 01/24/2022    KETONESU Negative 01/24/2022    OCCULTUA Negative 01/24/2022    NITRITE Negative 01/24/2022    LEUKOCYTESUR Negative 10/12/2023     Lab Results   Component Value Date    HGBA1C 6.0 11/06/2023    HGBA1C 6.1 10/13/2023    HGBA1C 6.0 05/03/2023     Lab Results   Component Value Date    PSA 8.02 (H) 11/07/2023         No results found for: "MICROALBUR", "[id number]"   Results for orders placed in visit on 10/10/22    Diabetic Eye Screening Photo         Assessment      "  ICD-10-CM ICD-9-CM   1. Primary hypertension  I10 401.9   2. Dyslipidemia  E78.5 272.4   3. Type 2 diabetes mellitus without complication, with long-term current use of insulin  E11.9 250.00    Z79.4 V58.67   4. Iron deficiency anemia, unspecified iron deficiency anemia type  D50.9 280.9   5. Benign prostatic hyperplasia, unspecified whether lower urinary tract symptoms present  N40.0 600.00   6. Mobitz type 1 second degree AV block  I44.1 426.13   7. Prostate cancer screening  Z12.5 V76.44   8. Elevated PSA  R97.20 790.93       Plan       Problem List Items Addressed This Visit          Cardiac/Vascular    Dyslipidemia    Current Assessment & Plan     LDL 39, Trig 228, HLD 48, Total 133  Avoid tobacco/ alcohol  Follow low fat/low cholesterol diet such as avoid/ decrease bean, sausage, fried foods, cookies, cakes, chips, cheese, whole milk, butter, mayonnaise. Add olive oil, avocados, lean meats, fresh fruits/ vegetables, heart healthy nuts to diet.  Educated on health benefits of exercise 5 days/ week of at least 30 minutes moderate intensity exercise (brisk walking) and 2 days/ week of muscle strength activities  Daily ASA 81 mg for CV prevention  Continue current medication regimen and add Zetia 10 mg once daily.  Educated on side effect of constipation and may add Metamucil             Relevant Medications    atorvastatin (LIPITOR) 40 MG tablet    ezetimibe (ZETIA) 10 mg tablet    Hypertension - Primary    Current Assessment & Plan     /58  Follow low sodium diet, < 2 gm/day (avoid high salty foods such as processed meats/ sausage/bean/ sandwich meat, chips, pickles, cheese, crackers and soft drinks/ electrolyte replacement drinks).  Avoid tobacco/ alcohol use  Educated on health benefits of at least 5 days/ week of 30 minutes moderate intensity exercise (brisk walking) and 2 or more days/ week of muscle strength activities  Daily ASA 81 mg for CV prevention  Continue current medication regimen            Mobitz type 1 second degree AV block    Current Assessment & Plan      Patient arrived to hospital 10/12/2023 with EKG revealing second-degree AV block type 1.  He is scheduled for pacemaker 11/09/2023.  Patient is not on any beta-blockers or calcium channel blockers at this time.  He reports feeling well today.            Renal/    Benign prostatic hyperplasia    Current Assessment & Plan       Rx refilled.  Patient needs to reestablish with [facility] urology for elevated PSA         Relevant Medications    finasteride (PROSCAR) 5 mg tablet    Elevated PSA    Current Assessment & Plan      Last PSA level noted January 2022 at 5.13.  Chart notes reviewed with last urology clinic appointment April 2022 with plan for prostate biopsy however no appointment noted and patient denies completing.  PSA today 8.02, urgent referral to [facility]         Relevant Orders    Ambulatory referral/consult to Urology       Endocrine    Type 2 diabetes mellitus without complication, with long-term current use of insulin    Current Assessment & Plan     A1c 6%  Hypoglycemia: denies symptoms  Microalbumin/ Cr ratio: 190.7  Educated on ADA diet:  1. Avoid/ decrease high carbohydrate foods (rice, pasta, bread, candy, sweets, carbonated beverages)  Educated on health benefits of at least 5 days/ week of 30 minutes moderate intensity exercise (brisk walking) and 2 or more days/ week of muscle strength activities  Avoid alcohol or tobacco use if applicable  Eye exam: 10/12/21 no DR- poor image.  Patient establish with Dr. Whyte and Dr. Morales- need records  Foot exam: 11/7/23  ACEI d/c s/t cough; ARB  Continue daily ASA and statin  Continue with current regimen         Relevant Medications    atorvastatin (LIPITOR) 40 MG tablet    empagliflozin (JARDIANCE) 10 mg tablet    LEVEMIR FLEXTOUCH U100 INSULIN 100 unit/mL (3 mL) InPn pen    metFORMIN (GLUCOPHAGE) 1000 MG tablet    ezetimibe (ZETIA) 10 mg tablet     Other Visit Diagnoses       Iron  deficiency anemia, unspecified iron deficiency anemia type        Relevant Medications    ferrous sulfate (FEROSUL) 325 mg (65 mg iron) Tab tablet    Prostate cancer screening        Relevant Orders    PSA, Screening (Completed)            Future Appointments   Date Time Provider Department Center   12/6/2023  8:15 AM Yolanda Mendoza NP [facility]   3/13/2024  8:00 AM Salvador Sinclair MD [facility]   5/7/2024  8:00 AM Yolanda Mendoza NP [facility]      I spent a total of 55 minutes on the day of the visit.  This includes face to face time and non-face to face time preparing to see the patient (eg, review of tests), obtaining and/or reviewing separately obtained history, documenting clinical information in the electronic or other health record, independently interpreting results and communicating results to the patient/family/caregiver, or care coordinator.      Follow up in about 6 months (around 5/7/2024) for with fasting labs before visit.    Signature:  Yolanda Mendoza NP  [facility]  [address][1] [phone]    Date of encounter: 11/7/23

## 2023-11-07 NOTE — ASSESSMENT & PLAN NOTE
/58  Follow low sodium diet, < 2 gm/day (avoid high salty foods such as processed meats/ sausage/bean/ sandwich meat, chips, pickles, cheese, crackers and soft drinks/ electrolyte replacement drinks).  Avoid tobacco/ alcohol use  Educated on health benefits of at least 5 days/ week of 30 minutes moderate intensity exercise (brisk walking) and 2 or more days/ week of muscle strength activities  Daily ASA 81 mg for CV prevention  Continue current medication regimen

## 2023-11-08 ENCOUNTER — TELEPHONE (OUTPATIENT)
Dept: INTERNAL MEDICINE | Facility: CLINIC | Age: 73
End: 2023-11-08
Payer: MEDICARE

## 2023-11-08 NOTE — TELEPHONE ENCOUNTER
Please let pt know PSA level increased to 8.02. Pt to follow up with [facility] clinic regarding further treatment/ evaluation. Please provide him with contact information to their clinic.     Thank you

## 2023-11-09 ENCOUNTER — HOSPITAL ENCOUNTER (OUTPATIENT)
Facility: HOSPITAL | Age: 73
Discharge: HOME | End: 2023-11-09
Attending: INTERNAL MEDICINE | Admitting: INTERNAL MEDICINE
Payer: MEDICARE

## 2023-11-09 VITALS
SYSTOLIC BLOOD PRESSURE: 172 MMHG | TEMPERATURE: 98 F | OXYGEN SATURATION: 98 % | HEIGHT: 72 IN | WEIGHT: 197.75 LBS | HEART RATE: 69 BPM | RESPIRATION RATE: 21 BRPM | DIASTOLIC BLOOD PRESSURE: 82 MMHG | BODY MASS INDEX: 26.78 KG/M2

## 2023-11-09 DIAGNOSIS — I49.9 ARRHYTHMIA: ICD-10-CM

## 2023-11-09 DIAGNOSIS — I25.10 CAD (CORONARY ARTERY DISEASE): ICD-10-CM

## 2023-11-09 DIAGNOSIS — I44.1 MOBITZ TYPE II ATRIOVENTRICULAR BLOCK: ICD-10-CM

## 2023-11-09 PROBLEM — I44.30 AV BLOCK: Status: ACTIVE | Noted: 2023-11-09

## 2023-11-09 LAB — POCT GLUCOSE: 102 MG/DL (ref 70–110)

## 2023-11-09 PROCEDURE — 99152 MOD SED SAME PHYS/QHP 5/>YRS: CPT | Performed by: INTERNAL MEDICINE

## 2023-11-09 PROCEDURE — C1898 LEAD, PMKR, OTHER THAN TRANS: HCPCS | Performed by: INTERNAL MEDICINE

## 2023-11-09 PROCEDURE — C1785 PMKR, DUAL, RATE-RESP: HCPCS | Performed by: INTERNAL MEDICINE

## 2023-11-09 PROCEDURE — 25500020 PHARM REV CODE 255: Performed by: INTERNAL MEDICINE

## 2023-11-09 PROCEDURE — 99153 MOD SED SAME PHYS/QHP EA: CPT | Performed by: INTERNAL MEDICINE

## 2023-11-09 PROCEDURE — 25000003 PHARM REV CODE 250: Performed by: INTERNAL MEDICINE

## 2023-11-09 PROCEDURE — 63600175 PHARM REV CODE 636 W HCPCS: Performed by: INTERNAL MEDICINE

## 2023-11-09 PROCEDURE — 93005 ELECTROCARDIOGRAM TRACING: CPT

## 2023-11-09 PROCEDURE — 33208 INSRT HEART PM ATRIAL & VENT: CPT | Mod: KX | Performed by: INTERNAL MEDICINE

## 2023-11-09 DEVICE — PACING LEAD
Type: IMPLANTABLE DEVICE | Site: OTHER (ADD COMMENT) | Status: FUNCTIONAL
Brand: TENDRIL™

## 2023-11-09 DEVICE — PULSE GENERATOR
Type: IMPLANTABLE DEVICE | Site: CHEST  WALL | Status: FUNCTIONAL
Brand: ASSURITY MRI™

## 2023-11-09 RX ORDER — SODIUM CHLORIDE 9 MG/ML
INJECTION, SOLUTION INTRAVENOUS ONCE
Status: ACTIVE | OUTPATIENT
Start: 2023-11-09

## 2023-11-09 RX ORDER — MIDAZOLAM HYDROCHLORIDE 1 MG/ML
INJECTION INTRAMUSCULAR; INTRAVENOUS
Status: DISCONTINUED | OUTPATIENT
Start: 2023-11-09 | End: 2023-11-09 | Stop reason: HOSPADM

## 2023-11-09 RX ORDER — CEFAZOLIN SODIUM 1 G/3ML
INJECTION, POWDER, FOR SOLUTION INTRAMUSCULAR; INTRAVENOUS
Status: DISCONTINUED | OUTPATIENT
Start: 2023-11-09 | End: 2023-11-09 | Stop reason: HOSPADM

## 2023-11-09 RX ORDER — LIDOCAINE HYDROCHLORIDE 10 MG/ML
INJECTION INFILTRATION; PERINEURAL
Status: DISCONTINUED | OUTPATIENT
Start: 2023-11-09 | End: 2023-11-09 | Stop reason: HOSPADM

## 2023-11-09 RX ORDER — HYDROCODONE BITARTRATE AND ACETAMINOPHEN 5; 325 MG/1; MG/1
1 TABLET ORAL EVERY 4 HOURS PRN
Status: DISCONTINUED | OUTPATIENT
Start: 2023-11-09 | End: 2023-11-09 | Stop reason: HOSPADM

## 2023-11-09 RX ORDER — FENTANYL CITRATE 50 UG/ML
INJECTION, SOLUTION INTRAMUSCULAR; INTRAVENOUS
Status: DISCONTINUED | OUTPATIENT
Start: 2023-11-09 | End: 2023-11-09 | Stop reason: HOSPADM

## 2023-11-09 RX ORDER — ACETAMINOPHEN 325 MG/1
650 TABLET ORAL EVERY 4 HOURS PRN
Status: DISCONTINUED | OUTPATIENT
Start: 2023-11-09 | End: 2023-11-09 | Stop reason: HOSPADM

## 2023-11-09 RX ORDER — CEFAZOLIN SODIUM 2 G/50ML
2 SOLUTION INTRAVENOUS ONCE
Status: DISCONTINUED | OUTPATIENT
Start: 2023-11-09 | End: 2023-11-09 | Stop reason: HOSPADM

## 2023-11-09 NOTE — Clinical Note
The chest and neck was prepped. The site was prepped with ChloraPrep. The site was clipped. The patient was draped.

## 2023-11-09 NOTE — DISCHARGE SUMMARY
[facility]  Discharge Note  Short Stay    Procedure(s) (LRB):  INSERTION, CARDIAC PACEMAKER, DUAL CHAMBER (N/A)      OUTCOME: Patient tolerated treatment/procedure well without complication and is now ready for discharge.    DISPOSITION: Home or Self Care    FINAL DIAGNOSIS:  AV block    FOLLOWUP: In clinic    DISCHARGE INSTRUCTIONS:  No discharge procedures on file.     TIME SPENT ON DISCHARGE: 15 minutes

## 2023-11-09 NOTE — Clinical Note
A venogram was performed in the left subclavian vein. The vessel was injected via hand injection  with 25 mL of contrast.

## 2023-11-10 NOTE — TELEPHONE ENCOUNTER
Pt notified of PSA level increase ti 8.02, pt has f/u appt on 12/11 with urology and states he will contact them to follow up. Pt verbalized understanding and will call with any questions or concerns.

## 2023-12-01 ENCOUNTER — DOCUMENTATION ONLY (OUTPATIENT)
Dept: INTERNAL MEDICINE | Facility: CLINIC | Age: 73
End: 2023-12-01
Payer: MEDICARE

## 2023-12-11 ENCOUNTER — OFFICE VISIT (OUTPATIENT)
Dept: UROLOGY | Facility: CLINIC | Age: 73
End: 2023-12-11
Payer: MEDICARE

## 2023-12-11 VITALS
SYSTOLIC BLOOD PRESSURE: 144 MMHG | RESPIRATION RATE: 20 BRPM | WEIGHT: 200 LBS | BODY MASS INDEX: 27.09 KG/M2 | DIASTOLIC BLOOD PRESSURE: 83 MMHG | HEART RATE: 66 BPM | OXYGEN SATURATION: 100 % | HEIGHT: 72 IN | TEMPERATURE: 98 F

## 2023-12-11 DIAGNOSIS — R97.20 ELEVATED PSA: Primary | ICD-10-CM

## 2023-12-11 LAB
BILIRUB SERPL-MCNC: NORMAL MG/DL
BLOOD URINE, POC: NORMAL
COLOR, POC UA: YELLOW
GLUCOSE UR QL STRIP: 500
KETONES UR QL STRIP: NORMAL
LEUKOCYTE ESTERASE URINE, POC: NORMAL
NITRITE, POC UA: NORMAL
PH, POC UA: 6
PROTEIN, POC: 100
SPECIFIC GRAVITY, POC UA: 1.02
UROBILINOGEN, POC UA: 2

## 2023-12-11 PROCEDURE — 81001 URINALYSIS AUTO W/SCOPE: CPT | Mod: PBBFAC | Performed by: UROLOGY

## 2023-12-11 PROCEDURE — 4010F ACE/ARB THERAPY RXD/TAKEN: CPT | Mod: CPTII,,, | Performed by: UROLOGY

## 2023-12-11 PROCEDURE — 3008F PR BODY MASS INDEX (BMI) DOCUMENTED: ICD-10-PCS | Mod: CPTII,,, | Performed by: UROLOGY

## 2023-12-11 PROCEDURE — 3066F PR DOCUMENTATION OF TREATMENT FOR NEPHROPATHY: ICD-10-PCS | Mod: CPTII,,, | Performed by: UROLOGY

## 2023-12-11 PROCEDURE — 3060F POS MICROALBUMINURIA REV: CPT | Mod: CPTII,,, | Performed by: UROLOGY

## 2023-12-11 PROCEDURE — 3077F PR MOST RECENT SYSTOLIC BLOOD PRESSURE >= 140 MM HG: ICD-10-PCS | Mod: CPTII,,, | Performed by: UROLOGY

## 2023-12-11 PROCEDURE — 1101F PT FALLS ASSESS-DOCD LE1/YR: CPT | Mod: CPTII,,, | Performed by: UROLOGY

## 2023-12-11 PROCEDURE — 3008F BODY MASS INDEX DOCD: CPT | Mod: CPTII,,, | Performed by: UROLOGY

## 2023-12-11 PROCEDURE — 99215 OFFICE O/P EST HI 40 MIN: CPT | Mod: PBBFAC | Performed by: UROLOGY

## 2023-12-11 PROCEDURE — 3044F HG A1C LEVEL LT 7.0%: CPT | Mod: CPTII,,, | Performed by: UROLOGY

## 2023-12-11 PROCEDURE — 3044F PR MOST RECENT HEMOGLOBIN A1C LEVEL <7.0%: ICD-10-PCS | Mod: CPTII,,, | Performed by: UROLOGY

## 2023-12-11 PROCEDURE — 1159F PR MEDICATION LIST DOCUMENTED IN MEDICAL RECORD: ICD-10-PCS | Mod: CPTII,,, | Performed by: UROLOGY

## 2023-12-11 PROCEDURE — 4010F PR ACE/ARB THEARPY RXD/TAKEN: ICD-10-PCS | Mod: CPTII,,, | Performed by: UROLOGY

## 2023-12-11 PROCEDURE — 3288F PR FALLS RISK ASSESSMENT DOCUMENTED: ICD-10-PCS | Mod: CPTII,,, | Performed by: UROLOGY

## 2023-12-11 PROCEDURE — 1160F RVW MEDS BY RX/DR IN RCRD: CPT | Mod: CPTII,,, | Performed by: UROLOGY

## 2023-12-11 PROCEDURE — 3079F PR MOST RECENT DIASTOLIC BLOOD PRESSURE 80-89 MM HG: ICD-10-PCS | Mod: CPTII,,, | Performed by: UROLOGY

## 2023-12-11 PROCEDURE — 1126F AMNT PAIN NOTED NONE PRSNT: CPT | Mod: CPTII,,, | Performed by: UROLOGY

## 2023-12-11 PROCEDURE — 3066F NEPHROPATHY DOC TX: CPT | Mod: CPTII,,, | Performed by: UROLOGY

## 2023-12-11 PROCEDURE — 99204 PR OFFICE/OUTPT VISIT, NEW, LEVL IV, 45-59 MIN: ICD-10-PCS | Mod: S$PBB,,, | Performed by: UROLOGY

## 2023-12-11 PROCEDURE — 1160F PR REVIEW ALL MEDS BY PRESCRIBER/CLIN PHARMACIST DOCUMENTED: ICD-10-PCS | Mod: CPTII,,, | Performed by: UROLOGY

## 2023-12-11 PROCEDURE — 3079F DIAST BP 80-89 MM HG: CPT | Mod: CPTII,,, | Performed by: UROLOGY

## 2023-12-11 PROCEDURE — 3060F PR POS MICROALBUMINURIA RESULT DOCUMENTED/REVIEW: ICD-10-PCS | Mod: CPTII,,, | Performed by: UROLOGY

## 2023-12-11 PROCEDURE — 3288F FALL RISK ASSESSMENT DOCD: CPT | Mod: CPTII,,, | Performed by: UROLOGY

## 2023-12-11 PROCEDURE — 99204 OFFICE O/P NEW MOD 45 MIN: CPT | Mod: S$PBB,,, | Performed by: UROLOGY

## 2023-12-11 PROCEDURE — 1101F PR PT FALLS ASSESS DOC 0-1 FALLS W/OUT INJ PAST YR: ICD-10-PCS | Mod: CPTII,,, | Performed by: UROLOGY

## 2023-12-11 PROCEDURE — 3077F SYST BP >= 140 MM HG: CPT | Mod: CPTII,,, | Performed by: UROLOGY

## 2023-12-11 PROCEDURE — 1159F MED LIST DOCD IN RCRD: CPT | Mod: CPTII,,, | Performed by: UROLOGY

## 2023-12-11 PROCEDURE — 1126F PR PAIN SEVERITY QUANTIFIED, NO PAIN PRESENT: ICD-10-PCS | Mod: CPTII,,, | Performed by: UROLOGY

## 2023-12-11 NOTE — PROGRESS NOTES
CC:  Elevated PSA    HPI:  Rogelio Johnson is a 73 y.o. male being seen in consultation for elevated PSA.  He was found to have an elevated PSA last year of 5.13 and was referred here but did not come to his appointment.  A repeat PSA was done in November of 2023 and that returned even higher at 8.02.  He denies any family history of prostate cancer.  He has no urinary complaints.  He is on finasteride and has been on that for quite a while.    Urinalysis:  Results for orders placed or performed in visit on 12/11/23   POCT URINE DIPSTICK WITH MICROSCOPE, AUTOMATED   Result Value Ref Range    Color, UA Yellow     Spec Grav UA 1.025     pH, UA 6.0     WBC, UA neg     Nitrite, UA neg     Protein,      Glucose,      Ketones, UA neg     Urobilinogen, UA 2.0     Bilirubin, POC neg     Blood, UA neg      Microscopic:   Negative      Lab Results:  PSA History:    01/16/18 09:12 04/03/19 10:25 12/30/20 10:13 01/12/22 08:40 11/07/23 10:47   1.2 1.6 2.57 5.13 (H) 8.02 (H)     Data Review:  Note from referring provider, Yolanda Mendoza NP dated 7 November 2023.  PSA..     ROS:  All systems reviewed and are negative except as documented in HPI and/or Assessment and Plan.     Patient Active Problem List:     Patient Active Problem List   Diagnosis    Type 2 diabetes mellitus without complication, with long-term current use of insulin    Erectile dysfunction    Adenomatous polyp of ascending colon    Arteriosclerosis of coronary artery    Atrioventricular block    Benign prostatic hyperplasia    Chronic low back pain    Dyslipidemia    Hypertension    Polyp of colon    Normocytic normochromic anemia    Obstructive sleep apnea syndrome    Stress at home    Mobitz type 1 second degree AV block    Elevated PSA    AV block        Past Medical History:  Past Medical History:   Diagnosis Date    Coronary artery disease     Hyperlipidemia     Hypertension     Personal history of colonic polyps     Sleep apnea, unspecified          Past Surgical History:  Past Surgical History:   Procedure Laterality Date    A-V CARDIAC PACEMAKER INSERTION N/A 11/9/2023    Procedure: INSERTION, CARDIAC PACEMAKER, DUAL CHAMBER;  Surgeon: Baljit Sánchez MD;  Location: [facility] CATH LAB;  Service: Cardiology;  Laterality: N/A;  DUAL CHAMBER PPM (SJM)    COLONOSCOPY      CORONARY ARTERY BYPASS GRAFT (CABG)          Family History:  Family History   Problem Relation Age of Onset    Colon cancer Mother     Diabetes Mellitus Mother     Hypertension Mother     Cataracts Father     Heart attack Father     Heart disease Father         Social History:  Social History     Socioeconomic History    Marital status:    Tobacco Use    Smoking status: Never     Passive exposure: Never    Smokeless tobacco: Never   Substance and Sexual Activity    Alcohol use: Not Currently    Drug use: Never    Sexual activity: Yes     Partners: Male     Social Determinants of Health     Physical Activity: Sufficiently Active (5/3/2023)    Exercise Vital Sign     Days of Exercise per Week: 5 days     Minutes of Exercise per Session: 30 min        Allergies:  Review of patient's allergies indicates:  No Known Allergies     Objective:  Vitals:    12/11/23 1511   BP: (!) 144/83   Pulse: 66   Resp: 20   Temp: 97.9 °F (36.6 °C)     General:  Well developed, well nourished adult male in no acute distress  Abdomen: Soft, nontender, no masses  Extremities:  No clubbing, cyanosis, or edema  Neurologic:  Grossly intact  Musculoskeletal:  Normal tone  Penis:  Circumcised, no lesions  Scrotum:  No hydrocele  Testicles:  Nontender, no masses  Epididymis:  Normal without masses  Prostate exam:  Deferred until the prostate biopsy.    Assessment:  1. Elevated PSA  - Ambulatory referral/consult to Urology  - POCT URINE DIPSTICK WITH MICROSCOPE, AUTOMATED     Plan:  He needs a prostate biopsy with the rising PSA.  Get a cardiac clearance from Dr. Baljit Sánchez.  The patient just had a pacemaker placed.  He  was instructed to stop his aspirin for one week prior to the biopsy.    Follow-up:  For prostate biopsy.

## 2023-12-11 NOTE — PROGRESS NOTES
Placed in room. Seen by dr. Tipton. Spoke with patient. Will need a cardiac clearance before a prostate biopsy.

## 2023-12-12 ENCOUNTER — TELEPHONE (OUTPATIENT)
Dept: ENDOSCOPY | Facility: HOSPITAL | Age: 73
End: 2023-12-12
Payer: MEDICARE

## 2023-12-18 DIAGNOSIS — R82.90 ABNORMAL URINALYSIS: Primary | ICD-10-CM

## 2023-12-19 ENCOUNTER — TELEPHONE (OUTPATIENT)
Dept: ENDOSCOPY | Facility: HOSPITAL | Age: 73
End: 2023-12-19
Payer: MEDICARE

## 2023-12-26 ENCOUNTER — LAB VISIT (OUTPATIENT)
Dept: LAB | Facility: HOSPITAL | Age: 73
End: 2023-12-26
Attending: NURSE PRACTITIONER
Payer: MEDICARE

## 2023-12-26 DIAGNOSIS — R82.90 ABNORMAL URINALYSIS: ICD-10-CM

## 2023-12-26 PROCEDURE — 87086 URINE CULTURE/COLONY COUNT: CPT

## 2023-12-28 LAB — BACTERIA UR CULT: NO GROWTH

## 2023-12-29 ENCOUNTER — ANESTHESIA EVENT (OUTPATIENT)
Dept: ENDOSCOPY | Facility: HOSPITAL | Age: 73
End: 2023-12-29
Payer: MEDICARE

## 2023-12-29 NOTE — ANESTHESIA PREPROCEDURE EVALUATION
12/29/2023  Rogelio Johnson is a 73 y.o., male with PMHx of CAD/CABG, type 2 AV block/pacemaker, HTN, HLD, TRIP, DM presents for prostate biopsy secondary to elevated PSA.    NO BETA BLOCKER USE    Active Ambulatory Problems     Diagnosis Date Noted    Type 2 diabetes mellitus without complication, with long-term current use of insulin 10/10/2022    Erectile dysfunction 10/10/2022    Adenomatous polyp of ascending colon 10/20/2021    Arteriosclerosis of coronary artery 10/10/2022    Atrioventricular block 10/10/2022    Benign prostatic hyperplasia 10/10/2022    Chronic low back pain 10/10/2022    Dyslipidemia 10/10/2022    Hypertension 10/10/2022    Polyp of colon 10/10/2022    Normocytic normochromic anemia 10/10/2022    Obstructive sleep apnea syndrome 10/10/2022    Stress at home 05/03/2023    Mobitz type 1 second degree AV block 10/15/2023    Elevated PSA 11/07/2023    AV block 11/09/2023     Resolved Ambulatory Problems     Diagnosis Date Noted    Diabetes mellitus 10/10/2022    Symptomatic bradycardia 10/15/2023     Past Medical History:   Diagnosis Date    Coronary artery disease     Hyperlipidemia     Personal history of colonic polyps     Sleep apnea, unspecified        Pre-op Assessment    I have reviewed the NPO Status.      Review of Systems  Anesthesia Hx:  No problems with previous Anesthesia                Social:  Non-Smoker       Cardiovascular:    Pacemaker Hypertension, well controlled   CAD  asymptomatic CABG/stent        hyperlipidemia                             Pulmonary:        Sleep Apnea                Renal/:  Renal/ Normal                 Hepatic/GI:  Hepatic/GI Normal                 Neurological:  Neurology Normal                                      Endocrine:  Diabetes, well controlled, type 2             Vitals:    01/02/24 0730   BP: (!) 143/73   BP Location: Left arm    Patient Position: Lying   Pulse: 68   Resp: 15   Temp: 36.4 °C (97.6 °F)   TempSrc: Oral   SpO2: 98%   Weight: 93.9 kg (207 lb)   Height: 6' (1.829 m)         Physical Exam  General: Cooperative, Well nourished and Alert    Airway:  Mallampati: II   Mouth Opening: Normal  TM Distance: Normal  Tongue: Normal  Neck ROM: Normal ROM    Dental:  Intact    Chest/Lungs:  Clear to auscultation, Normal Respiratory Rate    Heart:  Rate: Normal  Rhythm: Regular Rhythm  Sounds: Normal       Latest Reference Range & Units 01/02/24 07:26   POCT Glucose 70 - 110 mg/dL 140 (H)   (H): Data is abnormally high    Lab Results   Component Value Date    WBC 6.67 11/06/2023    HGB 12.5 (L) 11/06/2023    HCT 37.1 (L) 11/06/2023    MCV 95.1 (H) 11/06/2023     11/06/2023       CMP  Sodium   Date Value Ref Range Status   12/31/2021 137 136 - 145 mmol/L Final     Sodium Level   Date Value Ref Range Status   11/06/2023 143 136 - 145 mmol/L Final     Potassium   Date Value Ref Range Status   12/31/2021 3.7 3.5 - 5.1 mmol/L Final     Potassium Level   Date Value Ref Range Status   11/06/2023 3.5 3.5 - 5.1 mmol/L Final     Chloride   Date Value Ref Range Status   12/31/2021 110 (H) 100 - 109 mmol/L Final     Carbon Dioxide   Date Value Ref Range Status   11/06/2023 23 23 - 31 mmol/L Final   12/31/2021 20 (L) 22 - 33 mmol/L Final     Blood Urea Nitrogen   Date Value Ref Range Status   11/06/2023 10.1 8.4 - 25.7 mg/dL Final   12/31/2021 7 5 - 25 mg/dL Final     Creatinine   Date Value Ref Range Status   11/06/2023 0.93 0.73 - 1.18 mg/dL Final   12/31/2021 0.75 0.57 - 1.25 mg/dL Final     Calcium   Date Value Ref Range Status   12/31/2021 8.0 (L) 8.8 - 10.6 mg/dL Final     Calcium Level Total   Date Value Ref Range Status   11/06/2023 9.1 8.8 - 10.0 mg/dL Final     Albumin Level   Date Value Ref Range Status   11/06/2023 3.6 3.4 - 4.8 g/dL Final     Bilirubin Total   Date Value Ref Range Status   11/06/2023 0.4 <=1.5 mg/dL Final     Alkaline  Phosphatase   Date Value Ref Range Status   11/06/2023 91 40 - 150 unit/L Final     Aspartate Aminotransferase   Date Value Ref Range Status   11/06/2023 16 5 - 34 unit/L Final     Alanine Aminotransferase   Date Value Ref Range Status   11/06/2023 19 0 - 55 unit/L Final     Anion Gap   Date Value Ref Range Status   12/31/2021 7 (L) 8 - 16 mmol/L Final     eGFR   Date Value Ref Range Status   11/06/2023 >60 mls/min/1.73/m2 Final     Lab Results   Component Value Date    HGBA1C 6.0 11/06/2023                   Anesthesia Plan  Type of Anesthesia, risks & benefits discussed:    Anesthesia Type: Gen Natural Airway  Intra-op Monitoring Plan: Standard ASA Monitors  Post Op Pain Control Plan: IV/PO Opioids PRN  Induction:  IV  Informed Consent: Informed consent signed with the Patient and all parties understand the risks and agree with anesthesia plan.  All questions answered.   ASA Score: 3  Day of Surgery Review of History & Physical: H&P Update referred to the surgeon/provider.    Ready For Surgery From Anesthesia Perspective.     .

## 2024-01-02 ENCOUNTER — ANESTHESIA (OUTPATIENT)
Dept: ENDOSCOPY | Facility: HOSPITAL | Age: 74
End: 2024-01-02
Payer: MEDICARE

## 2024-01-02 ENCOUNTER — HOSPITAL ENCOUNTER (OUTPATIENT)
Facility: HOSPITAL | Age: 74
Discharge: HOME | End: 2024-01-02
Attending: UROLOGY | Admitting: UROLOGY
Payer: MEDICARE

## 2024-01-02 VITALS
BODY MASS INDEX: 28.04 KG/M2 | TEMPERATURE: 98 F | HEIGHT: 72 IN | HEART RATE: 69 BPM | DIASTOLIC BLOOD PRESSURE: 69 MMHG | OXYGEN SATURATION: 100 % | SYSTOLIC BLOOD PRESSURE: 145 MMHG | WEIGHT: 207 LBS | RESPIRATION RATE: 15 BRPM

## 2024-01-02 DIAGNOSIS — R97.20 ELEVATED PSA: ICD-10-CM

## 2024-01-02 LAB — POCT GLUCOSE: 140 MG/DL (ref 70–110)

## 2024-01-02 PROCEDURE — 63600175 PHARM REV CODE 636 W HCPCS: Performed by: NURSE ANESTHETIST, CERTIFIED REGISTERED

## 2024-01-02 PROCEDURE — 76872 US TRANSRECTAL: CPT | Mod: 26,,, | Performed by: UROLOGY

## 2024-01-02 PROCEDURE — 63600175 PHARM REV CODE 636 W HCPCS: Performed by: ANESTHESIOLOGY

## 2024-01-02 PROCEDURE — D9220A PRA ANESTHESIA: Mod: ,,, | Performed by: NURSE ANESTHETIST, CERTIFIED REGISTERED

## 2024-01-02 PROCEDURE — 88344 IMHCHEM/IMCYTCHM EA MLT ANTB: CPT | Mod: TC | Performed by: UROLOGY

## 2024-01-02 PROCEDURE — 55700 HC BIOPSY OF PROSTATE - NEEDLE OR PUNCH: CPT | Performed by: UROLOGY

## 2024-01-02 PROCEDURE — 37000009 HC ANESTHESIA EA ADD 15 MINS: Performed by: UROLOGY

## 2024-01-02 PROCEDURE — 55700 PR BIOPSY OF PROSTATE,NEEDLE/PUNCH: CPT | Mod: ,,, | Performed by: UROLOGY

## 2024-01-02 PROCEDURE — 25000003 PHARM REV CODE 250: Performed by: UROLOGY

## 2024-01-02 PROCEDURE — 25000003 PHARM REV CODE 250: Performed by: NURSE ANESTHETIST, CERTIFIED REGISTERED

## 2024-01-02 PROCEDURE — 37000008 HC ANESTHESIA 1ST 15 MINUTES: Performed by: UROLOGY

## 2024-01-02 RX ORDER — PROPOFOL 10 MG/ML
VIAL (ML) INTRAVENOUS
Status: DISCONTINUED | OUTPATIENT
Start: 2024-01-02 | End: 2024-01-02

## 2024-01-02 RX ORDER — CIPROFLOXACIN 500 MG/1
500 TABLET ORAL 2 TIMES DAILY
Qty: 6 TABLET | Refills: 0 | Status: SHIPPED | OUTPATIENT
Start: 2024-01-02 | End: 2024-01-17

## 2024-01-02 RX ORDER — CEFTRIAXONE 1 G/1
1 INJECTION, POWDER, FOR SOLUTION INTRAMUSCULAR; INTRAVENOUS
Status: DISCONTINUED | OUTPATIENT
Start: 2024-01-02 | End: 2024-01-02 | Stop reason: HOSPADM

## 2024-01-02 RX ORDER — SODIUM CHLORIDE, SODIUM LACTATE, POTASSIUM CHLORIDE, CALCIUM CHLORIDE 600; 310; 30; 20 MG/100ML; MG/100ML; MG/100ML; MG/100ML
INJECTION, SOLUTION INTRAVENOUS CONTINUOUS
Status: DISCONTINUED | OUTPATIENT
Start: 2024-01-02 | End: 2024-01-02 | Stop reason: HOSPADM

## 2024-01-02 RX ORDER — CIPROFLOXACIN 500 MG/1
500 TABLET ORAL EVERY 12 HOURS
Status: DISCONTINUED | OUTPATIENT
Start: 2024-01-02 | End: 2024-01-02 | Stop reason: HOSPADM

## 2024-01-02 RX ORDER — LIDOCAINE HYDROCHLORIDE 20 MG/ML
INJECTION INTRAVENOUS
Status: DISCONTINUED | OUTPATIENT
Start: 2024-01-02 | End: 2024-01-02

## 2024-01-02 RX ORDER — LIDOCAINE HYDROCHLORIDE 10 MG/ML
1 INJECTION, SOLUTION EPIDURAL; INFILTRATION; INTRACAUDAL; PERINEURAL ONCE
Status: DISCONTINUED | OUTPATIENT
Start: 2024-01-02 | End: 2024-01-02 | Stop reason: HOSPADM

## 2024-01-02 RX ADMIN — PROPOFOL 40 MG: 10 INJECTION, EMULSION INTRAVENOUS at 09:01

## 2024-01-02 RX ADMIN — CIPROFLOXACIN 500 MG: 500 TABLET ORAL at 08:01

## 2024-01-02 RX ADMIN — PROPOFOL 50 MG: 10 INJECTION, EMULSION INTRAVENOUS at 09:01

## 2024-01-02 RX ADMIN — SODIUM CHLORIDE, POTASSIUM CHLORIDE, SODIUM LACTATE AND CALCIUM CHLORIDE: 600; 310; 30; 20 INJECTION, SOLUTION INTRAVENOUS at 08:01

## 2024-01-02 RX ADMIN — LIDOCAINE HYDROCHLORIDE 50 MG: 20 INJECTION INTRAVENOUS at 09:01

## 2024-01-02 RX ADMIN — PROPOFOL 30 MG: 10 INJECTION, EMULSION INTRAVENOUS at 09:01

## 2024-01-02 RX ADMIN — SODIUM CHLORIDE, POTASSIUM CHLORIDE, SODIUM LACTATE AND CALCIUM CHLORIDE: 600; 310; 30; 20 INJECTION, SOLUTION INTRAVENOUS at 09:01

## 2024-01-02 RX ADMIN — PROPOFOL 20 MG: 10 INJECTION, EMULSION INTRAVENOUS at 09:01

## 2024-01-02 NOTE — OP NOTE
UROLOGY OPERATIVE NOTE      Date of Procedure: 2 January 2023    Procedure:  Prostate Biopsy with ultrasound guidance, rectal approach    Surgeon:  Miracle Pierre D.O.    Assisting Surgeon: None    Pre-Operative Diagnosis: Elevated PSA    Post-Operative Diagnosis: Elevated PSA    Anesthesia: MAC    Operative Findings: 27 cc gland.  No suspicious lesions.    Complications:  None    EBL:  <5 ml    Description of Procedures:    He was taken to the endoscopy suite and underwent satisfactory anesthesia.  He was placed with the right flank up and hips flexed up.  Eight megahertz ultrasound probe was inserted into his rectal vault.  There were no suspicious hyperechoic or hypoechoic areas seen.  He underwent sextant biopsies in standard fashion.  Two in each base, two in each mid, and two in each apex.  He tolerated the procedure well with minimal bleeding.    He was taken back to recovery room stable condition.  He will be seen in the central clinic Urology in one week.  He is will be given a prescription for Cipro for the next 3 days.  He was instructed should he have temperature over 101 he is to report back to the emergency room for IV antibiotic therapy.  He was given a post biopsy instruction sheet for items to call should he have problems.      Specimens:   Specimen (24h ago, onward)                 Start     Ordered    05/31/22 0949  Specimen to Pathology  RELEASE UPON ORDERING        References:    Click here for ordering Quick Tip   Question:  Release to patient  Answer:  Immediate    05/31/22 0949

## 2024-01-02 NOTE — TRANSFER OF CARE
Anesthesia Transfer of Care Note    Patient: Rogelio Johnson    Procedure(s) Performed: Procedure(s) (LRB):  BIOPSY, PROSTATE, RECTAL APPROACH, WITH US GUIDANCE (Bilateral)    Patient location: GI    Anesthesia Type: general    Post pain: adequate analgesia    Post assessment: no apparent anesthetic complications    Post vital signs: stable    Level of consciousness: awake    Nausea/Vomiting: no nausea/vomiting    Complications: none    Transfer of care protocol was followed      Last vitals:

## 2024-01-02 NOTE — H&P
CC:  Elevated PSA    HPI:  Rogelio Johnson is a 73 y.o. male here today for a prostate biopsy for elevated PSA.    He was found to have an elevated PSA last year of 5.13 and was referred here but did not come to his appointment.  A repeat PSA was done in November of 2023 and that returned even higher at 8.02.  He denies any family history of prostate cancer.  He is on finasteride.      ROS:  All systems reviewed and are negative except as documented in HPI and/or Assessment and Plan.     Patient Active Problem List:     Patient Active Problem List   Diagnosis    Type 2 diabetes mellitus without complication, with long-term current use of insulin    Erectile dysfunction    Adenomatous polyp of ascending colon    Arteriosclerosis of coronary artery    Atrioventricular block    Benign prostatic hyperplasia    Chronic low back pain    Dyslipidemia    Hypertension    Polyp of colon    Normocytic normochromic anemia    Obstructive sleep apnea syndrome    Stress at home    Mobitz type 1 second degree AV block    Elevated PSA    AV block        Past Medical History:    Past Medical History:   Diagnosis Date    Coronary artery disease     Hyperlipidemia     Hypertension     Personal history of colonic polyps     Sleep apnea, unspecified           Past Surgical History:    Past Surgical History:   Procedure Laterality Date    A-V CARDIAC PACEMAKER INSERTION N/A 11/9/2023    Procedure: INSERTION, CARDIAC PACEMAKER, DUAL CHAMBER;  Surgeon: Baljit Sánchez MD;  Location: [facility] CATH LAB;  Service: Cardiology;  Laterality: N/A;  DUAL CHAMBER PPM (SJM)    COLONOSCOPY      CORONARY ARTERY BYPASS GRAFT (CABG)          Family History:    Family History   Problem Relation Age of Onset    Colon cancer Mother     Diabetes Mellitus Mother     Hypertension Mother     Cataracts Father     Heart attack Father     Heart disease Father         Social History:  Social History     Socioeconomic History    Marital status:    Tobacco Use     Smoking status: Never     Passive exposure: Never    Smokeless tobacco: Never   Substance and Sexual Activity    Alcohol use: Not Currently    Drug use: Never    Sexual activity: Yes     Partners: Male     Social Determinants of Health     Physical Activity: Sufficiently Active (5/3/2023)    Exercise Vital Sign     Days of Exercise per Week: 5 days     Minutes of Exercise per Session: 30 min        Medications:  Current Outpatient Medications   Medication Instructions    amLODIPine (NORVASC) 5 mg, Oral, Daily    aspirin (ECOTRIN) 81 mg, Oral, Daily    atorvastatin (LIPITOR) 40 mg, Oral, Nightly    docusate sodium (COLACE) 100 mg, Oral    empagliflozin (JARDIANCE) 10 mg, Oral, Every morning    ezetimibe (ZETIA) 10 mg, Oral, Daily    ferrous sulfate (FEROSUL) 325 mg, Oral, Daily    finasteride (PROSCAR) 5 mg, Oral, Daily    fluticasone propionate (FLONASE) 100 mcg, Each Nostril, Daily    hydrALAZINE (APRESOLINE) 50 mg, Oral, Every 8 hours    LEVEMIR FLEXTOUCH U100 INSULIN 100 unit/mL (3 mL) InPn pen Administer 68 units SC q AM and 62 units SC q HS. Rotate injection sites.    losartan (COZAAR) 100 mg, Oral, Daily    metFORMIN (GLUCOPHAGE) 1,000 mg, Oral, 2 times daily with meals        Allergies:  Review of patient's allergies indicates:  No Known Allergies     Objective:  Vitals:    01/02/24 0730   BP: (!) 143/73   Pulse: 68   Resp: 15   Temp: 97.6 °F (36.4 °C)       General:  Well developed, well nourished adult male in no acute distress  Abdomen: Soft, nontender, no masses  Extremities:  No clubbing, cyanosis, or edema  Neurologic:  Grossly intact  Musculoskeletal:  Normal tone  Penis:  Circumcised, no lesions  Testicles:  Nontender, no masses  Epididymis:  Normal without masses  Prostate exam:  Left more firm than the right.    Rectal:  Normal rectal tone      Lab Results:  Urine culture - 26 December 2023:  No growth    Assessment:  Elevated PSA     Plan:  Transrectal ultrasound and prostate biopsy.

## 2024-01-02 NOTE — ANESTHESIA POSTPROCEDURE EVALUATION
Anesthesia Post Evaluation    Patient: Rogelio Johnson    Procedure(s) Performed: Procedure(s) (LRB):  BIOPSY, PROSTATE, RECTAL APPROACH, WITH US GUIDANCE (Bilateral)    Final Anesthesia Type: general      Patient location during evaluation: GI PACU  Patient participation: Yes- Able to Participate  Level of consciousness: awake and alert  Pain management: adequate  Airway patency: patent      Anesthetic complications: no      Cardiovascular status: hemodynamically stable  Respiratory status: unassisted, room air and spontaneous ventilation  Hydration status: euvolemic  Follow-up not needed.              No case tracking events are documented in the log.      Pain/Cathi Score: No data recorded

## 2024-01-02 NOTE — DISCHARGE SUMMARY
[facility] - Prostate Biopsy  Discharge Note      Procedure(s) (LRB):  BIOPSY, PROSTATE, RECTAL APPROACH, WITH US GUIDANCE (Bilateral)    OUTCOME: Patient tolerated treatment/procedure well without complication and is now ready for discharge.    DISPOSITION: Home or Self Care    FINAL DIAGNOSIS:  Elevated PSA    FOLLOWUP: In clinic in one week    DISCHARGE INSTRUCTIONS:  No discharge procedures on file.      Clinical Reference Documents Added to Patient Instructions         Document    PROSTATE BIOPSY DISCHARGE INSTRUCTIONS (ENGLISH)            TIME SPENT ON DISCHARGE: 5 minutes

## 2024-01-04 ENCOUNTER — PATIENT MESSAGE (OUTPATIENT)
Dept: UROLOGY | Facility: CLINIC | Age: 74
End: 2024-01-04
Payer: MEDICARE

## 2024-01-04 LAB
DHEA SERPL-MCNC: NORMAL
ESTROGEN SERPL-MCNC: NORMAL PG/ML
INSULIN SERPL-ACNC: NORMAL U[IU]/ML
LAB AP CLINICAL INFORMATION: NORMAL
LAB AP GROSS DESCRIPTION: NORMAL
LAB AP REPORT FOOTNOTES: NORMAL
T3RU NFR SERPL: NORMAL %

## 2024-01-09 ENCOUNTER — PATIENT MESSAGE (OUTPATIENT)
Dept: UROLOGY | Facility: CLINIC | Age: 74
End: 2024-01-09
Payer: MEDICARE

## 2024-01-10 ENCOUNTER — PATIENT MESSAGE (OUTPATIENT)
Dept: UROLOGY | Facility: CLINIC | Age: 74
End: 2024-01-10

## 2024-01-10 ENCOUNTER — OFFICE VISIT (OUTPATIENT)
Dept: UROLOGY | Facility: CLINIC | Age: 74
End: 2024-01-10
Payer: MEDICARE

## 2024-01-10 VITALS
WEIGHT: 205 LBS | HEIGHT: 72 IN | HEART RATE: 69 BPM | SYSTOLIC BLOOD PRESSURE: 158 MMHG | RESPIRATION RATE: 18 BRPM | TEMPERATURE: 97 F | DIASTOLIC BLOOD PRESSURE: 73 MMHG | BODY MASS INDEX: 27.77 KG/M2 | OXYGEN SATURATION: 99 %

## 2024-01-10 DIAGNOSIS — C61 PROSTATE CANCER: Primary | ICD-10-CM

## 2024-01-10 LAB
BILIRUB SERPL-MCNC: NEGATIVE MG/DL
BLOOD URINE, POC: NEGATIVE
COLOR, POC UA: YELLOW
GLUCOSE UR QL STRIP: NEGATIVE
KETONES UR QL STRIP: NEGATIVE
LEUKOCYTE ESTERASE URINE, POC: NEGATIVE
NITRITE, POC UA: NEGATIVE
PH, POC UA: 6
PROTEIN, POC: 100
SPECIFIC GRAVITY, POC UA: 1.02
UROBILINOGEN, POC UA: 2

## 2024-01-10 PROCEDURE — 81001 URINALYSIS AUTO W/SCOPE: CPT | Mod: PBBFAC | Performed by: UROLOGY

## 2024-01-10 PROCEDURE — 99214 OFFICE O/P EST MOD 30 MIN: CPT | Mod: S$PBB,,, | Performed by: UROLOGY

## 2024-01-10 PROCEDURE — 99215 OFFICE O/P EST HI 40 MIN: CPT | Mod: PBBFAC | Performed by: UROLOGY

## 2024-01-12 ENCOUNTER — TELEPHONE (OUTPATIENT)
Dept: INTERNAL MEDICINE | Facility: CLINIC | Age: 74
End: 2024-01-12
Payer: MEDICARE

## 2024-01-12 NOTE — TELEPHONE ENCOUNTER
----- Message from Teena Pritchard sent at 1/12/2024  9:43 AM CST -----  Type:  Needs Medical Advice    Who Called: pt    Would the patient rather a call back or a response via [employer]? Call back  Best Call Back Number:  [phone]  Additional Information: pt would like to speak to nurse , about a procedure he's having. (Didn't give further details)

## 2024-01-12 NOTE — TELEPHONE ENCOUNTER
Spoke with Mr Rogelio reports he was recently diagnosis with prostate cancer. He's anxious & unable to sleep. I informed him provider is out of clinic until Tuesday & encourage him to go to urgent care for treatment. He verbalize understanding.

## 2024-01-12 NOTE — PROGRESS NOTES
CC:  Biopsy results    HPI:  Rogelio Johnson is a 73 y.o. male seen for follow-up of prostate biopsy.  He returns for follow-up of a prostate biopsy done on 2 January 2024 for a PSA of 8.02.  He had no problems following the biopsy.    Urinalysis:  Results for orders placed or performed in visit on 01/10/24   POCT URINE DIPSTICK WITH MICROSCOPE, AUTOMATED   Result Value Ref Range    Color, UA Yellow     Spec Grav UA 1.020     pH, UA 6.0     WBC, UA Negative     Nitrite, UA Negative     Protein,      Glucose, UA Negative     Ketones, UA Negative     Urobilinogen, UA 2.0     Bilirubin, POC Negative     Blood, UA Negative      Microscopic:  Negative      PSA History:  01/16/18 09:12 04/03/19 10:25 12/30/20 10:13 01/12/22 08:40 11/07/23 10:47   1.2 1.6 2.57 5.13 (H) 8.02 (H)       Pathology:  1. Prostate, Right, right prostate biopsy-base:   - Benign prostatic tissue.  - Negative for malignancy.       2. Prostate, Right, right prostate biopsy-mid:   - Benign prostatic tissue.  - Negative for malignancy.        3. Prostate, Right, right prostate biopsy-apex:   - Benign prostatic tissue.  - Negative for malignancy.        4. Prostate, Left, left prostate biopsy -base:   - Adenocarcinoma, Vibha grade 4 + 3 = score 7, in 2 of 2 cores, involving 25% of needle core tissue.        5. Prostate, Left, left prostate biopsy-mid:   - Adenocarcinoma, Vibha grade 4 + 4 = score 8, in 2 of 2 cores, involving 40% of needle core tissue.        6. Prostate, Left, left prostate biopsy- apex:   - Adenocarcinoma, [place] grade 4 + 3 = score 7, in 2 of 2 cores, involving 30% of needle core tissue.        ROS:  All systems reviewed and are negative except as documented in HPI and/or Assessment and Plan.     Patient Active Problem List:     Patient Active Problem List   Diagnosis    Type 2 diabetes mellitus without complication, with long-term current use of insulin    Erectile dysfunction    Adenomatous polyp of ascending colon     Arteriosclerosis of coronary artery    Atrioventricular block    Benign prostatic hyperplasia    Chronic low back pain    Dyslipidemia    Hypertension    Polyp of colon    Normocytic normochromic anemia    Obstructive sleep apnea syndrome    Stress at home    Mobitz type 1 second degree AV block    Elevated PSA    AV block        Past Medical History:  Past Medical History:   Diagnosis Date    Coronary artery disease     Hyperlipidemia     Hypertension     Personal history of colonic polyps     Sleep apnea, unspecified         Past Surgical History:  Past Surgical History:   Procedure Laterality Date    A-V CARDIAC PACEMAKER INSERTION N/A 11/9/2023    Procedure: INSERTION, CARDIAC PACEMAKER, DUAL CHAMBER;  Surgeon: Baljit Sánchez MD;  Location: [facility] CATH LAB;  Service: Cardiology;  Laterality: N/A;  DUAL CHAMBER PPM (SJM)    COLONOSCOPY      CORONARY ARTERY BYPASS GRAFT (CABG)      TRANSRECTAL BIOPSY OF PROSTATE WITH ULTRASOUND GUIDANCE Bilateral 1/2/2024    Procedure: BIOPSY, PROSTATE, RECTAL APPROACH, WITH US GUIDANCE;  Surgeon: Miracle Pierre DO;  Location: [facility] ENDOSCOPY;  Service: Urology;  Laterality: Bilateral;        Family History:  Family History   Problem Relation Age of Onset    Colon cancer Mother     Diabetes Mellitus Mother     Hypertension Mother     Cataracts Father     Heart attack Father     Heart disease Father         Social History:  Social History     Socioeconomic History    Marital status:    Tobacco Use    Smoking status: Never     Passive exposure: Never    Smokeless tobacco: Never   Substance and Sexual Activity    Alcohol use: Not Currently    Drug use: Never    Sexual activity: Yes     Partners: Male     Social Determinants of Health     Physical Activity: Sufficiently Active (5/3/2023)    Exercise Vital Sign     Days of Exercise per Week: 5 days     Minutes of Exercise per Session: 30 min        Allergies:  Review of patient's allergies indicates:  No Known Allergies      Objective:  Vitals:    01/10/24 1505   BP: (!) 158/73   Pulse: 69   Resp: 18   Temp: 97.3 °F (36.3 °C)     General:  Well developed, well nourished adult male in no acute distress  Abdomen: Soft, nontender, no masses  Extremities:  No clubbing, cyanosis, or edema  Neurologic:  Grossly intact  Musculoskeletal:  Normal tone    Assessment:  1. Prostate cancer  - POCT URINE DIPSTICK WITH MICROSCOPE, AUTOMATED  - MRI Prostate W W/O Contrast; Future  - NM Bone Scan Whole Body; Future     Plan:  I discussed the characteristics of the patient's prostate cancer including the Vibha grading with the patient and his family.  I discussed the options for treatment of prostate cancer including observation, radiation, radical prostatectomy, and androgen ablation.  I will get a total body bone scan and MRI to rule out metastatic or extraprostatic disease.  Patient will return following those tests for further discussion.    Follow-up:  After bone scan and MRI.      I spent a total of 32 minutes on the day of the visit.This includes face to face time and non-face to face time preparing to see the patient (eg, review of tests), obtaining and/or reviewing separately obtained history, documenting clinical information in the electronic or other health record, independently interpreting results and communicating results to the patient/family/caregiver, or care coordinator.

## 2024-01-17 ENCOUNTER — OFFICE VISIT (OUTPATIENT)
Dept: INTERNAL MEDICINE | Facility: CLINIC | Age: 74
End: 2024-01-17
Payer: MEDICARE

## 2024-01-17 DIAGNOSIS — R45.89 ANXIETY ABOUT HEALTH: Primary | ICD-10-CM

## 2024-01-17 DIAGNOSIS — J32.1 FRONTAL SINUSITIS, UNSPECIFIED CHRONICITY: ICD-10-CM

## 2024-01-17 PROBLEM — F41.8 ANXIETY ABOUT HEALTH: Status: ACTIVE | Noted: 2024-01-17

## 2024-01-17 PROCEDURE — 99214 OFFICE O/P EST MOD 30 MIN: CPT | Mod: 95,,, | Performed by: NURSE PRACTITIONER

## 2024-01-17 RX ORDER — BUSPIRONE HYDROCHLORIDE 7.5 MG/1
7.5 TABLET ORAL 2 TIMES DAILY PRN
Qty: 60 TABLET | Refills: 2 | Status: SHIPPED | OUTPATIENT
Start: 2024-01-17 | End: 2024-05-03 | Stop reason: SDUPTHER

## 2024-01-17 RX ORDER — CARVEDILOL 6.25 MG/1
6.25 TABLET ORAL 2 TIMES DAILY
COMMUNITY
Start: 2023-12-29

## 2024-01-17 NOTE — PROGRESS NOTES
Audio Only Telehealth Visit     The patient location is: home  The chief complaint leading to consultation is: anxiety  Visit type: Virtual visit with audio only (telephone)  Total time spent with patient: 15 minutes     The reason for the audio only service rather than synchronous audio and video virtual visit was related to technical difficulties or patient preference/necessity.     Each patient to whom I provide medical services by telemedicine is:  (1) informed of the relationship between the physician and patient and the respective role of any other health care provider with respect to management of the patient; and (2) notified that they may decline to receive medical services by telemedicine and may withdraw from such care at any time. Patient verbally consented to receive this service via voice-only telephone call.       HPI: 74 yo AAM for anxiety/ insomnia and cold. He states since diagnosed with prostate cancer he has been worried about his upcoming tests and procedures. He is in need of MRI/ NM body scan to be scheduled. He states he was not expecting the news of his diagnosis. He already struggles with some anxiety and was trying to help on his own but not working this time. He is having difficulty sleeping due to worry. He also reports having PND and sinus congestion with cough x 2 days. Denies f/c, HA, CP, SOB, sore throat. Taking OTC Coricidin  and drinking tea which has helped. Otherwise no other concerns.       Medication List with Changes/Refills   New Medications    BUSPIRONE (BUSPAR) 7.5 MG TABLET    Take 1 tablet (7.5 mg total) by mouth 2 (two) times daily as needed (anxiety).   Current Medications    AMLODIPINE (NORVASC) 5 MG TABLET    Take 1 tablet (5 mg total) by mouth once daily.    ASPIRIN (ECOTRIN) 81 MG EC TABLET    Take 1 tablet (81 mg total) by mouth once daily.    ATORVASTATIN (LIPITOR) 40 MG TABLET    Take 1 tablet (40 mg total) by mouth every evening.    CARVEDILOL (COREG) 6.25 MG  TABLET    Take 6.25 mg by mouth 2 (two) times daily.    DOCUSATE SODIUM (COLACE) 100 MG CAPSULE    Take 100 mg by mouth.    EMPAGLIFLOZIN (JARDIANCE) 10 MG TABLET    Take 1 tablet (10 mg total) by mouth every morning.    EZETIMIBE (ZETIA) 10 MG TABLET    Take 1 tablet (10 mg total) by mouth once daily.    FERROUS SULFATE (FEROSUL) 325 MG (65 MG IRON) TAB TABLET    Take 1 tablet (325 mg total) by mouth once daily.    FINASTERIDE (PROSCAR) 5 MG TABLET    Take 1 tablet (5 mg total) by mouth once daily.    FLUTICASONE PROPIONATE (FLONASE) 50 MCG/ACTUATION NASAL SPRAY    2 sprays (100 mcg total) by Each Nostril route once daily.    HYDRALAZINE (APRESOLINE) 50 MG TABLET    Take 1 tablet (50 mg total) by mouth every 8 (eight) hours.    LEVEMIR FLEXTOUCH U100 INSULIN 100 UNIT/ML (3 ML) INPN PEN    Administer 68 units SC q AM and 62 units SC q HS. Rotate injection sites.    LOSARTAN (COZAAR) 100 MG TABLET    Take 100 mg by mouth once daily.    METFORMIN (GLUCOPHAGE) 1000 MG TABLET    Take 1 tablet (1,000 mg total) by mouth 2 (two) times daily with meals.   Discontinued Medications    CIPROFLOXACIN HCL (CIPRO) 500 MG TABLET    Take 1 tablet (500 mg total) by mouth 2 (two) times daily.       Assessment and plan:    Problem List Items Addressed This Visit          Psychiatric    Anxiety about health - Primary    Current Assessment & Plan     Recently diagnosed with prostate cancer with increased stress/ worry/ anxiety levels causing difficulty sleeping. H/o anxiety and he has tried treating on own but no longer working. Requests medication to help for the short term. Rx buspar 7.5 mg BID prn anxiety. Any worsening s/s or no improvement, notify provider or report to ER /  call 911 for SI/HI.         Relevant Medications    busPIRone (BUSPAR) 7.5 MG tablet     Other Visit Diagnoses       Frontal sinusitis, unspecified chronicity      Pt with 2 day onset of sinus congestion, PND and cough with improved symptoms after OTC tx.  OTC  cold/ sinus relief medications are okay to take as directed; use Coricidin products if you have high blood pressure  OTC Tylenol/ Ibuprofen as directed/ needed for fever/ pain relief   Adequate hydration/ fluid intake and rest   Any worsening symptoms- fever, chills, body aches, weakness, poor po intake, n/v, worsening cough/ SOB/wheezing/ CP, pt to seek care                   This service was not originating from a related E/M service provided within the previous 7 days nor will  to an E/M service or procedure within the next 24 hours or my soonest available appointment.  Prevailing standard of care was able to be met in this audio-only visit.

## 2024-01-17 NOTE — ASSESSMENT & PLAN NOTE
Recently diagnosed with prostate cancer with increased stress/ worry/ anxiety levels causing difficulty sleeping. H/o anxiety and he has tried treating on own but no longer working. Requests medication to help for the short term. Rx buspar 7.5 mg BID prn anxiety. Any worsening s/s or no improvement, notify provider or report to ER /  call 911 for SI/HI.

## 2024-01-26 ENCOUNTER — PATIENT MESSAGE (OUTPATIENT)
Dept: UROLOGY | Facility: CLINIC | Age: 74
End: 2024-01-26
Payer: MEDICARE

## 2024-01-26 ENCOUNTER — HOSPITAL ENCOUNTER (OUTPATIENT)
Dept: RADIOLOGY | Facility: HOSPITAL | Age: 74
Discharge: HOME | End: 2024-01-26
Attending: UROLOGY
Payer: MEDICARE

## 2024-01-26 DIAGNOSIS — C61 PROSTATE CANCER: ICD-10-CM

## 2024-01-26 PROCEDURE — A9503 TC99M MEDRONATE: HCPCS

## 2024-02-01 ENCOUNTER — PATIENT MESSAGE (OUTPATIENT)
Dept: UROLOGY | Facility: CLINIC | Age: 74
End: 2024-02-01
Payer: MEDICARE

## 2024-02-14 ENCOUNTER — PATIENT MESSAGE (OUTPATIENT)
Dept: INTERNAL MEDICINE | Facility: CLINIC | Age: 74
End: 2024-02-14
Payer: MEDICARE

## 2024-02-15 ENCOUNTER — HOSPITAL ENCOUNTER (OUTPATIENT)
Dept: RADIOLOGY | Facility: HOSPITAL | Age: 74
Discharge: HOME | End: 2024-02-15
Attending: UROLOGY
Payer: MEDICARE

## 2024-02-15 VITALS — OXYGEN SATURATION: 97 % | HEART RATE: 91 BPM

## 2024-02-15 DIAGNOSIS — C61 PROSTATE CANCER: ICD-10-CM

## 2024-02-15 PROCEDURE — 72197 MRI PELVIS W/O & W/DYE: CPT | Mod: TC

## 2024-02-19 ENCOUNTER — OFFICE VISIT (OUTPATIENT)
Dept: UROLOGY | Facility: CLINIC | Age: 74
End: 2024-02-19
Payer: MEDICARE

## 2024-02-19 VITALS
RESPIRATION RATE: 18 BRPM | HEIGHT: 72 IN | OXYGEN SATURATION: 98 % | HEART RATE: 91 BPM | DIASTOLIC BLOOD PRESSURE: 66 MMHG | SYSTOLIC BLOOD PRESSURE: 141 MMHG | TEMPERATURE: 98 F | BODY MASS INDEX: 28.04 KG/M2 | WEIGHT: 207 LBS

## 2024-02-19 DIAGNOSIS — C61 PROSTATE CANCER: Primary | ICD-10-CM

## 2024-02-19 PROCEDURE — 99215 OFFICE O/P EST HI 40 MIN: CPT | Mod: PBBFAC | Performed by: UROLOGY

## 2024-02-19 PROCEDURE — 99214 OFFICE O/P EST MOD 30 MIN: CPT | Mod: S$PBB,,, | Performed by: UROLOGY

## 2024-02-19 RX ORDER — LINAGLIPTIN 5 MG/1
5 TABLET, FILM COATED ORAL DAILY
COMMUNITY
Start: 2024-02-06 | End: 2024-06-18 | Stop reason: SDUPTHER

## 2024-02-19 NOTE — PROGRESS NOTES
Patient seen by Dr. Miracle Pierre. Education given, patient verbalized understanding. RTC 5 mths w/ PSA

## 2024-02-19 NOTE — PROGRESS NOTES
CC:  Imaging results    HPI:  Rogelio Johnson is a 73 y.o. male seen for follow-up of imaging.  He had a prostate biopsy on 2 January 2024 for a PSA of 8.02.  The biopsy showed Duncan 4+3 in the left base and apex and Vibha 4+4 in the left mid.   He had a bone scan and MRI for today's visit.      Urinalysis:  Unable to give a urine sample for a urinalysis today.    PSA History:  01/16/18 09:12 04/03/19 10:25 12/30/20 10:13 01/12/22 08:40 11/07/23 10:47   1.2 1.6 2.57 5.13 (H) 8.02 (H)       Imaging:  NM Bone Scan Whole Body - 26 January 2024:  No evidence of skeletal metastatic disease    MRI Prostate - 12 January 2024:  The prostate volume is estimated at 26 cc.    Numerous punctate lesions are present throughout the prostate, primarily in the peripheral zones and would be consistent with the patient's known prostatic malignancy.  In particular, restricted diffusion present to the medial and lateral segments of the left peripheral zone from the mid gland to the apex would likely correspond to the patient's positive biopsy.    The capsule does not appear discretely disrupted.    No MR evidence is present to suggest perineural spread.   Prominent pelvic lymph nodes are present bilaterally but do not satisfy MR size criteria for lymphadenopathy.  The largest lymph node is present at the level of the left internal inguinal ring and measures 8 mm in short axis.  Early regional metastatic disease could not be excluded.  No specific lesion seen to the included bones.    ROS:  All systems reviewed and are negative except as documented in HPI and/or Assessment and Plan.     Patient Active Problem List:     Patient Active Problem List   Diagnosis    Type 2 diabetes mellitus without complication, with long-term current use of insulin    Erectile dysfunction    Adenomatous polyp of ascending colon    Arteriosclerosis of coronary artery    Atrioventricular block    Benign prostatic hyperplasia    Chronic low back pain     Dyslipidemia    Hypertension    Polyp of colon    Normocytic normochromic anemia    Obstructive sleep apnea syndrome    Stress at home    Mobitz type 1 second degree AV block    Elevated PSA    AV block    Anxiety about health        Past Medical History:  Past Medical History:   Diagnosis Date    Coronary artery disease     Hyperlipidemia     Hypertension     Personal history of colonic polyps     Sleep apnea, unspecified         Past Surgical History:  Past Surgical History:   Procedure Laterality Date    A-V CARDIAC PACEMAKER INSERTION N/A 11/9/2023    Procedure: INSERTION, CARDIAC PACEMAKER, DUAL CHAMBER;  Surgeon: Baljit Sánchez MD;  Location: [facility] CATH LAB;  Service: Cardiology;  Laterality: N/A;  DUAL CHAMBER PPM (SJM)    COLONOSCOPY      CORONARY ARTERY BYPASS GRAFT (CABG)      TRANSRECTAL BIOPSY OF PROSTATE WITH ULTRASOUND GUIDANCE Bilateral 1/2/2024    Procedure: BIOPSY, PROSTATE, RECTAL APPROACH, WITH US GUIDANCE;  Surgeon: Miracle Pierre DO;  Location: [facility] ENDOSCOPY;  Service: Urology;  Laterality: Bilateral;        Family History:  Family History   Problem Relation Age of Onset    Colon cancer Mother     Diabetes Mellitus Mother     Hypertension Mother     Cataracts Father     Heart attack Father     Heart disease Father         Social History:  Social History     Socioeconomic History    Marital status:    Tobacco Use    Smoking status: Never     Passive exposure: Never    Smokeless tobacco: Never   Substance and Sexual Activity    Alcohol use: Not Currently    Drug use: Never    Sexual activity: Yes     Partners: Male     Social Determinants of Health     Physical Activity: Sufficiently Active (5/3/2023)    Exercise Vital Sign     Days of Exercise per Week: 5 days     Minutes of Exercise per Session: 30 min        Allergies:  Review of patient's allergies indicates:  No Known Allergies     Objective:  Vitals:    02/19/24 1506   BP: (!) 141/66   Pulse:    Resp:    Temp:      General:  Well  developed, well nourished adult male in no acute distress  Abdomen: Soft, nontender, no masses  Extremities:  No clubbing, cyanosis, or edema  Neurologic:  Grossly intact  Musculoskeletal:  Normal tone    Assessment:  1. Prostate cancer  - POCT URINE DIPSTICK WITH MICROSCOPE, AUTOMATED     Plan:  I discussed the results of the MRI and bone scan with the patient and his wife and grandson.  I discussed the options for treatment of prostate cancer again including observation, radiation, radical prostatectomy, and androgen ablation.  He would like to proceed with radiation therapy.  A referral was sent to Radiation Oncology.    Follow-up:  He was scheduled for a five month follow-up with a PSA prior to that visit.  He will return sooner if he has any problems or needs follow-up sooner.    I spent a total of 36 minutes on the day of the visit.This includes face to face time and non-face to face time preparing to see the patient (eg, review of tests), obtaining and/or reviewing separately obtained history, documenting clinical information in the electronic or other health record, independently interpreting results and communicating results to the patient/family/caregiver, or care coordinator.

## 2024-02-27 ENCOUNTER — TELEPHONE (OUTPATIENT)
Dept: UROLOGY | Facility: CLINIC | Age: 74
End: 2024-02-27
Payer: MEDICARE

## 2024-02-27 NOTE — TELEPHONE ENCOUNTER
Thank you!    ----- Message from Sammy Samson MA sent at 2/23/2024  9:40 AM CST -----  Regarding: RE: PA Needed for Lupron  Approved today  Request Reference Number: [id number].   LUPRON DEPOT INJ 45MG is approved through 12/31/2024.   Authorization Expiration Date: 12/31/2024    ----- Message -----  From: Leola Jaimes RN  Sent: 2/22/2024   9:16 AM CST  To: Sammy Samson MA  Subject: PA Needed for Lupron                             Pt needs ASAP PA for initial Lupron injection before radiation. Thanks!

## 2024-02-28 ENCOUNTER — OFFICE VISIT (OUTPATIENT)
Dept: UROLOGY | Facility: CLINIC | Age: 74
End: 2024-02-28
Payer: MEDICARE

## 2024-02-28 VITALS
DIASTOLIC BLOOD PRESSURE: 68 MMHG | BODY MASS INDEX: 28.12 KG/M2 | HEART RATE: 61 BPM | SYSTOLIC BLOOD PRESSURE: 138 MMHG | WEIGHT: 207.63 LBS | OXYGEN SATURATION: 99 % | RESPIRATION RATE: 20 BRPM | HEIGHT: 72 IN | TEMPERATURE: 98 F

## 2024-02-28 DIAGNOSIS — C61 PROSTATE CANCER: Primary | ICD-10-CM

## 2024-02-28 PROCEDURE — 96402 CHEMO HORMON ANTINEOPL SQ/IM: CPT | Mod: PBBFAC

## 2024-02-28 PROCEDURE — 99213 OFFICE O/P EST LOW 20 MIN: CPT | Mod: S$PBB,,, | Performed by: UROLOGY

## 2024-02-28 PROCEDURE — 99215 OFFICE O/P EST HI 40 MIN: CPT | Mod: PBBFAC,25 | Performed by: UROLOGY

## 2024-02-28 RX ADMIN — LEUPROLIDE ACETATE 45 MG: KIT at 08:02

## 2024-02-28 NOTE — PROGRESS NOTES
CC:  Lupron injection    HPI:  Rogelio Johnson is a 73 y.o. male seen for Lupron injection.  He had a prostate biopsy on 2 January 2024 for a PSA of 8.02.  The biopsy showed Vibha 4+3 in the left base and apex and Vibha 4+4 in the left mid.  He has elected radiation and is here today for his first Lupron injection.  He has an appointment tomorrow with Radiation Oncology.    Urinalysis:  No urinalysis done today.    Lab Results:  PSA History:    01/16/18 09:12 04/03/19 10:25 12/30/20 10:13 01/12/22 08:40 11/07/23 10:47   1.2 1.6 2.57 5.13 (H) 8.02 (H)      Imaging:  NM Bone Scan Whole Body - 26 January 2024:  No evidence of skeletal metastatic disease     MRI Prostate - 12 January 2024:  The prostate volume is estimated at 26 cc.    Numerous punctate lesions are present throughout the prostate, primarily in the peripheral zones and would be consistent with the patient's known prostatic malignancy.  In particular, restricted diffusion present to the medial and lateral segments of the left peripheral zone from the mid gland to the apex would likely correspond to the patient's positive biopsy.    The capsule does not appear discretely disrupted.    No MR evidence is present to suggest perineural spread.   Prominent pelvic lymph nodes are present bilaterally but do not satisfy MR size criteria for lymphadenopathy.  The largest lymph node is present at the level of the left internal inguinal ring and measures 8 mm in short axis.  Early regional metastatic disease could not be excluded.  No specific lesion seen to the included bones.     ROS:  All systems reviewed and are negative except as documented in HPI and/or Assessment and Plan.     Patient Active Problem List:     Patient Active Problem List   Diagnosis    Type 2 diabetes mellitus without complication, with long-term current use of insulin    Erectile dysfunction    Adenomatous polyp of ascending colon    Arteriosclerosis of coronary artery    Atrioventricular  block    Benign prostatic hyperplasia    Chronic low back pain    Dyslipidemia    Hypertension    Polyp of colon    Normocytic normochromic anemia    Obstructive sleep apnea syndrome    Stress at home    Mobitz type 1 second degree AV block    Elevated PSA    AV block    Anxiety about health        Past Medical History:  Past Medical History:   Diagnosis Date    Coronary artery disease     Hyperlipidemia     Hypertension     Personal history of colonic polyps     Sleep apnea, unspecified         Past Surgical History:  Past Surgical History:   Procedure Laterality Date    A-V CARDIAC PACEMAKER INSERTION N/A 11/9/2023    Procedure: INSERTION, CARDIAC PACEMAKER, DUAL CHAMBER;  Surgeon: Baljit Sánchez MD;  Location: [facility] CATH LAB;  Service: Cardiology;  Laterality: N/A;  DUAL CHAMBER PPM (SJM)    COLONOSCOPY      CORONARY ARTERY BYPASS GRAFT (CABG)      TRANSRECTAL BIOPSY OF PROSTATE WITH ULTRASOUND GUIDANCE Bilateral 1/2/2024    Procedure: BIOPSY, PROSTATE, RECTAL APPROACH, WITH US GUIDANCE;  Surgeon: Miracle Pierre DO;  Location: [facility] ENDOSCOPY;  Service: Urology;  Laterality: Bilateral;        Family History:  Family History   Problem Relation Age of Onset    Colon cancer Mother     Diabetes Mellitus Mother     Hypertension Mother     Cataracts Father     Heart attack Father     Heart disease Father         Social History:  Social History     Socioeconomic History    Marital status:    Tobacco Use    Smoking status: Never     Passive exposure: Never    Smokeless tobacco: Never   Substance and Sexual Activity    Alcohol use: Not Currently    Drug use: Never    Sexual activity: Yes     Partners: Male     Social Determinants of Health     Physical Activity: Sufficiently Active (5/3/2023)    Exercise Vital Sign     Days of Exercise per Week: 5 days     Minutes of Exercise per Session: 30 min        Allergies:  Review of patient's allergies indicates:  No Known Allergies     Objective:  Vitals:    02/28/24 0747    BP: 138/68   Pulse: 61   Resp: 20   Temp: 97.7 °F (36.5 °C)     General:  Well developed, well nourished adult male in no acute distress  Abdomen: Soft, nontender, no masses  Extremities:  No clubbing, cyanosis, or edema  Neurologic:  Grossly intact  Musculoskeletal:  Normal tone    Assessment:  1. Prostate cancer  - POCT URINE DIPSTICK WITH MICROSCOPE, AUTOMATED  - leuprolide acetate (6 month) injection 45 mg     Plan:  He was given Lupron 45 mg today.      Follow-up:  Return in six months with a PSA prior to the visit for a Lupron injection.

## 2024-02-28 NOTE — PROGRESS NOTES
Pt seen by Dr. Pierre. Lupron injection given to RGM w/no complications (Lot #: [id number]; Exp: [id number]). Pt instructed to return to clinic in 6 months w/PSA & possible Lupron injection. Discharge paperwork given w/pt verbalizing understanding

## 2024-02-29 DIAGNOSIS — C61 MALIGNANT NEOPLASM OF PROSTATE: Primary | ICD-10-CM

## 2024-03-12 ENCOUNTER — HOSPITAL ENCOUNTER (OUTPATIENT)
Dept: RADIOLOGY | Facility: HOSPITAL | Age: 74
Discharge: HOME | End: 2024-03-12
Attending: RADIOLOGY
Payer: MEDICARE

## 2024-03-12 DIAGNOSIS — C61 MALIGNANT NEOPLASM OF PROSTATE: ICD-10-CM

## 2024-03-12 PROCEDURE — A9596 HC GALLIUM GA-68 GOZETOTIDE, DX (ILLUCCIX), PER 1 MCI: HCPCS | Mod: TB | Performed by: RADIOLOGY

## 2024-03-12 PROCEDURE — 78815 PET IMAGE W/CT SKULL-THIGH: CPT | Mod: TC,PI

## 2024-03-12 RX ADMIN — KIT FOR THE PREPARATION OF GALLIUM GA 68 GOZETOTIDE INJECTION 5 MILLICURIE: KIT INTRAVENOUS at 12:03

## 2024-03-18 ENCOUNTER — PATIENT MESSAGE (OUTPATIENT)
Dept: INTERNAL MEDICINE | Facility: CLINIC | Age: 74
End: 2024-03-18
Payer: MEDICARE

## 2024-03-22 ENCOUNTER — APPOINTMENT (OUTPATIENT)
Dept: RADIATION THERAPY | Facility: HOSPITAL | Age: 74
End: 2024-03-22
Attending: RADIOLOGY
Payer: MEDICARE

## 2024-03-22 PROCEDURE — 77334 RADIATION TREATMENT AID(S): CPT | Performed by: RADIOLOGY

## 2024-03-25 PROCEDURE — 77301 RADIOTHERAPY DOSE PLAN IMRT: CPT | Performed by: RADIOLOGY

## 2024-03-25 PROCEDURE — 77338 DESIGN MLC DEVICE FOR IMRT: CPT | Performed by: RADIOLOGY

## 2024-03-25 PROCEDURE — 77300 RADIATION THERAPY DOSE PLAN: CPT | Performed by: RADIOLOGY

## 2024-03-27 PROCEDURE — 77386 HC IMRT, COMPLEX: CPT | Performed by: RADIOLOGY

## 2024-03-28 PROCEDURE — 77386 HC IMRT, COMPLEX: CPT | Performed by: RADIOLOGY

## 2024-03-29 PROCEDURE — 77386 HC IMRT, COMPLEX: CPT | Performed by: RADIOLOGY

## 2024-04-01 ENCOUNTER — CLINICAL SUPPORT (OUTPATIENT)
Dept: RADIATION THERAPY | Facility: HOSPITAL | Age: 74
End: 2024-04-01
Attending: RADIOLOGY
Payer: MEDICARE

## 2024-04-01 PROCEDURE — 77336 RADIATION PHYSICS CONSULT: CPT | Performed by: RADIOLOGY

## 2024-04-01 PROCEDURE — 77386 HC IMRT, COMPLEX: CPT | Performed by: RADIOLOGY

## 2024-04-02 PROCEDURE — 77386 HC IMRT, COMPLEX: CPT | Performed by: RADIOLOGY

## 2024-04-03 PROCEDURE — 77386 HC IMRT, COMPLEX: CPT | Performed by: RADIOLOGY

## 2024-04-04 PROCEDURE — 77386 HC IMRT, COMPLEX: CPT | Performed by: RADIOLOGY

## 2024-04-05 PROCEDURE — 77386 HC IMRT, COMPLEX: CPT | Performed by: RADIOLOGY

## 2024-04-08 PROCEDURE — 77386 HC IMRT, COMPLEX: CPT | Performed by: RADIOLOGY

## 2024-04-08 PROCEDURE — 77336 RADIATION PHYSICS CONSULT: CPT | Performed by: RADIOLOGY

## 2024-04-09 PROCEDURE — 77386 HC IMRT, COMPLEX: CPT | Performed by: RADIOLOGY

## 2024-04-10 PROCEDURE — 77386 HC IMRT, COMPLEX: CPT | Performed by: RADIOLOGY

## 2024-04-11 PROCEDURE — 77386 HC IMRT, COMPLEX: CPT | Performed by: RADIOLOGY

## 2024-04-12 PROCEDURE — 77386 HC IMRT, COMPLEX: CPT | Performed by: RADIOLOGY

## 2024-04-15 PROCEDURE — 77386 HC IMRT, COMPLEX: CPT | Performed by: RADIOLOGY

## 2024-04-15 PROCEDURE — 77336 RADIATION PHYSICS CONSULT: CPT | Performed by: RADIOLOGY

## 2024-04-16 PROCEDURE — 77386 HC IMRT, COMPLEX: CPT | Performed by: RADIOLOGY

## 2024-04-17 ENCOUNTER — OFFICE VISIT (OUTPATIENT)
Dept: URGENT CARE | Facility: CLINIC | Age: 74
End: 2024-04-17
Payer: MEDICARE

## 2024-04-17 VITALS
SYSTOLIC BLOOD PRESSURE: 159 MMHG | RESPIRATION RATE: 18 BRPM | BODY MASS INDEX: 28.17 KG/M2 | HEIGHT: 72 IN | HEART RATE: 67 BPM | OXYGEN SATURATION: 98 % | WEIGHT: 208 LBS | TEMPERATURE: 99 F | DIASTOLIC BLOOD PRESSURE: 73 MMHG

## 2024-04-17 DIAGNOSIS — M54.9 MUSCULOSKELETAL BACK PAIN: Primary | ICD-10-CM

## 2024-04-17 DIAGNOSIS — S16.1XXA NECK STRAIN, INITIAL ENCOUNTER: ICD-10-CM

## 2024-04-17 PROCEDURE — 99214 OFFICE O/P EST MOD 30 MIN: CPT | Mod: PBBFAC,25 | Performed by: NURSE PRACTITIONER

## 2024-04-17 PROCEDURE — 99203 OFFICE O/P NEW LOW 30 MIN: CPT | Mod: S$PBB,,, | Performed by: NURSE PRACTITIONER

## 2024-04-17 PROCEDURE — 77386 HC IMRT, COMPLEX: CPT | Performed by: RADIOLOGY

## 2024-04-17 RX ORDER — BACLOFEN 10 MG/1
10 TABLET ORAL 3 TIMES DAILY
Qty: 21 TABLET | Refills: 0 | Status: SHIPPED | OUTPATIENT
Start: 2024-04-17 | End: 2024-04-24

## 2024-04-17 NOTE — PATIENT INSTRUCTIONS
Medications may not resolve your issue.  Please see the included patient education for guidance on exercises and stretches.       **Drug [place]: Dr Pablo's Epsom for soreness, muscle recovery, post-workout. Large bags are $5, pour in tub of warm water and soak for 10-20 minutes.    **Light range of motion of the affected muscles.    **Roll muscle spasm on Lacrosse ball or tennis ball    **Heating pad

## 2024-04-17 NOTE — PROGRESS NOTES
Subjective:      Patient ID: Rogelio Johnson is a 73 y.o. male.    Vitals:  height is 6' (1.829 m) and weight is 94.3 kg (208 lb). His oral temperature is 98.5 °F (36.9 °C). His blood pressure is 159/73 (abnormal) and his pulse is 67. His respiration is 18 and oxygen saturation is 98%.     Chief Complaint: Fall (Patient reports L shoulder pain and back pain from fall in the shower  x 4 days ago )    Fall     As stated in CC. Pt reports he slipped while trying to keep his balance while getting out of the shower.  Pain is worse with movement Pt states he hit his lower back on tub.  Patient reports he has been taking Tylenol with some relief. Pt denies,chest pain or shortness for breath, weakness, numbness or tingling in his extremities bowel or bladder incontinence, stomach pain nausea or vomiting dysuria fever  ROS   Objective:     Physical Exam   Constitutional: He is oriented to person, place, and time. He appears well-developed.  Non-toxic appearance. He does not appear ill. No distress.   HENT:   Head: Normocephalic and atraumatic.   Ears:   Right Ear: Tympanic membrane normal.   Left Ear: Tympanic membrane normal.   Nose: No purulent discharge. Right sinus exhibits no maxillary sinus tenderness and no frontal sinus tenderness. Left sinus exhibits no maxillary sinus tenderness and no frontal sinus tenderness.   Mouth/Throat: Uvula is midline.   Eyes: Conjunctivae are normal. Pupils are equal, round, and reactive to light. Right eye exhibits no discharge. Left eye exhibits no discharge. Extraocular movement intact   Neck: Neck supple.     No neck rigidity present.   Cardiovascular: Regular rhythm.   Pulmonary/Chest: Effort normal and breath sounds normal. No respiratory distress. He has no wheezes. He has no rales.   Musculoskeletal: Normal range of motion.         General: Tenderness present. No swelling, deformity or signs of injury. Normal range of motion.      Lumbar back: He exhibits tenderness and spasm. He  exhibits normal range of motion, no bony tenderness, no swelling and no deformity.        Back:       Right lower leg: No edema.      Left lower leg: No edema.      Comments: TTP at lower lumbar in multiple sites. Left trapezius tenderness without  midline cervical or mid line spine tenderness. There is no bulging or deformity, pt has full rang eo of motion to neck and body.    Lymphadenopathy:     He has no cervical adenopathy.   Neurological: no focal deficit. He is alert and oriented to person, place, and time.   Skin: Skin is warm, dry and not diaphoretic. Capillary refill takes less than 2 seconds.   Psychiatric: His behavior is normal. Mood, judgment and thought content normal.   Nursing note and vitals reviewed.      Assessment:     1. Musculoskeletal back pain    2. Neck strain, initial encounter        Plan:   **Drug [place]: Dr Pablo's Epsom for soreness, muscle recovery, post-workout. Large bags are $5, pour in tub of warm water and soak for 10-20 minutes.    **Light range of motion of the affected muscles.    **Roll muscle spasm on Lacrosse ball or tennis ball    **Heating pad    Musculoskeletal back pain    Neck strain, initial encounter    Other orders  -     baclofen (LIORESAL) 10 MG tablet; Take 1 tablet (10 mg total) by mouth 3 (three) times daily. for 7 days  Dispense: 21 tablet; Refill: 0

## 2024-04-18 PROCEDURE — 77386 HC IMRT, COMPLEX: CPT | Performed by: RADIOLOGY

## 2024-04-19 PROCEDURE — 77386 HC IMRT, COMPLEX: CPT | Performed by: RADIOLOGY

## 2024-04-22 PROCEDURE — 77386 HC IMRT, COMPLEX: CPT | Performed by: RADIOLOGY

## 2024-04-22 PROCEDURE — 77336 RADIATION PHYSICS CONSULT: CPT | Performed by: RADIOLOGY

## 2024-04-23 PROCEDURE — 77386 HC IMRT, COMPLEX: CPT | Performed by: RADIOLOGY

## 2024-04-24 PROCEDURE — 77386 HC IMRT, COMPLEX: CPT | Performed by: RADIOLOGY

## 2024-04-25 PROCEDURE — 77386 HC IMRT, COMPLEX: CPT | Performed by: RADIOLOGY

## 2024-04-26 PROCEDURE — 77386 HC IMRT, COMPLEX: CPT | Performed by: RADIOLOGY

## 2024-04-29 PROCEDURE — 77386 HC IMRT, COMPLEX: CPT | Performed by: RADIOLOGY

## 2024-04-29 PROCEDURE — 77336 RADIATION PHYSICS CONSULT: CPT | Performed by: RADIOLOGY

## 2024-04-30 PROCEDURE — 77386 HC IMRT, COMPLEX: CPT | Performed by: RADIOLOGY

## 2024-05-01 ENCOUNTER — CLINICAL SUPPORT (OUTPATIENT)
Dept: RADIATION THERAPY | Facility: HOSPITAL | Age: 74
End: 2024-05-01
Attending: RADIOLOGY
Payer: MEDICARE

## 2024-05-01 PROCEDURE — 77386 HC IMRT, COMPLEX: CPT | Performed by: RADIOLOGY

## 2024-05-02 PROCEDURE — 77386 HC IMRT, COMPLEX: CPT | Performed by: RADIOLOGY

## 2024-05-03 DIAGNOSIS — E78.5 DYSLIPIDEMIA: ICD-10-CM

## 2024-05-03 DIAGNOSIS — E11.9 TYPE 2 DIABETES MELLITUS WITHOUT COMPLICATION, WITH LONG-TERM CURRENT USE OF INSULIN: ICD-10-CM

## 2024-05-03 DIAGNOSIS — Z79.4 TYPE 2 DIABETES MELLITUS WITHOUT COMPLICATION, WITH LONG-TERM CURRENT USE OF INSULIN: ICD-10-CM

## 2024-05-03 DIAGNOSIS — R45.89 ANXIETY ABOUT HEALTH: ICD-10-CM

## 2024-05-03 PROCEDURE — 77386 HC IMRT, COMPLEX: CPT | Performed by: RADIOLOGY

## 2024-05-06 PROCEDURE — 77336 RADIATION PHYSICS CONSULT: CPT | Performed by: RADIOLOGY

## 2024-05-06 PROCEDURE — 77386 HC IMRT, COMPLEX: CPT | Performed by: RADIOLOGY

## 2024-05-07 PROCEDURE — 77386 HC IMRT, COMPLEX: CPT | Performed by: RADIOLOGY

## 2024-05-07 RX ORDER — BUSPIRONE HYDROCHLORIDE 7.5 MG/1
7.5 TABLET ORAL 2 TIMES DAILY PRN
Qty: 60 TABLET | Refills: 1 | Status: SHIPPED | OUTPATIENT
Start: 2024-05-07 | End: 2025-05-07

## 2024-05-07 RX ORDER — ATORVASTATIN CALCIUM 40 MG/1
40 TABLET, FILM COATED ORAL NIGHTLY
Qty: 90 TABLET | Refills: 0 | Status: SHIPPED | OUTPATIENT
Start: 2024-05-07 | End: 2024-06-18 | Stop reason: SDUPTHER

## 2024-05-13 PROCEDURE — 77336 RADIATION PHYSICS CONSULT: CPT | Performed by: RADIOLOGY

## 2024-06-04 DIAGNOSIS — D50.9 IRON DEFICIENCY ANEMIA, UNSPECIFIED IRON DEFICIENCY ANEMIA TYPE: ICD-10-CM

## 2024-06-04 DIAGNOSIS — E78.5 DYSLIPIDEMIA: ICD-10-CM

## 2024-06-04 DIAGNOSIS — E11.9 TYPE 2 DIABETES MELLITUS WITHOUT COMPLICATION, WITH LONG-TERM CURRENT USE OF INSULIN: ICD-10-CM

## 2024-06-04 DIAGNOSIS — Z79.4 TYPE 2 DIABETES MELLITUS WITHOUT COMPLICATION, WITH LONG-TERM CURRENT USE OF INSULIN: ICD-10-CM

## 2024-06-06 RX ORDER — FERROUS SULFATE 325(65) MG
325 TABLET ORAL DAILY
Qty: 30 TABLET | Refills: 0 | Status: SHIPPED | OUTPATIENT
Start: 2024-06-06 | End: 2024-06-18 | Stop reason: SDUPTHER

## 2024-06-06 RX ORDER — EZETIMIBE 10 MG/1
10 TABLET ORAL DAILY
Qty: 30 TABLET | Refills: 0 | Status: SHIPPED | OUTPATIENT
Start: 2024-06-06 | End: 2024-06-18 | Stop reason: SDUPTHER

## 2024-06-18 ENCOUNTER — OFFICE VISIT (OUTPATIENT)
Dept: INTERNAL MEDICINE | Facility: CLINIC | Age: 74
End: 2024-06-18
Payer: MEDICARE

## 2024-06-18 VITALS
HEART RATE: 61 BPM | WEIGHT: 204.63 LBS | BODY MASS INDEX: 27.72 KG/M2 | SYSTOLIC BLOOD PRESSURE: 96 MMHG | DIASTOLIC BLOOD PRESSURE: 59 MMHG | RESPIRATION RATE: 18 BRPM | HEIGHT: 72 IN | TEMPERATURE: 98 F | OXYGEN SATURATION: 97 %

## 2024-06-18 DIAGNOSIS — Z79.4 TYPE 2 DIABETES MELLITUS WITHOUT COMPLICATION, WITH LONG-TERM CURRENT USE OF INSULIN: ICD-10-CM

## 2024-06-18 DIAGNOSIS — D64.9 NORMOCYTIC NORMOCHROMIC ANEMIA: ICD-10-CM

## 2024-06-18 DIAGNOSIS — I10 HYPERTENSION, UNSPECIFIED TYPE: ICD-10-CM

## 2024-06-18 DIAGNOSIS — C61 PROSTATE CANCER: ICD-10-CM

## 2024-06-18 DIAGNOSIS — D50.9 IRON DEFICIENCY ANEMIA, UNSPECIFIED IRON DEFICIENCY ANEMIA TYPE: ICD-10-CM

## 2024-06-18 DIAGNOSIS — E78.5 DYSLIPIDEMIA: ICD-10-CM

## 2024-06-18 DIAGNOSIS — I10 PRIMARY HYPERTENSION: Primary | ICD-10-CM

## 2024-06-18 DIAGNOSIS — E11.9 TYPE 2 DIABETES MELLITUS WITHOUT COMPLICATION, WITH LONG-TERM CURRENT USE OF INSULIN: ICD-10-CM

## 2024-06-18 PROCEDURE — 99213 OFFICE O/P EST LOW 20 MIN: CPT | Mod: PBBFAC | Performed by: NURSE PRACTITIONER

## 2024-06-18 RX ORDER — HYDRALAZINE HYDROCHLORIDE 25 MG/1
25 TABLET, FILM COATED ORAL EVERY 12 HOURS
Qty: 60 TABLET | Refills: 1 | Status: SHIPPED | OUTPATIENT
Start: 2024-06-18 | End: 2025-06-18

## 2024-06-18 RX ORDER — LOSARTAN POTASSIUM 100 MG/1
100 TABLET ORAL DAILY
Qty: 90 TABLET | Refills: 2 | Status: SHIPPED | OUTPATIENT
Start: 2024-06-18

## 2024-06-18 RX ORDER — EZETIMIBE 10 MG/1
10 TABLET ORAL DAILY
Qty: 90 TABLET | Refills: 2 | Status: SHIPPED | OUTPATIENT
Start: 2024-06-18 | End: 2025-06-18

## 2024-06-18 RX ORDER — FERROUS SULFATE 325(65) MG
325 TABLET ORAL DAILY
Qty: 90 TABLET | Refills: 2 | Status: SHIPPED | OUTPATIENT
Start: 2024-06-18

## 2024-06-18 RX ORDER — ATORVASTATIN CALCIUM 40 MG/1
40 TABLET, FILM COATED ORAL NIGHTLY
Qty: 90 TABLET | Refills: 2 | Status: SHIPPED | OUTPATIENT
Start: 2024-06-18

## 2024-06-18 RX ORDER — INSULIN DETEMIR 100 [IU]/ML
INJECTION, SOLUTION SUBCUTANEOUS
Qty: 120 ML | Refills: 6 | Status: SHIPPED | OUTPATIENT
Start: 2024-06-18

## 2024-06-18 RX ORDER — NITROGLYCERIN 0.4 MG/1
0.4 TABLET SUBLINGUAL EVERY 5 MIN PRN
COMMUNITY
Start: 2024-05-07

## 2024-06-18 RX ORDER — METFORMIN HYDROCHLORIDE 1000 MG/1
1000 TABLET ORAL 2 TIMES DAILY WITH MEALS
Qty: 180 TABLET | Refills: 2 | Status: SHIPPED | OUTPATIENT
Start: 2024-06-18

## 2024-06-18 RX ORDER — AMLODIPINE BESYLATE 5 MG/1
5 TABLET ORAL DAILY
Qty: 90 TABLET | Refills: 2 | Status: SHIPPED | OUTPATIENT
Start: 2024-06-18

## 2024-06-18 RX ORDER — LINAGLIPTIN 5 MG/1
5 TABLET, FILM COATED ORAL DAILY
Qty: 90 TABLET | Refills: 2 | Status: SHIPPED | OUTPATIENT
Start: 2024-06-18

## 2024-06-27 NOTE — ASSESSMENT & PLAN NOTE
BP Readings from Last 3 Encounters:   06/18/24 (!) 96/59   04/17/24 (!) 159/73   02/28/24 138/68     Follow low sodium diet, < 2 gm/day (avoid high salty foods such as processed meats/ sausage/bean/ sandwich meat, chips, pickles, cheese, crackers and soft drinks/ electrolyte replacement drinks).  Avoid tobacco/ alcohol use  Educated on health benefits of at least 5 days/ week of 30 minutes moderate intensity exercise (brisk walking) and 2 or more days/ week of muscle strength activities  Decrease hydralazine to 25 b.i.d.  Monitor BP at home and if continued hypotensive readings and/or accompanied symptoms patient should notify provider or seek care in walk-in clinic/ER as needed.  Patient to keep follow up with cardiologist

## 2024-06-27 NOTE — ASSESSMENT & PLAN NOTE
Patient states he completed radiation and we will continue follow up with [facility] urology clinic for Lupron injections.

## 2024-07-29 ENCOUNTER — HOSPITAL ENCOUNTER (EMERGENCY)
Facility: HOSPITAL | Age: 74
Discharge: HOME | End: 2024-07-29
Attending: EMERGENCY MEDICINE
Payer: MEDICARE

## 2024-07-29 VITALS
SYSTOLIC BLOOD PRESSURE: 113 MMHG | HEIGHT: 72 IN | DIASTOLIC BLOOD PRESSURE: 63 MMHG | HEART RATE: 65 BPM | OXYGEN SATURATION: 97 % | RESPIRATION RATE: 18 BRPM | BODY MASS INDEX: 27.77 KG/M2 | TEMPERATURE: 99 F | WEIGHT: 205 LBS

## 2024-07-29 DIAGNOSIS — E86.0 DEHYDRATION: Primary | ICD-10-CM

## 2024-07-29 DIAGNOSIS — R53.1 WEAKNESS: ICD-10-CM

## 2024-07-29 DIAGNOSIS — R55 NEAR SYNCOPE: ICD-10-CM

## 2024-07-29 LAB
ALBUMIN SERPL-MCNC: 4 G/DL (ref 3.4–4.8)
ALBUMIN/GLOB SERPL: 1.1 RATIO (ref 1.1–2)
ALP SERPL-CCNC: 88 UNIT/L (ref 40–150)
ALT SERPL-CCNC: 18 UNIT/L (ref 0–55)
ANION GAP SERPL CALC-SCNC: 12 MEQ/L
AST SERPL-CCNC: 18 UNIT/L (ref 5–34)
BACTERIA #/AREA URNS AUTO: ABNORMAL /HPF
BASOPHILS # BLD AUTO: 0.03 X10(3)/MCL
BASOPHILS NFR BLD AUTO: 0.5 %
BILIRUB SERPL-MCNC: 0.5 MG/DL
BILIRUB UR QL STRIP.AUTO: NEGATIVE
BNP BLD-MCNC: 79.7 PG/ML
BUN SERPL-MCNC: 23.6 MG/DL (ref 8.4–25.7)
CALCIUM SERPL-MCNC: 9.5 MG/DL (ref 8.8–10)
CHLORIDE SERPL-SCNC: 110 MMOL/L (ref 98–107)
CK SERPL-CCNC: 246 U/L (ref 30–200)
CLARITY UR: CLEAR
CO2 SERPL-SCNC: 19 MMOL/L (ref 23–31)
COLOR UR AUTO: YELLOW
CREAT SERPL-MCNC: 1.63 MG/DL (ref 0.73–1.18)
CREAT/UREA NIT SERPL: 14
EOSINOPHIL # BLD AUTO: 0.03 X10(3)/MCL (ref 0–0.9)
EOSINOPHIL NFR BLD AUTO: 0.5 %
ERYTHROCYTE [DISTWIDTH] IN BLOOD BY AUTOMATED COUNT: 13.2 % (ref 11.5–17)
GFR SERPLBLD CREATININE-BSD FMLA CKD-EPI: 44 ML/MIN/1.73/M2
GLOBULIN SER-MCNC: 3.6 GM/DL (ref 2.4–3.5)
GLUCOSE SERPL-MCNC: 126 MG/DL (ref 82–115)
GLUCOSE UR QL STRIP: NORMAL
HCT VFR BLD AUTO: 33.2 % (ref 42–52)
HGB BLD-MCNC: 11.6 G/DL (ref 14–18)
HGB UR QL STRIP: NEGATIVE
HYALINE CASTS #/AREA URNS LPF: >20 /LPF
IMM GRANULOCYTES # BLD AUTO: 0.02 X10(3)/MCL (ref 0–0.04)
IMM GRANULOCYTES NFR BLD AUTO: 0.3 %
KETONES UR QL STRIP: NEGATIVE
LEUKOCYTE ESTERASE UR QL STRIP: NEGATIVE
LYMPHOCYTES # BLD AUTO: 2.06 X10(3)/MCL (ref 0.6–4.6)
LYMPHOCYTES NFR BLD AUTO: 32.2 %
MCH RBC QN AUTO: 33 PG (ref 27–31)
MCHC RBC AUTO-ENTMCNC: 34.9 G/DL (ref 33–36)
MCV RBC AUTO: 94.3 FL (ref 80–94)
MONOCYTES # BLD AUTO: 0.45 X10(3)/MCL (ref 0.1–1.3)
MONOCYTES NFR BLD AUTO: 7 %
MUCOUS THREADS URNS QL MICRO: ABNORMAL /LPF
NEUTROPHILS # BLD AUTO: 3.8 X10(3)/MCL (ref 2.1–9.2)
NEUTROPHILS NFR BLD AUTO: 59.5 %
NITRITE UR QL STRIP: NEGATIVE
NRBC BLD AUTO-RTO: 0 %
OHS QRS DURATION: 172 MS
OHS QTC CALCULATION: 493 MS
PH UR STRIP: 5.5 [PH]
PLATELET # BLD AUTO: 298 X10(3)/MCL (ref 130–400)
PMV BLD AUTO: 9.7 FL (ref 7.4–10.4)
POTASSIUM SERPL-SCNC: 4.2 MMOL/L (ref 3.5–5.1)
PROT SERPL-MCNC: 7.6 GM/DL (ref 5.8–7.6)
PROT UR QL STRIP: ABNORMAL
RBC # BLD AUTO: 3.52 X10(6)/MCL (ref 4.7–6.1)
RBC #/AREA URNS AUTO: ABNORMAL /HPF
SODIUM SERPL-SCNC: 141 MMOL/L (ref 136–145)
SP GR UR STRIP.AUTO: 1.02 (ref 1–1.03)
SQUAMOUS #/AREA URNS LPF: ABNORMAL /HPF
TROPONIN I SERPL-MCNC: <0.01 NG/ML (ref 0–0.04)
UROBILINOGEN UR STRIP-ACNC: NORMAL
WBC # BLD AUTO: 6.39 X10(3)/MCL (ref 4.5–11.5)
WBC #/AREA URNS AUTO: ABNORMAL /HPF

## 2024-07-29 PROCEDURE — 85025 COMPLETE CBC W/AUTO DIFF WBC: CPT

## 2024-07-29 PROCEDURE — 99285 EMERGENCY DEPT VISIT HI MDM: CPT | Mod: 25

## 2024-07-29 PROCEDURE — 93005 ELECTROCARDIOGRAM TRACING: CPT

## 2024-07-29 PROCEDURE — 82550 ASSAY OF CK (CPK): CPT

## 2024-07-29 PROCEDURE — 80053 COMPREHEN METABOLIC PANEL: CPT

## 2024-07-29 PROCEDURE — 63600175 PHARM REV CODE 636 W HCPCS: Performed by: EMERGENCY MEDICINE

## 2024-07-29 PROCEDURE — 96360 HYDRATION IV INFUSION INIT: CPT

## 2024-07-29 PROCEDURE — 81001 URINALYSIS AUTO W/SCOPE: CPT

## 2024-07-29 PROCEDURE — 63600175 PHARM REV CODE 636 W HCPCS

## 2024-07-29 PROCEDURE — 83880 ASSAY OF NATRIURETIC PEPTIDE: CPT

## 2024-07-29 PROCEDURE — 93010 ELECTROCARDIOGRAM REPORT: CPT | Mod: ,,, | Performed by: INTERNAL MEDICINE

## 2024-07-29 PROCEDURE — 84484 ASSAY OF TROPONIN QUANT: CPT

## 2024-07-29 RX ADMIN — SODIUM CHLORIDE, POTASSIUM CHLORIDE, SODIUM LACTATE AND CALCIUM CHLORIDE 1000 ML: 600; 310; 30; 20 INJECTION, SOLUTION INTRAVENOUS at 04:07

## 2024-07-29 RX ADMIN — SODIUM CHLORIDE, POTASSIUM CHLORIDE, SODIUM LACTATE AND CALCIUM CHLORIDE 1000 ML: 600; 310; 30; 20 INJECTION, SOLUTION INTRAVENOUS at 12:07

## 2024-07-29 NOTE — DISCHARGE INSTRUCTIONS
Try to make sure you are drinking plenty of water and taking breaks when working outside. Call your primary care provider to schedule follow up and have your labs repeated within the next week. Do not take your losartan for the next couple of days until you have those repeat labs and your PCP tells you it is ok

## 2024-07-29 NOTE — FIRST PROVIDER EVALUATION
Medical screening examination initiated.  I have conducted a focused provider triage encounter, findings are as follows:    Brief history of present illness:  arrived to ED via EMS due to blurry vision and weakness. Reports symptoms began while cutting grass outside. States weakness has resolved but he still has blurred vision.  No additional complaints.     Vitals:    07/29/24 1217   BP: 113/63   Pulse: 65   Resp: 18   Temp: 98.5 °F (36.9 °C)   TempSrc: Oral   SpO2: 97%   Weight: 93 kg (205 lb)   Height: 6' (1.829 m)       Pertinent physical exam:  awake, alert, has non-labored breathing, GCS 15. No extremity weakness, slurred speech, or facial droop on exam    Brief workup plan:  labs, EKG, imaging     Preliminary workup initiated; this workup will be continued and followed by the physician or advanced practice provider that is assigned to the patient when roomed.

## 2024-07-29 NOTE — ED PROVIDER NOTES
Encounter Date: 7/29/2024    SCRIBE #1 NOTE: I, Tyler Butler, am scribing for, and in the presence of,  Chelsie Alberts MD. I have scribed the following portions of the note - Other sections scribed: HPI, ROS, and PE..       History     Chief Complaint   Patient presents with    Blurred Vision     C/o weakness and blurred vision which began this morning while cutting the grass. On arrival, pt states weakness has resolved but he is still having changes in vision when moving around. GCS 15.     Patient is a 74 y.o. male with a PMHx of CAD, HLD, HTN, sleep apnea who presents today via [facility] for weakness and blurred vision which onset PTA. Patient states he was cutting grass when his legs started to feel weak and started having hazy blurred vision, which he states have both subsided since arriving at ED. He does admit to not drinking much water when taking breaks. Says he checked with at home monitor and was hypotensive. No other associated symptoms. Patient denies any chest pain, shortness of breath, and palpitations.     PCP: Yolanda Mendoza NP  Cardiologist: Baljit Sánchez MD    The history is provided by the patient. No  was used.     Review of patient's allergies indicates:  No Known Allergies  Past Medical History:   Diagnosis Date    Coronary artery disease     Hyperlipidemia     Hypertension     Personal history of colonic polyps     Sleep apnea, unspecified      Past Surgical History:   Procedure Laterality Date    A-V CARDIAC PACEMAKER INSERTION N/A 11/9/2023    Procedure: INSERTION, CARDIAC PACEMAKER, DUAL CHAMBER;  Surgeon: Baljit Sánchez MD;  [facility] C[facility] LAB;  Service: Cardiology;  Laterality: N/A;  DUAL CHAMBER PPM (SJM)    COLONOSCOPY      CORONARY ARTERY BYPASS GRAFT (CABG)      TRANSRECTAL BIOPSY OF PROSTATE WITH ULTRASOUND GUIDANCE Bilateral 1/2/2024    Procedure: BIOPSY, PROSTATE, RECTAL APPROACH, WITH US GUIDANCE;  Surgeon: Miracle Pierre DO;  Location: [facility] ENDOSCOPY;  Service:  Urology;  Laterality: Bilateral;     Family History   Problem Relation Name Age of Onset    Colon cancer Mother      Diabetes Mellitus Mother      Hypertension Mother      Cataracts Father      Heart attack Father      Heart disease Father       Social History     Tobacco Use    Smoking status: Never     Passive exposure: Never    Smokeless tobacco: Never   Substance Use Topics    Alcohol use: Not Currently    Drug use: Never     Review of Systems   Constitutional:         Generalized leg weakness    Eyes:  Positive for visual disturbance.   Respiratory:  Negative for shortness of breath.    Cardiovascular:  Negative for chest pain and palpitations.       Physical Exam     Initial Vitals [07/29/24 1217]   BP Pulse Resp Temp SpO2   113/63 65 18 98.5 °F (36.9 °C) 97 %      MAP       --         Physical Exam    Nursing note and vitals reviewed.  Constitutional: He appears well-developed and well-nourished. He is not diaphoretic. No distress.   HENT:   Head: Normocephalic and atraumatic.   Nose: Nose normal.   Mouth/Throat: Oropharynx is clear and moist.   Eyes: Conjunctivae and EOM are normal. Pupils are equal, round, and reactive to light.   Neck: Trachea normal. Neck supple.   Normal range of motion.  Cardiovascular:  Normal rate, regular rhythm, normal heart sounds and intact distal pulses.     Exam reveals no gallop and no friction rub.       No murmur heard.  Pacemaker to left chest wall.   Pulmonary/Chest: Breath sounds normal. No respiratory distress. He has no wheezes. He has no rhonchi. He has no rales. He exhibits no tenderness.   Abdominal: Abdomen is soft. Bowel sounds are normal. He exhibits no distension and no mass. There is no abdominal tenderness. There is no rebound.   Musculoskeletal:         General: No tenderness or edema. Normal range of motion.      Cervical back: Normal range of motion and neck supple.      Lumbar back: Normal. No tenderness. Normal range of motion.     Neurological: He is alert  and oriented to person, place, and time. He has normal strength. No cranial nerve deficit or sensory deficit.   Skin: Skin is warm and dry. Capillary refill takes less than 2 seconds. No rash and no abscess noted. No erythema. No pallor.   Psychiatric: He has a normal mood and affect. His behavior is normal. Judgment and thought content normal.         ED Course   Procedures  Labs Reviewed   COMPREHENSIVE METABOLIC PANEL - Abnormal       Result Value    Sodium 141      Potassium 4.2      Chloride 110 (*)     CO2 19 (*)     Glucose 126 (*)     Blood Urea Nitrogen 23.6      Creatinine 1.63 (*)     Calcium 9.5      Protein Total 7.6      Albumin 4.0      Globulin 3.6 (*)     Albumin/Globulin Ratio 1.1      Bilirubin Total 0.5      ALP 88      ALT 18      AST 18      eGFR 44      Anion Gap 12.0      BUN/Creatinine Ratio 14     CK - Abnormal    Creatine Kinase 246 (*)    URINALYSIS, REFLEX TO URINE CULTURE - Abnormal    Color, UA Yellow      Appearance, UA Clear      Specific Gravity, UA 1.022      pH, UA 5.5      Protein, UA 1+ (*)     Glucose, UA Normal      Ketones, UA Negative      Blood, UA Negative      Bilirubin, UA Negative      Urobilinogen, UA Normal      Nitrites, UA Negative      Leukocyte Esterase, UA Negative      RBC, UA 0-5      WBC, UA 0-5      Bacteria, UA None Seen      Squamous Epithelial Cells, UA None Seen      Mucous, UA Trace (*)     Hyaline Casts, UA >20 (*)    CBC WITH DIFFERENTIAL - Abnormal    WBC 6.39      RBC 3.52 (*)     Hgb 11.6 (*)     Hct 33.2 (*)     MCV 94.3 (*)     MCH 33.0 (*)     MCHC 34.9      RDW 13.2      Platelet 298      MPV 9.7      Neut % 59.5      Lymph % 32.2      Mono % 7.0      Eos % 0.5      Basophil % 0.5      Lymph # 2.06      Neut # 3.80      Mono # 0.45      Eos # 0.03      Baso # 0.03      IG# 0.02      IG% 0.3      NRBC% 0.0     TROPONIN I - Normal    Troponin-I <0.010     B-TYPE NATRIURETIC PEPTIDE - Normal    Natriuretic Peptide 79.7     CBC W/ AUTO DIFFERENTIAL     Narrative:     The following orders were created for panel order CBC auto differential.  Procedure                               Abnormality         Status                     ---------                               -----------         ------                     CBC with Differential[[id number]]       Abnormal            Final result                 Please view results for these tests on the individual orders.     EKG Readings: (Independently Interpreted)   See ed course     ECG Results              EKG 12-lead (Final result)        Collection Time Result Time QRS Duration OHS QTC Calculation    07/29/24 12:21:30 07/29/24 13:24:50 172 493                     Final result by Interface, Lab In [facility] (07/29/24 13:25:02)                   Narrative:    Test Reason : R53.1,    Vent. Rate : 063 BPM     Atrial Rate : 063 BPM     P-R Int : 202 ms          QRS Dur : 172 ms      QT Int : 482 ms       P-R-T Axes : 023 -60 094 degrees     QTc Int : 493 ms    Atrial-sensed ventricular-paced rhythm  Abnormal ECG  When compared with ECG of 09-NOV-2023 14:37,  No significant change was found  Confirmed by Colton Palma MD ([id number]) on 7/29/2024 1:24:49 PM    Referred By:             Confirmed By:Colton Palma MD                                  Imaging Results              CT Head Without Contrast (Final result)  Result time 07/29/24 13:16:48      Final result by Richard Cooper MD (07/29/24 13:16:48)                   Impression:      No acute intracranial findings.      Electronically signed by: Richard Cooper  Date:    07/29/2024  Time:    13:16               Narrative:    EXAMINATION:  CT HEAD WITHOUT CONTRAST    CLINICAL HISTORY:  Dizziness, persistent/recurrent, cardiac or vascular cause suspected;    TECHNIQUE:  CT imaging of the head performed from the skull base to the vertex without intravenous contrast.  DLP 1102 mGycm. Automatic exposure control, adjustment of mA/kV or iterative reconstruction technique was used to reduce  radiation.    COMPARISON:  None Available.    FINDINGS:  There is no acute cortical infarct, hemorrhage or mass lesion.  There is moderate patchy hypoattenuation in the cerebral white matter which is nonspecific but most commonly associated with chronic small vessel ischemic changes.  Ventricles are not significantly enlarged.  There are vascular calcifications.    Visualized paranasal sinuses and mastoid air cells are clear.                                       Medications   lactated ringers bolus 1,000 mL (0 mLs Intravenous Stopped 7/29/24 1338)   lactated ringers bolus 1,000 mL (1,000 mLs Intravenous New Bag 7/29/24 1637)     Medical Decision Making  The differential diagnosis includes, but is not limited to, dehydration, renal failure, dysrhythmia, electrolyte imbalance. Cva, tia  Cbc, cmp, trop, bnp, EKG, ct head ordereda nd reviewed and pacemaker interrogated  S/s most consistent with near syncope due to ivvd  Given ivf with resolution  No focal deficits reporte dto suggest tia  Pacemaker interrogated without abnormality  Discussed holding arb, close f/u with pcp and he voiced understanding and agrees and is comfortabe with plan    Problems Addressed:  Dehydration: acute illness or injury that poses a threat to life or bodily functions  Near syncope: acute illness or injury that poses a threat to life or bodily functions  Weakness: acute illness or injury that poses a threat to life or bodily functions    Amount and/or Complexity of Data Reviewed  External Data Reviewed: labs.     Details: Cr increased  Labs: ordered.  Radiology: ordered and independent interpretation performed.  ECG/medicine tests: ordered and independent interpretation performed. Decision-making details documented in ED Course.    Risk  OTC drugs.  Prescription drug management.            Scribe Attestation:   Scribe #1: I performed the above scribed service and the documentation accurately describes the services I performed. I attest to  the accuracy of the note.  Comments: Attending:   Physician Attestation Statement for Scribe #1: IChelsie MD, personally performed the services described in this documentation. All medical record entries made by the scribe were at my direction and in my presence.  I have reviewed the chart and agree that the record reflects my personal performance and is accurate and complete.        Attending Attestation:           Physician Attestation for Scribe:  Physician Attestation Statement for Scribe #1: IChelsie MD, reviewed documentation, as scribed by Tyler Butler in my presence, and it is both accurate and complete.             ED Course as of 07/29/24 2038 Mon Jul 29, 2024   1543 Obtained at 12:21 p.m. rate of 63 atrial sensed ventricular paced rhythm [BS]   1731 Pacemaker interrogated, no dysfunction, discussed holding arb [BS]      ED Course User Index  [BS] Chelsie Alberts MD                           Clinical Impression:  Final diagnoses:  [R53.1] Weakness  [E86.0] Dehydration (Primary)  [R55] Near syncope          ED Disposition Condition    Discharge Stable          ED Prescriptions    None       Follow-up Information       Follow up With Specialties Details Why Contact Info    Yolanda Mendoza, NP Family Medicine Schedule an appointment as soon as possible for a visit   [address] [facility] [address] [phone]      [facility] - Emergency Dept Emergency Medicine  As needed, If symptoms worsen [address] [facility] [address]  [phone]             Chelsie Alberts MD  07/29/24 2038

## 2024-08-01 ENCOUNTER — LAB VISIT (OUTPATIENT)
Dept: LAB | Facility: HOSPITAL | Age: 74
End: 2024-08-01
Attending: NURSE PRACTITIONER
Payer: MEDICARE

## 2024-08-01 ENCOUNTER — OFFICE VISIT (OUTPATIENT)
Dept: INTERNAL MEDICINE | Facility: CLINIC | Age: 74
End: 2024-08-01
Payer: MEDICARE

## 2024-08-01 VITALS
HEART RATE: 68 BPM | OXYGEN SATURATION: 97 % | DIASTOLIC BLOOD PRESSURE: 61 MMHG | TEMPERATURE: 98 F | BODY MASS INDEX: 27.66 KG/M2 | RESPIRATION RATE: 18 BRPM | HEIGHT: 72 IN | WEIGHT: 204.19 LBS | SYSTOLIC BLOOD PRESSURE: 105 MMHG

## 2024-08-01 DIAGNOSIS — R55 NEAR SYNCOPE: Primary | ICD-10-CM

## 2024-08-01 DIAGNOSIS — I10 PRIMARY HYPERTENSION: ICD-10-CM

## 2024-08-01 DIAGNOSIS — Z79.4 TYPE 2 DIABETES MELLITUS WITHOUT COMPLICATION, WITH LONG-TERM CURRENT USE OF INSULIN: ICD-10-CM

## 2024-08-01 DIAGNOSIS — E11.9 TYPE 2 DIABETES MELLITUS WITHOUT COMPLICATION, WITH LONG-TERM CURRENT USE OF INSULIN: ICD-10-CM

## 2024-08-01 DIAGNOSIS — D64.9 NORMOCYTIC NORMOCHROMIC ANEMIA: ICD-10-CM

## 2024-08-01 DIAGNOSIS — C61 PROSTATE CANCER: ICD-10-CM

## 2024-08-01 LAB
ALBUMIN SERPL-MCNC: 3.7 G/DL (ref 3.4–4.8)
ALBUMIN/GLOB SERPL: 0.9 RATIO (ref 1.1–2)
ALP SERPL-CCNC: 96 UNIT/L (ref 40–150)
ALT SERPL-CCNC: 14 UNIT/L (ref 0–55)
ANION GAP SERPL CALC-SCNC: 7 MEQ/L
AST SERPL-CCNC: 16 UNIT/L (ref 5–34)
BASOPHILS # BLD AUTO: 0.04 X10(3)/MCL
BASOPHILS NFR BLD AUTO: 0.7 %
BILIRUB SERPL-MCNC: 0.3 MG/DL
BUN SERPL-MCNC: 18 MG/DL (ref 8.4–25.7)
CALCIUM SERPL-MCNC: 9.5 MG/DL (ref 8.8–10)
CHLORIDE SERPL-SCNC: 112 MMOL/L (ref 98–107)
CO2 SERPL-SCNC: 22 MMOL/L (ref 23–31)
CREAT SERPL-MCNC: 1.11 MG/DL (ref 0.73–1.18)
CREAT UR-MCNC: 175 MG/DL (ref 63–166)
CREAT/UREA NIT SERPL: 16
EOSINOPHIL # BLD AUTO: 0.05 X10(3)/MCL (ref 0–0.9)
EOSINOPHIL NFR BLD AUTO: 0.9 %
ERYTHROCYTE [DISTWIDTH] IN BLOOD BY AUTOMATED COUNT: 12.9 % (ref 11.5–17)
EST. AVERAGE GLUCOSE BLD GHB EST-MCNC: 145.6 MG/DL
GFR SERPLBLD CREATININE-BSD FMLA CKD-EPI: >60 ML/MIN/1.73/M2
GLOBULIN SER-MCNC: 4.1 GM/DL (ref 2.4–3.5)
GLUCOSE SERPL-MCNC: 154 MG/DL (ref 82–115)
HBA1C MFR BLD: 6.7 %
HCT VFR BLD AUTO: 31.4 % (ref 42–52)
HGB BLD-MCNC: 11.1 G/DL (ref 14–18)
IMM GRANULOCYTES # BLD AUTO: 0.02 X10(3)/MCL (ref 0–0.04)
IMM GRANULOCYTES NFR BLD AUTO: 0.3 %
LYMPHOCYTES # BLD AUTO: 2.59 X10(3)/MCL (ref 0.6–4.6)
LYMPHOCYTES NFR BLD AUTO: 45 %
MCH RBC QN AUTO: 33.7 PG (ref 27–31)
MCHC RBC AUTO-ENTMCNC: 35.4 G/DL (ref 33–36)
MCV RBC AUTO: 95.4 FL (ref 80–94)
MICROALBUMIN UR-MCNC: 85.2 UG/ML
MICROALBUMIN/CREAT RATIO PNL UR: 48.7 MG/GM CR (ref 0–30)
MONOCYTES # BLD AUTO: 0.63 X10(3)/MCL (ref 0.1–1.3)
MONOCYTES NFR BLD AUTO: 10.9 %
NEUTROPHILS # BLD AUTO: 2.43 X10(3)/MCL (ref 2.1–9.2)
NEUTROPHILS NFR BLD AUTO: 42.2 %
NRBC BLD AUTO-RTO: 0 %
PLATELET # BLD AUTO: 303 X10(3)/MCL (ref 130–400)
PMV BLD AUTO: 9.7 FL (ref 7.4–10.4)
POTASSIUM SERPL-SCNC: 4 MMOL/L (ref 3.5–5.1)
PROT SERPL-MCNC: 7.8 GM/DL (ref 5.8–7.6)
PSA SERPL-MCNC: <0.1 NG/ML
RBC # BLD AUTO: 3.29 X10(6)/MCL (ref 4.7–6.1)
SODIUM SERPL-SCNC: 141 MMOL/L (ref 136–145)
WBC # BLD AUTO: 5.76 X10(3)/MCL (ref 4.5–11.5)

## 2024-08-01 PROCEDURE — 80053 COMPREHEN METABOLIC PANEL: CPT

## 2024-08-01 PROCEDURE — 99215 OFFICE O/P EST HI 40 MIN: CPT | Mod: PBBFAC | Performed by: NURSE PRACTITIONER

## 2024-08-01 PROCEDURE — 83036 HEMOGLOBIN GLYCOSYLATED A1C: CPT

## 2024-08-01 PROCEDURE — 85025 COMPLETE CBC W/AUTO DIFF WBC: CPT

## 2024-08-01 PROCEDURE — 84153 ASSAY OF PSA TOTAL: CPT

## 2024-08-01 PROCEDURE — 82570 ASSAY OF URINE CREATININE: CPT

## 2024-08-01 PROCEDURE — 82043 UR ALBUMIN QUANTITATIVE: CPT

## 2024-08-01 PROCEDURE — 36415 COLL VENOUS BLD VENIPUNCTURE: CPT | Mod: 91

## 2024-08-01 NOTE — PROGRESS NOTES
Yolanda L Reji, NP   [facility]  [address] [facility][1] [address]      PATIENT NAME: Rogelio Johnson  : 1950  DATE: 24  MRN: [id number]        History of Present Illness / Problem Focused Workflow     Rogelio Johnson presents to the clinic with Follow-up (ED follow up-  C/O leg weakness.)     HPI: 73 yo male for ER follow up.  He is accompanied by his wife today.  Presented to ER 24 for weakness, dizziness and low BP reading at home. He states he was working outside in the heat. Admits he does not drink enough water as he should. BP today 105/61.  Upon review of his last recent visit his blood pressure was mildly hypotensive.  At that time he denied any symptoms.  He is unsure which blood pressure medications he is taking but recalls taking amlodipine 5 mg, carvedilol 6.25 mg b.i.d. and did lower dosage of hydralazine 25 mg 2 b.i.d..  He states since ER visit ER provider instructed him to hold losartan until he was seen today.  He did complete labs for this visit with improved kidney functions as he had mildly elevated creatinine level and reduced GFR upon presentation on 2024.  States he is feeling much better.  He does have follow up with his cardiologist next week.  Does have some bilateral feet heaviness with improvement with elevation.  He states he will discuss with his cardiologist.  He otherwise denies any other concerns or complaints at this time.    Review of Systems     Review of Systems   Constitutional: Negative.    HENT: Negative.     Eyes: Negative.    Respiratory: Negative.     Cardiovascular: Negative.    Gastrointestinal: Negative.    Endocrine: Negative.    Genitourinary: Negative.    Musculoskeletal: Negative.    Skin: Negative.    Allergic/Immunologic: Negative.    Neurological: Negative.    Hematological: Negative.    Psychiatric/Behavioral: Negative.         Medical / Social / Family History      -------------------------------------    Coronary artery disease    Hyperlipidemia    Hypertension    Personal history of colonic polyps    Sleep apnea, unspecified        Past Surgical History:   Procedure Laterality Date    A-V CARDIAC PACEMAKER INSERTION N/A 11/9/2023    Procedure: INSERTION, CARDIAC PACEMAKER, DUAL CHAMBER;  Surgeon: Baljit Sánchez MD;  Location: [facility] CATH LAB;  Service: Cardiology;  Laterality: N/A;  DUAL CHAMBER PPM (SJM)    COLONOSCOPY      CORONARY ARTERY BYPASS GRAFT (CABG)      TRANSRECTAL BIOPSY OF PROSTATE WITH ULTRASOUND GUIDANCE Bilateral 1/2/2024    Procedure: BIOPSY, PROSTATE, RECTAL APPROACH, WITH US GUIDANCE;  Surgeon: Miracle Pierre DO;  Location: [facility] ENDOSCOPY;  Service: Urology;  Laterality: Bilateral;       Social History     Socioeconomic History    Marital status:    Tobacco Use    Smoking status: Never     Passive exposure: Never    Smokeless tobacco: Never   Substance and Sexual Activity    Alcohol use: Not Currently    Drug use: Never    Sexual activity: Not Currently     Social Determinants of Health     Financial Resource Strain: High Risk (8/1/2024)    Overall Financial Resource Strain (CARDIA)     Difficulty of Paying Living Expenses: Hard   Food Insecurity: Food Insecurity Present (8/1/2024)    Hunger Vital Sign     Worried About Running Out of Food in the Last Year: Sometimes true     Ran Out of Food in the Last Year: Sometimes true   Transportation Needs: No Transportation Needs (8/1/2024)    TRANSPORTATION NEEDS     Transportation : No   Physical Activity: Sufficiently Active (5/3/2023)    Exercise Vital Sign     Days of Exercise per Week: 5 days     Minutes of Exercise per Session: 30 min   Stress: Stress Concern Present (8/1/2024)    [employer] - Occupational Stress Questionnaire     Feeling of Stress : To some extent   Housing Stability: Low Risk  (8/1/2024)    Housing Stability Vital Sign     Unable to Pay for Housing  "in the Last Year: No     Homeless in the Last Year: No        Family History   Problem Relation Name Age of Onset    Colon cancer Mother      Diabetes Mellitus Mother      Hypertension Mother      Cataracts Father      Heart attack Father      Heart disease Father          Medications and Allergies     Medications  Current Outpatient Medications   Medication Instructions    amLODIPine (NORVASC) 5 mg, Oral, Daily    aspirin (ECOTRIN) 81 mg, Oral, Daily    atorvastatin (LIPITOR) 40 mg, Oral, Nightly    baclofen (LIORESAL) 10 mg, Oral, 3 times daily    busPIRone (BUSPAR) 7.5 mg, Oral, 2 times daily PRN    carvediloL (COREG) 6.25 mg, Oral, 2 times daily    docusate sodium (COLACE) 100 mg, Oral    empagliflozin (JARDIANCE) 10 mg, Oral, Every morning    ezetimibe (ZETIA) 10 mg, Oral, Daily    ferrous sulfate (FEROSUL) 325 mg, Oral, Daily    finasteride (PROSCAR) 5 mg, Oral, Daily    fluticasone propionate (FLONASE) 100 mcg, Each Nostril, Daily    hydrALAZINE (APRESOLINE) 25 mg, Oral, Every 12 hours    LEVEMIR FLEXTOUCH U100 INSULIN 100 unit/mL (3 mL) InPn pen Administer 68 units SC q AM and 62 units SC q HS. Rotate injection sites.    losartan (COZAAR) 100 mg, Oral, Daily    metFORMIN (GLUCOPHAGE) 1,000 mg, Oral, 2 times daily with meals    nitroGLYCERIN (NITROSTAT) 0.4 mg, Sublingual, Every 5 min PRN    TRADJENTA 5 mg, Oral, Daily       Allergies  Review of patient's allergies indicates:  No Known Allergies    Physical Examination     Visit Vitals  /61 (BP Location: Right arm, Patient Position: Sitting, BP Method: Large (Automatic))   Pulse 68   Temp 97.8 °F (36.6 °C) (Oral)   Resp 18   Ht 6' 0.01" (1.829 m)   Wt 92.6 kg (204 lb 3.2 oz)   SpO2 97%   BMI 27.69 kg/m²       Physical Exam  Constitutional:       General: He is not in acute distress.     Appearance: Normal appearance. He is normal weight. He is not ill-appearing, toxic-appearing or diaphoretic.   HENT:      Head: Normocephalic.   Cardiovascular:      " Rate and Rhythm: Normal rate and regular rhythm.      Pulses:           Dorsalis pedis pulses are 2+ on the right side and 2+ on the left side.        Posterior tibial pulses are 2+ on the right side and 2+ on the left side.      Heart sounds: Murmur heard.   Pulmonary:      Effort: Pulmonary effort is normal. No respiratory distress.      Breath sounds: Normal breath sounds. No stridor. No wheezing, rhonchi or rales.   Musculoskeletal:      Right lower leg: No edema.      Left lower leg: No edema.   Skin:     General: Skin is warm and dry.   Neurological:      General: No focal deficit present.      Mental Status: He is alert and oriented to person, place, and time. Mental status is at baseline.      Motor: No weakness.      Coordination: Coordination normal.      Gait: Gait normal.   Psychiatric:         Mood and Affect: Mood normal.         Behavior: Behavior normal.         Thought Content: Thought content normal.         Judgment: Judgment normal.           Results     Lab Results   Component Value Date    WBC 5.76 08/01/2024    RBC 3.29 (L) 08/01/2024    HGB 11.1 (L) 08/01/2024    HCT 31.4 (L) 08/01/2024    MCV 95.4 (H) 08/01/2024    MCH 33.7 (H) 08/01/2024    MCHC 35.4 08/01/2024    RDW 12.9 08/01/2024     08/01/2024    MPV 9.7 08/01/2024     Sodium   Date Value Ref Range Status   08/01/2024 141 136 - 145 mmol/L Final   12/31/2021 137 136 - 145 mmol/L Final     Potassium   Date Value Ref Range Status   08/01/2024 4.0 3.5 - 5.1 mmol/L Final   12/31/2021 3.7 3.5 - 5.1 mmol/L Final     Chloride   Date Value Ref Range Status   08/01/2024 112 (H) 98 - 107 mmol/L Final   12/31/2021 110 (H) 100 - 109 mmol/L Final     CO2   Date Value Ref Range Status   08/01/2024 22 (L) 23 - 31 mmol/L Final     Carbon Dioxide   Date Value Ref Range Status   12/31/2021 20 (L) 22 - 33 mmol/L Final     Blood Urea Nitrogen   Date Value Ref Range Status   08/01/2024 18.0 8.4 - 25.7 mg/dL Final   12/31/2021 7 5 - 25 mg/dL Final  "    Creatinine   Date Value Ref Range Status   08/01/2024 1.11 0.73 - 1.18 mg/dL Final   12/31/2021 0.75 0.57 - 1.25 mg/dL Final     Calcium   Date Value Ref Range Status   08/01/2024 9.5 8.8 - 10.0 mg/dL Final   12/31/2021 8.0 (L) 8.8 - 10.6 mg/dL Final     Albumin   Date Value Ref Range Status   08/01/2024 3.7 3.4 - 4.8 g/dL Final     Bilirubin Total   Date Value Ref Range Status   08/01/2024 0.3 <=1.5 mg/dL Final     ALP   Date Value Ref Range Status   08/01/2024 96 40 - 150 unit/L Final     AST   Date Value Ref Range Status   08/01/2024 16 5 - 34 unit/L Final     ALT   Date Value Ref Range Status   08/01/2024 14 0 - 55 unit/L Final     Anion Gap   Date Value Ref Range Status   12/31/2021 7 (L) 8 - 16 mmol/L Final     eGFR    Date Value Ref Range Status   12/31/2021 124 >=60 mL/min/1.73mSq Final     Estimated GFR-Non    Date Value Ref Range Status   04/18/2022 66 >=90      Lab Results   Component Value Date    CHOL 133 11/06/2023     Lab Results   Component Value Date    HDL 48 11/06/2023     No results found for: "LDLCALC"  Lab Results   Component Value Date    TRIG 228 (H) 11/06/2023     No results found for: "CHOLHDL"  Lab Results   Component Value Date    TSH 1.117 11/06/2023     Lab Results   Component Value Date    PHUR 5.5 07/29/2024    SPECGRAV 1.020 01/10/2024    PROTEINUA 1+ (A) 07/29/2024    GLUCUA Normal 07/29/2024    KETONESU Negative 01/10/2024    OCCULTUA Negative 07/29/2024    NITRITE Negative 07/29/2024    LEUKOCYTESUR Negative 07/29/2024     Lab Results   Component Value Date    HGBA1C 6.7 08/01/2024    HGBA1C 6.0 11/06/2023    HGBA1C 6.1 10/13/2023     No results found for: "MICROALBUR", "[id number]"   Results for orders placed in visit on 10/10/22    Diabetic Eye Screening Photo       CT head 7/29/24  Narrative & Impression  EXAMINATION:  CT HEAD WITHOUT CONTRAST     CLINICAL HISTORY:  Dizziness, persistent/recurrent, cardiac or vascular cause suspected;   "   TECHNIQUE:  CT imaging of the head performed from the skull base to the vertex without intravenous contrast.  DLP 1102 mGycm. Automatic exposure control, adjustment of mA/kV or iterative reconstruction technique was used to reduce radiation.     COMPARISON:  None Available.     FINDINGS:  There is no acute cortical infarct, hemorrhage or mass lesion.  There is moderate patchy hypoattenuation in the cerebral white matter which is nonspecific but most commonly associated with chronic small vessel ischemic changes.  Ventricles are not significantly enlarged.  There are vascular calcifications.     Visualized paranasal sinuses and mastoid air cells are clear.     Impression:     No acute intracranial findings.       Assessment       ICD-10-CM ICD-9-CM   1. Near syncope  R55 780.2       Plan       Problem List Items Addressed This Visit    None  Visit Diagnoses       Near syncope    -  Primary  Pt presented with near syncope 7/29/24 to ER at [facility] with dizziness and low BP after working in the yard and drinking little fluid. He states he is feeling better however BP remains lower than baseline at 105/61. Instructed pt to hold hydralazine and continue to hold losartan. Keep appt with Cardiologist next week and monitor BP at home to keep log.  If blood pressure should continue to remain hypotensive and/or any associated symptoms patient should notify provider and/or report to ER as necessary.  Patient to keep appointment with cardiologist and he will also follow up with me on August 13th as scheduled.            Future Appointments   Date Time Provider Department Center   8/13/2024  7:20 AM Yolanda Mendoza NP [facility]   9/4/2024  8:00 AM Miracle Pierre DO [facility]        No follow-ups on file.    Signature:  Yolanda Mendoza NP  [facility]  [address] [address] [address]    Date of encounter: 8/1/24

## 2024-08-01 NOTE — PATIENT INSTRUCTIONS
HOLD hydralazine and can continue to hold losartan.   Continue Carvedilol and Amlodipine as prescribed   F/u on 8/13/24 for recheck of BP and keep log of BP to bring to next clinic visit.

## 2024-08-13 ENCOUNTER — OFFICE VISIT (OUTPATIENT)
Dept: INTERNAL MEDICINE | Facility: CLINIC | Age: 74
End: 2024-08-13
Payer: MEDICARE

## 2024-08-13 VITALS
BODY MASS INDEX: 27.5 KG/M2 | RESPIRATION RATE: 18 BRPM | WEIGHT: 203 LBS | HEIGHT: 72 IN | DIASTOLIC BLOOD PRESSURE: 63 MMHG | OXYGEN SATURATION: 97 % | HEART RATE: 63 BPM | SYSTOLIC BLOOD PRESSURE: 111 MMHG | TEMPERATURE: 98 F

## 2024-08-13 DIAGNOSIS — M79.605 PAIN IN BOTH LOWER EXTREMITIES: ICD-10-CM

## 2024-08-13 DIAGNOSIS — Z79.4 TYPE 2 DIABETES MELLITUS WITHOUT COMPLICATION, WITH LONG-TERM CURRENT USE OF INSULIN: Primary | ICD-10-CM

## 2024-08-13 DIAGNOSIS — M79.604 PAIN IN BOTH LOWER EXTREMITIES: ICD-10-CM

## 2024-08-13 DIAGNOSIS — E11.9 TYPE 2 DIABETES MELLITUS WITHOUT COMPLICATION, WITH LONG-TERM CURRENT USE OF INSULIN: Primary | ICD-10-CM

## 2024-08-13 DIAGNOSIS — I10 PRIMARY HYPERTENSION: ICD-10-CM

## 2024-08-13 DIAGNOSIS — R45.89 ANXIETY ABOUT HEALTH: ICD-10-CM

## 2024-08-13 DIAGNOSIS — C61 PROSTATE CANCER: ICD-10-CM

## 2024-08-13 PROCEDURE — 3066F NEPHROPATHY DOC TX: CPT | Mod: CPTII,,, | Performed by: NURSE PRACTITIONER

## 2024-08-13 PROCEDURE — 3060F POS MICROALBUMINURIA REV: CPT | Mod: CPTII,,, | Performed by: NURSE PRACTITIONER

## 2024-08-13 PROCEDURE — 1126F AMNT PAIN NOTED NONE PRSNT: CPT | Mod: CPTII,,, | Performed by: NURSE PRACTITIONER

## 2024-08-13 PROCEDURE — 4010F ACE/ARB THERAPY RXD/TAKEN: CPT | Mod: CPTII,,, | Performed by: NURSE PRACTITIONER

## 2024-08-13 PROCEDURE — 1160F RVW MEDS BY RX/DR IN RCRD: CPT | Mod: CPTII,,, | Performed by: NURSE PRACTITIONER

## 2024-08-13 PROCEDURE — 3288F FALL RISK ASSESSMENT DOCD: CPT | Mod: CPTII,,, | Performed by: NURSE PRACTITIONER

## 2024-08-13 PROCEDURE — 99214 OFFICE O/P EST MOD 30 MIN: CPT | Mod: S$PBB,,, | Performed by: NURSE PRACTITIONER

## 2024-08-13 PROCEDURE — 3078F DIAST BP <80 MM HG: CPT | Mod: CPTII,,, | Performed by: NURSE PRACTITIONER

## 2024-08-13 PROCEDURE — 3074F SYST BP LT 130 MM HG: CPT | Mod: CPTII,,, | Performed by: NURSE PRACTITIONER

## 2024-08-13 PROCEDURE — 99213 OFFICE O/P EST LOW 20 MIN: CPT | Mod: PBBFAC | Performed by: NURSE PRACTITIONER

## 2024-08-13 PROCEDURE — 1101F PT FALLS ASSESS-DOCD LE1/YR: CPT | Mod: CPTII,,, | Performed by: NURSE PRACTITIONER

## 2024-08-13 PROCEDURE — 1159F MED LIST DOCD IN RCRD: CPT | Mod: CPTII,,, | Performed by: NURSE PRACTITIONER

## 2024-08-13 PROCEDURE — 3044F HG A1C LEVEL LT 7.0%: CPT | Mod: CPTII,,, | Performed by: NURSE PRACTITIONER

## 2024-08-13 RX ORDER — LOSARTAN POTASSIUM 25 MG/1
12.5 TABLET ORAL DAILY
Qty: 45 TABLET | Refills: 0 | Status: SHIPPED | OUTPATIENT
Start: 2024-08-13 | End: 2025-08-13

## 2024-08-13 RX ORDER — AMLODIPINE BESYLATE 5 MG/1
2.5 TABLET ORAL DAILY
Start: 2024-08-13

## 2024-08-13 RX ORDER — ATORVASTATIN CALCIUM 80 MG/1
80 TABLET, FILM COATED ORAL DAILY
COMMUNITY
Start: 2024-08-12

## 2024-08-13 RX ORDER — BUSPIRONE HYDROCHLORIDE 7.5 MG/1
7.5 TABLET ORAL 2 TIMES DAILY PRN
Qty: 60 TABLET | Refills: 3 | Status: SHIPPED | OUTPATIENT
Start: 2024-08-13 | End: 2025-08-13

## 2024-08-13 NOTE — PROGRESS NOTES
Yolanda L Reji, NP   [facility]  [address] [address]      PATIENT NAME: Rogelio Johnson  : 1950  DATE: 24  MRN: [id number]        History of Present Illness / Problem Focused Workflow     Rogelio Johnson presents to the clinic with Follow-up (Denies any syncope episodes.)     HPI: 24: 75 yo male for ER follow up.  He is accompanied by his wife today.  Presented to ER 24 for weakness, dizziness and low BP reading at home. He states he was working outside in the heat. Admits he does not drink enough water as he should. BP today 105/61.  Upon review of his last recent visit his blood pressure was mildly hypotensive.  At that time he denied any symptoms.  He is unsure which blood pressure medications he is taking but recalls taking amlodipine 5 mg, carvedilol 6.25 mg b.i.d. and did lower dosage of hydralazine 25 mg 2 b.i.d..  He states since ER visit ER provider instructed him to hold losartan until he was seen today.  He did complete labs for this visit with improved kidney functions as he had mildly elevated creatinine level and reduced GFR upon presentation on 2024.  States he is feeling much better.  He does have follow up with his cardiologist next week.  Does have some bilateral feet heaviness with improvement with elevation.  He states he will discuss with his cardiologist.  He otherwise denies any other concerns or complaints at this time.     24: Pt accompanied by his wife for f/u. BP improved 111/63. He had visit with Cardiologist yesterday with BP around 130/70s. He states cardiologist deferred adjustment with antihypertensive medications until today for his visit. He denies any weakness, syncope, HA, dizziness, SOB, CP, palpitations. He denies monitoring BP at home recently. A1c 6.7% 24. Had last diabetic eye exam with Dr. Whyte a few months ago. Had procedure with Dr. Morales about 2 weeks ago  and f/u visit in about a month. Need records for review. He has upcoming Urology appt 9/4/24 which he is aware of. Had recent radiation visit with provider and does not need f/u until about a year as long as he continues to see urology. No other concerns stated.    Other providers:   Dr. Morales/ Dr. Whyte  [facility]- last visit noted February 28, 2024  Dr. Mathew/Dr. Sánchez  [facility] Dr. DONATO Rodriguez, Dr. Quintana    Review of Systems     Review of Systems   Constitutional: Negative.    HENT: Negative.     Eyes: Negative.    Respiratory: Negative.     Cardiovascular: Negative.    Gastrointestinal: Negative.    Endocrine: Negative.    Genitourinary: Negative.    Musculoskeletal: Negative.    Skin: Negative.    Allergic/Immunologic: Negative.    Neurological: Negative.    Hematological: Negative.    Psychiatric/Behavioral: Negative.         Medical / Social / Family History     -------------------------------------    Coronary artery disease    Hyperlipidemia    Hypertension    Personal history of colonic polyps    Sleep apnea, unspecified        Past Surgical History:   Procedure Laterality Date    A-V CARDIAC PACEMAKER INSERTION N/A 11/9/2023    Procedure: INSERTION, CARDIAC PACEMAKER, DUAL CHAMBER;  Surgeon: Baljit Sánchez MD;  Location: [facility] CATH LAB;  Service: Cardiology;  Laterality: N/A;  DUAL CHAMBER PPM (SJM)    COLONOSCOPY      CORONARY ARTERY BYPASS GRAFT (CABG)      TRANSRECTAL BIOPSY OF PROSTATE WITH ULTRASOUND GUIDANCE Bilateral 1/2/2024    Procedure: BIOPSY, PROSTATE, RECTAL APPROACH, WITH US GUIDANCE;  Surgeon: Miracle Pierre DO;  Location: [facility] ENDOSCOPY;  Service: Urology;  Laterality: Bilateral;       Social History     Socioeconomic History    Marital status:    Tobacco Use    Smoking status: Never     Passive exposure: Never    Smokeless tobacco: Never   Substance and Sexual Activity    Alcohol use: Not Currently    Drug use: Never    Sexual activity: Not Currently     Social Determinants of  Health     Financial Resource Strain: Medium Risk (8/7/2024)    Overall Financial Resource Strain (CARDIA)     Difficulty of Paying Living Expenses: Somewhat hard   Food Insecurity: Food Insecurity Present (8/7/2024)    Hunger Vital Sign     Worried About Running Out of Food in the Last Year: Sometimes true     Ran Out of Food in the Last Year: Sometimes true   Transportation Needs: No Transportation Needs (8/1/2024)    TRANSPORTATION NEEDS     Transportation : No   Physical Activity: Sufficiently Active (8/7/2024)    Exercise Vital Sign     Days of Exercise per Week: 5 days     Minutes of Exercise per Session: 30 min   Stress: Stress Concern Present (8/7/2024)    [place] citizen [employer] [employer] - Occupational Stress Questionnaire     Feeling of Stress : To some extent   Housing Stability: Low Risk  (8/7/2024)    Housing Stability Vital Sign     Unable to Pay for Housing in the Last Year: No     Homeless in the Last Year: No        Family History   Problem Relation Name Age of Onset    Colon cancer Mother      Diabetes Mellitus Mother      Hypertension Mother      Cataracts Father      Heart attack Father      Heart disease Father          Medications and Allergies     Medications  Current Outpatient Medications   Medication Instructions    amLODIPine (NORVASC) 2.5 mg, Oral, Daily    aspirin (ECOTRIN) 81 mg, Oral, Daily    atorvastatin (LIPITOR) 80 mg, Oral, Daily    baclofen (LIORESAL) 10 mg, Oral, 3 times daily    busPIRone (BUSPAR) 7.5 mg, Oral, 2 times daily PRN    carvediloL (COREG) 6.25 mg, Oral, 2 times daily    docusate sodium (COLACE) 100 mg, Oral    empagliflozin (JARDIANCE) 10 mg, Oral, Every morning    ezetimibe (ZETIA) 10 mg, Oral, Daily    ferrous sulfate (FEROSUL) 325 mg, Oral, Daily    finasteride (PROSCAR) 5 mg, Oral, Daily    fluticasone propionate (FLONASE) 100 mcg, Each Nostril, Daily    hydrALAZINE (APRESOLINE) 25 mg, Oral, Every 12 hours    LEVEMIR FLEXTOUCH U100 INSULIN 100 unit/mL (3  "mL) InPn pen Administer 68 units SC q AM and 62 units SC q HS. Rotate injection sites.    losartan (COZAAR) 12.5 mg, Oral, Daily    metFORMIN (GLUCOPHAGE) 1,000 mg, Oral, 2 times daily with meals    nitroGLYCERIN (NITROSTAT) 0.4 mg, Sublingual, Every 5 min PRN    TRADJENTA 5 mg, Oral, Daily       Allergies  Review of patient's allergies indicates:  No Known Allergies    Physical Examination     Visit Vitals  /63 (BP Location: Left arm, Patient Position: Sitting, BP Method: Large (Automatic))   Pulse 63   Temp 97.7 °F (36.5 °C) (Oral)   Resp 18   Ht 6' 0.01" (1.829 m)   Wt 92.1 kg (203 lb)   SpO2 97%   BMI 27.53 kg/m²       Physical Exam  Constitutional:       General: He is not in acute distress.     Appearance: Normal appearance. He is normal weight. He is not ill-appearing, toxic-appearing or diaphoretic.   HENT:      Head: Normocephalic.   Neck:      Thyroid: No thyroid mass, thyromegaly or thyroid tenderness.      Vascular: No carotid bruit.   Cardiovascular:      Rate and Rhythm: Normal rate and regular rhythm.      Heart sounds: Murmur heard.   Pulmonary:      Effort: Pulmonary effort is normal. No respiratory distress.      Breath sounds: Normal breath sounds. No stridor. No wheezing, rhonchi or rales.   Abdominal:      Tenderness: There is no abdominal tenderness.   Lymphadenopathy:      Cervical: No cervical adenopathy.   Skin:     General: Skin is warm and dry.   Neurological:      General: No focal deficit present.      Mental Status: He is alert and oriented to person, place, and time. Mental status is at baseline.      Motor: No weakness.      Coordination: Coordination normal.      Gait: Gait normal.   Psychiatric:         Mood and Affect: Mood normal.         Behavior: Behavior normal.         Thought Content: Thought content normal.         Judgment: Judgment normal.           Results     Lab Results   Component Value Date    WBC 5.76 08/01/2024    RBC 3.29 (L) 08/01/2024    HGB 11.1 (L) " 08/01/2024    HCT 31.4 (L) 08/01/2024    MCV 95.4 (H) 08/01/2024    MCH 33.7 (H) 08/01/2024    MCHC 35.4 08/01/2024    RDW 12.9 08/01/2024     08/01/2024    MPV 9.7 08/01/2024     Sodium   Date Value Ref Range Status   08/01/2024 141 136 - 145 mmol/L Final   12/31/2021 137 136 - 145 mmol/L Final     Potassium   Date Value Ref Range Status   08/01/2024 4.0 3.5 - 5.1 mmol/L Final   12/31/2021 3.7 3.5 - 5.1 mmol/L Final     Chloride   Date Value Ref Range Status   08/01/2024 112 (H) 98 - 107 mmol/L Final   12/31/2021 110 (H) 100 - 109 mmol/L Final     CO2   Date Value Ref Range Status   08/01/2024 22 (L) 23 - 31 mmol/L Final     Carbon Dioxide   Date Value Ref Range Status   12/31/2021 20 (L) 22 - 33 mmol/L Final     Blood Urea Nitrogen   Date Value Ref Range Status   08/01/2024 18.0 8.4 - 25.7 mg/dL Final   12/31/2021 7 5 - 25 mg/dL Final     Creatinine   Date Value Ref Range Status   08/01/2024 1.11 0.73 - 1.18 mg/dL Final   12/31/2021 0.75 0.57 - 1.25 mg/dL Final     Calcium   Date Value Ref Range Status   08/01/2024 9.5 8.8 - 10.0 mg/dL Final   12/31/2021 8.0 (L) 8.8 - 10.6 mg/dL Final     Albumin   Date Value Ref Range Status   08/01/2024 3.7 3.4 - 4.8 g/dL Final     Bilirubin Total   Date Value Ref Range Status   08/01/2024 0.3 <=1.5 mg/dL Final     ALP   Date Value Ref Range Status   08/01/2024 96 40 - 150 unit/L Final     AST   Date Value Ref Range Status   08/01/2024 16 5 - 34 unit/L Final     ALT   Date Value Ref Range Status   08/01/2024 14 0 - 55 unit/L Final     Anion Gap   Date Value Ref Range Status   12/31/2021 7 (L) 8 - 16 mmol/L Final     eGFR    Date Value Ref Range Status   12/31/2021 124 >=60 mL/min/1.73mSq Final     Estimated GFR-Non    Date Value Ref Range Status   04/18/2022 66 >=90      Lab Results   Component Value Date    CHOL 133 11/06/2023     Lab Results   Component Value Date    HDL 48 11/06/2023     Lab Results   Component Value Date    LDL 39.00  "(L) 11/06/2023       Lab Results   Component Value Date    TRIG 228 (H) 11/06/2023     No results found for: "CHOLHDL"  Lab Results   Component Value Date    TSH 1.117 11/06/2023     Lab Results   Component Value Date    PHUR 5.5 07/29/2024    SPECGRAV 1.020 01/10/2024    PROTEINUA 1+ (A) 07/29/2024    GLUCUA Normal 07/29/2024    KETONESU Negative 01/10/2024    OCCULTUA Negative 07/29/2024    NITRITE Negative 07/29/2024    LEUKOCYTESUR Negative 07/29/2024     Lab Results   Component Value Date    HGBA1C 6.7 08/01/2024    HGBA1C 6.0 11/06/2023    HGBA1C 6.1 10/13/2023     Lab Results   Component Value Date    MICALBCREAT 48.7 (H) 08/01/2024       Results for orders placed in visit on 10/10/22    Diabetic Eye Screening Photo         Assessment       ICD-10-CM ICD-9-CM   1. Type 2 diabetes mellitus without complication, with long-term current use of insulin  E11.9 250.00    Z79.4 V58.67   2. Primary hypertension  I10 401.9   3. Pain in both lower extremities  M79.604 729.5    M79.605    4. Anxiety about health  R45.89 799.29   5. Prostate cancer  C61 185       Plan       Problem List Items Addressed This Visit          Psychiatric    Anxiety about health    Relevant Medications    busPIRone (BUSPAR) 7.5 MG tablet       Cardiac/Vascular    Hypertension    Current Assessment & Plan     BP Readings from Last 3 Encounters:   08/13/24 111/63   08/01/24 105/61   07/29/24 113/63     Follow low sodium diet, < 2 gm/day (avoid high salty foods such as processed meats/ sausage/bean/ sandwich meat, chips, pickles, cheese, crackers and soft drinks/ electrolyte replacement drinks).  Avoid tobacco/ alcohol use  Educated on health benefits of at least 5 days/ week of 30 minutes moderate intensity exercise (brisk walking) and 2 or more days/ week of muscle strength activities  Daily ASA 81 mg for CV prevention  Continue to hold hydralazine.   Restart Losartan at lower dose of 12.5 mg (take 0.5 tablet of 25 mg) and lower dose of " amlodipine to 2.5 mg  Monitor BP at home daily once to twice daily. Notify provider for BP readings > 140/80. F/u 2 weeks or sooner if needed.           Relevant Medications    losartan (COZAAR) 25 MG tablet    amLODIPine (NORVASC) 5 MG tablet       Oncology    Prostate cancer    Overview     Est with Urology   Completed radiation therapy and on Lupron every 6 months          Current Assessment & Plan     Completed Radiation. Lupron 2/2024. Pt has upcoming Urology appt 9/4/24 and plans to attend.            Endocrine    Type 2 diabetes mellitus without complication, with long-term current use of insulin - Primary    Current Assessment & Plan     Lab Results   Component Value Date    HGBA1C 6.7 08/01/2024       Hypoglycemia: denies symptoms    Lab Results   Component Value Date    MICALBCREAT 48.7 (H) 08/01/2024       Educated on ADA diet:  1. Avoid/ decrease high carbohydrate foods (rice, pasta, bread, candy, sweets, carbonated beverages)  Educated on health benefits of at least 5 days/ week of 30 minutes moderate intensity exercise (brisk walking) and 2 or more days/ week of muscle strength activities  Avoid alcohol or tobacco use if applicable  Eye exam: 10/12/21 no DR- poor image.  Patient established with Dr. Whyte and Dr. Morales- need records  Foot exam: 8/13/24  ACEI d/c s/t cough; ARB  Continue daily ASA and statin  Continue with current regimen            Orthopedic    Pain in both lower extremities    Current Assessment & Plan     Endorses b/l leg pain. ANGELES ordered.         Relevant Orders    CV Ultrasound doppler arterial legs bilat       Future Appointments   Date Time Provider Department Center   9/4/2024  8:00 AM Miracle Pierre DO [facility]   9/4/2024  1:40 PM Yolanda Mendoza NP [facility]        Follow up in about 2 weeks (around 8/27/2024) for HTN with bp logs.    Signature:  Yolanda Mendoza NP  [facility]  [address]  [facility] [address]    Date of encounter: 8/13/24

## 2024-08-13 NOTE — ASSESSMENT & PLAN NOTE
Lab Results   Component Value Date    HGBA1C 6.7 08/01/2024       Hypoglycemia: denies symptoms    Lab Results   Component Value Date    MICALBCREAT 48.7 (H) 08/01/2024       Educated on ADA diet:  1. Avoid/ decrease high carbohydrate foods (rice, pasta, bread, candy, sweets, carbonated beverages)  Educated on health benefits of at least 5 days/ week of 30 minutes moderate intensity exercise (brisk walking) and 2 or more days/ week of muscle strength activities  Avoid alcohol or tobacco use if applicable  Eye exam: 10/12/21 no DR- poor image.  Patient established with Dr. Whyte and Dr. Morales- need records  Foot exam: 8/13/24  ACEI d/c s/t cough; ARB  Continue daily ASA and statin  Continue with current regimen

## 2024-08-13 NOTE — ASSESSMENT & PLAN NOTE
BP Readings from Last 3 Encounters:   08/13/24 111/63   08/01/24 105/61   07/29/24 113/63     Follow low sodium diet, < 2 gm/day (avoid high salty foods such as processed meats/ sausage/bean/ sandwich meat, chips, pickles, cheese, crackers and soft drinks/ electrolyte replacement drinks).  Avoid tobacco/ alcohol use  Educated on health benefits of at least 5 days/ week of 30 minutes moderate intensity exercise (brisk walking) and 2 or more days/ week of muscle strength activities  Daily ASA 81 mg for CV prevention  Continue to hold hydralazine.   Restart Losartan at lower dose of 12.5 mg (take 0.5 tablet of 25 mg) and lower dose of amlodipine to 2.5 mg  Monitor BP at home daily once to twice daily. Notify provider for BP readings > 140/80. F/u 2 weeks or sooner if needed.

## 2024-08-13 NOTE — PATIENT INSTRUCTIONS
Decrease Amlodipine to 2.5 mg morning  Start Losartan 25 mg (take 0.5 tablet = 12.5 mg) evening     Keep log of BP readings at home, check once-twice daily

## 2024-09-05 DIAGNOSIS — R45.89 ANXIETY ABOUT HEALTH: ICD-10-CM

## 2024-09-05 DIAGNOSIS — E11.9 TYPE 2 DIABETES MELLITUS WITHOUT COMPLICATION, WITH LONG-TERM CURRENT USE OF INSULIN: ICD-10-CM

## 2024-09-05 DIAGNOSIS — I10 PRIMARY HYPERTENSION: ICD-10-CM

## 2024-09-05 DIAGNOSIS — Z79.4 TYPE 2 DIABETES MELLITUS WITHOUT COMPLICATION, WITH LONG-TERM CURRENT USE OF INSULIN: ICD-10-CM

## 2024-09-05 RX ORDER — LOSARTAN POTASSIUM 25 MG/1
12.5 TABLET ORAL DAILY
Qty: 45 TABLET | Refills: 1 | Status: SHIPPED | OUTPATIENT
Start: 2024-09-05 | End: 2025-09-05

## 2024-09-05 RX ORDER — AMLODIPINE BESYLATE 5 MG/1
2.5 TABLET ORAL DAILY
Qty: 90 TABLET | Refills: 1 | Status: SHIPPED | OUTPATIENT
Start: 2024-09-05

## 2024-09-05 NOTE — TELEPHONE ENCOUNTER
Pt requesting refill for pt's (NORVASC) 5 MG tablet  and losartan (COZAAR) 25 MG tablet     LOV:8/13/24    NOV:10/29/24

## 2024-09-09 ENCOUNTER — TELEPHONE (OUTPATIENT)
Dept: INTERNAL MEDICINE | Facility: CLINIC | Age: 74
End: 2024-09-09
Payer: MEDICARE

## 2024-09-09 DIAGNOSIS — Z79.4 TYPE 2 DIABETES MELLITUS WITHOUT COMPLICATION, WITH LONG-TERM CURRENT USE OF INSULIN: Primary | ICD-10-CM

## 2024-09-09 DIAGNOSIS — E11.9 TYPE 2 DIABETES MELLITUS WITHOUT COMPLICATION, WITH LONG-TERM CURRENT USE OF INSULIN: Primary | ICD-10-CM

## 2024-09-09 RX ORDER — LINAGLIPTIN 5 MG/1
5 TABLET, FILM COATED ORAL DAILY
Qty: 90 TABLET | Refills: 2 | OUTPATIENT
Start: 2024-09-09

## 2024-09-09 RX ORDER — BUSPIRONE HYDROCHLORIDE 7.5 MG/1
7.5 TABLET ORAL 2 TIMES DAILY PRN
Qty: 60 TABLET | Refills: 3 | OUTPATIENT
Start: 2024-09-09 | End: 2025-09-09

## 2024-09-09 NOTE — TELEPHONE ENCOUNTER
----- Message from Cathleen Ovalles sent at 9/9/2024  8:42 AM CDT -----  Who Called: Rogelio Johnson    Refill or New Rx:Refill  RX Name and Strength:busPIRone (BUSPAR) 7.5 MG tablet   How is the patient currently taking it? (ex. 1XDay): 2 xday  Is this a 30 day or 90 day RX:60 tablet    RX Name and Strength:empagliflozin (JARDIANCE) 10 mg tablet   How is the patient currently taking it? (ex. 1XDay): 1 xday  Is this a 30 day or 90 day RX:90 tablet    RX Name and Strength:TRADJENTA 5 mg Tab tablet   How is the patient currently taking it? (ex. 1XDay): 1 xday  Is this a 30 day or 90 day RX:90 tablet    Local or Mail Order:local  List of preferred pharmacies on file (remove unneeded): [unfilled]  If different Pharmacy is requested, enter Pharmacy information here including location and phone number: [address]   Phone: [phone]  Fax: [phone]         Ordering Provider: ashley      Preferred Method of Contact: Phone Call  Patient's Preferred Phone Number on File: [phone]   Best Call Back Number, if different:##[phone] daughter -griselda##  Additional Information:  refill request, pharmacy needs approval to refill, please advise, thanks

## 2024-09-09 NOTE — TELEPHONE ENCOUNTER
Returned call to pts daughter regarding refill requests and for message below. LVM to return call to [facility] for further discussion.    Pt daughter-griselda, called to request getting medications: amLODIPine (NORVASC) 5 MG tablet 90 tablet and  losartan (COZAAR) 25 MG tablet 45 tablet, to be a split pill in order for pt to have easy access to cut in half, please advise, thanks       empagliflozin (JARDIANCE) 10 mg tablet needs refilled.

## 2024-09-09 NOTE — TELEPHONE ENCOUNTER
----- Message from Tracie Scott LPN sent at 9/6/2024 11:38 AM CDT -----  Regarding: Insulin  Called he informed me that the Levemir  price has gone up to $105.00 for 90 days.   Aked if you can change medication  ----- Message -----  From: Rishi Jo  Sent: 9/5/2024  12:40 PM CDT  To: Reji PERKINS Staff    .Who Called: Rogelio Johnson    Caller is requesting assistance/information from provider's office.    Symptoms (please be specific):    How long has patient had these symptoms:    List of preferred pharmacies on file (remove unneeded):       Preferred Method of Contact: Phone Call  Patient's Preferred Phone Number on File: [phone]   Best Call Back Number, if different:  Additional Information: pt called requesting to speak with nurse to discuss changes to his medication list

## 2024-09-09 NOTE — TELEPHONE ENCOUNTER
Pt receives from [facility] pharmacy. Please call [facility] and find out if Lantus or Basaglar more affordable as these are interchangeable. Please let pt know prices and if he agrees to change to one of these, let me know and I will send new prescription.     Thank you

## 2024-09-10 RX ORDER — INSULIN GLARGINE 100 [IU]/ML
INJECTION, SOLUTION SUBCUTANEOUS
Qty: 126 ML | Refills: 1 | Status: SHIPPED | OUTPATIENT
Start: 2024-09-10

## 2024-09-10 NOTE — TELEPHONE ENCOUNTER
Please let patient know prescription Levemir to be discontinued due to patient unable to afford secondary to cost.  New prescription for Lantus to administer 68 units subQ in the morning and 62 units subQ in the evening.  New prescription sent to [facility] pharmacy.    Thank you

## 2024-09-23 DIAGNOSIS — E78.5 DYSLIPIDEMIA: ICD-10-CM

## 2024-09-23 DIAGNOSIS — Z79.4 TYPE 2 DIABETES MELLITUS WITHOUT COMPLICATION, WITH LONG-TERM CURRENT USE OF INSULIN: ICD-10-CM

## 2024-09-23 DIAGNOSIS — E11.9 TYPE 2 DIABETES MELLITUS WITHOUT COMPLICATION, WITH LONG-TERM CURRENT USE OF INSULIN: ICD-10-CM

## 2024-09-23 RX ORDER — ATORVASTATIN CALCIUM 40 MG/1
40 TABLET, FILM COATED ORAL NIGHTLY
Qty: 90 TABLET | Refills: 0 | OUTPATIENT
Start: 2024-09-23

## 2024-09-23 RX ORDER — ATORVASTATIN CALCIUM 80 MG/1
80 TABLET, FILM COATED ORAL DAILY
Qty: 90 TABLET | Refills: 2 | Status: SHIPPED | OUTPATIENT
Start: 2024-09-23

## 2024-09-23 NOTE — TELEPHONE ENCOUNTER
----- Message from Gamaliel Hampton sent at 9/23/2024 11:45 AM CDT -----  .Type:  RX Refill Request    Who Called: [facility]   Refill or New Rx: Refill   RX Name and Strength: atorvastatin (LIPITOR) 80 MG tablet  How is the patient currently taking it? (ex. 1XDay):  Is this a 30 day or 90 day RX:  Preferred Pharmacy with phone number: [facility]  Local or Mail Order:  Ordering Provider: Reji   Would the patient rather a call back or a response via [facility]?   Best Call Back Number: [phone]  Additional Information: Please call with any questions.

## 2024-09-30 ENCOUNTER — HOSPITAL ENCOUNTER (OUTPATIENT)
Dept: RADIOLOGY | Facility: HOSPITAL | Age: 74
Discharge: HOME | End: 2024-09-30
Attending: NURSE PRACTITIONER
Payer: MEDICARE

## 2024-09-30 DIAGNOSIS — M79.605 PAIN IN BOTH LOWER EXTREMITIES: ICD-10-CM

## 2024-09-30 DIAGNOSIS — M79.604 PAIN IN BOTH LOWER EXTREMITIES: ICD-10-CM

## 2024-09-30 PROCEDURE — 93925 LOWER EXTREMITY STUDY: CPT

## 2024-09-30 PROCEDURE — 93924 LWR XTR VASC STDY BILAT: CPT

## 2024-10-01 LAB
IMMEDIATE ARM BP: 141 MMHG
IMMEDIATE LEFT ABI: 1.4
IMMEDIATE LEFT TIBIAL: 198 MMHG
IMMEDIATE RIGHT ABI: 1.38
IMMEDIATE RIGHT TIBIAL: 195 MMHG
LEFT ABI: 1.25
LEFT ARM BP: 164 MMHG
LEFT CFA PSV: 110.9 CM/S
LEFT DORSALIS PEDIS PSV: 75 CM/S
LEFT DORSALIS PEDIS: 198 MMHG
LEFT POPLITEAL PSV: 107 CM/S
LEFT POST TIBIAL SYS PSV: 68.9 CM/S
LEFT POSTERIOR TIBIAL: 205 MMHG
LEFT PROFUNDA SYS PSV: 116.7 CM/S
LEFT SUPER FEMORAL DIST SYS PSV: 76.1 CM/S
LEFT SUPER FEMORAL MID SYS PSV: 87.7 CM/S
LEFT SUPER FEMORAL PROX SYS PSV: 144.7 CM/S
OHS CV LEFT LOWER EXTREMITY ABI (NO CALC): 1.25
OHS CV RIGHT ABI LOWER EXTREMITY (NO CALC): 1.22
RIGHT ABI: 1.22
RIGHT ARM BP: 152 MMHG
RIGHT CFA PSV: 120.7 CM/S
RIGHT DORSALIS PEDIS PSV: 75 CM/S
RIGHT DORSALIS PEDIS: 200 MMHG
RIGHT POPLITEAL PSV: 87.7 CM/S
RIGHT POST TIBIAL SYS PSV: 21.5 CM/S
RIGHT POSTERIOR TIBIAL: 200 MMHG
RIGHT PROFUNDA SYS PSV: 110.3 CM/S
RIGHT SUPER FEMORAL DIST SYS PSV: 63 CM/S
RIGHT SUPER FEMORAL MID SYS PSV: 99 CM/S
RIGHT SUPER FEMORAL PROX SYS PSV: 138.5 CM/S

## 2024-10-17 ENCOUNTER — OFFICE VISIT (OUTPATIENT)
Dept: UROLOGY | Facility: CLINIC | Age: 74
End: 2024-10-17
Payer: MEDICARE

## 2024-10-17 VITALS
WEIGHT: 202 LBS | HEART RATE: 66 BPM | HEIGHT: 72 IN | SYSTOLIC BLOOD PRESSURE: 123 MMHG | RESPIRATION RATE: 18 BRPM | BODY MASS INDEX: 27.36 KG/M2 | OXYGEN SATURATION: 98 % | DIASTOLIC BLOOD PRESSURE: 75 MMHG

## 2024-10-17 DIAGNOSIS — N52.8 OTHER MALE ERECTILE DYSFUNCTION: ICD-10-CM

## 2024-10-17 DIAGNOSIS — N52.9 ERECTILE DYSFUNCTION, UNSPECIFIED ERECTILE DYSFUNCTION TYPE: Primary | ICD-10-CM

## 2024-10-17 DIAGNOSIS — C61 PROSTATE CANCER: Primary | ICD-10-CM

## 2024-10-17 DIAGNOSIS — N52.9 ERECTILE DYSFUNCTION, UNSPECIFIED ERECTILE DYSFUNCTION TYPE: ICD-10-CM

## 2024-10-17 PROCEDURE — 3066F NEPHROPATHY DOC TX: CPT | Mod: CPTII,,, | Performed by: NURSE PRACTITIONER

## 2024-10-17 PROCEDURE — 3074F SYST BP LT 130 MM HG: CPT | Mod: CPTII,,, | Performed by: NURSE PRACTITIONER

## 2024-10-17 PROCEDURE — 3288F FALL RISK ASSESSMENT DOCD: CPT | Mod: CPTII,,, | Performed by: NURSE PRACTITIONER

## 2024-10-17 PROCEDURE — 3078F DIAST BP <80 MM HG: CPT | Mod: CPTII,,, | Performed by: NURSE PRACTITIONER

## 2024-10-17 PROCEDURE — 3060F POS MICROALBUMINURIA REV: CPT | Mod: CPTII,,, | Performed by: NURSE PRACTITIONER

## 2024-10-17 PROCEDURE — 1159F MED LIST DOCD IN RCRD: CPT | Mod: CPTII,,, | Performed by: NURSE PRACTITIONER

## 2024-10-17 PROCEDURE — 4010F ACE/ARB THERAPY RXD/TAKEN: CPT | Mod: CPTII,,, | Performed by: NURSE PRACTITIONER

## 2024-10-17 PROCEDURE — 99213 OFFICE O/P EST LOW 20 MIN: CPT | Mod: S$PBB,,, | Performed by: NURSE PRACTITIONER

## 2024-10-17 PROCEDURE — 99214 OFFICE O/P EST MOD 30 MIN: CPT | Mod: PBBFAC | Performed by: NURSE PRACTITIONER

## 2024-10-17 PROCEDURE — 3044F HG A1C LEVEL LT 7.0%: CPT | Mod: CPTII,,, | Performed by: NURSE PRACTITIONER

## 2024-10-17 PROCEDURE — 1101F PT FALLS ASSESS-DOCD LE1/YR: CPT | Mod: CPTII,,, | Performed by: NURSE PRACTITIONER

## 2024-10-17 RX ORDER — TADALAFIL 10 MG/1
10 TABLET ORAL DAILY PRN
Qty: 10 TABLET | Refills: 11 | Status: SHIPPED | OUTPATIENT
Start: 2024-10-17 | End: 2025-10-17

## 2024-10-17 RX ORDER — TADALAFIL 10 MG/1
10 TABLET ORAL DAILY PRN
Qty: 10 TABLET | Refills: 11 | Status: SHIPPED | OUTPATIENT
Start: 2024-10-17 | End: 2024-10-17

## 2024-10-17 NOTE — PROGRESS NOTES
Chief Complaint:   Chief Complaint   Patient presents with    Prostate Cancer     Last PSA: <0.10 on 08/01/2024       HPI:   Rogelio Johnson is a 73 y.o. male seen for Lupron injection.  He had a prostate biopsy on 2 January 2024 for a PSA of 8.02.  The biopsy showed Vibha 4+3 in the left base and apex and Vibha 4+4 in the left mid.    Patient received his 1st Lupron injection on February 28, 2024.   Patient PSA <0.10.  Discussed with patient he needs to reschedule appointment for Lupron when Dr. Lombardo is here.  Patient complain of erectile dysfunction therefore start him on Cialis 10 mg p.r.n..      Allergies:  Review of patient's allergies indicates:  No Known Allergies    Medications:  Current Outpatient Medications   Medication Sig Dispense Refill    amLODIPine (NORVASC) 5 MG tablet Take 0.5 tablets (2.5 mg total) by mouth once daily. 90 tablet 1    aspirin (ECOTRIN) 81 MG EC tablet Take 1 tablet (81 mg total) by mouth once daily.  0    atorvastatin (LIPITOR) 80 MG tablet Take 1 tablet (80 mg total) by mouth once daily. 90 tablet 2    busPIRone (BUSPAR) 7.5 MG tablet Take 1 tablet (7.5 mg total) by mouth 2 (two) times daily as needed (anxiety). 60 tablet 3    carvediloL (COREG) 6.25 MG tablet Take 6.25 mg by mouth 2 (two) times daily.      docusate sodium (COLACE) 100 MG capsule Take 100 mg by mouth.      empagliflozin (JARDIANCE) 10 mg tablet Take 1 tablet (10 mg total) by mouth every morning. 90 tablet 2    ezetimibe (ZETIA) 10 mg tablet Take 1 tablet (10 mg total) by mouth once daily. 90 tablet 2    ferrous sulfate (FEROSUL) 325 mg (65 mg iron) Tab tablet Take 1 tablet (325 mg total) by mouth once daily. 90 tablet 2    finasteride (PROSCAR) 5 mg tablet Take 1 tablet (5 mg total) by mouth once daily. 90 tablet 2    fluticasone propionate (FLONASE) 50 mcg/actuation nasal spray 2 sprays (100 mcg total) by Each Nostril route once daily. 15.8 mL 6    insulin glargine U-100, Lantus, (LANTUS SOLOSTAR U-100  INSULIN) 100 unit/mL (3 mL) InPn pen Administer 68 units SC q AM and 62 units SC q HS. Rotate injection sites. 126 mL 1    losartan (COZAAR) 25 MG tablet Take 0.5 tablets (12.5 mg total) by mouth once daily. 45 tablet 1    metFORMIN (GLUCOPHAGE) 1000 MG tablet Take 1 tablet (1,000 mg total) by mouth 2 (two) times daily with meals. 180 tablet 2    nitroGLYCERIN (NITROSTAT) 0.4 MG SL tablet Place 0.4 mg under the tongue every 5 (five) minutes as needed for Chest pain.      TRADJENTA 5 mg Tab tablet Take 1 tablet (5 mg total) by mouth once daily. 90 tablet 2    baclofen (LIORESAL) 10 MG tablet Take 1 tablet (10 mg total) by mouth 3 (three) times daily. for 7 days 21 tablet 0    hydrALAZINE (APRESOLINE) 25 MG tablet Take 1 tablet (25 mg total) by mouth every 12 (twelve) hours. (Patient not taking: Reported on 10/17/2024) 60 tablet 1     No current facility-administered medications for this visit.     Facility-Administered Medications Ordered in Other Visits   Medication Dose Route Frequency Provider Last Rate Last Admin    0.9%  NaCl infusion   Intravenous Once Baljit Sánchez MD           Review of Systems:  General: No fever, chills, fatigability, or weight loss.  Skin: No rashes, itching, or changes in color or texture of skin.  Chest: Denies MCINTYRE, cyanosis, wheezing, cough, and sputum production.  Abdomen: Appetite fine. No weight loss. Denies diarrhea, abdominal pain, hematemesis, or blood in stool.  Musculoskeletal: No joint stiffness or swelling. Denies back pain.  : As above.  All other review of systems negative.    PMH:  Past Medical History:   Diagnosis Date    Coronary artery disease     Hyperlipidemia     Hypertension     Personal history of colonic polyps     Sleep apnea, unspecified        PSH:  Past Surgical History:   Procedure Laterality Date    A-V CARDIAC PACEMAKER INSERTION N/A 11/9/2023    Procedure: INSERTION, CARDIAC PACEMAKER, DUAL CHAMBER;  Surgeon: Baljit Sánchez MD;  Location: [facility] CATH LAB;   Service: Cardiology;  Laterality: N/A;  DUAL CHAMBER PPM (SJM)    COLONOSCOPY      CORONARY ARTERY BYPASS GRAFT (CABG)      TRANSRECTAL BIOPSY OF PROSTATE WITH ULTRASOUND GUIDANCE Bilateral 1/2/2024    Procedure: BIOPSY, PROSTATE, RECTAL APPROACH, WITH US GUIDANCE;  Surgeon: Miracle Pierre DO;  Location: [facility] ENDOSCOPY;  Service: Urology;  Laterality: Bilateral;       FamHx:  Family History   Problem Relation Name Age of Onset    Colon cancer Mother      Diabetes Mellitus Mother      Hypertension Mother      Cataracts Father      Heart attack Father      Heart disease Father         SocHx:  Social History     Socioeconomic History    Marital status:    Tobacco Use    Smoking status: Never     Passive exposure: Never    Smokeless tobacco: Never   Substance and Sexual Activity    Alcohol use: Not Currently    Drug use: Never    Sexual activity: Not Currently     Social Drivers of Health     Financial Resource Strain: Medium Risk (8/7/2024)    Overall Financial Resource Strain (CARDIA)     Difficulty of Paying Living Expenses: Somewhat hard   Food Insecurity: Food Insecurity Present (8/7/2024)    Hunger Vital Sign     Worried About Running Out of Food in the Last Year: Sometimes true     Ran Out of Food in the Last Year: Sometimes true   Transportation Needs: No Transportation Needs (8/1/2024)    TRANSPORTATION NEEDS     Transportation : No   Physical Activity: Sufficiently Active (8/7/2024)    Exercise Vital Sign     Days of Exercise per Week: 5 days     Minutes of Exercise per Session: 30 min   Stress: Stress Concern Present (8/7/2024)    [place] [employer] - Occupational Stress Questionnaire     Feeling of Stress : To some extent   Housing Stability: Low Risk  (8/7/2024)    Housing Stability Vital Sign     Unable to Pay for Housing in the Last Year: No     Homeless in the Last Year: No       Physical Exam:  There were no vitals filed for this visit.  General: A&Ox3, no apparent  distress, no deformities  Neck: No masses, normal thyroid  Lungs: CTA yolanda, no use of accessory muscles  Heart: RRR, no arrhythmias  Abdomen: Soft, NT, ND, no masses, no hernias, no hepatosplenomegaly  Lymphatic: Neck and groin nodes negative  Skin: The skin is warm and dry. No jaundice.  Ext: No c/c/e.    [place]:  Deferred CAMRYN this time.  Labs:    Latest Reference Range & Units 01/16/18 09:12 04/03/19 10:25 12/30/20 10:13 01/12/22 08:40 11/07/23 10:47 08/01/24 09:18   Prostate Specific Antigen <=4.00 ng/mL 1.2 1.6 2.57 5.13 (H) 8.02 (H) <0.10   (H): Data is abnormally high      Impression:  Prostate cancer  Elevated PSA    Plan:  Instructed patient to return when Dr. Lombardo is here for Lupron injection.  Instructed patient to start Cialis 10 mg p.o. p.r.n. for erectile dysfunction.    Instructed patient if develops any abnormal urologic symptoms notify clinic to be re-evaluate treated or during after hours go to emergency room versus urgent here.                           [facility]

## 2024-10-29 ENCOUNTER — HOSPITAL ENCOUNTER (OUTPATIENT)
Facility: HOSPITAL | Age: 74
Discharge: HOME | End: 2024-10-30
Attending: INTERNAL MEDICINE | Admitting: STUDENT IN AN ORGANIZED HEALTH CARE EDUCATION/TRAINING PROGRAM
Payer: MEDICARE

## 2024-10-29 ENCOUNTER — OFFICE VISIT (OUTPATIENT)
Dept: INTERNAL MEDICINE | Facility: CLINIC | Age: 74
End: 2024-10-29
Payer: MEDICARE

## 2024-10-29 ENCOUNTER — PATIENT MESSAGE (OUTPATIENT)
Dept: INTERNAL MEDICINE | Facility: CLINIC | Age: 74
End: 2024-10-29

## 2024-10-29 VITALS
HEIGHT: 72 IN | HEART RATE: 75 BPM | DIASTOLIC BLOOD PRESSURE: 50 MMHG | RESPIRATION RATE: 16 BRPM | OXYGEN SATURATION: 96 % | WEIGHT: 201.69 LBS | TEMPERATURE: 98 F | SYSTOLIC BLOOD PRESSURE: 92 MMHG | BODY MASS INDEX: 27.32 KG/M2

## 2024-10-29 DIAGNOSIS — R42 DIZZINESS: ICD-10-CM

## 2024-10-29 DIAGNOSIS — I95.9 HYPOTENSION, UNSPECIFIED HYPOTENSION TYPE: ICD-10-CM

## 2024-10-29 DIAGNOSIS — N17.9 AKI (ACUTE KIDNEY INJURY): Primary | ICD-10-CM

## 2024-10-29 DIAGNOSIS — R53.1 WEAKNESS: ICD-10-CM

## 2024-10-29 DIAGNOSIS — K80.50 CHOLEDOCHOLITHIASIS: ICD-10-CM

## 2024-10-29 DIAGNOSIS — R07.9 CHEST PAIN: ICD-10-CM

## 2024-10-29 DIAGNOSIS — R53.1 WEAKNESS: Primary | ICD-10-CM

## 2024-10-29 LAB
ALBUMIN SERPL-MCNC: 3.4 G/DL (ref 3.4–4.8)
ALBUMIN/GLOB SERPL: 0.9 RATIO (ref 1.1–2)
ALP SERPL-CCNC: 311 UNIT/L (ref 40–150)
ALT SERPL-CCNC: 213 UNIT/L (ref 0–55)
ANION GAP SERPL CALC-SCNC: 12 MEQ/L
AST SERPL-CCNC: 167 UNIT/L (ref 5–34)
BACTERIA #/AREA URNS AUTO: ABNORMAL /HPF
BASOPHILS # BLD AUTO: 0.02 X10(3)/MCL
BASOPHILS NFR BLD AUTO: 0.5 %
BILIRUB SERPL-MCNC: 2.8 MG/DL
BILIRUB UR QL STRIP.AUTO: ABNORMAL
BUN SERPL-MCNC: 20.3 MG/DL (ref 8.4–25.7)
CALCIUM SERPL-MCNC: 9.4 MG/DL (ref 8.8–10)
CHLORIDE SERPL-SCNC: 106 MMOL/L (ref 98–107)
CLARITY UR: ABNORMAL
CO2 SERPL-SCNC: 19 MMOL/L (ref 23–31)
COLOR UR AUTO: ABNORMAL
CREAT SERPL-MCNC: 1.93 MG/DL (ref 0.72–1.25)
CREAT/UREA NIT SERPL: 11
EOSINOPHIL # BLD AUTO: 0.05 X10(3)/MCL (ref 0–0.9)
EOSINOPHIL NFR BLD AUTO: 1.1 %
ERYTHROCYTE [DISTWIDTH] IN BLOOD BY AUTOMATED COUNT: 12 % (ref 11.5–17)
GFR SERPLBLD CREATININE-BSD FMLA CKD-EPI: 36 ML/MIN/1.73/M2
GLOBULIN SER-MCNC: 4 GM/DL (ref 2.4–3.5)
GLUCOSE SERPL-MCNC: 151 MG/DL (ref 82–115)
GLUCOSE UR QL STRIP: ABNORMAL
HAV IGM SERPL QL IA: NONREACTIVE
HBV CORE IGM SERPL QL IA: NONREACTIVE
HBV SURFACE AG SERPL QL IA: NONREACTIVE
HCT VFR BLD AUTO: 34.3 % (ref 42–52)
HCV AB SERPL QL IA: NONREACTIVE
HGB BLD-MCNC: 12.1 G/DL (ref 14–18)
HGB UR QL STRIP: NEGATIVE
HOLD SPECIMEN: NORMAL
HYALINE CASTS #/AREA URNS LPF: ABNORMAL /LPF
IMM GRANULOCYTES # BLD AUTO: 0.01 X10(3)/MCL (ref 0–0.04)
IMM GRANULOCYTES NFR BLD AUTO: 0.2 %
KETONES UR QL STRIP: NEGATIVE
LACTATE SERPL-SCNC: 1.5 MMOL/L (ref 0.5–2.2)
LACTATE SERPL-SCNC: 2.1 MMOL/L (ref 0.5–2.2)
LEUKOCYTE ESTERASE UR QL STRIP: NEGATIVE
LIPASE SERPL-CCNC: 38 U/L
LYMPHOCYTES # BLD AUTO: 1.5 X10(3)/MCL (ref 0.6–4.6)
LYMPHOCYTES NFR BLD AUTO: 34.4 %
MAGNESIUM SERPL-MCNC: 2.1 MG/DL (ref 1.6–2.6)
MCH RBC QN AUTO: 33.1 PG (ref 27–31)
MCHC RBC AUTO-ENTMCNC: 35.3 G/DL (ref 33–36)
MCV RBC AUTO: 93.7 FL (ref 80–94)
MONOCYTES # BLD AUTO: 0.4 X10(3)/MCL (ref 0.1–1.3)
MONOCYTES NFR BLD AUTO: 9.2 %
MUCOUS THREADS URNS QL MICRO: ABNORMAL /LPF
NEUTROPHILS # BLD AUTO: 2.38 X10(3)/MCL (ref 2.1–9.2)
NEUTROPHILS NFR BLD AUTO: 54.6 %
NITRITE UR QL STRIP: NEGATIVE
NRBC BLD AUTO-RTO: 0 %
PH UR STRIP: 6 [PH]
PHOSPHATE SERPL-MCNC: 4.1 MG/DL (ref 2.3–4.7)
PLATELET # BLD AUTO: 267 X10(3)/MCL (ref 130–400)
PMV BLD AUTO: 9.9 FL (ref 7.4–10.4)
POCT GLUCOSE: 138 MG/DL (ref 70–110)
POCT GLUCOSE: 62 MG/DL (ref 70–110)
POCT GLUCOSE: 66 MG/DL (ref 70–110)
POTASSIUM SERPL-SCNC: 3.3 MMOL/L (ref 3.5–5.1)
PROT SERPL-MCNC: 7.4 GM/DL (ref 5.8–7.6)
PROT UR QL STRIP: ABNORMAL
RBC # BLD AUTO: 3.66 X10(6)/MCL (ref 4.7–6.1)
RBC #/AREA URNS AUTO: ABNORMAL /HPF
SODIUM SERPL-SCNC: 137 MMOL/L (ref 136–145)
SP GR UR STRIP.AUTO: 1.02 (ref 1–1.03)
SQUAMOUS #/AREA URNS LPF: ABNORMAL /HPF
TROPONIN I SERPL-MCNC: 0.01 NG/ML (ref 0–0.04)
UROBILINOGEN UR STRIP-ACNC: ABNORMAL
WBC # BLD AUTO: 4.36 X10(3)/MCL (ref 4.5–11.5)
WBC #/AREA URNS AUTO: ABNORMAL /HPF

## 2024-10-29 PROCEDURE — 99214 OFFICE O/P EST MOD 30 MIN: CPT | Mod: S$PBB,,, | Performed by: NURSE PRACTITIONER

## 2024-10-29 PROCEDURE — 99215 OFFICE O/P EST HI 40 MIN: CPT | Mod: PBBFAC,25 | Performed by: NURSE PRACTITIONER

## 2024-10-29 PROCEDURE — 3044F HG A1C LEVEL LT 7.0%: CPT | Mod: CPTII,,, | Performed by: NURSE PRACTITIONER

## 2024-10-29 PROCEDURE — 3288F FALL RISK ASSESSMENT DOCD: CPT | Mod: CPTII,,, | Performed by: NURSE PRACTITIONER

## 2024-10-29 PROCEDURE — 82962 GLUCOSE BLOOD TEST: CPT

## 2024-10-29 PROCEDURE — 80053 COMPREHEN METABOLIC PANEL: CPT | Performed by: INTERNAL MEDICINE

## 2024-10-29 PROCEDURE — 25000003 PHARM REV CODE 250: Performed by: INTERNAL MEDICINE

## 2024-10-29 PROCEDURE — 81001 URINALYSIS AUTO W/SCOPE: CPT | Performed by: INTERNAL MEDICINE

## 2024-10-29 PROCEDURE — 3078F DIAST BP <80 MM HG: CPT | Mod: CPTII,,, | Performed by: NURSE PRACTITIONER

## 2024-10-29 PROCEDURE — 83735 ASSAY OF MAGNESIUM: CPT | Performed by: INTERNAL MEDICINE

## 2024-10-29 PROCEDURE — G0378 HOSPITAL OBSERVATION PER HR: HCPCS

## 2024-10-29 PROCEDURE — 93005 ELECTROCARDIOGRAM TRACING: CPT

## 2024-10-29 PROCEDURE — 85025 COMPLETE CBC W/AUTO DIFF WBC: CPT | Performed by: INTERNAL MEDICINE

## 2024-10-29 PROCEDURE — 84100 ASSAY OF PHOSPHORUS: CPT | Performed by: INTERNAL MEDICINE

## 2024-10-29 PROCEDURE — 96361 HYDRATE IV INFUSION ADD-ON: CPT

## 2024-10-29 PROCEDURE — 3074F SYST BP LT 130 MM HG: CPT | Mod: CPTII,,, | Performed by: NURSE PRACTITIONER

## 2024-10-29 PROCEDURE — 1101F PT FALLS ASSESS-DOCD LE1/YR: CPT | Mod: CPTII,,, | Performed by: NURSE PRACTITIONER

## 2024-10-29 PROCEDURE — 96365 THER/PROPH/DIAG IV INF INIT: CPT

## 2024-10-29 PROCEDURE — 99285 EMERGENCY DEPT VISIT HI MDM: CPT | Mod: 25,27

## 2024-10-29 PROCEDURE — 63600175 PHARM REV CODE 636 W HCPCS: Performed by: INTERNAL MEDICINE

## 2024-10-29 PROCEDURE — 4010F ACE/ARB THERAPY RXD/TAKEN: CPT | Mod: CPTII,,, | Performed by: NURSE PRACTITIONER

## 2024-10-29 PROCEDURE — 87040 BLOOD CULTURE FOR BACTERIA: CPT | Mod: 91 | Performed by: INTERNAL MEDICINE

## 2024-10-29 PROCEDURE — 83605 ASSAY OF LACTIC ACID: CPT | Performed by: INTERNAL MEDICINE

## 2024-10-29 PROCEDURE — 84484 ASSAY OF TROPONIN QUANT: CPT | Performed by: INTERNAL MEDICINE

## 2024-10-29 PROCEDURE — 1160F RVW MEDS BY RX/DR IN RCRD: CPT | Mod: CPTII,,, | Performed by: NURSE PRACTITIONER

## 2024-10-29 PROCEDURE — 3060F POS MICROALBUMINURIA REV: CPT | Mod: CPTII,,, | Performed by: NURSE PRACTITIONER

## 2024-10-29 PROCEDURE — 3066F NEPHROPATHY DOC TX: CPT | Mod: CPTII,,, | Performed by: NURSE PRACTITIONER

## 2024-10-29 PROCEDURE — 80074 ACUTE HEPATITIS PANEL: CPT | Performed by: INTERNAL MEDICINE

## 2024-10-29 PROCEDURE — 83690 ASSAY OF LIPASE: CPT | Performed by: INTERNAL MEDICINE

## 2024-10-29 PROCEDURE — 1159F MED LIST DOCD IN RCRD: CPT | Mod: CPTII,,, | Performed by: NURSE PRACTITIONER

## 2024-10-29 RX ORDER — POTASSIUM CHLORIDE 20 MEQ/1
40 TABLET, EXTENDED RELEASE ORAL ONCE
Status: COMPLETED | OUTPATIENT
Start: 2024-10-29 | End: 2024-10-29

## 2024-10-29 RX ORDER — ASPIRIN 81 MG/1
81 TABLET ORAL DAILY
Status: DISCONTINUED | OUTPATIENT
Start: 2024-10-30 | End: 2024-10-30 | Stop reason: HOSPADM

## 2024-10-29 RX ORDER — HYDRALAZINE HYDROCHLORIDE 20 MG/ML
10 INJECTION INTRAMUSCULAR; INTRAVENOUS EVERY 4 HOURS PRN
Status: DISCONTINUED | OUTPATIENT
Start: 2024-10-29 | End: 2024-10-30 | Stop reason: HOSPADM

## 2024-10-29 RX ORDER — LABETALOL HCL 20 MG/4 ML
10 SYRINGE (ML) INTRAVENOUS EVERY 4 HOURS PRN
Status: DISCONTINUED | OUTPATIENT
Start: 2024-10-29 | End: 2024-10-30 | Stop reason: HOSPADM

## 2024-10-29 RX ORDER — IBUPROFEN 200 MG
16 TABLET ORAL
Status: DISCONTINUED | OUTPATIENT
Start: 2024-10-29 | End: 2024-10-30 | Stop reason: HOSPADM

## 2024-10-29 RX ORDER — BUSPIRONE HYDROCHLORIDE 7.5 MG/1
7.5 TABLET ORAL 2 TIMES DAILY PRN
Status: DISCONTINUED | OUTPATIENT
Start: 2024-10-29 | End: 2024-10-30 | Stop reason: HOSPADM

## 2024-10-29 RX ORDER — NALOXONE HCL 0.4 MG/ML
0.02 VIAL (ML) INJECTION
Status: DISCONTINUED | OUTPATIENT
Start: 2024-10-29 | End: 2024-10-30 | Stop reason: HOSPADM

## 2024-10-29 RX ORDER — INSULIN ASPART 100 [IU]/ML
0-10 INJECTION, SOLUTION INTRAVENOUS; SUBCUTANEOUS EVERY 6 HOURS PRN
Status: DISCONTINUED | OUTPATIENT
Start: 2024-10-29 | End: 2024-10-30 | Stop reason: HOSPADM

## 2024-10-29 RX ORDER — NITROGLYCERIN 0.4 MG/1
0.4 TABLET SUBLINGUAL EVERY 5 MIN PRN
Status: DISCONTINUED | OUTPATIENT
Start: 2024-10-29 | End: 2024-10-30 | Stop reason: HOSPADM

## 2024-10-29 RX ORDER — ATORVASTATIN CALCIUM 40 MG/1
80 TABLET, FILM COATED ORAL DAILY
Status: DISCONTINUED | OUTPATIENT
Start: 2024-10-30 | End: 2024-10-30 | Stop reason: HOSPADM

## 2024-10-29 RX ORDER — SODIUM CHLORIDE, SODIUM LACTATE, POTASSIUM CHLORIDE, CALCIUM CHLORIDE 600; 310; 30; 20 MG/100ML; MG/100ML; MG/100ML; MG/100ML
INJECTION, SOLUTION INTRAVENOUS CONTINUOUS
Status: DISCONTINUED | OUTPATIENT
Start: 2024-10-29 | End: 2024-10-30 | Stop reason: HOSPADM

## 2024-10-29 RX ORDER — EZETIMIBE 10 MG/1
10 TABLET ORAL DAILY
Status: DISCONTINUED | OUTPATIENT
Start: 2024-10-30 | End: 2024-10-30 | Stop reason: HOSPADM

## 2024-10-29 RX ORDER — GLUCAGON 1 MG
1 KIT INJECTION
Status: DISCONTINUED | OUTPATIENT
Start: 2024-10-29 | End: 2024-10-30 | Stop reason: HOSPADM

## 2024-10-29 RX ORDER — SODIUM CHLORIDE 0.9 % (FLUSH) 0.9 %
10 SYRINGE (ML) INJECTION EVERY 12 HOURS PRN
Status: DISCONTINUED | OUTPATIENT
Start: 2024-10-29 | End: 2024-10-30 | Stop reason: HOSPADM

## 2024-10-29 RX ORDER — IBUPROFEN 200 MG
24 TABLET ORAL
Status: DISCONTINUED | OUTPATIENT
Start: 2024-10-29 | End: 2024-10-30 | Stop reason: HOSPADM

## 2024-10-29 RX ORDER — INSULIN GLARGINE 100 [IU]/ML
40 INJECTION, SOLUTION SUBCUTANEOUS NIGHTLY
Status: DISCONTINUED | OUTPATIENT
Start: 2024-10-29 | End: 2024-10-30 | Stop reason: HOSPADM

## 2024-10-29 RX ADMIN — PIPERACILLIN SODIUM AND TAZOBACTAM SODIUM 4.5 G: 4; .5 INJECTION, POWDER, LYOPHILIZED, FOR SOLUTION INTRAVENOUS at 05:10

## 2024-10-29 RX ADMIN — Medication 16 G: at 10:10

## 2024-10-29 RX ADMIN — SODIUM CHLORIDE, POTASSIUM CHLORIDE, SODIUM LACTATE AND CALCIUM CHLORIDE: 600; 310; 30; 20 INJECTION, SOLUTION INTRAVENOUS at 06:10

## 2024-10-29 RX ADMIN — POTASSIUM CHLORIDE 40 MEQ: 1500 TABLET, EXTENDED RELEASE ORAL at 06:10

## 2024-10-29 NOTE — ED PROVIDER NOTES
Encounter Date: 10/29/2024       History     Chief Complaint   Patient presents with    Fatigue    Hypotension     PT BROUGHT FROM CLINIC W LOW BP.  PT CO WEAKNESS SINCE THIS AM.  NO CP/SOB.  .  EKG OBTAINED.       The history is provided by the patient.     Review of patient's allergies indicates:  No Known Allergies  Past Medical History:   Diagnosis Date    Coronary artery disease     Hyperlipidemia     Hypertension     Personal history of colonic polyps     Sleep apnea, unspecified      Past Surgical History:   Procedure Laterality Date    A-V CARDIAC PACEMAKER INSERTION N/A 11/9/2023    Procedure: INSERTION, CARDIAC PACEMAKER, DUAL CHAMBER;  Surgeon: Baljit Sácnhez MD;  Location: [facility] CATH LAB;  Service: Cardiology;  Laterality: N/A;  DUAL CHAMBER PPM (SJM)    COLONOSCOPY      CORONARY ARTERY BYPASS GRAFT (CABG)      TRANSRECTAL BIOPSY OF PROSTATE WITH ULTRASOUND GUIDANCE Bilateral 1/2/2024    Procedure: BIOPSY, PROSTATE, RECTAL APPROACH, WITH US GUIDANCE;  Surgeon: Miracle Pierre DO;  Location: [facility] ENDOSCOPY;  Service: Urology;  Laterality: Bilateral;     Family History   Problem Relation Name Age of Onset    Colon cancer Mother      Diabetes Mellitus Mother      Hypertension Mother      Cataracts Father      Heart attack Father      Heart disease Father       Social History     Tobacco Use    Smoking status: Never     Passive exposure: Never    Smokeless tobacco: Never   Substance Use Topics    Alcohol use: Not Currently    Drug use: Never     Review of Systems    Physical Exam     Initial Vitals [10/29/24 1453]   BP Pulse Resp Temp SpO2   (!) 90/50 75 18 97.9 °F (36.6 °C) 100 %      MAP       --         Physical Exam    ED Course   Procedures  Labs Reviewed   COMPREHENSIVE METABOLIC PANEL - Abnormal       Result Value    Sodium 137      Potassium 3.3 (*)     Chloride 106      CO2 19 (*)     Glucose 151 (*)     Blood Urea Nitrogen 20.3      Creatinine 1.93 (*)     Calcium 9.4      Protein Total 7.4       Albumin 3.4      Globulin 4.0 (*)     Albumin/Globulin Ratio 0.9 (*)     Bilirubin Total 2.8 (*)      (*)      (*)      (*)     eGFR 36      Anion Gap 12.0      BUN/Creatinine Ratio 11     URINALYSIS, REFLEX TO URINE CULTURE - Abnormal    Color, UA Dark Yellow      Appearance, UA Hazy (*)     Specific Gravity, UA 1.019      pH, UA 6.0      Protein, UA 1+ (*)     Glucose, UA 3+ (*)     Ketones, UA Negative      Blood, UA Negative      Bilirubin, UA 2+ (*)     Urobilinogen, UA 1+ (*)     Nitrites, UA Negative      Leukocyte Esterase, UA Negative      RBC, UA 0-5      WBC, UA 6-10 (*)     Bacteria, UA Trace (*)     Squamous Epithelial Cells, UA Trace (*)     Mucous, UA Trace (*)     Hyaline Casts, UA 11-20 (*)    CBC WITH DIFFERENTIAL - Abnormal    WBC 4.36 (*)     RBC 3.66 (*)     Hgb 12.1 (*)     Hct 34.3 (*)     MCV 93.7      MCH 33.1 (*)     MCHC 35.3      RDW 12.0      Platelet 267      MPV 9.9      Neut % 54.6      Lymph % 34.4      Mono % 9.2      Eos % 1.1      Basophil % 0.5      Lymph # 1.50      Neut # 2.38      Mono # 0.40      Eos # 0.05      Baso # 0.02      IG# 0.01      IG% 0.2      NRBC% 0.0     TROPONIN I - Normal    Troponin-I 0.015     BLOOD CULTURE OLG   BLOOD CULTURE OLG   CBC W/ AUTO DIFFERENTIAL    Narrative:     The following orders were created for panel order CBC auto differential.  Procedure                               Abnormality         Status                     ---------                               -----------         ------                     CBC with Differential[[id number]]       Abnormal            Final result                 Please view results for these tests on the individual orders.   EXTRA TUBES    Narrative:     The following orders were created for panel order EXTRA TUBES.  Procedure                               Abnormality         Status                     ---------                               -----------         ------                      Light Blue Top Hold[[id number]]                             In process                 Lavender Top Hold[[id number]]                               In process                 Gold Top Hold[[id number]]                                   In process                 Pink Top Hold[[id number]]                                   In process                   Please view results for these tests on the individual orders.   LIGHT BLUE TOP HOLD   LAVENDER TOP HOLD   GOLD TOP HOLD   PINK TOP HOLD   HEPATITIS PANEL, ACUTE   LIPASE   LACTIC ACID, PLASMA   POCT TROPONIN     EKG Readings: (Independently Interpreted)   Initial Reading: No STEMI. Rhythm: Normal Sinus Rhythm. Heart Rate: 76. Ectopy: No Ectopy. Conduction: Normal (Non specific widening QRS). T Waves: Normal. T Waves Flipped: III, AVF, V5 and V6. Axis: Right Axis Deviation. Other Findings: Prolonged QT Interval. Clinical Impression: Normal Sinus Rhythm Other Impression: QTc 555 ms     ECG Results              EKG 12-lead (Weakness) Age > 50 (In process)        Collection Time Result Time QRS Duration OHS QTC Calculation    10/29/24 14:52:03 10/29/24 15:17:01 164 555                     In process by Interface, Lab In [facility] (10/29/24 15:17:07)                   Narrative:    Test Reason : R53.1,    Vent. Rate : 076 BPM     Atrial Rate : 076 BPM     P-R Int : 198 ms          QRS Dur : 164 ms      QT Int : 494 ms       P-R-T Axes : 057 148 -13 degrees     QTc Int : 555 ms    Sinus rhythm with Premature atrial complexes  Right axis deviation  Nonspecific intraventricular block  Abnormal ECG  When compared with ECG of 29-JUL-2024 12:21,  Sinus rhythm has replaced Electronic ventricular pacemaker    Referred By:             Confirmed By:                                   Imaging Results              CT Abdomen Pelvis  Without Contrast (Final result)  Result time 10/29/24 16:53:10      Final result by Adriana Marie MD (10/29/24 16:53:10)                   Impression:       No appreciable acute intra-abdominal abnormality by noncontrast CT evaluation      Electronically signed by: Adriana Marie  Date:    10/29/2024  Time:    16:53               Narrative:    EXAMINATION:  CT ABDOMEN PELVIS WITHOUT CONTRAST    CLINICAL HISTORY:  Epigastric pain;    TECHNIQUE:  Helically acquired axial images, sagittal and coronal reformations were obtained from the lung bases to the pubic symphysis without the IV administration of contrast.    Automated tube current modulation, weight-based exposure dosing, and/or iterative reconstruction technique utilized to reach lowest reasonably achievable exposure rate.    DLP: 411 mGy*cm    COMPARISON:  PET-CT 03/12/2024    FINDINGS:  HEART: There are coronary artery calcifications.    LUNG BASES: Well aerated.    LIVER: The liver hypodense compatible with steatosis.    BILIARY: Cholelithiasis.    PANCREAS: No inflammatory change.    SPLEEN: Normal in size    ADRENALS: No mass.    KIDNEYS/URETERS: No renal or ureteral calculus. No hydronephrosis.    GI TRACT/MESENTERY: Evaluation of the bowel is limited without contrast.  No evidence of bowel obstruction or inflammation. Postop right hemicolectomy.    PERITONEUM: No free fluid.No free air.    LYMPH NODES: No enlarged lymph nodes by size criteria.    VASCULATURE: Aortoiliac atherosclerosis.    BLADDER: Normal appearance given degree of distention.    REPRODUCTIVE ORGANS: Normal as visualized.    ABDOMINAL WALL: Unremarkable.    BONES: No acute osseous abnormality.                                       Medications   piperacillin-tazobactam (ZOSYN) 4.5 g in D5W 100 mL IVPB (MB+) (has no administration in time range)     Medical Decision Making  Amount and/or Complexity of Data Reviewed  Labs: ordered. Decision-making details documented in ED Course.  Radiology: ordered and independent interpretation performed. Decision-making details documented in ED Course.  ECG/medicine tests: ordered and independent  interpretation performed. Decision-making details documented in ED Course.     Details: 5:00 PM Consult: I discussed the case with Dr. Scanlon ([facility]). Agrees with current management.   Recommends will evaluate in ED                              4:52 PM    At this time pt stable, /90 mmHg, HR 64 bpm, RR 22.    SIRS 1/4; No sepsis    CMP reveal transaminates with hyperbilirubinemia. CT reveal gallbladder sludge with gallstone.     Those changes are concerning for choledocholithiasis for which antibiotic therapy started and will admit to IM service for GI evaluation. Pt may need an MRCP vs ERCP by GI service.      Pt tolerating PO intake, states episode of vomiting few days ago but not currently. Abd exam unremarkable, Barrett negative        Clinical Impression:  Final diagnoses:  [R53.1] Weakness  [N17.9] JAMIL (acute kidney injury) (Primary)  [K80.50] Choledocholithiasis          ED Disposition Condition    Observation Serious                Boston Fabian MD  10/29/24 0009

## 2024-10-29 NOTE — H&P
[facility] Internal Medicine History and Physical     Patient Name: Rogelio Johnson  MRN: [id number]  Admission Date: 10/29/2024  Current Hospital Day: 1   Code Status: Full Code  Attending Provider: Tunde Perry MD    Chief Complaint:      Fatigue and Hypotension (PT BROUGHT FROM CLINIC W LOW BP.  PT CO WEAKNESS SINCE THIS AM.  NO CP/SOB.  .  EKG OBTAINED.  )      Subjective:     History of Present Illness:  Rogelio Johnson is a 74 y.o. male with PMH of HTN, CAD, s/p CABG, s/p defibrillator, Type 2 DM . The patient presented to [facility] ED on 10/29/2024 with a primary complaint of fatigue, hypotensive, dizziness. The patient was referred by his PCP to the ED because he was complaining of fatigue, hypotension and dizziness when he saw her in the office today. Patients BP was 90/50 initially  which improved with oral fluids. Further workup done in the ED showed transminitis, elevated bilirubin, JAMIL. Ct abd and pelvis done showed signs of cholelithiasis. Upon asking the patient stated that he had abdominal pain the day before which is generalized, and is relieved with tylenol he took, he stated that he did not really notice if it is triggered by any food intake. But he also stated that he had an episode of vomiting yesterday which was clear liquid and resolved by itself. This morning he noticed he was feeling weak, dizziness which improved with the fluids he got in the ED.   He denied similar complaints in the past, He stated he is does not smoking, alcohol, recreational drug abuse, IV drug abuse. Internal medicine consulted for further management.     Review of Systems:  Pertinent positives and negatives listed in HPI. All other systems are reviewed and are negative.    Past Medical History: See above    Past Medical History:  He  has a past medical history of Coronary artery disease, Hyperlipidemia, Hypertension, Personal history of colonic polyps, and Sleep apnea, unspecified.    Past Surgical History:  He  has a past  surgical history that includes Coronary Artery Bypass Graft (CABG); Colonoscopy; A-V cardiac pacemaker insertion (N/A, 11/9/2023); and Transrectal biopsy of prostate with ultrasound guidance (Bilateral, 1/2/2024).    Allergies:  Review of patient's allergies indicates:  No Known Allergies    Family History:  His family history includes Cataracts in his father; Colon cancer in his mother; Diabetes Mellitus in his mother; Heart attack in his father; Heart disease in his father; Hypertension in his mother.    Social History:  He  reports that he has never smoked. He has never been exposed to tobacco smoke. He has never used smokeless tobacco. He reports that he does not currently use alcohol. He reports that he does not use drugs.    Home Medications:  He has a current medication list which includes the following prescription(s): amlodipine, aspirin, atorvastatin, baclofen, buspirone, carvedilol, docusate sodium, empagliflozin, ezetimibe, ferrous sulfate, finasteride, fluticasone propionate, hydralazine, lantus solostar u-100 insulin, losartan, metformin, nitroglycerin, tadalafil, and tradjenta, and the following Facility-Administered Medications: 0.9% nacl, dextrose 10%, dextrose 10%, glucagon (human recombinant), glucose, glucose, lactated ringers, naloxone, [START ON 10/30/2024] piperacillin-tazobactam (ZOSYN) 3.375 g in D5W 100 mL, potassium chloride sa, and sodium chloride 0.9%.        Objective:   Vitals  Vitals  BP: 134/63  Temp: 97.9 °F (36.6 °C)  Temp Source: Temporal  Pulse: 65  Resp: 20  SpO2: 100 %  Weight: 91.2 kg (201 lb)    Physical Examination:  General: Awake, alert, & oriented to person, place & time. No acute distress  Psychiatric: Mood and affect normal  HEENT: Normocephalic, atraumatic. Face symmetric.  Cardiovascular: Regular rate & rhythm, Normal S1 & S2 w/out murmurs, rubs or gallops  Pulmonary: Bilateral symmetric chest rise, Non-labored  Abdominal:  Soft and nondistended, non tender, pastor  "sign negative   Extremities: No clubbing or cyanosis.  Skin:  Exposed skin is warm & dry.  Neuro:   Patient moves all extremities equally. Sensation intact bilateraly.    Laboratory:  CMP:  Recent Labs   Lab 10/29/24  1517      K 3.3*   CO2 19*   BUN 20.3   CREATININE 1.93*   GLUCOSE 151*   CALCIUM 9.4   ALBUMIN 3.4   BILITOT 2.8*   *   *   ALKPHOS 311*       CBC:  Recent Labs   Lab 10/29/24  1517   WBC 4.36*   ABSNEUTRO 2.38   RBC 3.66*   HGB 12.1*   HCT 34.3*      MCV 93.7   RDW 12.0       FLP: No results for input(s): "CHOL", "HDL", "LDL", "TRIG" in the last 168 hours.    Anemia: No results for input(s): "IRON", "FERRITIN", "TRANS", "TIBC", "NLDZBIWP89", "FOLATE" in the last 168 hours.    DM: No results for input(s): "HGBA1C", "GLUF", "MICROALBUR", "PROTEINURINE", "CREATRANDUR" in the last 168 hours.    Urinalysis:   Recent Labs   Lab 10/29/24  1629   APPEARANCEUA Hazy*   SGUA 1.019   PROTEINUA 1+*   KETONESUA Negative   BILIRUBINUA 2+*   UROBILINOGEN 1+*   LEUKOCYTESUR Negative   WBCUA 6-10*   BACTERIA Trace*   SQEPUA Trace*   MUCOUSUA Trace*   HYALINECASTS 11-20*   RBCUA 0-5       REVIEWED      Radiology:  CT Abdomen Pelvis  Without Contrast    Result Date: 10/29/2024  No appreciable acute intra-abdominal abnormality by noncontrast CT evaluation Electronically signed by: Adriana Marie Date:    10/29/2024 Time:    16:53       Assessment & Plan:   Cholelithiasis  Transaminitis   Hypotension on admission   SIRS 1/4  In the ED, Patient afebrile, BP 98/62,    CMP done showed , , Alp 311, bilirubin 2.8  UA showed no findings of UTI   Hepatitis panel was normal   Lactic acid, lipase normal   CT abdomen and pelvis done showed cholelithiasis, will order Ultrasound abdomen for further evaluation   Blood cultures pending   Will continue zosyn for now as there is a concern for choledocholithiasis   Continue hydration for transaminitis     JAMIL  Hypokalemeia  On admission, his Cr " is 1.94, K+ is 3.3, Hco3 is 19  Holding losartan as he is in JAMIL for now   Started him on LR 100ml /hr   Will monitor with morning CMP,adjust the fluids accordingly   Maintain K+>4, phos>3, Mag>2, replenish as needed      Hx of CABG 2/2 multivessel CAD (2019)  Cardiac pacemaker in place(2023)   HTN   HLD  ECHO (2023) EF 60-65%  Continue his home aspirin, statin, Zetia for his CAD  Holding his home BP  meds as he was hypotensive on admission   Qtc prolonged 555 on admission, avoid Qtc prolonging drugs     T2DM  Insulin 40 units nightly  SSI ordered  Check POCT glucose, titrate the dose as needed    CODE STATUS: Full Code  Access: PIV   Antimicrobials: zosyn  Diet: Diet NPO   DVT Prophylaxis:  SCD  GI Prophylaxis: none  Fluids: LR @ 100 cc/hr    Disposition: admitted for further evaluation and management of cholelithiasis and JAMIL, will discharge him when he is medically stable, ultrasound abdomen due tomorrow morning       MEGAN PENA MD  Internal Medicine - PGY-1

## 2024-10-30 ENCOUNTER — TELEPHONE (OUTPATIENT)
Dept: INTERNAL MEDICINE | Facility: CLINIC | Age: 74
End: 2024-10-30
Payer: MEDICARE

## 2024-10-30 VITALS
OXYGEN SATURATION: 100 % | HEIGHT: 72 IN | SYSTOLIC BLOOD PRESSURE: 140 MMHG | RESPIRATION RATE: 18 BRPM | WEIGHT: 201.06 LBS | TEMPERATURE: 98 F | DIASTOLIC BLOOD PRESSURE: 72 MMHG | HEART RATE: 52 BPM | BODY MASS INDEX: 27.23 KG/M2

## 2024-10-30 PROBLEM — K80.50 CHOLEDOCHOLITHIASIS: Status: ACTIVE | Noted: 2024-10-30

## 2024-10-30 PROBLEM — K80.20 CHOLELITHIASIS: Status: ACTIVE | Noted: 2024-10-30

## 2024-10-30 PROBLEM — N17.9 AKI (ACUTE KIDNEY INJURY): Status: ACTIVE | Noted: 2024-10-30

## 2024-10-30 LAB
ALBUMIN SERPL-MCNC: 3.3 G/DL (ref 3.4–4.8)
ALBUMIN/GLOB SERPL: 0.8 RATIO (ref 1.1–2)
ALP SERPL-CCNC: 305 UNIT/L (ref 40–150)
ALT SERPL-CCNC: 187 UNIT/L (ref 0–55)
ANION GAP SERPL CALC-SCNC: 11 MEQ/L
AST SERPL-CCNC: 129 UNIT/L (ref 5–34)
BILIRUB SERPL-MCNC: 1.9 MG/DL
BUN SERPL-MCNC: 17 MG/DL (ref 8.4–25.7)
CALCIUM SERPL-MCNC: 9.5 MG/DL (ref 8.8–10)
CHLORIDE SERPL-SCNC: 108 MMOL/L (ref 98–107)
CO2 SERPL-SCNC: 23 MMOL/L (ref 23–31)
CREAT SERPL-MCNC: 1.27 MG/DL (ref 0.72–1.25)
CREAT/UREA NIT SERPL: 13
ERYTHROCYTE [DISTWIDTH] IN BLOOD BY AUTOMATED COUNT: 12.3 % (ref 11.5–17)
GFR SERPLBLD CREATININE-BSD FMLA CKD-EPI: 59 ML/MIN/1.73/M2
GLOBULIN SER-MCNC: 4.3 GM/DL (ref 2.4–3.5)
GLUCOSE SERPL-MCNC: 77 MG/DL (ref 82–115)
HCT VFR BLD AUTO: 34.3 % (ref 42–52)
HGB BLD-MCNC: 12.2 G/DL (ref 14–18)
HOLD SPECIMEN: NORMAL
MAGNESIUM SERPL-MCNC: 2.1 MG/DL (ref 1.6–2.6)
MCH RBC QN AUTO: 33.3 PG (ref 27–31)
MCHC RBC AUTO-ENTMCNC: 35.6 G/DL (ref 33–36)
MCV RBC AUTO: 93.7 FL (ref 80–94)
NRBC BLD AUTO-RTO: 0 %
OHS QRS DURATION: 164 MS
OHS QTC CALCULATION: 555 MS
PHOSPHATE SERPL-MCNC: 4.5 MG/DL (ref 2.3–4.7)
PLATELET # BLD AUTO: 263 X10(3)/MCL (ref 130–400)
PMV BLD AUTO: 10.3 FL (ref 7.4–10.4)
POCT GLUCOSE: 82 MG/DL (ref 70–110)
POCT GLUCOSE: 90 MG/DL (ref 70–110)
POTASSIUM SERPL-SCNC: 3.3 MMOL/L (ref 3.5–5.1)
PROT SERPL-MCNC: 7.6 GM/DL (ref 5.8–7.6)
RBC # BLD AUTO: 3.66 X10(6)/MCL (ref 4.7–6.1)
SODIUM SERPL-SCNC: 142 MMOL/L (ref 136–145)
WBC # BLD AUTO: 4.25 X10(3)/MCL (ref 4.5–11.5)

## 2024-10-30 PROCEDURE — 80053 COMPREHEN METABOLIC PANEL: CPT | Performed by: INTERNAL MEDICINE

## 2024-10-30 PROCEDURE — 63600175 PHARM REV CODE 636 W HCPCS: Performed by: INTERNAL MEDICINE

## 2024-10-30 PROCEDURE — 83735 ASSAY OF MAGNESIUM: CPT | Performed by: INTERNAL MEDICINE

## 2024-10-30 PROCEDURE — 85027 COMPLETE CBC AUTOMATED: CPT | Performed by: INTERNAL MEDICINE

## 2024-10-30 PROCEDURE — 84100 ASSAY OF PHOSPHORUS: CPT | Performed by: INTERNAL MEDICINE

## 2024-10-30 PROCEDURE — 96374 THER/PROPH/DIAG INJ IV PUSH: CPT | Mod: 59

## 2024-10-30 PROCEDURE — G0378 HOSPITAL OBSERVATION PER HR: HCPCS

## 2024-10-30 PROCEDURE — 25000003 PHARM REV CODE 250: Performed by: INTERNAL MEDICINE

## 2024-10-30 PROCEDURE — 96365 THER/PROPH/DIAG IV INF INIT: CPT | Mod: 59

## 2024-10-30 PROCEDURE — 96366 THER/PROPH/DIAG IV INF ADDON: CPT

## 2024-10-30 PROCEDURE — 36415 COLL VENOUS BLD VENIPUNCTURE: CPT | Performed by: INTERNAL MEDICINE

## 2024-10-30 PROCEDURE — 96376 TX/PRO/DX INJ SAME DRUG ADON: CPT

## 2024-10-30 RX ORDER — POTASSIUM CHLORIDE 20 MEQ/1
40 TABLET, EXTENDED RELEASE ORAL ONCE
Status: COMPLETED | OUTPATIENT
Start: 2024-10-30 | End: 2024-10-30

## 2024-10-30 RX ORDER — POTASSIUM CHLORIDE 7.45 MG/ML
10 INJECTION INTRAVENOUS
Status: COMPLETED | OUTPATIENT
Start: 2024-10-30 | End: 2024-10-30

## 2024-10-30 RX ADMIN — SODIUM CHLORIDE, POTASSIUM CHLORIDE, SODIUM LACTATE AND CALCIUM CHLORIDE: 600; 310; 30; 20 INJECTION, SOLUTION INTRAVENOUS at 08:10

## 2024-10-30 RX ADMIN — ATORVASTATIN CALCIUM 80 MG: 40 TABLET, FILM COATED ORAL at 08:10

## 2024-10-30 RX ADMIN — ASPIRIN 81 MG: 81 TABLET, COATED ORAL at 08:10

## 2024-10-30 RX ADMIN — POTASSIUM CHLORIDE 40 MEQ: 1500 TABLET, EXTENDED RELEASE ORAL at 02:10

## 2024-10-30 RX ADMIN — PIPERACILLIN SODIUM AND TAZOBACTAM SODIUM 4.5 G: 4; .5 INJECTION, POWDER, LYOPHILIZED, FOR SOLUTION INTRAVENOUS at 01:10

## 2024-10-30 RX ADMIN — POTASSIUM CHLORIDE 10 MEQ: 7.46 INJECTION, SOLUTION INTRAVENOUS at 12:10

## 2024-10-30 RX ADMIN — POTASSIUM CHLORIDE 10 MEQ: 7.46 INJECTION, SOLUTION INTRAVENOUS at 08:10

## 2024-10-30 RX ADMIN — PIPERACILLIN SODIUM AND TAZOBACTAM SODIUM 4.5 G: 4; .5 INJECTION, POWDER, LYOPHILIZED, FOR SOLUTION INTRAVENOUS at 10:10

## 2024-10-30 RX ADMIN — POTASSIUM CHLORIDE 10 MEQ: 7.46 INJECTION, SOLUTION INTRAVENOUS at 09:10

## 2024-10-30 RX ADMIN — POTASSIUM CHLORIDE 10 MEQ: 7.46 INJECTION, SOLUTION INTRAVENOUS at 11:10

## 2024-10-30 RX ADMIN — EZETIMIBE 10 MG: 10 TABLET ORAL at 11:10

## 2024-10-30 NOTE — DISCHARGE SUMMARY
[facility] Discharge Summary    Admitting Physician: Tunde Perry MD  Attending Physician: Tunde Perry MD  Date of Admit: 10/29/2024  Date of Discharge: 10/30/2024    Discharge to: Home or Self Care   Condition: Stable    Discharge Diagnoses     Patient Active Problem List   Diagnosis    Type 2 diabetes mellitus without complication, with long-term current use of insulin    Other male erectile dysfunction    Adenomatous polyp of ascending colon    Arteriosclerosis of coronary artery    Atrioventricular block    Benign prostatic hyperplasia    Chronic low back pain    Dyslipidemia    Hypertension    Polyp of colon    Normocytic normochromic anemia    Obstructive sleep apnea syndrome    Stress at home    Mobitz type 1 second degree AV block    Elevated PSA    AV block    Anxiety about health    Prostate cancer    Pain in both lower extremities    Cholelithiasis       Consultants and Procedures     Consultants:  Consults (From admission, onward)          Status Ordering Provider     Inpatient consult to Hospitalist  Once        Provider:  Paula Hernandez DO Acknowledged RODRIGUEZ QUINONES, JULIO J.             Brief History of Present Illness      Rogelio Johnson is a 74 y.o. male with PMH of HTN, CAD, s/p CABG, s/p defibrillator, Type 2 DM . The patient presented to [facility] ED on 10/29/2024 with a primary complaint of fatigue, hypotensive, dizziness. The patient was referred by his PCP to the ED because he was complaining of fatigue, hypotension and dizziness when he saw her in the office today. Patients BP was 90/50 initially  which improved with oral fluids. Further workup done in the ED showed transminitis, elevated bilirubin, JAMIL. Ct abd and pelvis done showed signs of cholelithiasis. Upon asking the patient stated that he had abdominal pain the day before which is generalized, and is relieved with tylenol he took, he stated that he did not really notice if it is triggered by any food intake. But he  also stated that he had an episode of vomiting yesterday which was clear liquid and resolved by itself. This morning he noticed he was feeling weak, dizziness which improved with the fluids he got in the ED.   He denied similar complaints in the past, He stated he is does not smoking, alcohol, recreational drug abuse, IV drug abuse. Internal medicine consulted for further management.     Hospital Course with Pertinent Findings     Patient admitted for symptomatic cholelithiasis with concern for choledocholithiasis due to elevated bilirubin and LFTs.  Patient did well during the hospitalization with fluids and antibiotics.  Abdominal ultrasound done during the hospitalization showed cholelithiasis with no stones, showed hepatic steatosis.  JAMIL improved with fluids and stopping losartan.  Patient denied any abdominal pain during discharge.  He denied any chest pain, shortness of breath, nausea, vomiting, low oral intake, weakness at the time of discharge.  Patient was to follow up with General surgery outpatient for elective surgery for symptomatic cholelithiasis needed.  Patient can follow up with the primary care within 1-2 weeks.  Stable at discharge.  Strict ED precautions given at the time of discharge.      Discharge physical exam:  Vitals:    10/30/24 1322   BP: (!) 140/72   Pulse: (!) 52   Resp:    Temp:        General: Awake, alert, & oriented to person, place & time. No acute distress  Psychiatric: Mood and affect normal  HEENT: Normocephalic, atraumatic. Face symmetric.  Cardiovascular: Regular rate & rhythm, Normal S1 & S2 w/out murmurs, rubs or gallops  Pulmonary: Bilateral symmetric chest rise, Non-labored  Abdominal:  Soft and nondistended, non tender, pastor sign negative   Extremities: No clubbing or cyanosis.  Skin:  Exposed skin is warm & dry.  Neuro:   Patient moves all extremities equally. Sensation intact bilateraly.     TIME SPENT ON DISCHARGE: 60 minutes    Discharge Medications         Medication List        CONTINUE taking these medications      amLODIPine 5 MG tablet  Commonly known as: NORVASC  Take 0.5 tablets (2.5 mg total) by mouth once daily.     aspirin 81 MG EC tablet  Commonly known as: ECOTRIN  Take 1 tablet (81 mg total) by mouth once daily.     atorvastatin 80 MG tablet  Commonly known as: LIPITOR  Take 1 tablet (80 mg total) by mouth once daily.     busPIRone 7.5 MG tablet  Commonly known as: BUSPAR  Take 1 tablet (7.5 mg total) by mouth 2 (two) times daily as needed (anxiety).     carvediloL 6.25 MG tablet  Commonly known as: COREG     docusate sodium 100 MG capsule  Commonly known as: COLACE     empagliflozin 10 mg tablet  Commonly known as: JARDIANCE  Take 1 tablet (10 mg total) by mouth every morning.     ezetimibe 10 mg tablet  Commonly known as: ZETIA  Take 1 tablet (10 mg total) by mouth once daily.     ferrous sulfate 325 mg (65 mg iron) Tab tablet  Commonly known as: FeroSuL  Take 1 tablet (325 mg total) by mouth once daily.     finasteride 5 mg tablet  Commonly known as: PROSCAR  Take 1 tablet (5 mg total) by mouth once daily.     fluticasone propionate 50 mcg/actuation nasal spray  Commonly known as: FLONASE  2 sprays (100 mcg total) by Each Nostril route once daily.     hydrALAZINE 25 MG tablet  Commonly known as: APRESOLINE  Take 1 tablet (25 mg total) by mouth every 12 (twelve) hours.     LANTUS SOLOSTAR U-100 INSULIN 100 unit/mL (3 mL) Inpn pen  Generic drug: insulin glargine U-100 (Lantus)  Administer 68 units SC q AM and 62 units SC q HS. Rotate injection sites.     metFORMIN 1000 MG tablet  Commonly known as: GLUCOPHAGE  Take 1 tablet (1,000 mg total) by mouth 2 (two) times daily with meals.     nitroGLYCERIN 0.4 MG SL tablet  Commonly known as: NITROSTAT     tadalafiL 10 MG tablet  Commonly known as: CIALIS  Take 1 tablet (10 mg total) by mouth daily as needed for Erectile Dysfunction.     TRADJENTA 5 mg Tab tablet  Generic drug: linaGLIPtin  Take 1 tablet (5 mg  total) by mouth once daily.            STOP taking these medications      baclofen 10 MG tablet  Commonly known as: LIORESAL     losartan 25 MG tablet  Commonly known as: COZAAR              Discharge Information:   Stable at discharge   strict ED precautions given at the time of discharge  Patient was to follow up with surgery outpatient elected for symptomatic cholelithiasis  Stopped taking losartan and baclofen to avoid Worsening of renal indices  continue adequate hydration      Paula Hernandez  Internal Medicine - PGY-3

## 2024-10-30 NOTE — PLAN OF CARE
Problem: Adult Inpatient Plan of Care  Goal: Plan of Care Review  Outcome: Progressing  Goal: Patient-Specific Goal (Individualized)  Outcome: Progressing  Goal: Absence of Hospital-Acquired Illness or Injury  Outcome: Progressing  Goal: Optimal Comfort and Wellbeing  Outcome: Progressing  Goal: Readiness for Transition of Care  Outcome: Progressing     Problem: Diabetes Comorbidity  Goal: Blood Glucose Level Within Targeted Range  Outcome: Progressing     Problem: Acute Kidney Injury/Impairment  Goal: Fluid and Electrolyte Balance  Outcome: Progressing  Goal: Improved Oral Intake  Outcome: Progressing  Goal: Effective Renal Function  Outcome: Progressing     Problem: Fluid Volume Deficit  Goal: Fluid Balance  Outcome: Progressing

## 2024-10-30 NOTE — PLAN OF CARE
Problem: Adult Inpatient Plan of Care  Goal: Plan of Care Review  10/30/2024 0250 by Sobia Munoz RN  Outcome: Progressing  10/30/2024 0249 by Sobia Munoz RN  Outcome: Progressing  Goal: Patient-Specific Goal (Individualized)  10/30/2024 0250 by Sobia Munoz RN  Outcome: Progressing  10/30/2024 0249 by Sobia Munoz RN  Outcome: Progressing  Goal: Absence of Hospital-Acquired Illness or Injury  10/30/2024 0250 by Sobia Munoz RN  Outcome: Progressing  10/30/2024 0249 by Sobia Munoz RN  Outcome: Progressing  Goal: Optimal Comfort and Wellbeing  10/30/2024 0250 by Sobia Munoz RN  Outcome: Progressing  10/30/2024 0249 by Sobia Munoz RN  Outcome: Progressing  Goal: Readiness for Transition of Care  10/30/2024 0250 by Sobia Munoz RN  Outcome: Progressing  10/30/2024 0249 by Sobia Munoz RN  Outcome: Progressing     Problem: Diabetes Comorbidity  Goal: Blood Glucose Level Within Targeted Range  10/30/2024 0250 by Sobia Munoz RN  Outcome: Progressing  10/30/2024 0249 by Sobia Munoz RN  Outcome: Progressing     Problem: Acute Kidney Injury/Impairment  Goal: Fluid and Electrolyte Balance  Outcome: Progressing  Goal: Improved Oral Intake  Outcome: Progressing  Goal: Effective Renal Function  Outcome: Progressing     Problem: Fluid Volume Deficit  Goal: Fluid Balance  Outcome: Progressing

## 2024-10-31 ENCOUNTER — PATIENT OUTREACH (OUTPATIENT)
Dept: ADMINISTRATIVE | Facility: CLINIC | Age: 74
End: 2024-10-31
Payer: MEDICARE

## 2024-10-31 DIAGNOSIS — I10 PRIMARY HYPERTENSION: Primary | ICD-10-CM

## 2024-10-31 RX ORDER — AMLODIPINE BESYLATE 2.5 MG/1
2.5 TABLET ORAL DAILY
Qty: 90 TABLET | Refills: 1 | Status: SHIPPED | OUTPATIENT
Start: 2024-10-31 | End: 2025-10-31

## 2024-11-03 LAB
BACTERIA BLD CULT: NORMAL
BACTERIA BLD CULT: NORMAL

## 2024-11-05 ENCOUNTER — TELEPHONE (OUTPATIENT)
Dept: UROLOGY | Facility: CLINIC | Age: 74
End: 2024-11-05
Payer: MEDICARE

## 2024-11-05 NOTE — TELEPHONE ENCOUNTER
Thank you!    ----- Message from Jacob Christianson sent at 11/5/2024  1:24 PM CST -----    Request Reference Number: [id number]. LUPRON DEPOT INJ 45MG is approved through 12/31/2025. Your patient may now fill this prescription and it will be covered.    Authorization Expiration Date: 12/31/2025

## 2024-11-06 ENCOUNTER — LAB VISIT (OUTPATIENT)
Dept: LAB | Facility: HOSPITAL | Age: 74
End: 2024-11-06
Attending: NURSE PRACTITIONER
Payer: MEDICARE

## 2024-11-06 ENCOUNTER — OFFICE VISIT (OUTPATIENT)
Dept: INTERNAL MEDICINE | Facility: CLINIC | Age: 74
End: 2024-11-06
Payer: MEDICARE

## 2024-11-06 VITALS
BODY MASS INDEX: 27.63 KG/M2 | HEIGHT: 72 IN | RESPIRATION RATE: 18 BRPM | WEIGHT: 204 LBS | SYSTOLIC BLOOD PRESSURE: 123 MMHG | DIASTOLIC BLOOD PRESSURE: 68 MMHG | HEART RATE: 66 BPM | TEMPERATURE: 98 F

## 2024-11-06 DIAGNOSIS — L84 CALLUS: ICD-10-CM

## 2024-11-06 DIAGNOSIS — I10 PRIMARY HYPERTENSION: ICD-10-CM

## 2024-11-06 DIAGNOSIS — N17.9 AKI (ACUTE KIDNEY INJURY): ICD-10-CM

## 2024-11-06 DIAGNOSIS — N17.9 AKI (ACUTE KIDNEY INJURY): Primary | ICD-10-CM

## 2024-11-06 DIAGNOSIS — E11.9 TYPE 2 DIABETES MELLITUS WITHOUT COMPLICATION, WITH LONG-TERM CURRENT USE OF INSULIN: ICD-10-CM

## 2024-11-06 DIAGNOSIS — K80.20 CALCULUS OF GALLBLADDER WITHOUT CHOLECYSTITIS WITHOUT OBSTRUCTION: ICD-10-CM

## 2024-11-06 DIAGNOSIS — R21 RASH: ICD-10-CM

## 2024-11-06 DIAGNOSIS — Z79.4 TYPE 2 DIABETES MELLITUS WITHOUT COMPLICATION, WITH LONG-TERM CURRENT USE OF INSULIN: ICD-10-CM

## 2024-11-06 DIAGNOSIS — E11.9 ENCOUNTER FOR DIABETIC FOOT EXAM: ICD-10-CM

## 2024-11-06 LAB
ALBUMIN SERPL-MCNC: 3.8 G/DL (ref 3.4–4.8)
ALBUMIN/GLOB SERPL: 0.8 RATIO (ref 1.1–2)
ALP SERPL-CCNC: 197 UNIT/L (ref 40–150)
ALT SERPL-CCNC: 45 UNIT/L (ref 0–55)
ANION GAP SERPL CALC-SCNC: 11 MEQ/L
AST SERPL-CCNC: 25 UNIT/L (ref 5–34)
BILIRUB SERPL-MCNC: 0.5 MG/DL
BUN SERPL-MCNC: 16 MG/DL (ref 8.4–25.7)
CALCIUM SERPL-MCNC: 9.8 MG/DL (ref 8.8–10)
CHLORIDE SERPL-SCNC: 107 MMOL/L (ref 98–107)
CO2 SERPL-SCNC: 22 MMOL/L (ref 23–31)
CREAT SERPL-MCNC: 1.19 MG/DL (ref 0.72–1.25)
CREAT/UREA NIT SERPL: 13
GFR SERPLBLD CREATININE-BSD FMLA CKD-EPI: >60 ML/MIN/1.73/M2
GLOBULIN SER-MCNC: 4.6 GM/DL (ref 2.4–3.5)
GLUCOSE SERPL-MCNC: 191 MG/DL (ref 82–115)
POTASSIUM SERPL-SCNC: 4.2 MMOL/L (ref 3.5–5.1)
PROT SERPL-MCNC: 8.4 GM/DL (ref 5.8–7.6)
SODIUM SERPL-SCNC: 140 MMOL/L (ref 136–145)

## 2024-11-06 PROCEDURE — 36415 COLL VENOUS BLD VENIPUNCTURE: CPT

## 2024-11-06 PROCEDURE — 80053 COMPREHEN METABOLIC PANEL: CPT

## 2024-11-06 PROCEDURE — 99214 OFFICE O/P EST MOD 30 MIN: CPT | Mod: PBBFAC | Performed by: NURSE PRACTITIONER

## 2024-11-06 RX ORDER — MUPIROCIN 20 MG/G
OINTMENT TOPICAL 2 TIMES DAILY
Qty: 30 G | Refills: 1 | Status: SHIPPED | OUTPATIENT
Start: 2024-11-06

## 2024-11-06 NOTE — PROGRESS NOTES
Yolanda L Reji, NP   [facility]  [address] [facility] [address]      PATIENT NAME: Rogelio Johnson  : 1950  DATE: 24  MRN: [id number]        History of Present Illness / Problem Focused Workflow     Rogelio Johnson presents to the clinic with Follow-up (1. Post white follow-up. Voiced no complaints./2. C/o sore like to arms  2-3 weeks.)     Last seen 10/29/24.   24: 75 yo for hospital follow up visit. He was admitted 10/29/24 for symptomatic cholelithiasis. Upon admission work up it was noted JAMIL and losartan was stopped. He admits he has not been taking since hospital discharge. Denies any abdominal pain, n/v/d, fever, poor appetite. Admits to drinking plenty of fluids and eating well.    He is c/o rash to upper arms for a few weeks. Non bothersome. Due for diabetic foot exam and would like to be referred to podiatry. Last A1c 6.7% 24.     Other providers:   Dr. Morales/ Dr. Whyte  [facility]- last visit noted 2024  Dr. Mathew/Dr. Sánchez  [facility] Dr. DONATO Rodriguez, Dr. Quintana    Review of Systems     Review of Systems   Constitutional: Negative.    HENT: Negative.     Eyes: Negative.    Respiratory: Negative.     Cardiovascular: Negative.    Gastrointestinal: Negative.    Endocrine: Negative.    Genitourinary: Negative.    Musculoskeletal: Negative.    Skin:  Positive for rash.   Allergic/Immunologic: Negative.    Neurological: Negative.    Hematological: Negative.    Psychiatric/Behavioral: Negative.         Medical / Social / Family History     -------------------------------------    Coronary artery disease    Hyperlipidemia    Hypertension    Personal history of colonic polyps    Sleep apnea, unspecified        Past Surgical History:   Procedure Laterality Date    A-V CARDIAC PACEMAKER INSERTION N/A 2023    Procedure: INSERTION, CARDIAC PACEMAKER, DUAL CHAMBER;  Surgeon: Baljit Sánchez MD;  Location: [facility]  LAB;  Service: Cardiology;  Laterality: N/A;  DUAL CHAMBER PPM (SJM)    COLONOSCOPY      CORONARY ARTERY BYPASS GRAFT (CABG)      TRANSRECTAL BIOPSY OF PROSTATE WITH ULTRASOUND GUIDANCE Bilateral 1/2/2024    Procedure: BIOPSY, PROSTATE, RECTAL APPROACH, WITH US GUIDANCE;  Surgeon: Miracle Pierre DO;  Location: [facility] ENDOSCOPY;  Service: Urology;  Laterality: Bilateral;       Social History     Socioeconomic History    Marital status:    Tobacco Use    Smoking status: Never     Passive exposure: Never    Smokeless tobacco: Never   Substance and Sexual Activity    Alcohol use: Not Currently    Drug use: Never    Sexual activity: Not Currently     Social Drivers of Health     Financial Resource Strain: Patient Declined (10/29/2024)    Overall Financial Resource Strain (CARDIA)     Difficulty of Paying Living Expenses: Patient declined   Recent Concern: Financial Resource Strain - Medium Risk (8/7/2024)    Overall Financial Resource Strain (CARDIA)     Difficulty of Paying Living Expenses: Somewhat hard   Food Insecurity: Patient Declined (10/29/2024)    Hunger Vital Sign     Worried About Running Out of Food in the Last Year: Patient declined     Ran Out of Food in the Last Year: Patient declined   Recent Concern: Food Insecurity - Food Insecurity Present (8/7/2024)    Hunger Vital Sign     Worried About Running Out of Food in the Last Year: Sometimes true     Ran Out of Food in the Last Year: Sometimes true   Transportation Needs: Patient Declined (10/29/2024)    TRANSPORTATION NEEDS     Transportation : Patient declined   Physical Activity: Sufficiently Active (8/7/2024)    Exercise Vital Sign     Days of Exercise per Week: 5 days     Minutes of Exercise per Session: 30 min   Stress: Patient Declined (10/29/2024)    [employer] [employer] - Occupational Stress Questionnaire     Feeling of Stress : Patient declined   Recent Concern: Stress - Stress Concern Present (8/7/2024)    [place] [employer] - Occupational Stress Questionnaire     Feeling of Stress : To some extent   Housing Stability: Patient Declined (10/29/2024)    Housing Stability Vital Sign     Unable to Pay for Housing in the Last Year: Patient declined     Homeless in the Last Year: Patient declined        Family History   Problem Relation Name Age of Onset    Colon cancer Mother      Diabetes Mellitus Mother      Hypertension Mother      Cataracts Father      Heart attack Father      Heart disease Father          Medications and Allergies     Medications  Current Outpatient Medications   Medication Instructions    amLODIPine (NORVASC) 2.5 mg, Oral, Daily    aspirin (ECOTRIN) 81 mg, Oral, Daily    atorvastatin (LIPITOR) 80 mg, Oral, Daily    busPIRone (BUSPAR) 7.5 mg, Oral, 2 times daily PRN    carvediloL (COREG) 6.25 mg, 2 times daily    docusate sodium (COLACE) 100 mg    empagliflozin (JARDIANCE) 10 mg, Oral, Every morning    ezetimibe (ZETIA) 10 mg, Oral, Daily    ferrous sulfate (FEROSUL) 325 mg, Oral, Daily    finasteride (PROSCAR) 5 mg, Oral, Daily    fluticasone propionate (FLONASE) 100 mcg, Each Nostril, Daily    hydrALAZINE (APRESOLINE) 25 mg, Oral, Every 12 hours    insulin glargine U-100, Lantus, (LANTUS SOLOSTAR U-100 INSULIN) 100 unit/mL (3 mL) InPn pen Administer 68 units SC q AM and 62 units SC q HS. Rotate injection sites.    metFORMIN (GLUCOPHAGE) 1,000 mg, Oral, 2 times daily with meals    mupirocin (BACTROBAN) 2 % ointment Topical (Top), 2 times daily    nitroGLYCERIN (NITROSTAT) 0.4 mg, Every 5 min PRN    tadalafiL (CIALIS) 10 mg, Oral, Daily PRN    TRADJENTA 5 mg, Oral, Daily       Allergies  Review of patient's allergies indicates:  No Known Allergies    Physical Examination     Visit Vitals  /68 (BP Location: Left arm, Patient Position: Sitting)   Pulse 66   Temp 97.7 °F (36.5 °C) (Oral)   Resp 18   Ht 6' 0.01" (1.829 m)   Wt 92.5 kg (204 lb)   BMI 27.66 kg/m²       Physical " Exam  Constitutional:       General: He is not in acute distress.     Appearance: Normal appearance. He is normal weight. He is not ill-appearing, toxic-appearing or diaphoretic.   HENT:      Head: Normocephalic.   Cardiovascular:      Rate and Rhythm: Normal rate and regular rhythm.      Pulses:           Dorsalis pedis pulses are 2+ on the right side and 2+ on the left side.        Posterior tibial pulses are 2+ on the right side and 2+ on the left side.   Pulmonary:      Effort: Pulmonary effort is normal. No respiratory distress.      Breath sounds: Normal breath sounds.   Abdominal:      General: Bowel sounds are normal. There is no distension.      Palpations: Abdomen is soft. There is no mass.      Tenderness: There is no abdominal tenderness. There is no guarding or rebound.   Feet:      Right foot:      Protective Sensation: 5 sites tested.  5 sites sensed.      Skin integrity: Callus present.      Left foot:      Protective Sensation: 5 sites tested.  5 sites sensed.      Skin integrity: Callus present.      Toenail Condition: Left toenails are normal.   Skin:     General: Skin is warm and dry.   Neurological:      General: No focal deficit present.      Mental Status: He is alert and oriented to person, place, and time. Mental status is at baseline.      Motor: No weakness.      Coordination: Coordination normal.      Gait: Gait normal.   Psychiatric:         Mood and Affect: Mood normal.         Behavior: Behavior normal.         Thought Content: Thought content normal.         Judgment: Judgment normal.           Results     Lab Results   Component Value Date    WBC 4.25 (L) 10/30/2024    RBC 3.66 (L) 10/30/2024    HGB 12.2 (L) 10/30/2024    HCT 34.3 (L) 10/30/2024    MCV 93.7 10/30/2024    MCH 33.3 (H) 10/30/2024    MCHC 35.6 10/30/2024    RDW 12.3 10/30/2024     10/30/2024    MPV 10.3 10/30/2024     Sodium   Date Value Ref Range Status   10/30/2024 142 136 - 145 mmol/L Final   12/31/2021 137  136 - 145 mmol/L Final     Potassium   Date Value Ref Range Status   10/30/2024 3.3 (L) 3.5 - 5.1 mmol/L Final   12/31/2021 3.7 3.5 - 5.1 mmol/L Final     Chloride   Date Value Ref Range Status   10/30/2024 108 (H) 98 - 107 mmol/L Final   12/31/2021 110 (H) 100 - 109 mmol/L Final     CO2   Date Value Ref Range Status   10/30/2024 23 23 - 31 mmol/L Final     Carbon Dioxide   Date Value Ref Range Status   12/31/2021 20 (L) 22 - 33 mmol/L Final     Blood Urea Nitrogen   Date Value Ref Range Status   10/30/2024 17.0 8.4 - 25.7 mg/dL Final   12/31/2021 7 5 - 25 mg/dL Final     Creatinine   Date Value Ref Range Status   10/30/2024 1.27 (H) 0.72 - 1.25 mg/dL Final   12/31/2021 0.75 0.57 - 1.25 mg/dL Final     Calcium   Date Value Ref Range Status   10/30/2024 9.5 8.8 - 10.0 mg/dL Final   12/31/2021 8.0 (L) 8.8 - 10.6 mg/dL Final     Albumin   Date Value Ref Range Status   10/30/2024 3.3 (L) 3.4 - 4.8 g/dL Final     Bilirubin Total   Date Value Ref Range Status   10/30/2024 1.9 (H) <=1.5 mg/dL Final     ALP   Date Value Ref Range Status   10/30/2024 305 (H) 40 - 150 unit/L Final     AST   Date Value Ref Range Status   10/30/2024 129 (H) 5 - 34 unit/L Final     ALT   Date Value Ref Range Status   10/30/2024 187 (H) 0 - 55 unit/L Final     Anion Gap   Date Value Ref Range Status   12/31/2021 7 (L) 8 - 16 mmol/L Final     eGFR    Date Value Ref Range Status   12/31/2021 124 >=60 mL/min/1.73mSq Final     Estimated GFR-Non    Date Value Ref Range Status   04/18/2022 66 >=90      Lab Results   Component Value Date    CHOL 133 11/06/2023     Lab Results   Component Value Date    HDL 48 11/06/2023     Lab Results   Component Value Date    TRIG 228 (H) 11/06/2023     Lab Results   Component Value Date    LDL 39.00 (L) 11/06/2023     Lab Results   Component Value Date    TSH 1.117 11/06/2023     Lab Results   Component Value Date    PHUR 6.0 10/29/2024    SPECGRAV 1.020 01/10/2024    PROTEINUA 1+ (A)  10/29/2024    GLUCUA 3+ (A) 10/29/2024    KETONESU Negative 01/10/2024    OCCULTUA Negative 10/29/2024    NITRITE Negative 10/29/2024    LEUKOCYTESUR Negative 10/29/2024     Lab Results   Component Value Date    HGBA1C 6.7 08/01/2024    HGBA1C 6.0 11/06/2023    HGBA1C 6.1 10/13/2023     Lab Results   Component Value Date    MICALBCREAT 48.7 (H) 08/01/2024        Assessment       ICD-10-CM ICD-9-CM   1. JAMIL (acute kidney injury)  N17.9 584.9   2. Callus  L84 700   3. Calculus of gallbladder without cholecystitis without obstruction  K80.20 574.20   4. Rash  R21 782.1   5. Type 2 diabetes mellitus without complication, with long-term current use of insulin  E11.9 250.00    Z79.4 V58.67   6. Primary hypertension  I10 401.9   7. Encounter for diabetic foot exam  E11.9 250.00       Plan       Problem List Items Addressed This Visit          Derm    Callus    Relevant Orders    Ambulatory referral/consult to Podiatry    DIABETIC SHOES FOR HOME USE       Cardiac/Vascular    Hypertension    Current Assessment & Plan     BP Readings from Last 3 Encounters:   11/06/24 123/68   10/30/24 (!) 140/72   10/29/24 (!) 92/50     Follow low sodium diet, < 2 gm/day (avoid high salty foods such as processed meats/ sausage/bean/ sandwich meat, chips, pickles, cheese, crackers and soft drinks/ electrolyte replacement drinks).  Avoid tobacco/ alcohol use  Educated on health benefits of at least 5 days/ week of 30 minutes moderate intensity exercise (brisk walking) and 2 or more days/ week of muscle strength activities  Daily ASA 81 mg for CV prevention  Continue current medication regimen              Renal/    JAMIL (acute kidney injury) - Primary    Current Assessment & Plan     Pt hospitalized with cholelithiasis and losartan stopped with improved renal functions  Pt confirms has not been taking losartan since discharge (daughter helps with medications)  Will recheck CMP today          Relevant Orders    Comprehensive Metabolic Panel        Endocrine    Type 2 diabetes mellitus without complication, with long-term current use of insulin    Relevant Orders    Ambulatory referral/consult to Podiatry    DIABETIC SHOES FOR HOME USE       GI    Cholelithiasis    Current Assessment & Plan     Refer to abdominal US/ CT abdomen results  Asymptomatic at present  Advised to avoid high fatty/ cholesterol/ carb meals  ER precautions advised  Referral to surgery         Relevant Orders    Ambulatory referral/consult to General Surgery     Other Visit Diagnoses       Rash        Relevant Medications    mupirocin (BACTROBAN) 2 % ointment    Encounter for diabetic foot exam                Future Appointments   Date Time Provider Department Center   11/11/2024  9:00 AM Miracle Pierre DO [facility]   12/3/2024  2:00 PM [facility]   2/6/2025  7:40 AM [facility]        Follow up in about 3 months (around 2/6/2025) for HTN, HLD, CAD, diabetes with fasting labs.    Signature:  Yolanda Mendoza NP  [facility]  [address] [address]    Date of encounter: 11/6/24

## 2024-11-06 NOTE — ASSESSMENT & PLAN NOTE
BP Readings from Last 3 Encounters:   11/06/24 123/68   10/30/24 (!) 140/72   10/29/24 (!) 92/50     Follow low sodium diet, < 2 gm/day (avoid high salty foods such as processed meats/ sausage/bean/ sandwich meat, chips, pickles, cheese, crackers and soft drinks/ electrolyte replacement drinks).  Avoid tobacco/ alcohol use  Educated on health benefits of at least 5 days/ week of 30 minutes moderate intensity exercise (brisk walking) and 2 or more days/ week of muscle strength activities  Daily ASA 81 mg for CV prevention  Continue current medication regimen

## 2024-11-06 NOTE — ASSESSMENT & PLAN NOTE
Pt hospitalized with cholelithiasis and losartan stopped with improved renal functions  Pt confirms has not been taking losartan since discharge (daughter helps with medications)  Will recheck CMP today

## 2024-11-06 NOTE — ASSESSMENT & PLAN NOTE
Refer to abdominal US/ CT abdomen results  Asymptomatic at present  Advised to avoid high fatty/ cholesterol/ carb meals  ER precautions advised  Referral to surgery

## 2024-11-07 ENCOUNTER — TELEPHONE (OUTPATIENT)
Dept: INTERNAL MEDICINE | Facility: CLINIC | Age: 74
End: 2024-11-07
Payer: MEDICARE

## 2024-11-07 NOTE — TELEPHONE ENCOUNTER
Spoke to pt . Informed pt of PCP 's message below. Pt denies having any questions and verbalizes understanding.          ----- Message from Yolanda Mendoza NP sent at 11/6/2024  3:57 PM CST -----  Please let pt know kidney, liver and bili levels have improved.

## 2024-11-07 NOTE — TELEPHONE ENCOUNTER
----- Message from Yolanda Mendoza NP sent at 11/6/2024  3:57 PM CST -----  Please let pt know kidney, liver and bili levels have improved.

## 2024-11-11 ENCOUNTER — OFFICE VISIT (OUTPATIENT)
Dept: UROLOGY | Facility: CLINIC | Age: 74
End: 2024-11-11
Payer: MEDICARE

## 2024-11-11 VITALS
DIASTOLIC BLOOD PRESSURE: 72 MMHG | BODY MASS INDEX: 27.87 KG/M2 | RESPIRATION RATE: 20 BRPM | OXYGEN SATURATION: 100 % | HEIGHT: 72 IN | SYSTOLIC BLOOD PRESSURE: 133 MMHG | HEART RATE: 60 BPM | WEIGHT: 205.81 LBS | TEMPERATURE: 98 F

## 2024-11-11 DIAGNOSIS — C61 PROSTATE CANCER: Primary | ICD-10-CM

## 2024-11-11 DIAGNOSIS — N52.35 ERECTILE DYSFUNCTION FOLLOWING RADIATION THERAPY: ICD-10-CM

## 2024-11-11 LAB
BILIRUB SERPL-MCNC: NORMAL MG/DL
BLOOD URINE, POC: NORMAL
COLOR, POC UA: YELLOW
GLUCOSE UR QL STRIP: 500
KETONES UR QL STRIP: NORMAL
LEUKOCYTE ESTERASE URINE, POC: NORMAL
NITRITE, POC UA: NORMAL
PH, POC UA: 6
PROTEIN, POC: NORMAL
SPECIFIC GRAVITY, POC UA: 1.01
UROBILINOGEN, POC UA: 0.2

## 2024-11-11 PROCEDURE — 3075F SYST BP GE 130 - 139MM HG: CPT | Mod: CPTII,,, | Performed by: UROLOGY

## 2024-11-11 PROCEDURE — 96402 CHEMO HORMON ANTINEOPL SQ/IM: CPT | Mod: PBBFAC

## 2024-11-11 PROCEDURE — 81001 URINALYSIS AUTO W/SCOPE: CPT | Mod: PBBFAC | Performed by: UROLOGY

## 2024-11-11 PROCEDURE — 3044F HG A1C LEVEL LT 7.0%: CPT | Mod: CPTII,,, | Performed by: UROLOGY

## 2024-11-11 PROCEDURE — 4010F ACE/ARB THERAPY RXD/TAKEN: CPT | Mod: CPTII,,, | Performed by: UROLOGY

## 2024-11-11 PROCEDURE — 1160F RVW MEDS BY RX/DR IN RCRD: CPT | Mod: CPTII,,, | Performed by: UROLOGY

## 2024-11-11 PROCEDURE — 99214 OFFICE O/P EST MOD 30 MIN: CPT | Mod: S$PBB,,, | Performed by: UROLOGY

## 2024-11-11 PROCEDURE — 1101F PT FALLS ASSESS-DOCD LE1/YR: CPT | Mod: CPTII,,, | Performed by: UROLOGY

## 2024-11-11 PROCEDURE — 3066F NEPHROPATHY DOC TX: CPT | Mod: CPTII,,, | Performed by: UROLOGY

## 2024-11-11 PROCEDURE — 3078F DIAST BP <80 MM HG: CPT | Mod: CPTII,,, | Performed by: UROLOGY

## 2024-11-11 PROCEDURE — 99213 OFFICE O/P EST LOW 20 MIN: CPT | Mod: PBBFAC | Performed by: UROLOGY

## 2024-11-11 PROCEDURE — 3288F FALL RISK ASSESSMENT DOCD: CPT | Mod: CPTII,,, | Performed by: UROLOGY

## 2024-11-11 PROCEDURE — 1159F MED LIST DOCD IN RCRD: CPT | Mod: CPTII,,, | Performed by: UROLOGY

## 2024-11-11 PROCEDURE — 3060F POS MICROALBUMINURIA REV: CPT | Mod: CPTII,,, | Performed by: UROLOGY

## 2024-11-11 RX ADMIN — LEUPROLIDE ACETATE 45 MG: KIT at 09:11

## 2024-11-11 NOTE — PROGRESS NOTES
Seen by Dr. Pierre RTC 6 mnths with PSA & Lupron , Lupron injection administered today to [facility] WITH LOT #[id number] AND EXP. DATE 08/01/2026 , no reaction after 15 minutes tolerated well

## 2024-11-11 NOTE — PROGRESS NOTES
CC:  Prostate cancer    HPI:  Rogelio Johnson is a 74 y.o. male seen for follow-up of prostate cancer.  He had a prostate biopsy on 2 January 2024 for a PSA of 8.02.  The biopsy showed Augusta 4+3 in the left base and apex and Augusta 4+4 in the left mid.  He is on Lupron in conjunction with radiation which he had from 27 March to 7 May 2024.  First Lupron was on 28 February 2024.  He is here today for the second injection but is late coming for that.    No urinary complaints.   He was given Tadalafil for erectile dysfunction but hasn't tried it yet.  He did have some difficulty with erections prior to the radiation.      Urinalysis:  Results for orders placed or performed in visit on 11/11/24   POCT URINE DIPSTICK WITH MICROSCOPE, AUTOMATED   Result Value Ref Range    Color, UA Yellow     Spec Grav UA 1.015     pH, UA 6.0     WBC, UA neg     Nitrite, UA neg     Protein, POC neg     Glucose,      Ketones, UA neg     Urobilinogen, UA 0.2     Bilirubin, POC neg     Blood, UA neg      Microscopic Urinalysis:  WBC:   None per HPF     RBC:    None per HPF     Bacteria:    None per HPF     Squamous epithelial cells:  None per HPF      Crystals:   None    Lab Results:  PSA History:    01/16/18 09:12 04/03/19 10:25 12/30/20 10:13 01/12/22 08:40 11/07/23 10:47 08/01/24 09:18   1.2 1.6 2.57 5.13 (H) 8.02 (H) <0.10     Recent Labs     11/06/24  1326   CREATININE 1.19     Data Review:  Note from Gamaliel Orantes dated 7 May 2024.  PSA.     ROS:  All systems reviewed and are negative except as documented in HPI and/or Assessment and Plan.     Patient Active Problem List:     Patient Active Problem List   Diagnosis    Type 2 diabetes mellitus without complication, with long-term current use of insulin    Other male erectile dysfunction    Adenomatous polyp of ascending colon    Arteriosclerosis of coronary artery    Atrioventricular block    Benign prostatic hyperplasia    Chronic low back pain    Dyslipidemia    Hypertension     Polyp of colon    Normocytic normochromic anemia    Obstructive sleep apnea syndrome    Stress at home    Mobitz type 1 second degree AV block    Elevated PSA    AV block    Anxiety about health    Prostate cancer    Pain in both lower extremities    Cholelithiasis    JAMIL (acute kidney injury)    Choledocholithiasis    Callus        Past Medical History:  Past Medical History:   Diagnosis Date    Coronary artery disease     Hyperlipidemia     Hypertension     Personal history of colonic polyps     Sleep apnea, unspecified         Past Surgical History:  Past Surgical History:   Procedure Laterality Date    A-V CARDIAC PACEMAKER INSERTION N/A 11/9/2023    Procedure: INSERTION, CARDIAC PACEMAKER, DUAL CHAMBER;  Surgeon: Baljit Sánchez MD;  Location: [facility] CATH LAB;  Service: Cardiology;  Laterality: N/A;  DUAL CHAMBER PPM (SJM)    COLONOSCOPY      CORONARY ARTERY BYPASS GRAFT (CABG)      TRANSRECTAL BIOPSY OF PROSTATE WITH ULTRASOUND GUIDANCE Bilateral 1/2/2024    Procedure: BIOPSY, PROSTATE, RECTAL APPROACH, WITH US GUIDANCE;  Surgeon: Miracle Pierre DO;  Location: [facility] ENDOSCOPY;  Service: Urology;  Laterality: Bilateral;        Family History:  Family History   Problem Relation Name Age of Onset    Colon cancer Mother      Diabetes Mellitus Mother      Hypertension Mother      Cataracts Father      Heart attack Father      Heart disease Father          Social History:  Social History     Socioeconomic History    Marital status:    Tobacco Use    Smoking status: Never     Passive exposure: Never    Smokeless tobacco: Never   Substance and Sexual Activity    Alcohol use: Not Currently    Drug use: Never    Sexual activity: Not Currently     Social Drivers of Health     Financial Resource Strain: Patient Declined (10/29/2024)    Overall Financial Resource Strain (CARDIA)     Difficulty of Paying Living Expenses: Patient declined   Recent Concern: Financial Resource Strain - Medium Risk (8/7/2024)    Overall  Financial Resource Strain (CARDIA)     Difficulty of Paying Living Expenses: Somewhat hard   Food Insecurity: Patient Declined (10/29/2024)    Hunger Vital Sign     Worried About Running Out of Food in the Last Year: Patient declined     Ran Out of Food in the Last Year: Patient declined   Recent Concern: Food Insecurity - Food Insecurity Present (8/7/2024)    Hunger Vital Sign     Worried About Running Out of Food in the Last Year: Sometimes true     Ran Out of Food in the Last Year: Sometimes true   Transportation Needs: Patient Declined (10/29/2024)    TRANSPORTATION NEEDS     Transportation : Patient declined   Physical Activity: Sufficiently Active (8/7/2024)    Exercise Vital Sign     Days of Exercise per Week: 5 days     Minutes of Exercise per Session: 30 min   Stress: Patient Declined (10/29/2024)    [employer] - Occupational Stress Questionnaire     Feeling of Stress : Patient declined   Recent Concern: Stress - Stress Concern Present (8/7/2024)    [employer] - Occupational Stress Questionnaire     Feeling of Stress : To some extent   Housing Stability: Patient Declined (10/29/2024)    Housing Stability Vital Sign     Unable to Pay for Housing in the Last Year: Patient declined     Homeless in the Last Year: Patient declined        Allergies:  Review of patient's allergies indicates:  No Known Allergies     Objective:  Vitals:    11/11/24 0910   BP: 133/72   Pulse: 60   Resp: 20   Temp: 98 °F (36.7 °C)     General:  Well developed, well nourished adult male in no acute distress  Abdomen: Soft, nontender, no masses  Extremities:  No clubbing, cyanosis, or edema  Neurologic:  Grossly intact  Musculoskeletal:  Normal tone    Assessment:  1. Prostate cancer  - POCT URINE DIPSTICK WITH MICROSCOPE, AUTOMATED  - PSA, Total (Diagnostic); Future  - leuprolide acetate (6 month) injection 45 mg    2. Erectile dysfunction following radiation therapy      Plan:  Lupron given today.  Emphasized the importance of getting the injection on time.    He will try the Tadalafil he was given.  Was given a 10 mg dose.  I told him to try this and if it has not effective he can increase to 20 mg.    Follow-up tests needed:   PSA in six months     Return appointment:  Six months for Lupron.

## 2024-11-12 ENCOUNTER — TELEPHONE (OUTPATIENT)
Dept: INTERNAL MEDICINE | Facility: CLINIC | Age: 74
End: 2024-11-12
Payer: MEDICARE

## 2024-11-12 NOTE — TELEPHONE ENCOUNTER
Contacted informed that Amlodipine is 2.5 mg daily. Informed if has any remaining she may cut them in half. She voiced understanding.

## 2024-11-12 NOTE — TELEPHONE ENCOUNTER
----- Message from Rishi sent at 10/22/2024 11:52 AM CDT -----  .Who Called: Evelia Johnson (pt's daughter)    Caller is requesting assistance/information from provider's office.    Symptoms (please be specific):    How long has patient had these symptoms:    List of preferred pharmacies on file (remove unneeded):       Preferred Method of Contact: Phone Call  Patient's Preferred Phone Number on File: [phone]  Best Call Back Number, if different:  Additional Information:  pt's daughter called wanting to discuss medication changes

## 2024-11-13 ENCOUNTER — PATIENT MESSAGE (OUTPATIENT)
Dept: INTERNAL MEDICINE | Facility: CLINIC | Age: 74
End: 2024-11-13
Payer: MEDICARE

## 2024-11-19 ENCOUNTER — OFFICE VISIT (OUTPATIENT)
Dept: SURGERY | Facility: CLINIC | Age: 74
End: 2024-11-19
Payer: MEDICARE

## 2024-11-19 VITALS
HEART RATE: 60 BPM | TEMPERATURE: 98 F | DIASTOLIC BLOOD PRESSURE: 67 MMHG | SYSTOLIC BLOOD PRESSURE: 106 MMHG | HEIGHT: 72 IN | BODY MASS INDEX: 27.77 KG/M2 | RESPIRATION RATE: 20 BRPM | WEIGHT: 205 LBS | OXYGEN SATURATION: 97 %

## 2024-11-19 DIAGNOSIS — K80.20 CALCULUS OF GALLBLADDER WITHOUT CHOLECYSTITIS WITHOUT OBSTRUCTION: ICD-10-CM

## 2024-11-19 DIAGNOSIS — N17.9 AKI (ACUTE KIDNEY INJURY): ICD-10-CM

## 2024-11-19 PROCEDURE — 99215 OFFICE O/P EST HI 40 MIN: CPT | Mod: PBBFAC

## 2024-11-19 NOTE — PROGRESS NOTES
Seen by Dr. Stanley.  CIS form completed and given to patient to bring to upcoming appt. RTC 1 month.

## 2024-11-19 NOTE — PROGRESS NOTES
[facility] Note    HPI: Mr. Johnson is a 74 year old male with PMHx of prostate cancer (s/p Lupron, radiation), DM on insulin,and HTN presenting today for evaluation of cholelithiasis. He reports that on 10/29 he experienced an episode of diffuse abdominal pain and bloating, 1x emesis, and passing of a antonia BM. He was seen in the ED where u/s showed gallstones without gallbladder wall thickening or pericholecystic fluid, or common bile duct dilation. Labs in ED showed elevated bilirubin, follow up lab work unremarkable. He has not had an episode since. He has no history of postprandial pain, RUQ pain, or changes in bowel movements. No hematochezia. He reports continued occasional bloating and abdominal distention, denies pain or additional episodes of N/V. Denies chest pain/SOB, fever/chills. Family history is significant for gallbladder cancer in mother, diagnosed in 70s. Surgical history is significant for right hemicolectomy (2021) with benign polyp, quadruple bypass, pacemaker (2023). No problems with anesthesia. Last A1C 6.7%. He is a non-smoker.    PMH:   Past Medical History:   Diagnosis Date    Coronary artery disease     Hyperlipidemia     Hypertension     Personal history of colonic polyps     Sleep apnea, unspecified       Meds:   Current Outpatient Medications:     amLODIPine (NORVASC) 2.5 MG tablet, Take 1 tablet (2.5 mg total) by mouth once daily., Disp: 90 tablet, Rfl: 1    aspirin (ECOTRIN) 81 MG EC tablet, Take 1 tablet (81 mg total) by mouth once daily., Disp: , Rfl: 0    atorvastatin (LIPITOR) 80 MG tablet, Take 1 tablet (80 mg total) by mouth once daily., Disp: 90 tablet, Rfl: 2    busPIRone (BUSPAR) 7.5 MG tablet, Take 1 tablet (7.5 mg total) by mouth 2 (two) times daily as needed (anxiety)., Disp: 60 tablet, Rfl: 3    carvediloL (COREG) 6.25 MG tablet, Take 6.25 mg by mouth 2 (two) times daily., Disp: , Rfl:     docusate sodium (COLACE) 100 MG capsule, Take 100 mg by mouth.,  Disp: , Rfl:     empagliflozin (JARDIANCE) 10 mg tablet, Take 1 tablet (10 mg total) by mouth every morning., Disp: 90 tablet, Rfl: 2    ezetimibe (ZETIA) 10 mg tablet, Take 1 tablet (10 mg total) by mouth once daily., Disp: 90 tablet, Rfl: 2    ferrous sulfate (FEROSUL) 325 mg (65 mg iron) Tab tablet, Take 1 tablet (325 mg total) by mouth once daily., Disp: 90 tablet, Rfl: 2    finasteride (PROSCAR) 5 mg tablet, Take 1 tablet (5 mg total) by mouth once daily., Disp: 90 tablet, Rfl: 2    fluticasone propionate (FLONASE) 50 mcg/actuation nasal spray, 2 sprays (100 mcg total) by Each Nostril route once daily., Disp: 15.8 mL, Rfl: 6    hydrALAZINE (APRESOLINE) 25 MG tablet, Take 1 tablet (25 mg total) by mouth every 12 (twelve) hours., Disp: 60 tablet, Rfl: 1    insulin glargine U-100, Lantus, (LANTUS SOLOSTAR U-100 INSULIN) 100 unit/mL (3 mL) InPn pen, Administer 68 units SC q AM and 62 units SC q HS. Rotate injection sites., Disp: 126 mL, Rfl: 1    metFORMIN (GLUCOPHAGE) 1000 MG tablet, Take 1 tablet (1,000 mg total) by mouth 2 (two) times daily with meals., Disp: 180 tablet, Rfl: 2    mupirocin (BACTROBAN) 2 % ointment, Apply topically 2 (two) times daily., Disp: 30 g, Rfl: 1    nitroGLYCERIN (NITROSTAT) 0.4 MG SL tablet, Place 0.4 mg under the tongue every 5 (five) minutes as needed for Chest pain., Disp: , Rfl:     tadalafiL (CIALIS) 10 MG tablet, Take 1 tablet (10 mg total) by mouth daily as needed for Erectile Dysfunction., Disp: 10 tablet, Rfl: 11    TRADJENTA 5 mg Tab tablet, Take 1 tablet (5 mg total) by mouth once daily., Disp: 90 tablet, Rfl: 2  No current facility-administered medications for this visit.    Facility-Administered Medications Ordered in Other Visits:     0.9%  NaCl infusion, , Intravenous, Once, Gonzalo, Baljit N, MD  Allergies: Review of patient's allergies indicates:  No Known Allergies  Social History:   Social History     Tobacco Use    Smoking status: Never     Passive exposure: Never     Smokeless tobacco: Never   Substance Use Topics    Alcohol use: Not Currently    Drug use: Never     Family History:   Family History   Problem Relation Name Age of Onset    Other (Bile Duct) Mother      Gallbladder disease Mother      Diabetes Mellitus Mother      Hypertension Mother      Cataracts Father      Heart attack Father      Heart disease Father       Surgical History:   Past Surgical History:   Procedure Laterality Date    A-V CARDIAC PACEMAKER INSERTION N/A 11/9/2023    Procedure: INSERTION, CARDIAC PACEMAKER, DUAL CHAMBER;  Surgeon: Baljit Sánchez MD;  Location: [facility] CATH LAB;  Service: Cardiology;  Laterality: N/A;  DUAL CHAMBER PPM (SJM)    COLONOSCOPY      CORONARY ARTERY BYPASS GRAFT (CABG)      TRANSRECTAL BIOPSY OF PROSTATE WITH ULTRASOUND GUIDANCE Bilateral 1/2/2024    Procedure: BIOPSY, PROSTATE, RECTAL APPROACH, WITH US GUIDANCE;  Surgeon: Miracle Pierre DO;  Location: [facility] ENDOSCOPY;  Service: Urology;  Laterality: Bilateral;     Review of Systems:  Skin: No rashes or itching.  Head: Denies headache or recent trauma.  Eyes: Denies eye pain or double vision.  Neck: Denies swelling or hoarseness of voice.  Respiratory: Denies shortness of breath or chest pain  Cardiac: Denies palpitations or swelling in hands/feet.  Gastrointestinal: Denies nausea, denies vomiting.   Urinary: Denies dysuria or hematuria.  Vascular: Denies claudication or leg swelling.  Neuro: Denies motor deficits. Denies weakness.  Endocrine: Denies excessive sweating or cold intolerance.  Psych: Denies memory problems. Denies anxiety.    Objective:    Vitals:  Vitals:    11/19/24 0914   BP: 106/67   Pulse: 60   Resp: 20   Temp: 97.9 °F (36.6 °C)        Physical Exam:  Gen: NAD  Neuro: awake, alert, answering questions appropriately  CV: RRR. Well-healed midline chest scar.  Resp: non-labored breathing, SERGIO  Abd: firm, non-tender. Well-healed midline incision scar.   Ext: moves all 4 spontaneously and purposefully  Skin:  warm, well perfused    Imaging:  US Abdomen Limited    Result Date: 10/30/2024  EXAMINATION:  US ABDOMEN LIMITED    CLINICAL HISTORY:  cholelithiasis;    COMPARISON:  CT yesterday.    FINDINGS:  Grayscale, color and spectral doppler evaluation of the right upper quadrant.    The pancreas is obscured.  The IVC is obscured.    Liver is not significantly enlarged.  There is increased hepatic parenchymal echogenicity.  Normal hepatopetal flow is noted in the portal vein.    There are gallstones.  No significant gallbladder wall thickening or pericholecystic fluid.  The common bile duct is normal in caliber  and measures 5 mm.    Right kidney measures 11 cm in length. No hydronephrosis.        CT Abdomen Pelvis  Without Contrast    Result Date: 10/29/2024  EXAMINATION:  CT ABDOMEN PELVIS WITHOUT CONTRAST    CLINICAL HISTORY:  Epigastric pain;    TECHNIQUE:  Helically acquired axial images, sagittal and coronal reformations were obtained from the lung bases to the pubic symphysis without the IV administration of contrast.    Automated tube current modulation, weight-based exposure dosing, and/or iterative reconstruction technique utilized to reach lowest reasonably achievable exposure rate.    DLP: 411 mGy*cm    COMPARISON:  PET-CT 03/12/2024    FINDINGS:  HEART: There are coronary artery calcifications.    LUNG BASES: Well aerated.    LIVER: The liver hypodense compatible with steatosis.    BILIARY: Cholelithiasis.    PANCREAS: No inflammatory change.    SPLEEN: Normal in size    ADRENALS: No mass.    KIDNEYS/URETERS: No renal or ureteral calculus. No hydronephrosis.    GI TRACT/MESENTERY: Evaluation of the bowel is limited without contrast.  No evidence of bowel obstruction or inflammation. Postop right hemicolectomy.    PERITONEUM: No free fluid.No free air.    LYMPH NODES: No enlarged lymph nodes by size criteria.    VASCULATURE: Aortoiliac atherosclerosis.    BLADDER: Normal appearance given degree of  distention.    REPRODUCTIVE ORGANS: Normal as visualized.    ABDOMINAL WALL: Unremarkable.    BONES: No acute osseous abnormality.      Assessment/Plan:  Mr. Johnson is a 74 year old male with PMHx of prostate cancer (s/p Lupron, radiation), DM on insulin,and HTN presenting today for evaluation of choledocholithiasis/cholelithiasis s/p ED visit 10/29. U/s showed cholelithiasis without inflammation  or dilation of duct. Patient counseled on possible recurrence, and elective cholecystectomy vs lifestyle modifications. Also discussed possibility of lap vs open surgery if proceeding, given past surgical history. Patient would need cardiac clearance prior to scheduling, plans to see cardiology in December.     - RTC 1 month for symptom check, discuss possible elective cholecystectomy  - Needs cardiac risk stratification, may hold ASA prior to surgery,has appointment 12/13    Ailcia Vale  [employer] [facility]     Sheng Stanley MD MPH  [employer] [facility]

## 2024-11-19 NOTE — PROGRESS NOTES
I have reviewed the notes, assessments, and/or procedures performed by Dr Stanley, I concur with her/his documentation of Rogelio Johnson.  Date of Service: 11/19/2024    [facility]

## 2024-11-19 NOTE — PROGRESS NOTES
[facility] Clinic Note    HPI: Mr. Johnson is a 74 year old male with PMHx of prostate cancer (s/p Lupron, radiation), DM on insulin,and HTN presenting today for evaluation of cholelithiasis. He reports that on 10/29 he experienced an episode of diffuse abdominal pain and bloating, 1x emesis, and passing of a antonia BM. He was seen in the ED where u/s showed gallstones without gallbladder wall thickening or pericholecystic fluid, or common bile duct dilation. Labs in ED showed elevated bilirubin, follow up lab work unremarkable. He has not had an episode since. He has no history of postprandial pain, RUQ pain, or changes in bowel movements. No hematochezia. He reports continued occasional bloating and abdominal distention, denies pain or additional episodes of N/V. Denies chest pain/SOB, fever/chills. Family history is significant for gallbladder cancer in mother, diagnosed in 70s. Surgical history is significant for right hemicolectomy (2021) with benign polyp, quadruple bypass, pacemaker (2023). No problems with anesthesia. Last A1C 6.7%. He is a non-smoker.    PMH:   Past Medical History:   Diagnosis Date    Coronary artery disease     Hyperlipidemia     Hypertension     Personal history of colonic polyps     Sleep apnea, unspecified       Meds:   Current Outpatient Medications:     amLODIPine (NORVASC) 2.5 MG tablet, Take 1 tablet (2.5 mg total) by mouth once daily., Disp: 90 tablet, Rfl: 1    aspirin (ECOTRIN) 81 MG EC tablet, Take 1 tablet (81 mg total) by mouth once daily., Disp: , Rfl: 0    atorvastatin (LIPITOR) 80 MG tablet, Take 1 tablet (80 mg total) by mouth once daily., Disp: 90 tablet, Rfl: 2    busPIRone (BUSPAR) 7.5 MG tablet, Take 1 tablet (7.5 mg total) by mouth 2 (two) times daily as needed (anxiety)., Disp: 60 tablet, Rfl: 3    carvediloL (COREG) 6.25 MG tablet, Take 6.25 mg by mouth 2 (two) times daily., Disp: , Rfl:     docusate sodium (COLACE) 100 MG capsule, Take 100 mg by mouth.,  Disp: , Rfl:     empagliflozin (JARDIANCE) 10 mg tablet, Take 1 tablet (10 mg total) by mouth every morning., Disp: 90 tablet, Rfl: 2    ezetimibe (ZETIA) 10 mg tablet, Take 1 tablet (10 mg total) by mouth once daily., Disp: 90 tablet, Rfl: 2    ferrous sulfate (FEROSUL) 325 mg (65 mg iron) Tab tablet, Take 1 tablet (325 mg total) by mouth once daily., Disp: 90 tablet, Rfl: 2    finasteride (PROSCAR) 5 mg tablet, Take 1 tablet (5 mg total) by mouth once daily., Disp: 90 tablet, Rfl: 2    fluticasone propionate (FLONASE) 50 mcg/actuation nasal spray, 2 sprays (100 mcg total) by Each Nostril route once daily., Disp: 15.8 mL, Rfl: 6    hydrALAZINE (APRESOLINE) 25 MG tablet, Take 1 tablet (25 mg total) by mouth every 12 (twelve) hours., Disp: 60 tablet, Rfl: 1    insulin glargine U-100, Lantus, (LANTUS SOLOSTAR U-100 INSULIN) 100 unit/mL (3 mL) InPn pen, Administer 68 units SC q AM and 62 units SC q HS. Rotate injection sites., Disp: 126 mL, Rfl: 1    metFORMIN (GLUCOPHAGE) 1000 MG tablet, Take 1 tablet (1,000 mg total) by mouth 2 (two) times daily with meals., Disp: 180 tablet, Rfl: 2    mupirocin (BACTROBAN) 2 % ointment, Apply topically 2 (two) times daily., Disp: 30 g, Rfl: 1    nitroGLYCERIN (NITROSTAT) 0.4 MG SL tablet, Place 0.4 mg under the tongue every 5 (five) minutes as needed for Chest pain., Disp: , Rfl:     tadalafiL (CIALIS) 10 MG tablet, Take 1 tablet (10 mg total) by mouth daily as needed for Erectile Dysfunction., Disp: 10 tablet, Rfl: 11    TRADJENTA 5 mg Tab tablet, Take 1 tablet (5 mg total) by mouth once daily., Disp: 90 tablet, Rfl: 2  No current facility-administered medications for this visit.    Facility-Administered Medications Ordered in Other Visits:     0.9%  NaCl infusion, , Intravenous, Once, Gonzalo, Baljit N, MD  Allergies: Review of patient's allergies indicates:  No Known Allergies  Social History:   Social History     Tobacco Use    Smoking status: Never     Passive exposure: Never     Smokeless tobacco: Never   Substance Use Topics    Alcohol use: Not Currently    Drug use: Never     Family History:   Family History   Problem Relation Name Age of Onset    Other (Bile Duct) Mother      Gallbladder disease Mother      Diabetes Mellitus Mother      Hypertension Mother      Cataracts Father      Heart attack Father      Heart disease Father       Surgical History:   Past Surgical History:   Procedure Laterality Date    A-V CARDIAC PACEMAKER INSERTION N/A 11/9/2023    Procedure: INSERTION, CARDIAC PACEMAKER, DUAL CHAMBER;  Surgeon: Baljit Sánchez MD;  Location: [facility] CATH LAB;  Service: Cardiology;  Laterality: N/A;  DUAL CHAMBER PPM (SJM)    COLONOSCOPY      CORONARY ARTERY BYPASS GRAFT (CABG)      TRANSRECTAL BIOPSY OF PROSTATE WITH ULTRASOUND GUIDANCE Bilateral 1/2/2024    Procedure: BIOPSY, PROSTATE, RECTAL APPROACH, WITH US GUIDANCE;  Surgeon: Miracle Pierre DO;  Location: [facility] ENDOSCOPY;  Service: Urology;  Laterality: Bilateral;     Review of Systems:  Skin: No rashes or itching.  Head: Denies headache or recent trauma.  Eyes: Denies eye pain or double vision.  Neck: Denies swelling or hoarseness of voice.  Respiratory: Denies shortness of breath or chest pain  Cardiac: Denies palpitations or swelling in hands/feet.  Gastrointestinal: Denies nausea, denies vomiting.   Urinary: Denies dysuria or hematuria.  Vascular: Denies claudication or leg swelling.  Neuro: Denies motor deficits. Denies weakness.  Endocrine: Denies excessive sweating or cold intolerance.  Psych: Denies memory problems. Denies anxiety.    Objective:    Vitals:  Vitals:    11/19/24 0914   BP: 106/67   Pulse: 60   Resp: 20   Temp: 97.9 °F (36.6 °C)        Physical Exam:  Gen: NAD  Neuro: awake, alert, answering questions appropriately  CV: RRR. Well-healed midline chest scar.  Resp: non-labored breathing, SERGIO  Abd: firm, non-tender. Well-healed midline incision scar.   Ext: moves all 4 spontaneously and purposefully  Skin:  warm, well perfused    Imaging:  US Abdomen Limited    Result Date: 10/30/2024  EXAMINATION:  US ABDOMEN LIMITED    CLINICAL HISTORY:  cholelithiasis;    COMPARISON:  CT yesterday.    FINDINGS:  Grayscale, color and spectral doppler evaluation of the right upper quadrant.    The pancreas is obscured.  The IVC is obscured.    Liver is not significantly enlarged.  There is increased hepatic parenchymal echogenicity.  Normal hepatopetal flow is noted in the portal vein.    There are gallstones.  No significant gallbladder wall thickening or pericholecystic fluid.  The common bile duct is normal in caliber  and measures 5 mm.    Right kidney measures 11 cm in length. No hydronephrosis.        CT Abdomen Pelvis  Without Contrast    Result Date: 10/29/2024  EXAMINATION:  CT ABDOMEN PELVIS WITHOUT CONTRAST    CLINICAL HISTORY:  Epigastric pain;    TECHNIQUE:  Helically acquired axial images, sagittal and coronal reformations were obtained from the lung bases to the pubic symphysis without the IV administration of contrast.    Automated tube current modulation, weight-based exposure dosing, and/or iterative reconstruction technique utilized to reach lowest reasonably achievable exposure rate.    DLP: 411 mGy*cm    COMPARISON:  PET-CT 03/12/2024    FINDINGS:  HEART: There are coronary artery calcifications.    LUNG BASES: Well aerated.    LIVER: The liver hypodense compatible with steatosis.    BILIARY: Cholelithiasis.    PANCREAS: No inflammatory change.    SPLEEN: Normal in size    ADRENALS: No mass.    KIDNEYS/URETERS: No renal or ureteral calculus. No hydronephrosis.    GI TRACT/MESENTERY: Evaluation of the bowel is limited without contrast.  No evidence of bowel obstruction or inflammation. Postop right hemicolectomy.    PERITONEUM: No free fluid.No free air.    LYMPH NODES: No enlarged lymph nodes by size criteria.    VASCULATURE: Aortoiliac atherosclerosis.    BLADDER: Normal appearance given degree of  distention.    REPRODUCTIVE ORGANS: Normal as visualized.    ABDOMINAL WALL: Unremarkable.    BONES: No acute osseous abnormality.      Assessment/Plan:  Mr. Johnson is a 74 year old male with PMHx of prostate cancer (s/p Lupron, radiation), DM on insulin,and HTN presenting today for evaluation of cholelithiasis s/p ED visit 10/29. U/s showed cholelithiasis without inflammation  or dilation of duct. Patient counseled on possible recurrence, and elective cholecystectomy vs lifestyle modifications. Also discussed possibility of lap vs open surgery if proceeding, given past surgical history. Patient would need cardiac clearance prior to scheduling, plans to see cardiology in December.     - RTC 1 month for symptom check, discuss possible elective cholecystectomy  - Needs cardiac risk stratification to hold ASA prior to surgery    Alicia Vale  [employer] L[employer]

## 2024-11-22 DIAGNOSIS — Z79.4 TYPE 2 DIABETES MELLITUS WITHOUT COMPLICATION, WITH LONG-TERM CURRENT USE OF INSULIN: ICD-10-CM

## 2024-11-22 DIAGNOSIS — N40.0 BENIGN PROSTATIC HYPERPLASIA, UNSPECIFIED WHETHER LOWER URINARY TRACT SYMPTOMS PRESENT: ICD-10-CM

## 2024-11-22 DIAGNOSIS — I10 PRIMARY HYPERTENSION: ICD-10-CM

## 2024-11-22 DIAGNOSIS — R45.89 ANXIETY ABOUT HEALTH: ICD-10-CM

## 2024-11-22 DIAGNOSIS — E11.9 TYPE 2 DIABETES MELLITUS WITHOUT COMPLICATION, WITH LONG-TERM CURRENT USE OF INSULIN: ICD-10-CM

## 2024-11-22 DIAGNOSIS — E78.5 DYSLIPIDEMIA: ICD-10-CM

## 2024-11-25 RX ORDER — BUSPIRONE HYDROCHLORIDE 7.5 MG/1
7.5 TABLET ORAL 2 TIMES DAILY PRN
Qty: 60 TABLET | Refills: 6 | Status: SHIPPED | OUTPATIENT
Start: 2024-11-25 | End: 2025-11-25

## 2024-11-25 RX ORDER — HYDRALAZINE HYDROCHLORIDE 25 MG/1
25 TABLET, FILM COATED ORAL EVERY 12 HOURS
Qty: 180 TABLET | Refills: 2 | Status: SHIPPED | OUTPATIENT
Start: 2024-11-25 | End: 2025-11-25

## 2024-11-25 RX ORDER — FINASTERIDE 5 MG/1
5 TABLET, FILM COATED ORAL DAILY
Qty: 90 TABLET | Refills: 2 | OUTPATIENT
Start: 2024-11-25

## 2024-11-25 RX ORDER — EZETIMIBE 10 MG/1
10 TABLET ORAL DAILY
Qty: 90 TABLET | Refills: 2 | OUTPATIENT
Start: 2024-11-25 | End: 2025-11-25

## 2024-12-06 DIAGNOSIS — N40.0 BENIGN PROSTATIC HYPERPLASIA, UNSPECIFIED WHETHER LOWER URINARY TRACT SYMPTOMS PRESENT: ICD-10-CM

## 2024-12-09 RX ORDER — FINASTERIDE 5 MG/1
5 TABLET, FILM COATED ORAL DAILY
Qty: 90 TABLET | Refills: 2 | OUTPATIENT
Start: 2024-12-09

## 2024-12-10 ENCOUNTER — OFFICE VISIT (OUTPATIENT)
Dept: SURGERY | Facility: CLINIC | Age: 74
End: 2024-12-10
Payer: MEDICARE

## 2024-12-10 VITALS
BODY MASS INDEX: 28.67 KG/M2 | WEIGHT: 204.81 LBS | OXYGEN SATURATION: 99 % | HEIGHT: 71 IN | RESPIRATION RATE: 16 BRPM | TEMPERATURE: 98 F | DIASTOLIC BLOOD PRESSURE: 77 MMHG | SYSTOLIC BLOOD PRESSURE: 145 MMHG | HEART RATE: 66 BPM

## 2024-12-10 DIAGNOSIS — N40.0 BENIGN PROSTATIC HYPERPLASIA, UNSPECIFIED WHETHER LOWER URINARY TRACT SYMPTOMS PRESENT: ICD-10-CM

## 2024-12-10 DIAGNOSIS — K80.20 ASYMPTOMATIC CHOLELITHIASIS: Primary | ICD-10-CM

## 2024-12-10 PROCEDURE — 99215 OFFICE O/P EST HI 40 MIN: CPT | Mod: PBBFAC

## 2024-12-10 RX ORDER — FINASTERIDE 5 MG/1
5 TABLET, FILM COATED ORAL DAILY
Qty: 90 TABLET | Refills: 3 | Status: SHIPPED | OUTPATIENT
Start: 2024-12-10

## 2024-12-10 RX ORDER — FINASTERIDE 5 MG/1
5 TABLET, FILM COATED ORAL DAILY
Qty: 90 TABLET | Refills: 2 | OUTPATIENT
Start: 2024-12-10

## 2024-12-10 NOTE — PROGRESS NOTES
[facility] Clinic Note    HPI:   Rogelio Johnson is a 73 yo male with PMHx of prostate cancer (s/p Lupron, radiation), DM on insulin, and HTN presenting today for follow-up of cholelithiasis. Patient went to ED on 10/29 for symptomatic episode where gallstones were found on US. Since last visit, patient remains asymptomatic. Denies nausea, vomiting, fever/chills, or changes in bowel habits. Patient has not had any abdominal discomfort or pain.    PMH:   Past Medical History:   Diagnosis Date    Coronary artery disease     Hyperlipidemia     Hypertension     Personal history of colonic polyps     Sleep apnea, unspecified       Meds:   Current Outpatient Medications:     amLODIPine (NORVASC) 2.5 MG tablet, Take 1 tablet (2.5 mg total) by mouth once daily., Disp: 90 tablet, Rfl: 1    aspirin (ECOTRIN) 81 MG EC tablet, Take 1 tablet (81 mg total) by mouth once daily., Disp: , Rfl: 0    atorvastatin (LIPITOR) 80 MG tablet, Take 1 tablet (80 mg total) by mouth once daily., Disp: 90 tablet, Rfl: 2    busPIRone (BUSPAR) 7.5 MG tablet, Take 1 tablet (7.5 mg total) by mouth 2 (two) times daily as needed (anxiety)., Disp: 60 tablet, Rfl: 6    carvediloL (COREG) 6.25 MG tablet, Take 6.25 mg by mouth 2 (two) times daily., Disp: , Rfl:     docusate sodium (COLACE) 100 MG capsule, Take 100 mg by mouth., Disp: , Rfl:     empagliflozin (JARDIANCE) 10 mg tablet, Take 1 tablet (10 mg total) by mouth every morning., Disp: 90 tablet, Rfl: 2    ezetimibe (ZETIA) 10 mg tablet, Take 1 tablet (10 mg total) by mouth once daily., Disp: 90 tablet, Rfl: 2    ferrous sulfate (FEROSUL) 325 mg (65 mg iron) Tab tablet, Take 1 tablet (325 mg total) by mouth once daily., Disp: 90 tablet, Rfl: 2    finasteride (PROSCAR) 5 mg tablet, Take 1 tablet (5 mg total) by mouth once daily., Disp: 90 tablet, Rfl: 2    fluticasone propionate (FLONASE) 50 mcg/actuation nasal spray, 2 sprays (100 mcg total) by Each Nostril route once daily., Disp: 15.8 mL,  Rfl: 6    hydrALAZINE (APRESOLINE) 25 MG tablet, Take 1 tablet (25 mg total) by mouth every 12 (twelve) hours., Disp: 180 tablet, Rfl: 2    insulin glargine U-100, Lantus, (LANTUS SOLOSTAR U-100 INSULIN) 100 unit/mL (3 mL) InPn pen, Administer 68 units SC q AM and 62 units SC q HS. Rotate injection sites., Disp: 126 mL, Rfl: 1    metFORMIN (GLUCOPHAGE) 1000 MG tablet, Take 1 tablet (1,000 mg total) by mouth 2 (two) times daily with meals., Disp: 180 tablet, Rfl: 2    mupirocin (BACTROBAN) 2 % ointment, Apply topically 2 (two) times daily., Disp: 30 g, Rfl: 1    nitroGLYCERIN (NITROSTAT) 0.4 MG SL tablet, Place 0.4 mg under the tongue every 5 (five) minutes as needed for Chest pain., Disp: , Rfl:     tadalafiL (CIALIS) 10 MG tablet, Take 1 tablet (10 mg total) by mouth daily as needed for Erectile Dysfunction., Disp: 10 tablet, Rfl: 11    TRADJENTA 5 mg Tab tablet, Take 1 tablet (5 mg total) by mouth once daily., Disp: 90 tablet, Rfl: 2  No current facility-administered medications for this visit.    Facility-Administered Medications Ordered in Other Visits:     0.9%  NaCl infusion, , Intravenous, Once, Baljit Sánchez MD  Allergies: Review of patient's allergies indicates:  No Known Allergies  Social History:   Social History     Tobacco Use    Smoking status: Never     Passive exposure: Never    Smokeless tobacco: Never   Substance Use Topics    Alcohol use: Not Currently    Drug use: Never     Family History:   Family History   Problem Relation Name Age of Onset    Other (Bile Duct) Mother      Gallbladder disease Mother      Diabetes Mellitus Mother      Hypertension Mother      Cataracts Father      Heart attack Father      Heart disease Father       Surgical History:   Past Surgical History:   Procedure Laterality Date    A-V CARDIAC PACEMAKER INSERTION N/A 11/9/2023    Procedure: INSERTION, CARDIAC PACEMAKER, DUAL CHAMBER;  Surgeon: Baljit Sánchez MD;  Location: [facility] CATH LAB;  Service: Cardiology;  Laterality:  N/A;  DUAL CHAMBER PPM (SJM)    COLONOSCOPY      CORONARY ARTERY BYPASS GRAFT (CABG)      TRANSRECTAL BIOPSY OF PROSTATE WITH ULTRASOUND GUIDANCE Bilateral 1/2/2024    Procedure: BIOPSY, PROSTATE, RECTAL APPROACH, WITH US GUIDANCE;  Surgeon: Miracle Pierre DO;  Location: [facility] ENDOSCOPY;  Service: Urology;  Laterality: Bilateral;     Objective:    Vitals:  Vitals:    12/10/24 0820   BP: (!) 145/77   Pulse:    Resp:    Temp:         Physical Exam:  Gen: NAD  Neuro: awake, alert, answering questions appropriately  CV: RR  Resp: non-labored breathing on RA  Abd: soft, ND, NT  Ext: moves all 4 spontaneously and purposefully  Skin: warm, well perfused    Imaging:  10/29/24:  EXAMINATION:  US ABDOMEN LIMITED     CLINICAL HISTORY:  cholelithiasis;     COMPARISON:  CT yesterday.     FINDINGS:  Grayscale, color and spectral doppler evaluation of the right upper quadrant.     The pancreas is obscured.  The IVC is obscured.     Liver is not significantly enlarged.  There is increased hepatic parenchymal echogenicity.  Normal hepatopetal flow is noted in the portal vein.     There are gallstones.  No significant gallbladder wall thickening or pericholecystic fluid.  The common bile duct is normal in caliber  and measures 5 mm.     Right kidney measures 11 cm in length. No hydronephrosis.     Impression:     1. Cholelithiasis without convincing sonographic evidence of acute cholecystitis.  2. No biliary ductal dilatation.  3. Hepatic steatosis.      Assessment/Plan:  Rogelio Johnson is a 73 yo male with PMHx of prostate cancer (s/p Lupron, radiation), DM on insulin, and HTN presenting today for follow-up of cholelithiasis. Patient remains asymptomatic. Talked to patient about surgical options, patient would like to defer surgical intervention as he is not symptomatic at this time. Referral for cardiac clearance sent last visit, and patient saw his cardiologist at [facility] yesterday.    - Scheduled 6 month follow up in clinic with  understanding that patient call clinic sooner if symptomatic  - Follow up cardiac risk assessment from ESPERANZA Rutledge, MS3  [facility] [employer]    I have independently reviewed the medical records and examined Rogelio Johnson. I have reviewed the notes, assessments, and/or procedures performed by the above medical student, I concur with her/his documentation of our patient.    - Rogelio Johnson is a 74 y.o. presenting 12/10/2024 to clinic for follow up for symptomatic cholelithiasis s/p cardiac risk assessment. Patient is currently asymptomatic and not wishing to undergo surgical intervention at this time. Patient is aware that symptoms may return and was urged to reach out to clinic if so prior to his follow up appt in 6 months.      Nevin Hunt MD   [facility] General Surgery PGY-1

## 2024-12-10 NOTE — TELEPHONE ENCOUNTER
----- Message from Pat sent at 12/10/2024  8:37 AM CST -----  Regarding: med  .Type:  RX Refill Request    Who Called: pt  Refill or New Rx:refill  RX Name and Strength:finasteride (PROSCAR) 5 mg tablet  How is the patient currently taking it? (ex. 1XDay):Take 1 tablet (5 mg total) by mouth once daily.   Is this a 30 day or 90 day RX:90  Preferred Pharmacy with phone number:[facility] [address] [facility] or Mail Order:  Ordering Provider:  Would the patient rather a call back or a response via [facility]?   Best Call Back Number:  Additional Information:

## 2024-12-10 NOTE — PROGRESS NOTES
[facility] Clinic Note    HPI:   Rogelio Johnson is a 73 yo male with PMHx of prostate cancer (s/p Lupron, radiation), DM on insulin, and HTN presenting today for follow-up of cholelithiasis. Patient went to ED on 10/29 for symptomatic episode where gallstones were found on US. Since last visit, patient remains asymptomatic. Denies nausea, vomiting, fever/chills, or changes in bowel habits. Patient has not had any abdominal discomfort or pain.    PMH:   Past Medical History:   Diagnosis Date    Coronary artery disease     Hyperlipidemia     Hypertension     Personal history of colonic polyps     Sleep apnea, unspecified       Meds:   Current Outpatient Medications:     amLODIPine (NORVASC) 2.5 MG tablet, Take 1 tablet (2.5 mg total) by mouth once daily., Disp: 90 tablet, Rfl: 1    aspirin (ECOTRIN) 81 MG EC tablet, Take 1 tablet (81 mg total) by mouth once daily., Disp: , Rfl: 0    atorvastatin (LIPITOR) 80 MG tablet, Take 1 tablet (80 mg total) by mouth once daily., Disp: 90 tablet, Rfl: 2    busPIRone (BUSPAR) 7.5 MG tablet, Take 1 tablet (7.5 mg total) by mouth 2 (two) times daily as needed (anxiety)., Disp: 60 tablet, Rfl: 6    carvediloL (COREG) 6.25 MG tablet, Take 6.25 mg by mouth 2 (two) times daily., Disp: , Rfl:     docusate sodium (COLACE) 100 MG capsule, Take 100 mg by mouth., Disp: , Rfl:     empagliflozin (JARDIANCE) 10 mg tablet, Take 1 tablet (10 mg total) by mouth every morning., Disp: 90 tablet, Rfl: 2    ezetimibe (ZETIA) 10 mg tablet, Take 1 tablet (10 mg total) by mouth once daily., Disp: 90 tablet, Rfl: 2    ferrous sulfate (FEROSUL) 325 mg (65 mg iron) Tab tablet, Take 1 tablet (325 mg total) by mouth once daily., Disp: 90 tablet, Rfl: 2    finasteride (PROSCAR) 5 mg tablet, Take 1 tablet (5 mg total) by mouth once daily., Disp: 90 tablet, Rfl: 2    fluticasone propionate (FLONASE) 50 mcg/actuation nasal spray, 2 sprays (100 mcg total) by Each Nostril route once daily., Disp: 15.8 mL,  Rfl: 6    hydrALAZINE (APRESOLINE) 25 MG tablet, Take 1 tablet (25 mg total) by mouth every 12 (twelve) hours., Disp: 180 tablet, Rfl: 2    insulin glargine U-100, Lantus, (LANTUS SOLOSTAR U-100 INSULIN) 100 unit/mL (3 mL) InPn pen, Administer 68 units SC q AM and 62 units SC q HS. Rotate injection sites., Disp: 126 mL, Rfl: 1    metFORMIN (GLUCOPHAGE) 1000 MG tablet, Take 1 tablet (1,000 mg total) by mouth 2 (two) times daily with meals., Disp: 180 tablet, Rfl: 2    mupirocin (BACTROBAN) 2 % ointment, Apply topically 2 (two) times daily., Disp: 30 g, Rfl: 1    nitroGLYCERIN (NITROSTAT) 0.4 MG SL tablet, Place 0.4 mg under the tongue every 5 (five) minutes as needed for Chest pain., Disp: , Rfl:     tadalafiL (CIALIS) 10 MG tablet, Take 1 tablet (10 mg total) by mouth daily as needed for Erectile Dysfunction., Disp: 10 tablet, Rfl: 11    TRADJENTA 5 mg Tab tablet, Take 1 tablet (5 mg total) by mouth once daily., Disp: 90 tablet, Rfl: 2  No current facility-administered medications for this visit.    Facility-Administered Medications Ordered in Other Visits:     0.9%  NaCl infusion, , Intravenous, Once, Baljit Sánchez MD  Allergies: Review of patient's allergies indicates:  No Known Allergies  Social History:   Social History     Tobacco Use    Smoking status: Never     Passive exposure: Never    Smokeless tobacco: Never   Substance Use Topics    Alcohol use: Not Currently    Drug use: Never     Family History:   Family History   Problem Relation Name Age of Onset    Other (Bile Duct) Mother      Gallbladder disease Mother      Diabetes Mellitus Mother      Hypertension Mother      Cataracts Father      Heart attack Father      Heart disease Father       Surgical History:   Past Surgical History:   Procedure Laterality Date    A-V CARDIAC PACEMAKER INSERTION N/A 11/9/2023    Procedure: INSERTION, CARDIAC PACEMAKER, DUAL CHAMBER;  Surgeon: Baljit Sánchez MD;  Location: [facility] CATH LAB;  Service: Cardiology;  Laterality:  N/A;  DUAL CHAMBER PPM (SJM)    COLONOSCOPY      CORONARY ARTERY BYPASS GRAFT (CABG)      TRANSRECTAL BIOPSY OF PROSTATE WITH ULTRASOUND GUIDANCE Bilateral 1/2/2024    Procedure: BIOPSY, PROSTATE, RECTAL APPROACH, WITH US GUIDANCE;  Surgeon: Miracle Pierre DO;  Location: [facility] ENDOSCOPY;  Service: Urology;  Laterality: Bilateral;     Objective:    Vitals:  Vitals:    12/10/24 0820   BP: (!) 145/77   Pulse:    Resp:    Temp:         Physical Exam:  Gen: NAD  Neuro: awake, alert, answering questions appropriately  CV: RR  Resp: non-labored breathing on RA  Abd: soft, ND, NT  Ext: moves all 4 spontaneously and purposefully  Skin: warm, well perfused    Imaging:  10/29/24:  EXAMINATION:  US ABDOMEN LIMITED     CLINICAL HISTORY:  cholelithiasis;     COMPARISON:  CT yesterday.     FINDINGS:  Grayscale, color and spectral doppler evaluation of the right upper quadrant.     The pancreas is obscured.  The IVC is obscured.     Liver is not significantly enlarged.  There is increased hepatic parenchymal echogenicity.  Normal hepatopetal flow is noted in the portal vein.     There are gallstones.  No significant gallbladder wall thickening or pericholecystic fluid.  The common bile duct is normal in caliber  and measures 5 mm.     Right kidney measures 11 cm in length. No hydronephrosis.     Impression:     1. Cholelithiasis without convincing sonographic evidence of acute cholecystitis.  2. No biliary ductal dilatation.  3. Hepatic steatosis.      Assessment/Plan:  Rogelio Johnson is a 73 yo male with PMHx of prostate cancer (s/p Lupron, radiation), DM on insulin, and HTN presenting today for follow-up of cholelithiasis. Patient remains asymptomatic. Talked to patient about surgical options, patient would like to defer surgical intervention as he is not symptomatic at this time. Referral for cardiac clearance sent last visit, and patient saw his cardiologist at [facility] yesterday.    - Scheduled 6 month follow up in clinic with  understanding that patient call clinic sooner if symptomatic  - Follow up cardiac risk assessment from [facility]    Kris Rutledge, MS3  [facility] [employer]

## 2024-12-12 NOTE — PROGRESS NOTES
I have reviewed the notes, assessments, and/or procedures performed by Dr Hunt, I concur with her/his documentation of Rogelio Johnson.  Date of Service: 12/10/2024    [facility]

## 2024-12-19 ENCOUNTER — HOSPITAL ENCOUNTER (EMERGENCY)
Facility: HOSPITAL | Age: 74
Discharge: HOME | End: 2024-12-19
Attending: EMERGENCY MEDICINE
Payer: MEDICARE

## 2024-12-19 VITALS
DIASTOLIC BLOOD PRESSURE: 62 MMHG | RESPIRATION RATE: 19 BRPM | HEART RATE: 70 BPM | BODY MASS INDEX: 28.29 KG/M2 | WEIGHT: 202.81 LBS | TEMPERATURE: 98 F | OXYGEN SATURATION: 96 % | SYSTOLIC BLOOD PRESSURE: 142 MMHG

## 2024-12-19 DIAGNOSIS — R53.1 WEAKNESS: ICD-10-CM

## 2024-12-19 DIAGNOSIS — R10.13 EPIGASTRIC PAIN: ICD-10-CM

## 2024-12-19 LAB
ALBUMIN SERPL-MCNC: 3.6 G/DL (ref 3.4–4.8)
ALBUMIN/GLOB SERPL: 0.9 RATIO (ref 1.1–2)
ALP SERPL-CCNC: 117 UNIT/L (ref 40–150)
ALT SERPL-CCNC: 71 UNIT/L (ref 0–55)
ANION GAP SERPL CALC-SCNC: 9 MEQ/L
AST SERPL-CCNC: 133 UNIT/L (ref 5–34)
BACTERIA #/AREA URNS AUTO: ABNORMAL /HPF
BASOPHILS # BLD AUTO: 0.03 X10(3)/MCL
BASOPHILS NFR BLD AUTO: 0.4 %
BILIRUB SERPL-MCNC: 1.1 MG/DL
BILIRUB UR QL STRIP.AUTO: NEGATIVE
BUN SERPL-MCNC: 10.3 MG/DL (ref 8.4–25.7)
CALCIUM SERPL-MCNC: 9.8 MG/DL (ref 8.8–10)
CHLORIDE SERPL-SCNC: 107 MMOL/L (ref 98–107)
CLARITY UR: CLEAR
CO2 SERPL-SCNC: 27 MMOL/L (ref 23–31)
COLOR UR AUTO: YELLOW
CREAT SERPL-MCNC: 1.02 MG/DL (ref 0.72–1.25)
CREAT/UREA NIT SERPL: 10
EOSINOPHIL # BLD AUTO: 0.03 X10(3)/MCL (ref 0–0.9)
EOSINOPHIL NFR BLD AUTO: 0.4 %
ERYTHROCYTE [DISTWIDTH] IN BLOOD BY AUTOMATED COUNT: 12.9 % (ref 11.5–17)
GFR SERPLBLD CREATININE-BSD FMLA CKD-EPI: >60 ML/MIN/1.73/M2
GLOBULIN SER-MCNC: 4.2 GM/DL (ref 2.4–3.5)
GLUCOSE SERPL-MCNC: 151 MG/DL (ref 82–115)
GLUCOSE UR QL STRIP: ABNORMAL
HCT VFR BLD AUTO: 35.1 % (ref 42–52)
HGB BLD-MCNC: 11.8 G/DL (ref 14–18)
HGB UR QL STRIP: NEGATIVE
HOLD SPECIMEN: NORMAL
HYALINE CASTS #/AREA URNS LPF: ABNORMAL /LPF
IMM GRANULOCYTES # BLD AUTO: 0.03 X10(3)/MCL (ref 0–0.04)
IMM GRANULOCYTES NFR BLD AUTO: 0.4 %
KETONES UR QL STRIP: NEGATIVE
LEUKOCYTE ESTERASE UR QL STRIP: NEGATIVE
LIPASE SERPL-CCNC: 29 U/L
LYMPHOCYTES # BLD AUTO: 1.91 X10(3)/MCL (ref 0.6–4.6)
LYMPHOCYTES NFR BLD AUTO: 23.6 %
MAGNESIUM SERPL-MCNC: 1.7 MG/DL (ref 1.6–2.6)
MCH RBC QN AUTO: 31.2 PG (ref 27–31)
MCHC RBC AUTO-ENTMCNC: 33.6 G/DL (ref 33–36)
MCV RBC AUTO: 92.9 FL (ref 80–94)
MONOCYTES # BLD AUTO: 0.51 X10(3)/MCL (ref 0.1–1.3)
MONOCYTES NFR BLD AUTO: 6.3 %
MUCOUS THREADS URNS QL MICRO: ABNORMAL /LPF
NEUTROPHILS # BLD AUTO: 5.57 X10(3)/MCL (ref 2.1–9.2)
NEUTROPHILS NFR BLD AUTO: 68.9 %
NITRITE UR QL STRIP: NEGATIVE
NRBC BLD AUTO-RTO: 0 %
PH UR STRIP: 7 [PH]
PLATELET # BLD AUTO: 287 X10(3)/MCL (ref 130–400)
PMV BLD AUTO: 9.7 FL (ref 7.4–10.4)
POTASSIUM SERPL-SCNC: 4.6 MMOL/L (ref 3.5–5.1)
PROT SERPL-MCNC: 7.8 GM/DL (ref 5.8–7.6)
PROT UR QL STRIP: ABNORMAL
RBC # BLD AUTO: 3.78 X10(6)/MCL (ref 4.7–6.1)
RBC #/AREA URNS AUTO: ABNORMAL /HPF
SODIUM SERPL-SCNC: 143 MMOL/L (ref 136–145)
SP GR UR STRIP.AUTO: 1.03 (ref 1–1.03)
SQUAMOUS #/AREA URNS LPF: ABNORMAL /HPF
TROPONIN I SERPL-MCNC: <0.01 NG/ML (ref 0–0.04)
UROBILINOGEN UR STRIP-ACNC: ABNORMAL
WBC # BLD AUTO: 8.08 X10(3)/MCL (ref 4.5–11.5)
WBC #/AREA URNS AUTO: ABNORMAL /HPF

## 2024-12-19 PROCEDURE — 99285 EMERGENCY DEPT VISIT HI MDM: CPT | Mod: 25

## 2024-12-19 PROCEDURE — 96375 TX/PRO/DX INJ NEW DRUG ADDON: CPT

## 2024-12-19 PROCEDURE — 85025 COMPLETE CBC W/AUTO DIFF WBC: CPT | Performed by: EMERGENCY MEDICINE

## 2024-12-19 PROCEDURE — 83690 ASSAY OF LIPASE: CPT | Performed by: EMERGENCY MEDICINE

## 2024-12-19 PROCEDURE — 93005 ELECTROCARDIOGRAM TRACING: CPT

## 2024-12-19 PROCEDURE — 63600175 PHARM REV CODE 636 W HCPCS: Performed by: EMERGENCY MEDICINE

## 2024-12-19 PROCEDURE — 80053 COMPREHEN METABOLIC PANEL: CPT | Performed by: EMERGENCY MEDICINE

## 2024-12-19 PROCEDURE — 83735 ASSAY OF MAGNESIUM: CPT | Performed by: EMERGENCY MEDICINE

## 2024-12-19 PROCEDURE — 96374 THER/PROPH/DIAG INJ IV PUSH: CPT

## 2024-12-19 PROCEDURE — 81001 URINALYSIS AUTO W/SCOPE: CPT | Performed by: EMERGENCY MEDICINE

## 2024-12-19 PROCEDURE — 84484 ASSAY OF TROPONIN QUANT: CPT | Performed by: EMERGENCY MEDICINE

## 2024-12-19 PROCEDURE — 25500020 PHARM REV CODE 255

## 2024-12-19 RX ORDER — MORPHINE SULFATE 2 MG/ML
4 INJECTION, SOLUTION INTRAMUSCULAR; INTRAVENOUS
Status: COMPLETED | OUTPATIENT
Start: 2024-12-19 | End: 2024-12-19

## 2024-12-19 RX ORDER — ONDANSETRON HYDROCHLORIDE 2 MG/ML
4 INJECTION, SOLUTION INTRAVENOUS
Status: COMPLETED | OUTPATIENT
Start: 2024-12-19 | End: 2024-12-19

## 2024-12-19 RX ADMIN — MORPHINE SULFATE 4 MG: 2 INJECTION, SOLUTION INTRAMUSCULAR; INTRAVENOUS at 01:12

## 2024-12-19 RX ADMIN — IOHEXOL 100 ML: 350 INJECTION, SOLUTION INTRAVENOUS at 02:12

## 2024-12-19 RX ADMIN — ONDANSETRON 4 MG: 2 INJECTION INTRAMUSCULAR; INTRAVENOUS at 01:12

## 2024-12-19 NOTE — ED PROVIDER NOTES
ED PROVIDER NOTE  12/19/2024    CHIEF COMPLAINT:   Chief Complaint   Patient presents with    Abdominal Pain     PT W HX OF GALL STONES W CO UPPER ABD/EPIGASTRIC PAIN AND DIARRHEA AFTER EATING PEANUTS LAST PM.  NO NV.  DENIES CP/SOB.  HX OF CABG AND PACE MAKER.  EKG OBTAINED.        HISTORY OF PRESENT ILLNESS:   Rogelio Johnson is a 74 y.o. male who presents with chief complaint Abdominal pain. Reports upper abdominal pain that began this morning that is characterized as throbbing and nonradiating.  States it feels like when he has had gallbladder attacks in the past.  Denies nausea or vomiting.      The history is provided by the patient.         REVIEW OF SYSTEMS: as noted in the HPI.  NURSING NOTES REVIEWED      PAST MEDICAL/SURGICAL HISTORY:   Past Medical History:   Diagnosis Date    Coronary artery disease     Hyperlipidemia     Hypertension     Personal history of colonic polyps     Sleep apnea, unspecified       Past Surgical History:   Procedure Laterality Date    A-V CARDIAC PACEMAKER INSERTION N/A 11/9/2023    Procedure: INSERTION, CARDIAC PACEMAKER, DUAL CHAMBER;  Surgeon: Baljit Sánchez MD;  Location: [facility] CATH LAB;  Service: Cardiology;  Laterality: N/A;  DUAL CHAMBER PPM (SJM)    COLONOSCOPY      CORONARY ARTERY BYPASS GRAFT (CABG)      TRANSRECTAL BIOPSY OF PROSTATE WITH ULTRASOUND GUIDANCE Bilateral 1/2/2024    Procedure: BIOPSY, PROSTATE, RECTAL APPROACH, WITH US GUIDANCE;  Surgeon: Miracle Pierre DO;  Location: [facility] ENDOSCOPY;  Service: Urology;  Laterality: Bilateral;       FAMILY HISTORY:   Family History   Problem Relation Name Age of Onset    Other (Bile Duct) Mother      Gallbladder disease Mother      Diabetes Mellitus Mother      Hypertension Mother      Cataracts Father      Heart attack Father      Heart disease Father         SOCIAL HISTORY:   Social History     Tobacco Use    Smoking status: Never     Passive exposure: Never    Smokeless tobacco: Never   Substance Use Topics    Alcohol  use: Not Currently    Drug use: Never       ALLERGIES: Review of patient's allergies indicates:  No Known Allergies    PHYSICAL EXAM:  Initial Vitals [12/19/24 1211]   BP Pulse Resp Temp SpO2   (!) 177/85 70 16 97.9 °F (36.6 °C) 100 %      MAP       --         Physical Exam    Nursing note and vitals reviewed.  Constitutional: He appears well-developed and well-nourished. No distress.   HENT:   Head: Normocephalic and atraumatic.   Nose: Nose normal. Mouth/Throat: Oropharynx is clear and moist and mucous membranes are normal.   Eyes: Conjunctivae and EOM are normal. Pupils are equal, round, and reactive to light.   Neck: Neck supple. No tracheal deviation present.   Cardiovascular:  Normal rate, regular rhythm, normal heart sounds, intact distal pulses and normal pulses.           Pulmonary/Chest: Effort normal and breath sounds normal. No respiratory distress.   Abdominal: Abdomen is soft. There is generalized abdominal tenderness. There is no rebound and no guarding.   Musculoskeletal:         General: Normal range of motion.      Cervical back: Neck supple.     Neurological: He is alert and oriented to person, place, and time. GCS eye subscore is 4. GCS verbal subscore is 5. GCS motor subscore is 6.   CN II-XII intact. Moves all extremities. No gross sensory or motor deficits.   Skin: Skin is warm, dry and intact.   Psychiatric: He has a normal mood and affect. His speech is normal and behavior is normal. Judgment and thought content normal. Cognition and memory are normal.         RESULTS:  Labs Reviewed   COMPREHENSIVE METABOLIC PANEL - Abnormal       Result Value    Sodium 143      Potassium 4.6      Chloride 107      CO2 27      Glucose 151 (*)     Blood Urea Nitrogen 10.3      Creatinine 1.02      Calcium 9.8      Protein Total 7.8 (*)     Albumin 3.6      Globulin 4.2 (*)     Albumin/Globulin Ratio 0.9 (*)     Bilirubin Total 1.1            ALT 71 (*)      (*)     eGFR >60      Anion Gap 9.0       BUN/Creatinine Ratio 10     URINALYSIS, REFLEX TO URINE CULTURE - Abnormal    Color, UA Yellow      Appearance, UA Clear      Specific Gravity, UA 1.028      pH, UA 7.0      Protein, UA Trace (*)     Glucose, UA 4+ (*)     Ketones, UA Negative      Blood, UA Negative      Bilirubin, UA Negative      Urobilinogen, UA 3+ (*)     Nitrites, UA Negative      Leukocyte Esterase, UA Negative      RBC, UA 0-5      WBC, UA 0-5      Bacteria, UA None Seen      Squamous Epithelial Cells, UA None Seen      Mucous, UA Trace (*)     Hyaline Casts, UA None Seen     CBC WITH DIFFERENTIAL - Abnormal    WBC 8.08      RBC 3.78 (*)     Hgb 11.8 (*)     Hct 35.1 (*)     MCV 92.9      MCH 31.2 (*)     MCHC 33.6      RDW 12.9      Platelet 287      MPV 9.7      Neut % 68.9      Lymph % 23.6      Mono % 6.3      Eos % 0.4      Basophil % 0.4      Lymph # 1.91      Neut # 5.57      Mono # 0.51      Eos # 0.03      Baso # 0.03      IG# 0.03      IG% 0.4      NRBC% 0.0     MAGNESIUM - Normal    Magnesium Level 1.70     LIPASE - Normal    Lipase Level 29     TROPONIN I - Normal    Troponin-I <0.010     CBC W/ AUTO DIFFERENTIAL    Narrative:     The following orders were created for panel order CBC auto differential.  Procedure                               Abnormality         Status                     ---------                               -----------         ------                     CBC with Differential[[id number]]       Abnormal            Final result                 Please view results for these tests on the individual orders.   EXTRA TUBES    Narrative:     The following orders were created for panel order EXTRA TUBES.  Procedure                               Abnormality         Status                     ---------                               -----------         ------                     Light Blue Top Hold[[id number]]                             Final result               Red Top Hold[[id number]]                                     Final result               Gold Top Hold[[id number]]                                   Final result               Pink Top Hold[[id number]]                                   Final result                 Please view results for these tests on the individual orders.   LIGHT BLUE TOP HOLD    Extra Tube Hold for add-ons.     RED TOP HOLD    Extra Tube Hold for add-ons.     GOLD TOP HOLD    Extra Tube Hold for add-ons.     PINK TOP HOLD    Extra Tube Hold for add-ons.       Imaging Results              CT Abdomen Pelvis With IV Contrast NO Oral Contrast (Final result)  Result time 12/19/24 15:13:58      Final result by Akash Forte MD (12/19/24 15:13:58)                   Impression:      Some layering sludge in the gallbladder otherwise normal      Electronically signed by: Leonel Forte  Date:    12/19/2024  Time:    15:13               Narrative:    EXAMINATION:  CT ABDOMEN PELVIS WITH IV CONTRAST    CLINICAL HISTORY:  Abdominal pain, acute, nonlocalized;    TECHNIQUE:  Low dose axial images, sagittal and coronal reformations were obtained from the lung bases to the pubic symphysis following the IV administration of contrast. Automatic exposure control (AEC) is utilized to reduce patient radiation exposure.    COMPARISON:  None.    FINDINGS:  The lung bases are clear.    The liver appears normal.  No liver mass or lesion is seen.  Portal and hepatic veins appear normal.    There are some layering sludge in the gallbladder.  .  No biliary dilatation is seen.  No pericholecystic fluid is seen.    The pancreas appears normal.  No pancreatic mass or lesion is seen.    The spleen shows no acute abnormality.    The adrenal glands appear normal.  No adrenal nodule is seen.    The kidneys appear normal.  No hydronephrosis is seen.  No hydroureter is seen.  No nephrolithiasis is seen.  No obvious ureteral stones are seen.    Urinary bladder appears grossly unremarkable.    No colitis is seen.  No diverticulitis  is seen.  No obvious colonic mass or lesion is seen.    No free air is seen.  No free fluid is seen.                                      PROCEDURES:  Procedures    ECG:  EKG Readings: (Independently Interpreted)   Initial Reading: No STEMI. Rhythm: Normal Sinus Rhythm. Heart Rate: 70. Ectopy: No Ectopy. Axis: Normal.       ED COURSE AND MEDICAL DECISION MAKING:  Medications   morphine injection 4 mg (4 mg Intravenous Given 12/19/24 1328)   ondansetron injection 4 mg (4 mg Intravenous Given 12/19/24 1328)   iohexoL (OMNIPAQUE 350) 350 mg iodine/mL injection (100 mLs  Given 12/19/24 1440)     ED Course as of 12/19/24 1627   Thu Dec 19, 2024   1340 WBC: 8.08 [IB]   1340 Hemoglobin(!): 11.8 [IB]   1340 Platelet Count: 287 [IB]   1348 Creatinine: 1.02 [IB]   1349 Lipase: 29 [IB]   1349 Magnesium : 1.70 [IB]   1349 Troponin I: <0.010 [IB]   1349 BILIRUBIN TOTAL: 1.1 [IB]   1349 ALP: 117 [IB]   1349 ALT(!): 71 [IB]   1349 AST(!): 133 [IB]   1534 Patient reports pain has resolved no longer having upper abdominal tenderness.  Discussed CT results and offered to get ultrasound but he is okay with foregoing this at this time and we will just follow up outpatient with General surgery. [IB]      ED Course User Index  [IB] Phi Brenner, DO        Medical Decision Making  This patient presents with abdominal pain of unclear etiology. A CT scan was performed to evaluate for potential causes of the abdominal pain, however, neither the clinical exam nor the CT has identified an emergent etiology for the abdominal pain. Specifically, given the benign exam, the laboratory studies, and unremarkable CT, I have a very low suspicion for appendicitis, ischemic bowel, bowel perforation, or any other life threatening disease. I have discussed with the patient the level of uncertainty with undifferentiated abdominal pain and clearly explained the need to follow-up as noted on the discharge instructions, or return to the Emergency Department  immediately if the pain worsens, develops fever, persistent and uncontrollable vomiting, or for any new symptoms or concerns.  Given strict ED return precautions. I have spoken with the patient and/or caregivers. I have explained the patient's condition, diagnoses and treatment plan based on the information available to me at this time. I have answered the patient's and/or caregiver's questions and addressed any concerns. The patient and/or caregivers have as good an understanding of the patient's diagnosis, condition and treatment plan as can be expected at this point. The vital signs have been stable. The patient's condition is stable and appropriate for discharge from the emergency department.     The patient will pursue further outpatient evaluation with the primary care physician or other designated or consulting physician as outlined in the discharge instructions. The patient and/or caregivers are agreeable to this plan of care and follow-up instructions have been explained in detail. The patient and/or caregivers have received these instructions in written format and have expressed an understanding of the discharge instructions. The patient and/or caregivers are aware that any significant change in condition or worsening of symptoms should prompt an immediate return to this or the closest emergency department or a call to 911.    Amount and/or Complexity of Data Reviewed  External Data Reviewed: notes.  Labs: ordered. Decision-making details documented in ED Course.  Radiology: ordered.  ECG/medicine tests: ordered and independent interpretation performed.    Risk  OTC drugs.  Prescription drug management.  Decision regarding hospitalization.        CLINICAL IMPRESSION:  1. Weakness    2. Epigastric pain        DISPOSITION:   ED Disposition Condition    Discharge Stable            ED Prescriptions    None       Follow-up Information       Follow up With Specialties Details Why Contact Yolanda Rutherford  NATALI, NP Family Medicine Schedule an appointment as soon as possible for a visit   [address] [facility] [address]  [phone]      [facility] - Emergency Dept Emergency Medicine  If symptoms worsen [facility] [facility] [id number] [phone]               Phi Brenner,   12/19/24 2692

## 2024-12-20 LAB
OHS QRS DURATION: 170 MS
OHS QTC CALCULATION: 507 MS

## 2024-12-22 ENCOUNTER — HOSPITAL ENCOUNTER (INPATIENT)
Facility: HOSPITAL | Age: 74
LOS: 2 days | Discharge: HOME | DRG: 418 | End: 2024-12-24
Attending: EMERGENCY MEDICINE | Admitting: SURGERY
Payer: MEDICARE

## 2024-12-22 DIAGNOSIS — K80.50 CHOLEDOCHOLITHIASIS: ICD-10-CM

## 2024-12-22 DIAGNOSIS — R10.9 ABDOMINAL PAIN, UNSPECIFIED ABDOMINAL LOCATION: ICD-10-CM

## 2024-12-22 DIAGNOSIS — K81.9 CHOLECYSTITIS: Primary | ICD-10-CM

## 2024-12-22 LAB
ALBUMIN SERPL-MCNC: 3.6 G/DL (ref 3.4–4.8)
ALBUMIN/GLOB SERPL: 0.8 RATIO (ref 1.1–2)
ALP SERPL-CCNC: 228 UNIT/L (ref 40–150)
ALT SERPL-CCNC: 154 UNIT/L (ref 0–55)
ANION GAP SERPL CALC-SCNC: 3 MEQ/L
AST SERPL-CCNC: 75 UNIT/L (ref 5–34)
BACTERIA #/AREA URNS AUTO: ABNORMAL /HPF
BASOPHILS # BLD AUTO: 0.02 X10(3)/MCL
BASOPHILS NFR BLD AUTO: 0.2 %
BILIRUB SERPL-MCNC: 1.2 MG/DL
BILIRUB UR QL STRIP.AUTO: NEGATIVE
BUN SERPL-MCNC: 13.5 MG/DL (ref 8.4–25.7)
CALCIUM SERPL-MCNC: 9.6 MG/DL (ref 8.8–10)
CHLORIDE SERPL-SCNC: 110 MMOL/L (ref 98–107)
CLARITY UR: CLEAR
CO2 SERPL-SCNC: 23 MMOL/L (ref 23–31)
COLOR UR AUTO: ABNORMAL
CREAT SERPL-MCNC: 0.97 MG/DL (ref 0.72–1.25)
CREAT/UREA NIT SERPL: 14
EOSINOPHIL # BLD AUTO: 0.03 X10(3)/MCL (ref 0–0.9)
EOSINOPHIL NFR BLD AUTO: 0.3 %
ERYTHROCYTE [DISTWIDTH] IN BLOOD BY AUTOMATED COUNT: 12.5 % (ref 11.5–17)
EST. AVERAGE GLUCOSE BLD GHB EST-MCNC: 142.7 MG/DL
GFR SERPLBLD CREATININE-BSD FMLA CKD-EPI: >60 ML/MIN/1.73/M2
GLOBULIN SER-MCNC: 4.3 GM/DL (ref 2.4–3.5)
GLUCOSE SERPL-MCNC: 148 MG/DL (ref 82–115)
GLUCOSE UR QL STRIP: ABNORMAL
HAV IGM SERPL QL IA: NONREACTIVE
HBA1C MFR BLD: 6.6 %
HBV CORE IGM SERPL QL IA: NONREACTIVE
HBV SURFACE AG SERPL QL IA: NONREACTIVE
HCT VFR BLD AUTO: 35.6 % (ref 42–52)
HCV AB SERPL QL IA: NONREACTIVE
HGB BLD-MCNC: 12 G/DL (ref 14–18)
HGB UR QL STRIP: NEGATIVE
HOLD SPECIMEN: NORMAL
HYALINE CASTS #/AREA URNS LPF: ABNORMAL /LPF
IMM GRANULOCYTES # BLD AUTO: 0.03 X10(3)/MCL (ref 0–0.04)
IMM GRANULOCYTES NFR BLD AUTO: 0.3 %
KETONES UR QL STRIP: ABNORMAL
LEUKOCYTE ESTERASE UR QL STRIP: NEGATIVE
LIPASE SERPL-CCNC: 19 U/L
LIPASE SERPL-CCNC: 26 U/L
LYMPHOCYTES # BLD AUTO: 2.5 X10(3)/MCL (ref 0.6–4.6)
LYMPHOCYTES NFR BLD AUTO: 27.2 %
MAGNESIUM SERPL-MCNC: 2.8 MG/DL (ref 1.6–2.6)
MCH RBC QN AUTO: 31.8 PG (ref 27–31)
MCHC RBC AUTO-ENTMCNC: 33.7 G/DL (ref 33–36)
MCV RBC AUTO: 94.4 FL (ref 80–94)
MONOCYTES # BLD AUTO: 0.81 X10(3)/MCL (ref 0.1–1.3)
MONOCYTES NFR BLD AUTO: 8.8 %
NEUTROPHILS # BLD AUTO: 5.8 X10(3)/MCL (ref 2.1–9.2)
NEUTROPHILS NFR BLD AUTO: 63.2 %
NITRITE UR QL STRIP: NEGATIVE
NRBC BLD AUTO-RTO: 0 %
PH UR STRIP: 7 [PH]
PLATELET # BLD AUTO: 280 X10(3)/MCL (ref 130–400)
PMV BLD AUTO: 9.7 FL (ref 7.4–10.4)
POCT GLUCOSE: 136 MG/DL (ref 70–110)
POCT GLUCOSE: 177 MG/DL (ref 70–110)
POCT GLUCOSE: 190 MG/DL (ref 70–110)
POCT GLUCOSE: 193 MG/DL (ref 70–110)
POTASSIUM SERPL-SCNC: 4 MMOL/L (ref 3.5–5.1)
PROT SERPL-MCNC: 7.9 GM/DL (ref 5.8–7.6)
PROT UR QL STRIP: ABNORMAL
RBC # BLD AUTO: 3.77 X10(6)/MCL (ref 4.7–6.1)
RBC #/AREA URNS AUTO: ABNORMAL /HPF
SODIUM SERPL-SCNC: 136 MMOL/L (ref 136–145)
SP GR UR STRIP.AUTO: 1.03 (ref 1–1.03)
SQUAMOUS #/AREA URNS LPF: ABNORMAL /HPF
UROBILINOGEN UR STRIP-ACNC: NORMAL
WBC # BLD AUTO: 9.19 X10(3)/MCL (ref 4.5–11.5)
WBC #/AREA URNS AUTO: ABNORMAL /HPF

## 2024-12-22 PROCEDURE — 11000001 HC ACUTE MED/SURG PRIVATE ROOM

## 2024-12-22 PROCEDURE — 96375 TX/PRO/DX INJ NEW DRUG ADDON: CPT

## 2024-12-22 PROCEDURE — 85025 COMPLETE CBC W/AUTO DIFF WBC: CPT | Performed by: EMERGENCY MEDICINE

## 2024-12-22 PROCEDURE — 25000003 PHARM REV CODE 250: Performed by: EMERGENCY MEDICINE

## 2024-12-22 PROCEDURE — 80074 ACUTE HEPATITIS PANEL: CPT

## 2024-12-22 PROCEDURE — 21400001 HC TELEMETRY ROOM

## 2024-12-22 PROCEDURE — 83690 ASSAY OF LIPASE: CPT | Performed by: EMERGENCY MEDICINE

## 2024-12-22 PROCEDURE — 25500020 PHARM REV CODE 255

## 2024-12-22 PROCEDURE — 25000003 PHARM REV CODE 250

## 2024-12-22 PROCEDURE — 63600175 PHARM REV CODE 636 W HCPCS: Performed by: EMERGENCY MEDICINE

## 2024-12-22 PROCEDURE — 63600175 PHARM REV CODE 636 W HCPCS

## 2024-12-22 PROCEDURE — 81001 URINALYSIS AUTO W/SCOPE: CPT | Performed by: EMERGENCY MEDICINE

## 2024-12-22 PROCEDURE — 99285 EMERGENCY DEPT VISIT HI MDM: CPT | Mod: 25

## 2024-12-22 PROCEDURE — 96361 HYDRATE IV INFUSION ADD-ON: CPT

## 2024-12-22 PROCEDURE — 83690 ASSAY OF LIPASE: CPT

## 2024-12-22 PROCEDURE — 83036 HEMOGLOBIN GLYCOSYLATED A1C: CPT

## 2024-12-22 PROCEDURE — 83735 ASSAY OF MAGNESIUM: CPT | Performed by: EMERGENCY MEDICINE

## 2024-12-22 PROCEDURE — 96374 THER/PROPH/DIAG INJ IV PUSH: CPT

## 2024-12-22 PROCEDURE — 80053 COMPREHEN METABOLIC PANEL: CPT | Performed by: EMERGENCY MEDICINE

## 2024-12-22 RX ORDER — INSULIN ASPART 100 [IU]/ML
0-10 INJECTION, SOLUTION INTRAVENOUS; SUBCUTANEOUS
Status: DISCONTINUED | OUTPATIENT
Start: 2024-12-22 | End: 2024-12-24 | Stop reason: HOSPADM

## 2024-12-22 RX ORDER — AMOXICILLIN 250 MG
1 CAPSULE ORAL 2 TIMES DAILY
Status: DISCONTINUED | OUTPATIENT
Start: 2024-12-22 | End: 2024-12-24 | Stop reason: HOSPADM

## 2024-12-22 RX ORDER — IBUPROFEN 200 MG
24 TABLET ORAL
Status: DISCONTINUED | OUTPATIENT
Start: 2024-12-22 | End: 2024-12-24 | Stop reason: HOSPADM

## 2024-12-22 RX ORDER — BISACODYL 5 MG
5 TABLET, DELAYED RELEASE (ENTERIC COATED) ORAL 2 TIMES DAILY
Status: DISCONTINUED | OUTPATIENT
Start: 2024-12-22 | End: 2024-12-24 | Stop reason: HOSPADM

## 2024-12-22 RX ORDER — HYDRALAZINE HYDROCHLORIDE 20 MG/ML
15 INJECTION INTRAMUSCULAR; INTRAVENOUS EVERY 6 HOURS PRN
Status: DISCONTINUED | OUTPATIENT
Start: 2024-12-22 | End: 2024-12-24 | Stop reason: HOSPADM

## 2024-12-22 RX ORDER — MORPHINE SULFATE 2 MG/ML
6 INJECTION, SOLUTION INTRAMUSCULAR; INTRAVENOUS
Status: COMPLETED | OUTPATIENT
Start: 2024-12-22 | End: 2024-12-22

## 2024-12-22 RX ORDER — AMLODIPINE BESYLATE 2.5 MG/1
2.5 TABLET ORAL DAILY
Status: DISCONTINUED | OUTPATIENT
Start: 2024-12-23 | End: 2024-12-24 | Stop reason: HOSPADM

## 2024-12-22 RX ORDER — ACETAMINOPHEN 325 MG/1
650 TABLET ORAL EVERY 8 HOURS PRN
Status: DISCONTINUED | OUTPATIENT
Start: 2024-12-22 | End: 2024-12-24 | Stop reason: HOSPADM

## 2024-12-22 RX ORDER — ATORVASTATIN CALCIUM 20 MG/1
20 TABLET, FILM COATED ORAL DAILY
Status: DISCONTINUED | OUTPATIENT
Start: 2024-12-23 | End: 2024-12-24 | Stop reason: HOSPADM

## 2024-12-22 RX ORDER — OXYCODONE HYDROCHLORIDE 5 MG/1
10 TABLET ORAL EVERY 6 HOURS PRN
Status: DISCONTINUED | OUTPATIENT
Start: 2024-12-22 | End: 2024-12-23

## 2024-12-22 RX ORDER — LIDOCAINE HYDROCHLORIDE 20 MG/ML
15 SOLUTION OROPHARYNGEAL
Status: COMPLETED | OUTPATIENT
Start: 2024-12-22 | End: 2024-12-22

## 2024-12-22 RX ORDER — BUSPIRONE HYDROCHLORIDE 7.5 MG/1
7.5 TABLET ORAL 2 TIMES DAILY PRN
Status: DISCONTINUED | OUTPATIENT
Start: 2024-12-22 | End: 2024-12-24 | Stop reason: HOSPADM

## 2024-12-22 RX ORDER — ONDANSETRON HYDROCHLORIDE 2 MG/ML
4 INJECTION, SOLUTION INTRAVENOUS
Status: COMPLETED | OUTPATIENT
Start: 2024-12-22 | End: 2024-12-22

## 2024-12-22 RX ORDER — GLUCAGON 1 MG
1 KIT INJECTION
Status: DISCONTINUED | OUTPATIENT
Start: 2024-12-22 | End: 2024-12-24 | Stop reason: HOSPADM

## 2024-12-22 RX ORDER — OXYCODONE HYDROCHLORIDE 5 MG/1
5 TABLET ORAL EVERY 6 HOURS PRN
Status: DISCONTINUED | OUTPATIENT
Start: 2024-12-22 | End: 2024-12-23

## 2024-12-22 RX ORDER — ONDANSETRON 4 MG/1
8 TABLET, ORALLY DISINTEGRATING ORAL EVERY 8 HOURS PRN
Status: DISCONTINUED | OUTPATIENT
Start: 2024-12-22 | End: 2024-12-24 | Stop reason: HOSPADM

## 2024-12-22 RX ORDER — TALC
6 POWDER (GRAM) TOPICAL NIGHTLY PRN
Status: DISCONTINUED | OUTPATIENT
Start: 2024-12-22 | End: 2024-12-24 | Stop reason: HOSPADM

## 2024-12-22 RX ORDER — IBUPROFEN 200 MG
16 TABLET ORAL
Status: DISCONTINUED | OUTPATIENT
Start: 2024-12-22 | End: 2024-12-24 | Stop reason: HOSPADM

## 2024-12-22 RX ORDER — FINASTERIDE 5 MG/1
5 TABLET, FILM COATED ORAL DAILY
Status: DISCONTINUED | OUTPATIENT
Start: 2024-12-23 | End: 2024-12-24 | Stop reason: HOSPADM

## 2024-12-22 RX ORDER — SODIUM CHLORIDE 0.9 % (FLUSH) 0.9 %
10 SYRINGE (ML) INJECTION
Status: DISCONTINUED | OUTPATIENT
Start: 2024-12-22 | End: 2024-12-24 | Stop reason: HOSPADM

## 2024-12-22 RX ORDER — HYDRALAZINE HYDROCHLORIDE 20 MG/ML
10 INJECTION INTRAMUSCULAR; INTRAVENOUS EVERY 6 HOURS PRN
Status: DISCONTINUED | OUTPATIENT
Start: 2024-12-22 | End: 2024-12-22

## 2024-12-22 RX ORDER — KETOROLAC TROMETHAMINE 30 MG/ML
15 INJECTION, SOLUTION INTRAMUSCULAR; INTRAVENOUS
Status: COMPLETED | OUTPATIENT
Start: 2024-12-22 | End: 2024-12-22

## 2024-12-22 RX ADMIN — LIDOCAINE HYDROCHLORIDE 15 ML: 20 SOLUTION ORAL at 07:12

## 2024-12-22 RX ADMIN — ONDANSETRON 4 MG: 2 INJECTION INTRAMUSCULAR; INTRAVENOUS at 06:12

## 2024-12-22 RX ADMIN — BISACODYL 5 MG: 5 TABLET, COATED ORAL at 11:12

## 2024-12-22 RX ADMIN — KETOROLAC TROMETHAMINE 15 MG: 30 INJECTION, SOLUTION INTRAMUSCULAR; INTRAVENOUS at 06:12

## 2024-12-22 RX ADMIN — SODIUM CHLORIDE 1000 ML: 9 INJECTION, SOLUTION INTRAVENOUS at 06:12

## 2024-12-22 RX ADMIN — ALUMINUM HYDROXIDE AND MAGNESIUM HYDROXIDE 30 ML: 200; 200 SUSPENSION ORAL at 06:12

## 2024-12-22 RX ADMIN — HYDRALAZINE HYDROCHLORIDE 15 MG: 20 INJECTION INTRAMUSCULAR; INTRAVENOUS at 04:12

## 2024-12-22 RX ADMIN — OXYCODONE 5 MG: 5 TABLET ORAL at 09:12

## 2024-12-22 RX ADMIN — OXYCODONE HYDROCHLORIDE 10 MG: 5 TABLET ORAL at 02:12

## 2024-12-22 RX ADMIN — MORPHINE SULFATE 6 MG: 2 INJECTION, SOLUTION INTRAMUSCULAR; INTRAVENOUS at 06:12

## 2024-12-22 RX ADMIN — SENNOSIDES AND DOCUSATE SODIUM 1 TABLET: 50; 8.6 TABLET ORAL at 09:12

## 2024-12-22 RX ADMIN — BISACODYL 5 MG: 5 TABLET, COATED ORAL at 09:12

## 2024-12-22 RX ADMIN — IOHEXOL 95 ML: 350 INJECTION, SOLUTION INTRAVENOUS at 06:12

## 2024-12-22 RX ADMIN — SENNOSIDES AND DOCUSATE SODIUM 1 TABLET: 50; 8.6 TABLET ORAL at 11:12

## 2024-12-22 RX ADMIN — ALUMINUM HYDROXIDE AND MAGNESIUM HYDROXIDE 30 ML: 200; 200 SUSPENSION ORAL at 07:12

## 2024-12-22 NOTE — ED PROVIDER NOTES
"Encounter Date: 12/22/2024       History     Chief Complaint   Patient presents with    Abdominal Pain    Vomiting     States epigastric and right upper quadrant abdominal pain radiating to back.  States vomited x 1.  Has known gall stones.  States this "bout" started Saturday morning.     74-year-old male presents with epigastric abdominal pain, right upper quadrant abdominal pain.  Patient endorsing nausea, few episodes of vomiting.  Who reports similar episodes diagnosed as biliary colic.  He reports he had previously opted for conservative treatment but returns today after 24 hours of symptoms wondering whether he requires surgical intervention at present.  Patient denies fever, chills, changes in bowel habits.        Review of patient's allergies indicates:  No Known Allergies  Past Medical History:   Diagnosis Date    Coronary artery disease     Diabetes mellitus     Hyperlipidemia     Hypertension     Personal history of colonic polyps     Sleep apnea, unspecified      Past Surgical History:   Procedure Laterality Date    A-V CARDIAC PACEMAKER INSERTION N/A 11/09/2023    Procedure: INSERTION, CARDIAC PACEMAKER, DUAL CHAMBER;  Surgeon: Baljit Sánchez MD;  Location: [facility] CATH LAB;  Service: Cardiology;  Laterality: N/A;  DUAL CHAMBER PPM (SJM)    COLON RESECTION      COLONOSCOPY      CORONARY ARTERY BYPASS GRAFT (CABG)      TRANSRECTAL BIOPSY OF PROSTATE WITH ULTRASOUND GUIDANCE Bilateral 01/02/2024    Procedure: BIOPSY, PROSTATE, RECTAL APPROACH, WITH US GUIDANCE;  Surgeon: Miracle Pierre DO;  Location: [facility] ENDOSCOPY;  Service: Urology;  Laterality: Bilateral;     Family History   Problem Relation Name Age of Onset    Other (Bile Duct) Mother      Gallbladder disease Mother      Diabetes Mellitus Mother      Hypertension Mother      Cataracts Father      Heart attack Father      Heart disease Father       Social History     Tobacco Use    Smoking status: Never     Passive exposure: Never    Smokeless tobacco: " Never   Substance Use Topics    Alcohol use: Not Currently    Drug use: Never     Review of Systems    Physical Exam     Initial Vitals [12/22/24 0452]   BP Pulse Resp Temp SpO2   (!) 171/82 71 18 98.4 °F (36.9 °C) 100 %      MAP       --         Physical Exam    Nursing note and vitals reviewed.  Constitutional: He appears well-developed and well-nourished. He is not diaphoretic. No distress.   HENT:   Head: Normocephalic and atraumatic.   Eyes: EOM are normal. Pupils are equal, round, and reactive to light. Right eye exhibits no discharge. Left eye exhibits no discharge.   Neck: Neck supple. No thyromegaly present. No tracheal deviation present. No JVD present.   Normal range of motion.  Cardiovascular:  Normal rate, regular rhythm, normal heart sounds and intact distal pulses.           No murmur heard.  Pulmonary/Chest: Breath sounds normal. No stridor. No respiratory distress. He has no wheezes. He has no rhonchi. He has no rales.   Abdominal: Abdomen is soft. He exhibits no distension. There is no abdominal tenderness. There is no rebound and no guarding.   Musculoskeletal:         General: No tenderness or edema. Normal range of motion.      Cervical back: Normal range of motion and neck supple.     Neurological: He is alert and oriented to person, place, and time. He has normal strength. No cranial nerve deficit. GCS score is 15. GCS eye subscore is 4. GCS verbal subscore is 5. GCS motor subscore is 6.   Skin: Skin is warm and dry. Capillary refill takes less than 2 seconds. No rash and no abscess noted. No erythema. No pallor.   Psychiatric: He has a normal mood and affect. His behavior is normal. Judgment and thought content normal.         ED Course   Procedures  Labs Reviewed   COMPREHENSIVE METABOLIC PANEL - Abnormal       Result Value    Sodium 136      Potassium 4.0      Chloride 110 (*)     CO2 23      Glucose 148 (*)     Blood Urea Nitrogen 13.5      Creatinine 0.97      Calcium 9.6      Protein  Total 7.9 (*)     Albumin 3.6      Globulin 4.3 (*)     Albumin/Globulin Ratio 0.8 (*)     Bilirubin Total 1.2       (*)      (*)     AST 75 (*)     eGFR >60      Anion Gap 3.0      BUN/Creatinine Ratio 14     MAGNESIUM - Abnormal    Magnesium Level 2.80 (*)    URINALYSIS, REFLEX TO URINE CULTURE - Abnormal    Color, UA Light-Yellow      Appearance, UA Clear      Specific Gravity, UA 1.032 (*)     pH, UA 7.0      Protein, UA Trace (*)     Glucose, UA 4+ (*)     Ketones, UA 1+ (*)     Blood, UA Negative      Bilirubin, UA Negative      Urobilinogen, UA Normal      Nitrites, UA Negative      Leukocyte Esterase, UA Negative      RBC, UA 0-5      WBC, UA 0-5      Bacteria, UA None Seen      Squamous Epithelial Cells, UA None Seen      Hyaline Casts, UA None Seen     CBC WITH DIFFERENTIAL - Abnormal    WBC 9.19      RBC 3.77 (*)     Hgb 12.0 (*)     Hct 35.6 (*)     MCV 94.4 (*)     MCH 31.8 (*)     MCHC 33.7      RDW 12.5      Platelet 280      MPV 9.7      Neut % 63.2      Lymph % 27.2      Mono % 8.8      Eos % 0.3      Basophil % 0.2      Lymph # 2.50      Neut # 5.80      Mono # 0.81      Eos # 0.03      Baso # 0.02      IG# 0.03      IG% 0.3      NRBC% 0.0     LIPASE - Normal    Lipase Level 26     CBC W/ AUTO DIFFERENTIAL    Narrative:     The following orders were created for panel order CBC auto differential.  Procedure                               Abnormality         Status                     ---------                               -----------         ------                     CBC with Differential[[id number]]       Abnormal            Final result                 Please view results for these tests on the individual orders.   HEMOGLOBIN A1C   HEPATITIS PANEL, ACUTE   LIPASE          Imaging Results              CT Abdomen Pelvis With IV Contrast NO Oral Contrast (Final result)  Result time 12/22/24 08:12:12      Final result by Adriana Marie MD (12/22/24 08:12:12)                    Impression:      1. Distension of the gallbladder without overt inflammatory change by CT evaluation.  CT is less sensitive than ultrasound in detecting gallbladder wall thickening and pericholecystic fluid.  2. Moderate colonic stool burden.  3. The preliminary and final reports are concordant.      Electronically signed by: Adriana Marie  Date:    12/22/2024  Time:    08:12               Narrative:    EXAMINATION:  CT ABDOMEN PELVIS WITH IV CONTRAST    CLINICAL HISTORY:  ruq pain;    TECHNIQUE:  Helically acquired images with axial, sagittal and coronal reformations were obtained from the lung bases to the pubic symphysis after the IV administration of contrast.    Automated tube current modulation, weight-based exposure dosing, and/or iterative reconstruction technique utilized to reach lowest reasonably achievable exposure rate.    DLP: 407 mGy*cm    COMPARISON:  CT abdomen pelvis 12/19/2024    FINDINGS:  HEART: Distal aspect of a cardiac pacer device is seen.    LUNG BASES: Well aerated.    LIVER: The liver is slightly hypodense.    BILIARY: Cholelithiasis.  Gallbladder is mildly distended.  No overt acute inflammatory change by CT evaluation.  Common bile duct measures 10 mm above the pancreas and tapers distally.  Similar to previous exam.    PANCREAS: No inflammatory change.    SPLEEN: Normal in size    ADRENALS: No mass.    KIDNEYS/URETERS: The kidneys enhance symmetrically.  No hydronephrosis.    GI TRACT/MESENTERY:  No evidence of bowel obstruction or inflammation. Postop right hemicolectomy.  Moderate stool burden.    PERITONEUM: No free fluid.No free air.    LYMPH NODES: No enlarged lymph nodes by size criteria.    VASCULATURE: Aortoiliac atherosclerosis.    BLADDER: Normal appearance given degree of distention.    REPRODUCTIVE ORGANS: Normal as visualized.    SOFT TISSUES: Unremarkable.    BONES: Degenerative change at the lumbar spine and hips.                        Preliminary result by Moises  Michael CHAUDHARY MD (12/22/24 07:12:43)                   Impression:    1. There is mild to moderate gallbladder distension. Minimal dependent hyperdensity is seen in the gallbladder which is consistent with sludge and/or tiny stones. The gallbladder otherwise appears unremarkable with no wall thickening or pericholecytic inflammatory change. Correlate with clinical and laboratory findings as regards further evaluation and follow-up.  2. No acute intraabdominal or pelvic solid organ or bowel pathology identified. Details and other findings as discussed above.               Narrative:    START OF REPORT:  Technique: CT of the abdomen and pelvis was performed with axial images as well as sagittal and coronal reconstruction images with intravenous contrast.    Comparison: None available.    Clinical History: States epigastric and right upper quadrant abdominal pain radiating to back. States vomited x 1. Has known gall stones. States this bout started Saturday morning.    Dosage Information: Automated Exposure Control was utilized 406.62 mGy.cm.    Findings:  Lines and Tubes: None.  Thorax:  Lungs: Mild streaky hazy and linear opacity is present at the visualized lung bases, consistent with nonspecific dependent changes scarring and subsegmental atelectasis. No focal infiltrate or consolidation is seen.  Pleura: No effusions or thickening.  Heart: The heart size is within normal limits. Pacer leads are seen in the visualized heart.  Abdomen:  Abdominal Wall: No abdominal wall pathology is seen.  Liver: Mild fatty infiltration of the liver is present. The liver otherwise appears unremarkable.  Biliary System: No intrahepatic or extrahepatic biliary duct dilatation is seen.  Gallbladder: There is mild to moderate gallbladder distension. Minimal dependent hyperdensity is seen in the gallbladder which is consistent with sludge and/or tiny stones. The gallbladder otherwise appears unremarkable with no wall thickening or  pericholecytic inflammatory change.  Pancreas: The pancreas appears unremarkable.  Spleen: The spleen appears unremarkable.  Adrenals: The adrenal glands appear unremarkable.  Kidneys: The left kidney appears unremarkable with no stones cysts masses or hydronephrosis. A single cyst measuring 7.3 mm is seen on Image 52, Series 2 in the upper pole of the right kidney. Focal parenchymal scarring is noted in the superior pole of the right kidney. The right kidney appears otherwise unremarkable with no stones, masses or hydronephrosis.  Aorta: There is extensive calcification of the abdominal aorta and its branches.  IVC: Unremarkable.  Bowel:  Esophagus: The visualized esophagus appears unremarkable.  Stomach: The stomach appears unremarkable.  Duodenum: Unremarkable appearing duodenum.  Small Bowel: The small bowel appears unremarkable.  Colon: Nondistended. There is moderate stool in the colon which could reflect an element of constipation.  Appendix: No appendix is identified and a suture line is seen at the base of the cecum consistent with appendectomy.  Peritoneum: No intraperitoneal free air or ascites is seen.    Pelvis:  Bladder: The bladder appears unremarkable.  Male:  Prostate gland: The prostate gland appears unremarkable.    Bony structures:  Dorsal Spine: There is mild spondylosis of the visualized dorsal spine.  Bony Pelvis: There is mild degenerative change of the hip.                                         Medications   sodium chloride 0.9% flush 10 mL (has no administration in time range)   ondansetron disintegrating tablet 8 mg (has no administration in time range)   melatonin tablet 6 mg (has no administration in time range)   acetaminophen tablet 650 mg (has no administration in time range)   ketorolac injection 15 mg (15 mg Intravenous Given 12/22/24 0610)   morphine injection 6 mg (6 mg Intravenous Given 12/22/24 0609)   ondansetron injection 4 mg (4 mg Intravenous Given 12/22/24 0608)   sodium  chloride 0.9% bolus 1,000 mL 1,000 mL (0 mLs Intravenous Stopped 12/22/24 0710)   aluminum-magnesium hydroxide 200-200 mg/5 mL suspension 30 mL (30 mLs Oral Given 12/22/24 0608)   iohexoL (OMNIPAQUE 350) 350 mg iodine/mL injection (95 mLs Intravenous Given 12/22/24 0630)   aluminum-magnesium hydroxide 200-200 mg/5 mL suspension 30 mL (30 mLs Oral Given 12/22/24 0739)   LIDOcaine viscous HCl 2% oral solution 15 mL (15 mLs Oral Given 12/22/24 0739)     Medical Decision Making  Differential diagnosis GERD, gastritis, biliary colic, cholecystitis, others    Patient signed out to me, Dr. Brewster, at end of shift.  I have seen and evaluated the patient.  He reports having upper abdominal pain going to his back associated with nausea and vomiting.  CT abdomen and pelvis shows cholelithiasis with distended gallbladder but no evidence of cholecystitis.  General surgery consulted and has seen and evaluated the patient and will admit.  I have spoken with the patient and/or caregivers. I have explained the patient's condition, diagnoses and treatment plan based on the information available to me at this time. I have answered the patient's and/or caregiver's questions and addressed any concerns. The patient and/or caregivers have as good an understanding of the patient's diagnosis, condition and treatment plan as can be expected at this point. The patient has been stabilized within the capability of the emergency department. The patient will be transported for further care and management or will be moved to an observation or inpatient service. I have communicated with the staff or medical practitioner taking over this patient's care.    Amount and/or Complexity of Data Reviewed  External Data Reviewed: labs and notes.  Labs: ordered. Decision-making details documented in ED Course.     Details: As above;  Radiology: ordered.  Discussion of management or test interpretation with external provider(s): Case discussed with General  surgery    Risk  OTC drugs.  Prescription drug management.  Decision regarding hospitalization.               ED Course as of 12/22/24 0836   Sun Dec 22, 2024   0602 Reassuring hemogram; [CT]   0637 Chemistries in sequence with recent comparison values; [CT]   0818 NITRITE UA: Negative [IB]   0818 Leukocyte Esterase, UA: Negative [IB]   0818 RBC, UA: 0-5 [IB]   0818 WBC, UA: 0-5 [IB]   0818 Bacteria, UA: None Seen [IB]      ED Course User Index  [CT] Ivan English MD  [IB] Phi Brenner DO                             Clinical Impression:  Final diagnoses:  [R10.9] Abdominal pain, unspecified abdominal location (Primary)          ED Disposition Condition    Admit                 Phi Brenner DO  12/22/24 0836

## 2024-12-22 NOTE — H&P
[facility]  General Surgery - Purple Team  H&P Note  Admit Date: 12/22/2024  HD#0    Patient Name: Rogelio Johnson  YOB: 1950  Date: 12/22/2024 10:41 AM  Date of Admission: 12/22/2024    PRESENTING HISTORY   Chief Complaint/Reason for Admission:    History of Present Illness:  Rogelio Johnson is a 73yo male with PMHx of prostate cancer (s/p Lupron, radiation), T2DM (last Hgb A1C 6.6), CAD s/p CABG with ICD in place, HTN and hx of open right colon resection who is currently followed in clinic by the general surgery service for cholelithiasis. Patient presented to the ED today with 10/10 abdominal pain, nausea and x1 episode of emesis. He states that he also presented to the ED on 12/19 with similar symptoms however his abdominal pain was not as severe at that time. Patient was last seen in general surgery clinic on 12/09 at which time he was asymptomatic. Patient states that the pain started jannet-umbilical around 6pm yesterday and began to radiate diffusely over his upper abdomen until it became 10/10 abdominal pain around 4am. He reports decreased appetite and last ate the morning of 12/21. Patient denies taking anticoagulants except for ASA that he took this am.     Review of Systems:  12 point ROS negative except as stated in HPI    PAST HISTORY:   Past medical history:  Past Medical History:   Diagnosis Date    Coronary artery disease     Diabetes mellitus     Hyperlipidemia     Hypertension     Personal history of colonic polyps     Sleep apnea, unspecified        Past surgical history:  Past Surgical History:   Procedure Laterality Date    A-V CARDIAC PACEMAKER INSERTION N/A 11/09/2023    Procedure: INSERTION, CARDIAC PACEMAKER, DUAL CHAMBER;  Surgeon: Baljit Sánchez MD;  Location: [facility] CATH LAB;  Service: Cardiology;  Laterality: N/A;  DUAL CHAMBER PPM (SJM)    COLON RESECTION      COLONOSCOPY      CORONARY ARTERY BYPASS GRAFT (CABG)      TRANSRECTAL BIOPSY OF PROSTATE WITH  ULTRASOUND GUIDANCE Bilateral 01/02/2024    Procedure: BIOPSY, PROSTATE, RECTAL APPROACH, WITH US GUIDANCE;  Surgeon: Miracle Pierre DO;  Location: [facility] ENDOSCOPY;  Service: Urology;  Laterality: Bilateral;       Family history:  Mother: T2DM, Gallbladder dyskinesia, HTN   Father: Cataracts, MI, CAD     Social history:  Tobacco: Never  Alcohol: Not currently  Drug use:  Never     MEDICATIONS & ALLERGIES:   Home meds: amlodipine, ASA 81, atorvastatin, buspirone, carvedilol, empagliflozin, ezetimibe, Flonase, ezetimibe, hydralazine, insulin lantus, nitroglycerin, tadalafil, finasteride     Allergies: NKDA     OBJECTIVE:   Vital Signs:  VITAL SIGNS: 24 HR MIN & MAX LAST   Temp  Min: 97.7 °F (36.5 °C)  Max: 98.4 °F (36.9 °C)  97.7 °F (36.5 °C)   BP  Min: 161/109  Max: 184/81  (!) 178/86    Pulse  Min: 68  Max: 84  84    Resp  Min: 14  Max: 18  14    SpO2  Min: 96 %  Max: 100 %  98 %      HT: 6' (182.9 cm)  WT: 92.7 kg (204 lb 5.9 oz)  BMI: 27.7     Intake/output:  NPO at admit/ voiding spontaneously     Physical Exam:  General: Well developed, well nourished, no acute distress  HEENT: NCAT, PERRL  CV: RR  Resp: NWOB on RA  GI:  Soft, mildly protuberant, tender to palpation in bilateral upper quadrants, positive pastor's sign, negative for rebound, negative for peritonitis    :  Deferred  MSK: Moves all extremities spontaneously and purposefully  Skin/wounds:  No rashes, ulcers, erythema  Neuro:  CNII-XII grossly intact, A&Ox3    Labs:  Recent Labs     12/19/24  1315 12/22/24  0543   WBC 8.08 9.19   HGB 11.8* 12.0*   HCT 35.1* 35.6*    280    136   K 4.6 4.0    110*   CO2 27 23   BUN 10.3 13.5   CREATININE 1.02 0.97   BILITOT 1.1 1.2   * 75*   ALT 71* 154*   ALKPHOS 117 228*   CALCIUM 9.8 9.6   ALBUMIN 3.6 3.6   MG 1.70 2.80*     Imaging:  CT Abdomen Pelvis With IV Contrast NO Oral Contrast   Final Result      1. Distension of the gallbladder without overt inflammatory change by CT  evaluation.  CT is less sensitive than ultrasound in detecting gallbladder wall thickening and pericholecystic fluid.   2. Moderate colonic stool burden.   3. The preliminary and final reports are concordant.         Electronically signed by: Adriaan Marie   Date:    12/22/2024   Time:    08:12         I have reviewed all pertinent imaging results/findings within the past 24 hours.    Micro/Path:  None     ASSESSMENT & PLAN:    Mr. Johnson is a 73yo M presenting today due to symptomatic cholelithiasis.      - Patient will likely require laparoscopic vs open cholecystectomy during this hospital admission pending further workup of LFTs    - MMPC   - Diet: Diabetic diet  - Daily labs, Will monitor LFTs and Tbili   - Will start gentle IVF if patient is not tolerating PO   - Anti-emesis: Ondansetron   - Bowel regimen: senna and dulcolax     Nevin Hunt MD   [employer] General Surgery PGY-1

## 2024-12-23 ENCOUNTER — ANESTHESIA EVENT (OUTPATIENT)
Dept: SURGERY | Facility: HOSPITAL | Age: 74
End: 2024-12-23
Payer: MEDICARE

## 2024-12-23 ENCOUNTER — ANESTHESIA (OUTPATIENT)
Dept: SURGERY | Facility: HOSPITAL | Age: 74
End: 2024-12-23
Payer: MEDICARE

## 2024-12-23 LAB
ALBUMIN SERPL-MCNC: 3.5 G/DL (ref 3.4–4.8)
ALBUMIN/GLOB SERPL: 0.7 RATIO (ref 1.1–2)
ALP SERPL-CCNC: 192 UNIT/L (ref 40–150)
ALT SERPL-CCNC: 110 UNIT/L (ref 0–55)
ANION GAP SERPL CALC-SCNC: 13 MEQ/L
AST SERPL-CCNC: 39 UNIT/L (ref 5–34)
BASOPHILS # BLD AUTO: 0.02 X10(3)/MCL
BASOPHILS NFR BLD AUTO: 0.2 %
BILIRUB SERPL-MCNC: 0.8 MG/DL
BUN SERPL-MCNC: 11.4 MG/DL (ref 8.4–25.7)
CALCIUM SERPL-MCNC: 9.7 MG/DL (ref 8.8–10)
CHLORIDE SERPL-SCNC: 106 MMOL/L (ref 98–107)
CO2 SERPL-SCNC: 17 MMOL/L (ref 23–31)
CREAT SERPL-MCNC: 0.84 MG/DL (ref 0.72–1.25)
CREAT/UREA NIT SERPL: 14
EOSINOPHIL # BLD AUTO: 0.01 X10(3)/MCL (ref 0–0.9)
EOSINOPHIL NFR BLD AUTO: 0.1 %
ERYTHROCYTE [DISTWIDTH] IN BLOOD BY AUTOMATED COUNT: 12.5 % (ref 11.5–17)
GFR SERPLBLD CREATININE-BSD FMLA CKD-EPI: >60 ML/MIN/1.73/M2
GLOBULIN SER-MCNC: 4.8 GM/DL (ref 2.4–3.5)
GLUCOSE SERPL-MCNC: 139 MG/DL (ref 82–115)
HCT VFR BLD AUTO: 37.1 % (ref 42–52)
HGB BLD-MCNC: 12.6 G/DL (ref 14–18)
HOLD SPECIMEN: NORMAL
IMM GRANULOCYTES # BLD AUTO: 0.05 X10(3)/MCL (ref 0–0.04)
IMM GRANULOCYTES NFR BLD AUTO: 0.4 %
LYMPHOCYTES # BLD AUTO: 2.85 X10(3)/MCL (ref 0.6–4.6)
LYMPHOCYTES NFR BLD AUTO: 24.5 %
MAGNESIUM SERPL-MCNC: 2 MG/DL (ref 1.6–2.6)
MCH RBC QN AUTO: 31 PG (ref 27–31)
MCHC RBC AUTO-ENTMCNC: 34 G/DL (ref 33–36)
MCV RBC AUTO: 91.4 FL (ref 80–94)
MONOCYTES # BLD AUTO: 1.09 X10(3)/MCL (ref 0.1–1.3)
MONOCYTES NFR BLD AUTO: 9.4 %
NEUTROPHILS # BLD AUTO: 7.63 X10(3)/MCL (ref 2.1–9.2)
NEUTROPHILS NFR BLD AUTO: 65.4 %
NRBC BLD AUTO-RTO: 0 %
PHOSPHATE SERPL-MCNC: 4.3 MG/DL (ref 2.3–4.7)
PLATELET # BLD AUTO: 292 X10(3)/MCL (ref 130–400)
PMV BLD AUTO: 9.7 FL (ref 7.4–10.4)
POCT GLUCOSE: 148 MG/DL (ref 70–110)
POCT GLUCOSE: 198 MG/DL (ref 70–110)
POCT GLUCOSE: 203 MG/DL (ref 70–110)
POTASSIUM SERPL-SCNC: 4 MMOL/L (ref 3.5–5.1)
PROT SERPL-MCNC: 8.3 GM/DL (ref 5.8–7.6)
RBC # BLD AUTO: 4.06 X10(6)/MCL (ref 4.7–6.1)
SODIUM SERPL-SCNC: 136 MMOL/L (ref 136–145)
WBC # BLD AUTO: 11.65 X10(3)/MCL (ref 4.5–11.5)

## 2024-12-23 PROCEDURE — 25000003 PHARM REV CODE 250: Performed by: STUDENT IN AN ORGANIZED HEALTH CARE EDUCATION/TRAINING PROGRAM

## 2024-12-23 PROCEDURE — 94761 N-INVAS EAR/PLS OXIMETRY MLT: CPT

## 2024-12-23 PROCEDURE — 63600175 PHARM REV CODE 636 W HCPCS: Performed by: NURSE ANESTHETIST, CERTIFIED REGISTERED

## 2024-12-23 PROCEDURE — 63600175 PHARM REV CODE 636 W HCPCS: Performed by: STUDENT IN AN ORGANIZED HEALTH CARE EDUCATION/TRAINING PROGRAM

## 2024-12-23 PROCEDURE — 84100 ASSAY OF PHOSPHORUS: CPT

## 2024-12-23 PROCEDURE — 21400001 HC TELEMETRY ROOM

## 2024-12-23 PROCEDURE — 25000003 PHARM REV CODE 250

## 2024-12-23 PROCEDURE — 36000708 HC OR TIME LEV III 1ST 15 MIN: Performed by: SURGERY

## 2024-12-23 PROCEDURE — 80053 COMPREHEN METABOLIC PANEL: CPT

## 2024-12-23 PROCEDURE — 63600175 PHARM REV CODE 636 W HCPCS

## 2024-12-23 PROCEDURE — 0FT44ZZ RESECTION OF GALLBLADDER, PERCUTANEOUS ENDOSCOPIC APPROACH: ICD-10-PCS | Performed by: NURSE ANESTHETIST, CERTIFIED REGISTERED

## 2024-12-23 PROCEDURE — 37000009 HC ANESTHESIA EA ADD 15 MINS: Performed by: SURGERY

## 2024-12-23 PROCEDURE — 36415 COLL VENOUS BLD VENIPUNCTURE: CPT

## 2024-12-23 PROCEDURE — 88304 TISSUE EXAM BY PATHOLOGIST: CPT | Mod: TC | Performed by: SURGERY

## 2024-12-23 PROCEDURE — 63600175 PHARM REV CODE 636 W HCPCS: Performed by: ANESTHESIOLOGY

## 2024-12-23 PROCEDURE — 63600175 PHARM REV CODE 636 W HCPCS: Mod: JZ,JG | Performed by: SURGERY

## 2024-12-23 PROCEDURE — 83735 ASSAY OF MAGNESIUM: CPT

## 2024-12-23 PROCEDURE — 27201423 OPTIME MED/SURG SUP & DEVICES STERILE SUPPLY: Performed by: SURGERY

## 2024-12-23 PROCEDURE — 0F943ZZ DRAINAGE OF GALLBLADDER, PERCUTANEOUS APPROACH: ICD-10-PCS | Performed by: NURSE ANESTHETIST, CERTIFIED REGISTERED

## 2024-12-23 PROCEDURE — 82962 GLUCOSE BLOOD TEST: CPT | Performed by: SURGERY

## 2024-12-23 PROCEDURE — 37000008 HC ANESTHESIA 1ST 15 MINUTES: Performed by: SURGERY

## 2024-12-23 PROCEDURE — 25000003 PHARM REV CODE 250: Performed by: NURSE ANESTHETIST, CERTIFIED REGISTERED

## 2024-12-23 PROCEDURE — 71000033 HC RECOVERY, INTIAL HOUR: Performed by: SURGERY

## 2024-12-23 PROCEDURE — 36000709 HC OR TIME LEV III EA ADD 15 MIN: Performed by: SURGERY

## 2024-12-23 PROCEDURE — 85025 COMPLETE CBC W/AUTO DIFF WBC: CPT

## 2024-12-23 PROCEDURE — 25000003 PHARM REV CODE 250: Performed by: ANESTHESIOLOGY

## 2024-12-23 RX ORDER — MORPHINE SULFATE 2 MG/ML
INJECTION, SOLUTION INTRAMUSCULAR; INTRAVENOUS
Status: COMPLETED
Start: 2024-12-23 | End: 2024-12-23

## 2024-12-23 RX ORDER — MORPHINE SULFATE 2 MG/ML
2 INJECTION, SOLUTION INTRAMUSCULAR; INTRAVENOUS EVERY 5 MIN PRN
Status: DISCONTINUED | OUTPATIENT
Start: 2024-12-23 | End: 2024-12-24 | Stop reason: HOSPADM

## 2024-12-23 RX ORDER — PROPOFOL 10 MG/ML
VIAL (ML) INTRAVENOUS
Status: DISCONTINUED | OUTPATIENT
Start: 2024-12-23 | End: 2024-12-23

## 2024-12-23 RX ORDER — GLUCAGON 1 MG
1 KIT INJECTION
Status: DISCONTINUED | OUTPATIENT
Start: 2024-12-23 | End: 2024-12-24 | Stop reason: HOSPADM

## 2024-12-23 RX ORDER — CARVEDILOL 3.12 MG/1
6.25 TABLET ORAL ONCE
Status: COMPLETED | OUTPATIENT
Start: 2024-12-23 | End: 2024-12-23

## 2024-12-23 RX ORDER — BUPIVACAINE HYDROCHLORIDE 2.5 MG/ML
INJECTION, SOLUTION EPIDURAL; INFILTRATION; INTRACAUDAL
Status: DISCONTINUED | OUTPATIENT
Start: 2024-12-23 | End: 2024-12-23 | Stop reason: HOSPADM

## 2024-12-23 RX ORDER — MEPERIDINE HYDROCHLORIDE 25 MG/ML
12.5 INJECTION INTRAMUSCULAR; INTRAVENOUS; SUBCUTANEOUS EVERY 10 MIN PRN
Status: ACTIVE | OUTPATIENT
Start: 2024-12-23 | End: 2024-12-23

## 2024-12-23 RX ORDER — NAPROXEN SODIUM 220 MG/1
81 TABLET, FILM COATED ORAL ONCE
Status: COMPLETED | OUTPATIENT
Start: 2024-12-23 | End: 2024-12-23

## 2024-12-23 RX ORDER — ONDANSETRON HYDROCHLORIDE 2 MG/ML
4 INJECTION, SOLUTION INTRAVENOUS DAILY PRN
Status: DISCONTINUED | OUTPATIENT
Start: 2024-12-23 | End: 2024-12-24 | Stop reason: HOSPADM

## 2024-12-23 RX ORDER — MIDAZOLAM HYDROCHLORIDE 2 MG/2ML
2 INJECTION, SOLUTION INTRAMUSCULAR; INTRAVENOUS ONCE AS NEEDED
Status: CANCELLED | OUTPATIENT
Start: 2024-12-23 | End: 2036-05-21

## 2024-12-23 RX ORDER — ONDANSETRON HYDROCHLORIDE 2 MG/ML
INJECTION, SOLUTION INTRAMUSCULAR; INTRAVENOUS
Status: DISCONTINUED | OUTPATIENT
Start: 2024-12-23 | End: 2024-12-23

## 2024-12-23 RX ORDER — ROCURONIUM BROMIDE 10 MG/ML
INJECTION, SOLUTION INTRAVENOUS
Status: DISCONTINUED | OUTPATIENT
Start: 2024-12-23 | End: 2024-12-23

## 2024-12-23 RX ORDER — POLYETHYLENE GLYCOL 3350 17 G/17G
17 POWDER, FOR SOLUTION ORAL 2 TIMES DAILY
Status: DISCONTINUED | OUTPATIENT
Start: 2024-12-23 | End: 2024-12-24 | Stop reason: HOSPADM

## 2024-12-23 RX ORDER — FENTANYL CITRATE 50 UG/ML
INJECTION, SOLUTION INTRAMUSCULAR; INTRAVENOUS
Status: DISCONTINUED | OUTPATIENT
Start: 2024-12-23 | End: 2024-12-23

## 2024-12-23 RX ORDER — OXYCODONE HYDROCHLORIDE 5 MG/1
5 TABLET ORAL
Status: DISCONTINUED | OUTPATIENT
Start: 2024-12-23 | End: 2024-12-24 | Stop reason: HOSPADM

## 2024-12-23 RX ORDER — SODIUM CHLORIDE, SODIUM LACTATE, POTASSIUM CHLORIDE, CALCIUM CHLORIDE 600; 310; 30; 20 MG/100ML; MG/100ML; MG/100ML; MG/100ML
INJECTION, SOLUTION INTRAVENOUS CONTINUOUS
Status: CANCELLED | OUTPATIENT
Start: 2024-12-23

## 2024-12-23 RX ORDER — LIDOCAINE HYDROCHLORIDE 20 MG/ML
INJECTION, SOLUTION EPIDURAL; INFILTRATION; INTRACAUDAL; PERINEURAL
Status: DISCONTINUED | OUTPATIENT
Start: 2024-12-23 | End: 2024-12-23

## 2024-12-23 RX ORDER — DEXAMETHASONE SODIUM PHOSPHATE 4 MG/ML
INJECTION, SOLUTION INTRA-ARTICULAR; INTRALESIONAL; INTRAMUSCULAR; INTRAVENOUS; SOFT TISSUE
Status: DISCONTINUED | OUTPATIENT
Start: 2024-12-23 | End: 2024-12-23

## 2024-12-23 RX ORDER — SUCCINYLCHOLINE CHLORIDE 20 MG/ML
INJECTION INTRAMUSCULAR; INTRAVENOUS
Status: DISCONTINUED | OUTPATIENT
Start: 2024-12-23 | End: 2024-12-23

## 2024-12-23 RX ORDER — SODIUM CHLORIDE 0.9 % (FLUSH) 0.9 %
10 SYRINGE (ML) INJECTION
Status: CANCELLED | OUTPATIENT
Start: 2024-12-23

## 2024-12-23 RX ORDER — PHENYLEPHRINE HYDROCHLORIDE 10 MG/ML
INJECTION INTRAVENOUS
Status: DISCONTINUED | OUTPATIENT
Start: 2024-12-23 | End: 2024-12-23

## 2024-12-23 RX ORDER — HEPARIN SODIUM 5000 [USP'U]/ML
5000 INJECTION, SOLUTION INTRAVENOUS; SUBCUTANEOUS ONCE
Status: COMPLETED | OUTPATIENT
Start: 2024-12-23 | End: 2024-12-23

## 2024-12-23 RX ADMIN — LIDOCAINE HYDROCHLORIDE 50 MG: 20 INJECTION, SOLUTION EPIDURAL; INFILTRATION; INTRACAUDAL; PERINEURAL at 12:12

## 2024-12-23 RX ADMIN — HEPARIN SODIUM 5000 UNITS: 5000 INJECTION INTRAVENOUS; SUBCUTANEOUS at 11:12

## 2024-12-23 RX ADMIN — SUCCINYLCHOLINE CHLORIDE 100 MG: 20 INJECTION, SOLUTION INTRAMUSCULAR; INTRAVENOUS; PARENTERAL at 12:12

## 2024-12-23 RX ADMIN — CARVEDILOL 6.25 MG: 6.25 TABLET, FILM COATED ORAL at 11:12

## 2024-12-23 RX ADMIN — POLYETHYLENE GLYCOL 3350 17 G: 17 POWDER, FOR SOLUTION ORAL at 09:12

## 2024-12-23 RX ADMIN — ROCURONIUM BROMIDE 50 MG: 10 INJECTION INTRAVENOUS at 12:12

## 2024-12-23 RX ADMIN — OXYCODONE 5 MG: 5 TABLET ORAL at 05:12

## 2024-12-23 RX ADMIN — PHENYLEPHRINE HYDROCHLORIDE 200 MCG: 10 INJECTION INTRAVENOUS at 12:12

## 2024-12-23 RX ADMIN — BISACODYL 5 MG: 5 TABLET, COATED ORAL at 08:12

## 2024-12-23 RX ADMIN — ATORVASTATIN CALCIUM 20 MG: 20 TABLET, FILM COATED ORAL at 08:12

## 2024-12-23 RX ADMIN — DEXAMETHASONE SODIUM PHOSPHATE 8 MG: 4 INJECTION, SOLUTION INTRA-ARTICULAR; INTRALESIONAL; INTRAMUSCULAR; INTRAVENOUS; SOFT TISSUE at 12:12

## 2024-12-23 RX ADMIN — ONDANSETRON 4 MG: 2 INJECTION INTRAMUSCULAR; INTRAVENOUS at 12:12

## 2024-12-23 RX ADMIN — MORPHINE SULFATE 2 MG: 2 INJECTION, SOLUTION INTRAMUSCULAR; INTRAVENOUS at 01:12

## 2024-12-23 RX ADMIN — SUGAMMADEX 200 MG: 100 INJECTION, SOLUTION INTRAVENOUS at 01:12

## 2024-12-23 RX ADMIN — BISACODYL 5 MG: 5 TABLET, COATED ORAL at 09:12

## 2024-12-23 RX ADMIN — OXYCODONE HYDROCHLORIDE 10 MG: 5 TABLET ORAL at 03:12

## 2024-12-23 RX ADMIN — PIPERACILLIN SODIUM AND TAZOBACTAM SODIUM 4.5 G: 4; .5 INJECTION, POWDER, LYOPHILIZED, FOR SOLUTION INTRAVENOUS at 12:12

## 2024-12-23 RX ADMIN — INSULIN ASPART 4 UNITS: 100 INJECTION, SOLUTION INTRAVENOUS; SUBCUTANEOUS at 05:12

## 2024-12-23 RX ADMIN — POLYETHYLENE GLYCOL 3350 17 G: 17 POWDER, FOR SOLUTION ORAL at 08:12

## 2024-12-23 RX ADMIN — SENNOSIDES AND DOCUSATE SODIUM 1 TABLET: 50; 8.6 TABLET ORAL at 08:12

## 2024-12-23 RX ADMIN — ASPIRIN 81 MG CHEWABLE TABLET 81 MG: 81 TABLET CHEWABLE at 11:12

## 2024-12-23 RX ADMIN — FINASTERIDE 5 MG: 5 TABLET, FILM COATED ORAL at 08:12

## 2024-12-23 RX ADMIN — PROPOFOL 200 MG: 10 INJECTION, EMULSION INTRAVENOUS at 12:12

## 2024-12-23 RX ADMIN — FENTANYL CITRATE 100 MCG: 50 INJECTION, SOLUTION INTRAMUSCULAR; INTRAVENOUS at 12:12

## 2024-12-23 RX ADMIN — HYDRALAZINE HYDROCHLORIDE 15 MG: 20 INJECTION INTRAMUSCULAR; INTRAVENOUS at 02:12

## 2024-12-23 RX ADMIN — SENNOSIDES AND DOCUSATE SODIUM 1 TABLET: 50; 8.6 TABLET ORAL at 09:12

## 2024-12-23 RX ADMIN — AMLODIPINE BESYLATE 2.5 MG: 2.5 TABLET ORAL at 08:12

## 2024-12-23 NOTE — MEDICAL/APP STUDENT
[facility]  General Surgery - Purple Team  Progress Note  Admit Date: 12/22/2024  HD#1  POD#* No surgery found *    Subjective:   Interval history:  NAEON, Afebrile  HTN SBP to 180s yesterday afternoon, 140s this AM  Denies CP, SOB, N/V, F/C/NS  Has had persistent pain in RUQ of abdomen, worse with inspiration  States abdominal distension has decreased since yesterday  + flatus, No BM in 3 days     Objective:     VITAL SIGNS: 24 HR MIN & MAX LAST   Temp  Min: 97.7 °F (36.5 °C)  Max: 99.9 °F (37.7 °C)  98.3 °F (36.8 °C)   BP  Min: 147/81  Max: 189/87  (!) 147/81    Pulse  Min: 68  Max: 99  98    Resp  Min: 14  Max: 20  20    SpO2  Min: 95 %  Max: 99 %  96 %      HT: 6' (182.9 cm)  WT: 92.7 kg (204 lb 5.9 oz)  BMI: 27.7     Intake/output:  N/a  Voiding spontaneously    Physical examination:  General: Well developed, well nourished, no acute distress  HEENT: EOMI  CV: RR  Resp: NWOB on RA  GI:  Soft, mildly protuberant, tender to palpation in bilateral upper quadrants, negative for rebound, negative for peritonitis    :  Deferred  MSK: Moves all extremities spontaneously and purposefully  Skin/wounds:  No rashes, ulcers, erythema  Neuro:  CNII-XII grossly intact, A&Ox3    Labs:  Recent Labs     12/22/24  0543 12/23/24  0442   WBC 9.19 11.65*   HGB 12.0* 12.6*   HCT 35.6* 37.1*    292    136   K 4.0 4.0   * 106   CO2 23 17*   BUN 13.5 11.4   CREATININE 0.97 0.84   BILITOT 1.2 0.8   AST 75* 39*   * 110*   ALKPHOS 228* 192*   CALCIUM 9.6 9.7   ALBUMIN 3.6 3.5   MG 2.80* 2.00   PHOS  --  4.3       Imaging:  CT Abdomen Pelvis With IV Contrast NO Oral Contrast   Final Result      1. Distension of the gallbladder without overt inflammatory change by CT evaluation.  CT is less sensitive than ultrasound in detecting gallbladder wall thickening and pericholecystic fluid.   2. Moderate colonic stool burden.   3. The preliminary and final reports are concordant.          Electronically signed by: Adriana Marie   Date:    12/22/2024   Time:    08:12         I have reviewed all pertinent imaging results/findings within the past 24 hours.    Micro/Path:  N/a    Assessment & Plan:   Rogelio Johnson is a 73 yo male with PMHx of prostate cancer (s/p Lupron, radiation), T2DM (last Hgb A1C 6.6), CAD s/p CABG with ICD in place, HTN and hx of open right colon resection who was admitted for symptomatic cholelithiasis.     - Patient will likely require laparoscopic vs open cholecystectomy during this hospital admission. LFTS down trending since yesterday.   - Consult to cardiology to evaluate risks prior to surgery due to cardiac history.  - Diet: Diabetic  - Anti-emesis: Ondansetron  - Bowel reg: Senna and dulcolax  - Daily labs, monitor LFTs and Carolyn Moser MS4

## 2024-12-23 NOTE — PROGRESS NOTES
Inpatient Nutrition Evaluation    Admit Date: 12/22/2024   Total duration of encounter: 1 day   Patient Age: 74 y.o.    Nutrition Recommendation/Prescription     Post procedure--ADAT to Diabetic/ heart healthy diet  Pt education on diet/complete  MVI/fe  Biweekly wt  Will monitor nutrition status     Nutrition Assessment     Chart Review    Reason Seen: continuous nutrition monitoring    Malnutrition Screening Tool Results   Have you recently lost weight without trying?: No  Have you been eating poorly because of a decreased appetite?: No   MST Score: 0   Diagnosis:  Cholelithiasis     Relevant Medical History: prostate CA, DM, CAD, CABG, ICD, HTN, open R colon resection     Scheduled Medications:  amLODIPine, 2.5 mg, Daily  atorvastatin, 20 mg, Daily  bisacodyL, 5 mg, BID  finasteride, 5 mg, Daily  piperacillin-tazobactam (Zosyn) IV (PEDS and ADULTS) (extended infusion is not appropriate), 4.5 g, Once  polyethylene glycol, 17 g, BID  senna-docusate 8.6-50 mg, 1 tablet, BID    Continuous Infusions:   PRN Medications:   Current Facility-Administered Medications:     acetaminophen, 650 mg, Oral, Q8H PRN    busPIRone, 7.5 mg, Oral, BID PRN    dextrose 50%, 12.5 g, Intravenous, PRN    dextrose 50%, 25 g, Intravenous, PRN    glucagon (human recombinant), 1 mg, Intramuscular, PRN    glucose, 16 g, Oral, PRN    glucose, 24 g, Oral, PRN    hydrALAZINE, 15 mg, Intravenous, Q6H PRN    insulin aspart U-100, 0-10 Units, Subcutaneous, QID (AC + HS) PRN    melatonin, 6 mg, Oral, Nightly PRN    ondansetron, 8 mg, Oral, Q8H PRN    oxyCODONE, 10 mg, Oral, Q6H PRN    oxyCODONE, 5 mg, Oral, Q6H PRN    sodium chloride 0.9%, 10 mL, Intravenous, PRN    Recent Labs   Lab 12/19/24  1315 12/22/24  0543 12/22/24  0852 12/23/24  0442    136  --  136   K 4.6 4.0  --  4.0   CALCIUM 9.8 9.6  --  9.7   PHOS  --   --   --  4.3   MG 1.70 2.80*  --  2.00   CO2 27 23  --  17*   BUN 10.3 13.5  --  11.4   CREATININE 1.02 0.97  --  0.84    EGFRNORACEVR >60 >60  --  >60   GLUCOSE 151* 148*  --  139*   BILITOT 1.1 1.2  --  0.8   ALKPHOS 117 228*  --  192*   ALT 71* 154*  --  110*   * 75*  --  39*   ALBUMIN 3.6 3.6  --  3.5   HGBA1C  --  6.6  --   --    LIPASE 29 26 19  --    WBC 8.08 9.19  --  11.65*   HGB 11.8* 12.0*  --  12.6*   HCT 35.1* 35.6*  --  37.1*     Nutrition Orders:  Diet NPO Except for: Sips with Medication      Appetite/Oral Intake: NPO/NPO  Factors Affecting Nutritional Intake: abdominal pain, nausea, NPO, and vomiting  Social Needs Impacting Access to Food: none identified  Food/Alevism/Cultural Preferences: none reported  Food Allergies: none reported  Last Bowel Movement: 24  Wound(s):  none    Comments    () Pt laying in bed; wife at bedside; currently NPO for test; pt reported usually eats well/good appetite; + N/V x 1 day; none currently ; abdominal pain x 1 week; increased after eating; cholelithiasis--to have cholecystectomy prior to discharge. Labs acknowledged: LFTs elevated. Reviewed diabetic/ cardiac diet     Anthropometrics    Height: 6' (182.9 cm), Height Method: Stated  Last Weight: 92.7 kg (204 lb 5.9 oz) (24 0452), Weight Method: Standard Scale  BMI (Calculated): 27.7  BMI Classification: overweight (BMI 25-29.9)        Ideal Body Weight (IBW), Male: 178 lb     % Ideal Body Weight, Male (lb): 114.81 %                 Usual Body Weight (UBW), k.7 kg  % Usual Body Weight: 100.21     Usual Weight Provided By: patient and EMR weight history    Wt Readings from Last 5 Encounters:   24 92.7 kg (204 lb 5.9 oz)   24 92 kg (202 lb 13.2 oz)   12/10/24 92.9 kg (204 lb 12.8 oz)   24 93 kg (205 lb)   24 93.4 kg (205 lb 12.8 oz)     Weight Change(s) Since Admission: no wt loss   Wt Readings from Last 1 Encounters:   24 92.7 kg (204 lb 5.9 oz)   Admit Weight: 92.7 kg (204 lb 5.9 oz) (24), Weight Method: Standard Scale    Patient Education     Education  Provided: diabetic diet and heart healthy diet  Teaching Method: explanation and printed materials  Comprehension: verbalizes understanding  Barriers to Learning: none evident  Expected Compliance: fair  Comments: All questions were answered and dietitian's contact information was provided.     Nutrition Goals & Monitoring     Dietitian will monitor: food and beverage intake, weight, and food/nutrition knowledge skill  Discharge planning: continue diabetic/ card  diet  Nutrition Risk/Follow-Up: low (follow-up in 5-7 days)  Patients assigned 'low nutrition risk' status do not qualify for a full nutritional assessment but will be monitored and re-evaluated in a 5-7 day time period. Please consult if re-evaluation needed sooner.

## 2024-12-23 NOTE — CONSULTS
"  Cardiology   Mr. Rogelio Johnson is a 74 y.o. male with:   Chief Complaint   Patient presents with    Abdominal Pain    Vomiting     States epigastric and right upper quadrant abdominal pain radiating to back.  States vomited x 1.  Has known gall stones.  States this "bout" started Saturday morning.         HPI  Cardiology was consulted for pre-op risk assessment.   Mr. Rogelio Johnson with past medical history significant for CAD, CABG, HTN, HLD, DM 2, TRIP, PPM.  The patient is followed by Dr Sánchez and Dr. Willson.  He was last see for PM check on 12/9/24.  (Per patient report PM checked out fine and plan is for recheck in one year.)  Prior to the PM check he was seen in Cardiology clinic with plans of f/u in one year.     Patient works out at gym (including exercise bike and stair master for 30 minutes) without any significant symptoms.  He mows lawn with self propelled mower without significant symptoms.     No chest discomfort.  No shortness of breath.  No MCINTYRE  No orthopnea.    No edema.      No fatigue.  No near syncope.   No syncope.   No lightheadedness  No palpitations.    ROS  See HPI    PMH:    Patient Active Problem List   Diagnosis    Type 2 diabetes mellitus without complication, with long-term current use of insulin    Other male erectile dysfunction    Adenomatous polyp of ascending colon    Arteriosclerosis of coronary artery    Atrioventricular block    Benign prostatic hyperplasia    Chronic low back pain    Dyslipidemia    Hypertension    Polyp of colon    Normocytic normochromic anemia    Obstructive sleep apnea syndrome    Stress at home    Mobitz type 1 second degree AV block    Elevated PSA    AV block    Anxiety about health    Prostate cancer    Pain in both lower extremities    Cholelithiasis    JAMIL (acute kidney injury)    Choledocholithiasis    Callus     Surgical Hx:    Past Surgical History:   Procedure Laterality Date    A-V CARDIAC PACEMAKER INSERTION N/A 11/09/2023    Procedure: " INSERTION, CARDIAC PACEMAKER, DUAL CHAMBER;  Surgeon: Baljit Sánchez MD;  Location: [facility] CATH LAB;  Service: Cardiology;  Laterality: N/A;  DUAL CHAMBER PPM (SJM)    COLON RESECTION      COLONOSCOPY      CORONARY ARTERY BYPASS GRAFT (CABG)      TRANSRECTAL BIOPSY OF PROSTATE WITH ULTRASOUND GUIDANCE Bilateral 01/02/2024    Procedure: BIOPSY, PROSTATE, RECTAL APPROACH, WITH US GUIDANCE;  Surgeon: Miracle Pierre DO;  Location: [facility] ENDOSCOPY;  Service: Urology;  Laterality: Bilateral;       Social History     Socioeconomic History    Marital status:    Tobacco Use    Smoking status: Never     Passive exposure: Never    Smokeless tobacco: Never   Substance and Sexual Activity    Alcohol use: Not Currently    Drug use: Never    Sexual activity: Not Currently     Social Drivers of Health     Financial Resource Strain: Low Risk  (12/22/2024)    Overall Financial Resource Strain (CARDIA)     Difficulty of Paying Living Expenses: Not hard at all   Food Insecurity: No Food Insecurity (12/22/2024)    Hunger Vital Sign     Worried About Running Out of Food in the Last Year: Never true     Ran Out of Food in the Last Year: Never true   Transportation Needs: No Transportation Needs (12/22/2024)    TRANSPORTATION NEEDS     Transportation : No   Physical Activity: Sufficiently Active (8/7/2024)    Exercise Vital Sign     Days of Exercise per Week: 5 days     Minutes of Exercise per Session: 30 min   Stress: No Stress Concern Present (12/22/2024)    [employer] - Occupational Stress Questionnaire     Feeling of Stress : Not at all   Housing Stability: Low Risk  (12/22/2024)    Housing Stability Vital Sign     Unable to Pay for Housing in the Last Year: No     Homeless in the Last Year: No       Family History   Problem Relation Name Age of Onset    Other (Bile Duct) Mother      Gallbladder disease Mother      Diabetes Mellitus Mother      Hypertension Mother      Cataracts Father      Heart  attack Father      Heart disease Father         Review of patient's allergies indicates:  No Known Allergies    Current Medications:    Current Facility-Administered Medications:     acetaminophen tablet 650 mg, 650 mg, Oral, Q8H PRN, Nevin Hunt MD    amLODIPine tablet 2.5 mg, 2.5 mg, Oral, Daily, Nevin Hunt MD, 2.5 mg at 12/23/24 0856    atorvastatin tablet 20 mg, 20 mg, Oral, Daily, Nevin Hunt MD, 20 mg at 12/23/24 0856    bisacodyL EC tablet 5 mg, 5 mg, Oral, BID, Nevin Hunt MD, 5 mg at 12/23/24 0856    busPIRone tablet 7.5 mg, 7.5 mg, Oral, BID PRN, Nevin Hunt MD    dextrose 50% injection 12.5 g, 12.5 g, Intravenous, PRN, Nevin Hunt MD    dextrose 50% injection 25 g, 25 g, Intravenous, PRN, Nevin Hunt MD    finasteride tablet 5 mg, 5 mg, Oral, Daily, Nevin Hunt MD, 5 mg at 12/23/24 0856    glucagon (human recombinant) injection 1 mg, 1 mg, Intramuscular, PRN, Nevin Hunt MD    glucose chewable tablet 16 g, 16 g, Oral, PRN, Nevin Hunt MD    glucose chewable tablet 24 g, 24 g, Oral, PRN, Nevin Hunt MD    hydrALAZINE injection 15 mg, 15 mg, Intravenous, Q6H PRN, Nevin Hunt MD, 15 mg at 12/22/24 1633    insulin aspart U-100 injection 0-10 Units, 0-10 Units, Subcutaneous, QID (AC + HS) PRN, Nevin Hunt MD    melatonin tablet 6 mg, 6 mg, Oral, Nightly PRN, Nevin Hunt MD    ondansetron disintegrating tablet 8 mg, 8 mg, Oral, Q8H PRN, Nevin Hunt MD    oxyCODONE immediate release tablet 10 mg, 10 mg, Oral, Q6H PRN, Nevin Hunt MD, 10 mg at 12/23/24 0343    oxyCODONE immediate release tablet 5 mg, 5 mg, Oral, Q6H PRN, Nevin Hunt MD, 5 mg at 12/22/24 2130    polyethylene glycol packet 17 g, 17 g, Oral, BID, Alicia Park MD, 17 g at 12/23/24 0856    senna-docusate 8.6-50 mg per tablet 1 tablet, 1 tablet, Oral, BID, Nevin Hunt MD, 1 tablet at 12/23/24 0856    sodium chloride 0.9% flush 10 mL, 10 mL, Intravenous, PRN,  Nevin Hunt MD    Facility-Administered Medications Ordered in Other Encounters:     0.9%  NaCl infusion, , Intravenous, Once, Baljit Sánchez MD  Outpatient Medications:    Current Outpatient Medications   Medication Instructions    amLODIPine (NORVASC) 2.5 mg, Oral, Daily    aspirin (ECOTRIN) 81 mg, Oral, Daily    atorvastatin (LIPITOR) 80 mg, Oral, Daily    busPIRone (BUSPAR) 7.5 mg, Oral, 2 times daily PRN    carvediloL (COREG) 6.25 mg, 2 times daily    docusate sodium (COLACE) 100 mg    empagliflozin (JARDIANCE) 10 mg, Oral, Every morning    ezetimibe (ZETIA) 10 mg, Oral, Daily    ferrous sulfate (FEROSUL) 325 mg, Oral, Daily    finasteride (PROSCAR) 5 mg, Oral, Daily    fluticasone propionate (FLONASE) 100 mcg, Each Nostril, Daily    hydrALAZINE (APRESOLINE) 25 mg, Oral, Every 12 hours    insulin glargine U-100, Lantus, (LANTUS SOLOSTAR U-100 INSULIN) 100 unit/mL (3 mL) InPn pen Administer 68 units SC q AM and 62 units SC q HS. Rotate injection sites.    metFORMIN (GLUCOPHAGE) 1,000 mg, Oral, 2 times daily with meals    mupirocin (BACTROBAN) 2 % ointment Topical (Top), 2 times daily    nitroGLYCERIN (NITROSTAT) 0.4 mg, Every 5 min PRN    tadalafiL (CIALIS) 10 mg, Oral, Daily PRN    TRADJENTA 5 mg, Oral, Daily       ROS:  Please see HPI.    BP Readings from Last 3 Encounters:   12/23/24 (!) 145/82   12/19/24 (!) 142/62   12/10/24 (!) 145/77        Pulse Readings from Last 3 Encounters:   12/23/24 89   12/19/24 70   12/10/24 66        Temp Readings from Last 3 Encounters:   12/23/24 97.4 °F (36.3 °C) (Oral)   12/19/24 97.9 °F (36.6 °C) (Temporal)   12/10/24 98 °F (36.7 °C) (Oral)     Wt Readings from Last 3 Encounters:   12/22/24 92.7 kg (204 lb 5.9 oz)   12/19/24 92 kg (202 lb 13.2 oz)   12/10/24 92.9 kg (204 lb 12.8 oz)     BMI:  27.72 kg/m^2    PE  Blood pressure (!) 145/82, pulse 89, temperature 97.4 °F (36.3 °C), temperature source Oral, resp. rate 18, height 6' (1.829 m), weight 92.7 kg (204 lb 5.9 oz),  SpO2 98%.  CONSTITUTIONAL:  No acute distress.  Not ill appearing.  NECK:  Supple.    CARDIOVASCULAR:  Normal rate.    PULMONARY:  No respiratory distress.  No audible wheezing.    ABDOMINAL:  No distention.    MUSCULOSKELETAL:  No deformity.    SKIN:  No bruising.  No rash.  NEURO  Oriented x 3      CARDIAC TESTS  ECHO  Results for orders placed during the hospital encounter of 10/12/23    Echo    Interpretation Summary    Left Ventricle: The left ventricle is mildly dilated. Mildly increased wall thickness. Normal wall motion. There is normal systolic function with a visually estimated ejection fraction of 60 - 65%. Grade I diastolic dysfunction.    Right Ventricle: Normal right ventricular cavity size. Wall thickness is normal. Right ventricle wall motion  is normal. Systolic function is normal.    Aortic Valve: There is moderate aortic valve sclerosis. There is normal leaflet mobility. There is no stenosis. Aortic valve peak velocity is 1.20 m/s. Mean gradient is 3 mmHg. There is no significant regurgitation.    Mitral Valve: Mildly thickened leaflets. There is no stenosis. There is mild regurgitation.    Tricuspid Valve: There is no stenosis. There is trace regurgitation.    Pulmonic Valve: There is no stenosis.    STRESS TEST  No results found for this or any previous visit.    [facility]  Results for orders placed in visit on 07/20/21    CATH LAB PROCEDURE  Angiogram revealed severe native CAD with patent SVG's and LIMA.      LABS  Last BMP  Lab Results   Component Value Date     12/23/2024    K 4.0 12/23/2024     12/23/2024    CO2 17 (L) 12/23/2024    BUN 11.4 12/23/2024    CREATININE 0.84 12/23/2024    CALCIUM 9.7 12/23/2024    ANIONGAP 7 (L) 12/31/2021    EGFRNORACEVR >60 12/23/2024       Lab Results   Component Value Date    CREATININE 0.84 12/23/2024    CREATININE 0.97 12/22/2024    CREATININE 1.02 12/19/2024     Lab Results   Component Value Date    BNP 79.7 07/29/2024    .5 (H) 10/12/2023  "    Lab Results   Component Value Date    TROPONINI <0.010 12/19/2024    TROPONINI 0.015 10/29/2024    TROPONINI <0.010 07/29/2024     Last CBC     Lab Results   Component Value Date    WBC 11.65 (H) 12/23/2024    HGB 12.6 (L) 12/23/2024    HCT 37.1 (L) 12/23/2024    MCV 91.4 12/23/2024     12/23/2024           Last lipids    Lab Results   Component Value Date    CHOL 133 11/06/2023    CHOL 141 10/07/2022    CHOL 126 06/25/2021    HDL 48 11/06/2023    HDL 47 10/07/2022    HDL 48 06/25/2021    LDL 39.00 (L) 11/06/2023    LDL 70.00 10/07/2022    LDL 61.00 06/25/2021    TRIG 228 (H) 11/06/2023    TRIG 118 10/07/2022    TRIG 87 06/25/2021    TOTALCHOLEST 3 11/06/2023    TOTALCHOLEST 3 10/07/2022    TOTALCHOLEST 3 06/25/2021       LFT   No components found for: "LFT"    ASSESSMENT  Preop risk assessment  CAD with h/o CABG  DM 2 with insulin use  HTN   HLD  S/P PPM     PLAN  Medications:  Continue Coreg   Continue aspirin if feasible     PREOP CARDIAC RISK ASSESSMENT   Antiplatelets:  The patient is at moderate cardiac risk for holding aspirin      Cardiac risk:  The patient is at moderate cardiac risk for an intermediate risk procedure.      (Pt has RCRI score of 3 which indicates 15% risk of major cardiac event, but patient does > 4 METs)    "

## 2024-12-23 NOTE — ANESTHESIA PREPROCEDURE EVALUATION
12/23/2024  Rogelio Johnson is a 74 y.o., male with PMHx of HTN, HLD, CAD/CABG, type 2 AV block/pacemaker, TRIP, DM, h/o prostate CA, anxiety presents for laparoscopic cholecystectomy.    Carvedilol--last dose    Active Ambulatory Problems     Diagnosis Date Noted    Type 2 diabetes mellitus without complication, with long-term current use of insulin 10/10/2022    Other male erectile dysfunction 10/10/2022    Adenomatous polyp of ascending colon 10/20/2021    Arteriosclerosis of coronary artery 10/10/2022    Atrioventricular block 10/10/2022    Benign prostatic hyperplasia 10/10/2022    Chronic low back pain 10/10/2022    Dyslipidemia 10/10/2022    Hypertension 10/10/2022    Polyp of colon 10/10/2022    Normocytic normochromic anemia 10/10/2022    Obstructive sleep apnea syndrome 10/10/2022    Stress at home 05/03/2023    Mobitz type 1 second degree AV block 10/15/2023    Elevated PSA 11/07/2023    AV block 11/09/2023    Anxiety about health 01/17/2024    Prostate cancer 06/18/2024    Pain in both lower extremities 08/13/2024    Cholelithiasis 10/30/2024    JAMIL (acute kidney injury) 10/30/2024    Choledocholithiasis 10/30/2024    Callus 11/06/2024     Resolved Ambulatory Problems     Diagnosis Date Noted    Diabetes mellitus 10/10/2022    Symptomatic bradycardia 10/15/2023     Past Medical History:   Diagnosis Date    Coronary artery disease     Hyperlipidemia     Personal history of colonic polyps     Sleep apnea, unspecified        Pre-op Assessment    I have reviewed the NPO Status.      Review of Systems  Anesthesia Hx:  No problems with previous Anesthesia                Social:  Non-Smoker       Hematology/Oncology:                        --  Cancer in past history:       Other (see Oncology comments)       radiation       Cardiovascular:    Pacemaker Hypertension, poorly controlled   CAD   CABG/stent  Dysrhythmias       hyperlipidemia                               Pulmonary:        Sleep Apnea                Renal/:  Renal/ Normal                 Hepatic/GI:  Hepatic/GI Normal                    Neurological:  Neurology Normal                                      Endocrine:  Diabetes, well controlled, type 2             Vitals:    12/23/24 0343 12/23/24 0400 12/23/24 0713 12/23/24 0732   BP:    (!) 145/82   BP Location:       Pulse:  98  89   Resp: 20   18   Temp:    36.3 °C (97.4 °F)   TempSrc:    Oral   SpO2:   99% 98%   Weight:       Height:             Physical Exam  General: Alert, Cooperative and Well nourished    Airway:  Mallampati: II   Mouth Opening: Normal  TM Distance: Normal  Tongue: Normal  Neck ROM: Normal ROM    Dental:  Dentures    Chest/Lungs:  Normal Respiratory Rate, Clear to auscultation    Heart:  Rate: Normal  Rhythm: Regular Rhythm  Sounds: Normal      Lab Results   Component Value Date    WBC 11.65 (H) 12/23/2024    HGB 12.6 (L) 12/23/2024    HCT 37.1 (L) 12/23/2024    MCV 91.4 12/23/2024     12/23/2024       CMP  Sodium   Date Value Ref Range Status   12/23/2024 136 136 - 145 mmol/L Final   12/31/2021 137 136 - 145 mmol/L Final     Potassium   Date Value Ref Range Status   12/23/2024 4.0 3.5 - 5.1 mmol/L Final   12/31/2021 3.7 3.5 - 5.1 mmol/L Final     Chloride   Date Value Ref Range Status   12/23/2024 106 98 - 107 mmol/L Final   12/31/2021 110 (H) 100 - 109 mmol/L Final     CO2   Date Value Ref Range Status   12/23/2024 17 (L) 23 - 31 mmol/L Final     Carbon Dioxide   Date Value Ref Range Status   12/31/2021 20 (L) 22 - 33 mmol/L Final     Blood Urea Nitrogen   Date Value Ref Range Status   12/23/2024 11.4 8.4 - 25.7 mg/dL Final   12/31/2021 7 5 - 25 mg/dL Final     Creatinine   Date Value Ref Range Status   12/23/2024 0.84 0.72 - 1.25 mg/dL Final   12/31/2021 0.75 0.57 - 1.25 mg/dL Final     Calcium   Date Value Ref Range Status   12/23/2024 9.7 8.8 - 10.0 mg/dL Final    12/31/2021 8.0 (L) 8.8 - 10.6 mg/dL Final     Albumin   Date Value Ref Range Status   12/23/2024 3.5 3.4 - 4.8 g/dL Final     Bilirubin Total   Date Value Ref Range Status   12/23/2024 0.8 <=1.5 mg/dL Final     ALP   Date Value Ref Range Status   12/23/2024 192 (H) 40 - 150 unit/L Final     AST   Date Value Ref Range Status   12/23/2024 39 (H) 5 - 34 unit/L Final     ALT   Date Value Ref Range Status   12/23/2024 110 (H) 0 - 55 unit/L Final     Anion Gap   Date Value Ref Range Status   12/31/2021 7 (L) 8 - 16 mmol/L Final     eGFR   Date Value Ref Range Status   12/23/2024 >60 mL/min/1.73/m2 Final     Lab Results   Component Value Date    HGBA1C 6.6 12/22/2024                   Anesthesia Plan  Type of Anesthesia, risks & benefits discussed:    Anesthesia Type: Gen ETT  Intra-op Monitoring Plan: Standard ASA Monitors  Post Op Pain Control Plan: IV/PO Opioids PRN  Induction:  IV  Airway Plan: Direct  Informed Consent: Informed consent signed with the Patient and all parties understand the risks and agree with anesthesia plan.  All questions answered.   ASA Score: 3  Day of Surgery Review of History & Physical: H&P Update referred to the surgeon/provider.    Ready For Surgery From Anesthesia Perspective.     .

## 2024-12-23 NOTE — TRANSFER OF CARE
Anesthesia Transfer of Care Note    Patient: Rogelio Johnson    Procedure(s) Performed: Procedure(s) (LRB):  CHOLECYSTECTOMY, LAPAROSCOPIC (N/A)    Patient location: PACU    Anesthesia Type: general    Transport from OR: Transported from OR on room air with adequate spontaneous ventilation    Post pain: adequate analgesia    Post assessment: no apparent anesthetic complications    Post vital signs: stable    Level of consciousness: awake    Nausea/Vomiting: no nausea/vomiting    Complications: none    Transfer of care protocol was followed      Last vitals: Visit Vitals  BP (!) 140/78   Pulse 88   Temp 36.3 °C (97.3 °F) (Temporal)   Resp 20   Ht 6' (1.829 m)   Wt 92.7 kg (204 lb 5.9 oz)   SpO2 100%   BMI 27.72 kg/m²

## 2024-12-23 NOTE — PROGRESS NOTES
[facility]  General Surgery - Purple Team  Progress Note  Admit Date: 12/22/2024  HD#1  POD#* No surgery found *    Subjective:   Interval history:  NAEON, Afebrile  HTN SBP to 180s yesterday afternoon, 140s this AM  Denies CP, SOB, N/V, F/C/NS  Has had persistent pain in RUQ of abdomen, worse with inspiration  States abdominal distension has decreased since yesterday  + flatus, No BM in 3 days  + self induced emesis x 1 yesterday     Objective:     VITAL SIGNS: 24 HR MIN & MAX LAST   Temp  Min: 97.7 °F (36.5 °C)  Max: 99.9 °F (37.7 °C)  98.3 °F (36.8 °C)   BP  Min: 147/81  Max: 189/87  (!) 147/81    Pulse  Min: 68  Max: 99  98    Resp  Min: 14  Max: 20  20    SpO2  Min: 95 %  Max: 99 %  99 %      HT: 6' (182.9 cm)  WT: 92.7 kg (204 lb 5.9 oz)  BMI: 27.7     Intake/output:  N/a  Voiding spontaneously    Physical examination:  General: Well developed, well nourished, no acute distress  HEENT: EOMI  CV: RR  Resp: NWOB on RA  GI:  Soft, mildly distended, tender to palpation in bilateral upper quadrants, no rebound tenderness  :  Deferred  MSK: Moves all extremities spontaneously and purposefully  Skin/wounds:  No rashes, ulcers, erythema  Neuro:  CNII-XII grossly intact, A&Ox3    Labs:  Recent Labs     12/22/24  0543 12/23/24  0442   WBC 9.19 11.65*   HGB 12.0* 12.6*   HCT 35.6* 37.1*    292    136   K 4.0 4.0   * 106   CO2 23 17*   BUN 13.5 11.4   CREATININE 0.97 0.84   BILITOT 1.2 0.8   AST 75* 39*   * 110*   ALKPHOS 228* 192*   CALCIUM 9.6 9.7   ALBUMIN 3.6 3.5   MG 2.80* 2.00   PHOS  --  4.3       Imaging:  CT Abdomen Pelvis With IV Contrast NO Oral Contrast   Final Result      1. Distension of the gallbladder without overt inflammatory change by CT evaluation.  CT is less sensitive than ultrasound in detecting gallbladder wall thickening and pericholecystic fluid.   2. Moderate colonic stool burden.   3. The preliminary and final reports are concordant.          Electronically signed by: Adriana Marie   Date:    12/22/2024   Time:    08:12         I have reviewed all pertinent imaging results/findings within the past 24 hours.    Micro/Path:  N/a    Assessment & Plan:   Rogelio Johnson is a 75 yo male with PMHx of prostate cancer (s/p Lupron, radiation), T2DM (last Hgb A1C 6.6), CAD s/p CABG with ICD in place, HTN and hx of open right colon resection who was admitted for symptomatic cholelithiasis.     - Patient will likely require laparoscopic vs open cholecystectomy during this hospital admission. LFTS down trending since yesterday.   - Consult to cardiology for preoperative risk stratification prior to surgery due to cardiac history.  - Diet: Diabetic  - Anti-emesis: Ondansetron  - Bowel reg: Senna and dulcolax  - Daily labs, monitor LFTs and Tbili    Arun Moser MS4    Jean Claude Ovalle MD  [employer] General Surgery

## 2024-12-23 NOTE — ANESTHESIA POSTPROCEDURE EVALUATION
Anesthesia Post Evaluation    Patient: Rogelio Johnson    Procedure(s) Performed: Procedure(s) (LRB):  CHOLECYSTECTOMY, LAPAROSCOPIC (N/A)    Final Anesthesia Type: general      Patient location during evaluation: med/surg floor  Post-procedure vital signs: reviewed and stable  Airway patency: patent      Anesthetic complications: no      Cardiovascular status: hemodynamically stable  Respiratory status: spontaneous ventilation  Follow-up not needed.              Vitals Value Taken Time   /72 12/23/24 1614   Temp 37.1 °C (98.8 °F) 12/23/24 1614   Pulse 100 12/23/24 1614   Resp 18 12/23/24 1702   SpO2 98 % 12/23/24 1614         Event Time   Out of Recovery 12/23/2024 13:58:00         Pain/Cathi Score: Pain Rating Prior to Med Admin: 6 (12/23/2024  5:02 PM)  Pain Rating Post Med Admin: 3 (12/23/2024  2:08 PM)  Cathi Score: 9 (12/23/2024  1:37 PM)

## 2024-12-23 NOTE — PLAN OF CARE
12/23/24 1000   Discharge Assessment   Assessment Type Discharge Planning Assessment   Confirmed/corrected address, phone number and insurance Yes   Confirmed Demographics Correct on Facesheet   Source of Information patient   When was your last doctors appointment?   (Yolanda Reji)   Reason For Admission Abd pain   People in Home spouse   Do you expect to return to your current living situation? Yes   Do you have help at home or someone to help you manage your care at home? Yes   Who are your caregiver(s) and their phone number(s)? Precious Altamirano [phone], Cris [phone]   Prior to hospitilization cognitive status: Unable to Assess   Current cognitive status: Alert/Oriented;No Deficits   Walking or Climbing Stairs Difficulty no   Dressing/Bathing Difficulty no   Equipment Currently Used at Home none   Readmission within 30 days? No   Do you currently have service(s) that help you manage your care at home? No   Do you take prescription medications? Yes   Do you have prescription coverage? No   Do you have any problems affording any of your prescribed medications? No   Is the patient taking medications as prescribed? yes   Who is going to help you get home at discharge? Precious Altamirano [phone], Cris [phone]   How do you get to doctors appointments? car, drives self   Are you on dialysis? No   Discharge Plan A Home with family   DME Needed Upon Discharge  none   Discharge Plan discussed with: Patient   Transition of Care Barriers None   Physical Activity   On average, how many days per week do you engage in moderate to strenuous exercise (like a brisk walk)? 3 days   On average, how many minutes do you engage in exercise at this level? 20 min   Financial Resource Strain   How hard is it for you to pay for the very basics like food, housing, medical care, and heating? Not very   Housing Stability   In the last 12 months, was there a time when you were not able to pay the mortgage or  rent on time? N   At any time in the past 12 months, were you homeless or living in a shelter (including now)? N   Transportation Needs   Has the lack of transportation kept you from medical appointments, meetings, work or from getting things needed for daily living? No   Food Insecurity   Within the past 12 months, you worried that your food would run out before you got the money to buy more. Never true   Within the past 12 months, the food you bought just didn't last and you didn't have money to get more. Never true   Stress   Do you feel stress - tense, restless, nervous, or anxious, or unable to sleep at night because your mind is troubled all the time - these days? Not at all   Social Isolation   How often do you feel lonely or isolated from those around you?  Never   Alcohol Use   Q1: How often do you have a drink containing alcohol? Never   Q2: How many drinks containing alcohol do you have on a typical day when you are drinking? None   Q3: How often do you have six or more drinks on one occasion? Never   Utilities   In the past 12 months has the electric, gas, oil, or water company threatened to shut off services in your home? No   Health Literacy   How often do you need to have someone help you when you read instructions, pamphlets, or other written material from your doctor or pharmacy? Never   OTHER   Name(s) of People in Home Spouse

## 2024-12-24 VITALS
OXYGEN SATURATION: 97 % | SYSTOLIC BLOOD PRESSURE: 142 MMHG | BODY MASS INDEX: 27.68 KG/M2 | TEMPERATURE: 98 F | HEART RATE: 84 BPM | WEIGHT: 204.38 LBS | DIASTOLIC BLOOD PRESSURE: 80 MMHG | RESPIRATION RATE: 17 BRPM | HEIGHT: 72 IN

## 2024-12-24 PROBLEM — K80.20 CHOLELITHIASIS: Status: RESOLVED | Noted: 2024-10-30 | Resolved: 2024-12-24

## 2024-12-24 LAB
ALBUMIN SERPL-MCNC: 3.1 G/DL (ref 3.4–4.8)
ALBUMIN/GLOB SERPL: 0.6 RATIO (ref 1.1–2)
ALP SERPL-CCNC: 164 UNIT/L (ref 40–150)
ALT SERPL-CCNC: 94 UNIT/L (ref 0–55)
ANION GAP SERPL CALC-SCNC: 12 MEQ/L
AST SERPL-CCNC: 56 UNIT/L (ref 5–34)
BASOPHILS # BLD AUTO: 0.01 X10(3)/MCL
BASOPHILS NFR BLD AUTO: 0.1 %
BILIRUB SERPL-MCNC: 0.7 MG/DL
BUN SERPL-MCNC: 20.2 MG/DL (ref 8.4–25.7)
CALCIUM SERPL-MCNC: 9.7 MG/DL (ref 8.8–10)
CHLORIDE SERPL-SCNC: 106 MMOL/L (ref 98–107)
CO2 SERPL-SCNC: 17 MMOL/L (ref 23–31)
CREAT SERPL-MCNC: 1.03 MG/DL (ref 0.72–1.25)
CREAT/UREA NIT SERPL: 20
EOSINOPHIL # BLD AUTO: 0 X10(3)/MCL (ref 0–0.9)
EOSINOPHIL NFR BLD AUTO: 0 %
ERYTHROCYTE [DISTWIDTH] IN BLOOD BY AUTOMATED COUNT: 13 % (ref 11.5–17)
GFR SERPLBLD CREATININE-BSD FMLA CKD-EPI: >60 ML/MIN/1.73/M2
GLOBULIN SER-MCNC: 5.1 GM/DL (ref 2.4–3.5)
GLUCOSE SERPL-MCNC: 163 MG/DL (ref 82–115)
HCT VFR BLD AUTO: 35.9 % (ref 42–52)
HGB BLD-MCNC: 12.2 G/DL (ref 14–18)
IMM GRANULOCYTES # BLD AUTO: 0.07 X10(3)/MCL (ref 0–0.04)
IMM GRANULOCYTES NFR BLD AUTO: 0.6 %
LYMPHOCYTES # BLD AUTO: 2.27 X10(3)/MCL (ref 0.6–4.6)
LYMPHOCYTES NFR BLD AUTO: 19.3 %
MAGNESIUM SERPL-MCNC: 2.3 MG/DL (ref 1.6–2.6)
MCH RBC QN AUTO: 31.8 PG (ref 27–31)
MCHC RBC AUTO-ENTMCNC: 34 G/DL (ref 33–36)
MCV RBC AUTO: 93.5 FL (ref 80–94)
MONOCYTES # BLD AUTO: 1.26 X10(3)/MCL (ref 0.1–1.3)
MONOCYTES NFR BLD AUTO: 10.7 %
NEUTROPHILS # BLD AUTO: 8.15 X10(3)/MCL (ref 2.1–9.2)
NEUTROPHILS NFR BLD AUTO: 69.3 %
NRBC BLD AUTO-RTO: 0 %
PHOSPHATE SERPL-MCNC: 4.1 MG/DL (ref 2.3–4.7)
PLATELET # BLD AUTO: 278 X10(3)/MCL (ref 130–400)
PMV BLD AUTO: 10 FL (ref 7.4–10.4)
POCT GLUCOSE: 220 MG/DL (ref 70–110)
POTASSIUM SERPL-SCNC: 4.4 MMOL/L (ref 3.5–5.1)
PROT SERPL-MCNC: 8.2 GM/DL (ref 5.8–7.6)
RBC # BLD AUTO: 3.84 X10(6)/MCL (ref 4.7–6.1)
SODIUM SERPL-SCNC: 135 MMOL/L (ref 136–145)
WBC # BLD AUTO: 11.76 X10(3)/MCL (ref 4.5–11.5)

## 2024-12-24 PROCEDURE — 83735 ASSAY OF MAGNESIUM: CPT

## 2024-12-24 PROCEDURE — 36415 COLL VENOUS BLD VENIPUNCTURE: CPT

## 2024-12-24 PROCEDURE — 85025 COMPLETE CBC W/AUTO DIFF WBC: CPT

## 2024-12-24 PROCEDURE — 94761 N-INVAS EAR/PLS OXIMETRY MLT: CPT

## 2024-12-24 PROCEDURE — 80053 COMPREHEN METABOLIC PANEL: CPT

## 2024-12-24 PROCEDURE — 84100 ASSAY OF PHOSPHORUS: CPT

## 2024-12-24 RX ORDER — OXYCODONE HYDROCHLORIDE 5 MG/1
5 TABLET ORAL EVERY 4 HOURS PRN
Qty: 5 TABLET | Refills: 0 | Status: SHIPPED | OUTPATIENT
Start: 2024-12-24 | End: 2025-01-27

## 2024-12-24 NOTE — PLAN OF CARE
Problem: Adult Inpatient Plan of Care  Goal: Plan of Care Review  Outcome: Progressing  Goal: Patient-Specific Goal (Individualized)  Outcome: Progressing  Goal: Absence of Hospital-Acquired Illness or Injury  Outcome: Progressing  Goal: Optimal Comfort and Wellbeing  Outcome: Progressing  Goal: Readiness for Transition of Care  Outcome: Progressing     Problem: Diabetes Comorbidity  Goal: Blood Glucose Level Within Targeted Range  Outcome: Progressing     Problem: Acute Kidney Injury/Impairment  Goal: Fluid and Electrolyte Balance  Outcome: Progressing  Goal: Improved Oral Intake  Outcome: Progressing  Goal: Effective Renal Function  Outcome: Progressing     Problem: Wound  Goal: Optimal Coping  Outcome: Progressing  Goal: Optimal Functional Ability  Outcome: Progressing  Goal: Absence of Infection Signs and Symptoms  Outcome: Progressing  Goal: Improved Oral Intake  Outcome: Progressing  Goal: Optimal Pain Control and Function  Outcome: Progressing  Goal: Skin Health and Integrity  Outcome: Progressing  Goal: Optimal Wound Healing  Outcome: Progressing     Problem: Fall Injury Risk  Goal: Absence of Fall and Fall-Related Injury  Outcome: Progressing

## 2024-12-24 NOTE — DISCHARGE SUMMARY
[facility] - [address] [place] [address] Surg Telemetry  Discharge Summary      Patient Name: Rogelio Johnson  MRN: [id number]  Admission Date: 12/22/2024  Hospital Length of Stay: 2 days  Discharge Date and Time:  12/24/2024 6:45 AM  Attending Physician: No att. providers found   Discharging Provider: Alicia Park MD  Primary Care Provider: Yolanda Mendoza NP    History of Present Illness:  Rogelio Johnson is a 73yo male with PMHx of prostate cancer (s/p Lupron, radiation), T2DM (last Hgb A1C 6.6), CAD s/p CABG with ICD in place, HTN and hx of open right colon resection who is currently followed in clinic by the general surgery service for cholelithiasis. Patient presented to the ED today with 10/10 abdominal pain, nausea and x1 episode of emesis. He states that he also presented to the ED on 12/19 with similar symptoms however his abdominal pain was not as severe at that time. Patient was last seen in general surgery clinic on 12/09 at which time he was asymptomatic. Patient states that the pain started jannet-umbilical around 6pm yesterday and began to radiate diffusely over his upper abdomen until it became 10/10 abdominal pain around 4am. He reports decreased appetite and last ate the morning of 12/21. Patient denies taking anticoagulants except for ASA that he took this am.     Procedure(s) (LRB):  CHOLECYSTECTOMY, LAPAROSCOPIC (N/A)      Indwelling Lines/Drains at time of discharge:   Lines/Drains/Airways       None                 Hospital Course:   Mr. Johnson presented to the hospital on 12/22 - please see HPI above. The following morning, his LFTs, total bilirubin and ALP downtrended. However, his white count became elevated. An ultrasound was obtained, demonstrating cholecystitis.  Cardiology was consulted to give pre-operative clearance, which was obtained.  He was then taken to the OR on 12/23 for laparoscopic cholecystectomy. Please see operative note for further details.  Patient tolerated procedure well, and was  given a diet post-operatively, of which he tolerated. He ambulated without difficulty and voided spontaneously.  He was discharged home in hemodynamically stable condition on 12/24.    Consults:   Consults (From admission, onward)          Status Ordering Provider     Inpatient consult to Cardiology  Once        Provider:  (Not yet assigned)    Completed ARLEEN FREDERICK     Inpatient consult to General surgery  Once        Provider:  Nevin Hunt MD    Acknowledged HERMINIA MOREL            Significant Diagnostic Studies: N/A    Pending Diagnostic Studies:       None          Final Active Diagnoses:      Problems Resolved During this Admission:    Diagnosis Date Noted Date Resolved POA    PRINCIPAL PROBLEM:  Cholelithiasis [K80.20] 10/30/2024 12/24/2024 Yes      Discharged Condition: stable    Disposition: Home or Self Care    Follow Up:    Patient Instructions:      Diet diabetic     Lifting restrictions   Order Comments: No heavy lifting more than 10 pounds for four weeks. Following, can progress activity as tolerated.     Notify your health care provider if you experience any of the following:  temperature >100.4     Notify your health care provider if you experience any of the following:  persistent nausea and vomiting or diarrhea     Notify your health care provider if you experience any of the following:  severe uncontrolled pain     Notify your health care provider if you experience any of the following:  redness, tenderness, or signs of infection (pain, swelling, redness, odor or green/yellow discharge around incision site)     No dressing needed   Order Comments: No dressing needed.   Okay to shower - pat incision dry.   No submerging in water - no swimming in pools/lakes/rivers/oceans, no bubble baths - for 1 week.     Medications:  Reconciled Home Medications:      Medication List        START taking these medications      oxyCODONE 5 MG immediate release tablet  Commonly known as: ROXICODONE  Take 1  tablet (5 mg total) by mouth every 4 (four) hours as needed for Pain.            CONTINUE taking these medications      amLODIPine 2.5 MG tablet  Commonly known as: NORVASC  Take 1 tablet (2.5 mg total) by mouth once daily.     aspirin 81 MG EC tablet  Commonly known as: ECOTRIN  Take 1 tablet (81 mg total) by mouth once daily.     atorvastatin 80 MG tablet  Commonly known as: LIPITOR  Take 1 tablet (80 mg total) by mouth once daily.     busPIRone 7.5 MG tablet  Commonly known as: BUSPAR  Take 1 tablet (7.5 mg total) by mouth 2 (two) times daily as needed (anxiety).     carvediloL 6.25 MG tablet  Commonly known as: COREG  Take 6.25 mg by mouth 2 (two) times daily.     docusate sodium 100 MG capsule  Commonly known as: COLACE  Take 100 mg by mouth.     empagliflozin 10 mg tablet  Commonly known as: JARDIANCE  Take 1 tablet (10 mg total) by mouth every morning.     ezetimibe 10 mg tablet  Commonly known as: ZETIA  Take 1 tablet (10 mg total) by mouth once daily.     ferrous sulfate 325 mg (65 mg iron) Tab tablet  Commonly known as: FeroSuL  Take 1 tablet (325 mg total) by mouth once daily.     finasteride 5 mg tablet  Commonly known as: PROSCAR  Take 1 tablet (5 mg total) by mouth once daily.     fluticasone propionate 50 mcg/actuation nasal spray  Commonly known as: FLONASE  2 sprays (100 mcg total) by Each Nostril route once daily.     hydrALAZINE 25 MG tablet  Commonly known as: APRESOLINE  Take 1 tablet (25 mg total) by mouth every 12 (twelve) hours.     LANTUS SOLOSTAR U-100 INSULIN 100 unit/mL (3 mL) Inpn pen  Generic drug: insulin glargine U-100 (Lantus)  Administer 68 units SC q AM and 62 units SC q HS. Rotate injection sites.     metFORMIN 1000 MG tablet  Commonly known as: GLUCOPHAGE  Take 1 tablet (1,000 mg total) by mouth 2 (two) times daily with meals.     mupirocin 2 % ointment  Commonly known as: BACTROBAN  Apply topically 2 (two) times daily.     nitroGLYCERIN 0.4 MG SL tablet  Commonly known as:  NITROSTAT  Place 0.4 mg under the tongue every 5 (five) minutes as needed for Chest pain.     tadalafiL 10 MG tablet  Commonly known as: CIALIS  Take 1 tablet (10 mg total) by mouth daily as needed for Erectile Dysfunction.     TRADJENTA 5 mg Tab tablet  Generic drug: linaGLIPtin  Take 1 tablet (5 mg total) by mouth once daily.            Time spent on the discharge of patient: 30 minutes         Alicia Park MD  [facility] - [facility] Med Surg Telemetry

## 2024-12-24 NOTE — OP NOTE
[facility] Surg Telemetry  Surgery Department  Operative Note    SUMMARY     Date of Procedure: 12/23/2024     Procedure: Procedure(s) (LRB):  CHOLECYSTECTOMY, LAPAROSCOPIC (N/A)     Surgeons and Role:     * Donn Cheema Jr., MD - Primary     * Alicia Park MD - Resident      * Jean Claude Ovalle MD - Resident - Assisting        Pre-Operative Diagnosis: cholecystitis     Post-Operative Diagnosis: Post-Op Diagnosis Codes:     * Cholecystitis [K81.9]    Anesthesia: General    Operative Findings (including complications, if any):   - Hydrops of gallbladder; large and distended, required decompression with approximately 150 cc of mucinous output  - Had adhesions to anterior abdominal that corresponded to prior midline incision     Description of Technical Procedures:   The patient was brought to the operating room, placed supine on the operating table, and general anesthesia was induced. Once adequately anesthetized, the abdomen was prepped and draped in the usual sterile fashion. A time out was performed, confirming the correct patient, procedure, and location and all team members were in agreement.     An infraumbilical incision was made and a 10 mm trocar was placed via Padron technique. Pneumoperitoneum was then created with CO2 to 15 mmHg and tolerated well without any adverse changes in the patient's vital signs. Camera was introduced into the abdomen and surveyed in all four quadrants. There was no sign of injury to intraabdominal structures or viscus from initial trocar placement. Of note, there were significant adhesions to anterior abdominal wall corresponding to prior midline incision.  We were able to get around these.  Additional trocars were introduced under direct vision. A 12 mm in epigastric and 2 additional 5mm ports in the right upper quadrant. The patient was positioned in the supine position with some reverse Trendelenburg and right side up. The gallbladder was identified, and  found to be congested and distended.  The gallbladder was raised to the abdominal wall, and a spinal needle entered the abdomen into the gallbladder for decompression.  Approximately 150 cc mucinous output was removed from gallbladder.  The fundus was then grasped and retracted cephalad and laterally. The infundibulum was grasped and retracted laterally, exposing the peritoneum overlying the triangle of Calot. This was then divided and exposed in a blunt fashion. The cystic duct was clearly identified and bluntly dissected circumferentially. The junctions of the gallbladder, cystic duct and common bile duct were clearly identified prior to the division of any linear structure. The cystic duct was then doubly ligated with surgical clips on the patient side and singly clipped on the gallbladder side and divided. The cystic artery was identified, dissected free, ligated doubly with clips on the patient side and with a single clip on the gallbladder side and divided as well. The gallbladder was dissected from the liver bed in retrograde fashion with the monopolar hook electrocautery. The gallbladder was removed. The liver bed was irrigated and inspected. Hemostasis was achieved with the electrocautery, surgicel and surgiflow. Copious irrigation was utilized and was repeatedly aspirated until clear all particulate matter. Pneumoperitoneum was completely reduced after viewing removal of the trocars under direct vision. The wound was thoroughly irrigated and the fascia was then closed with a figure of eight suture; the skin was then closed with 4-0 and dermabond was applied. Instrument, sponge, and needle counts were correct at closure and at the conclusion of the case.         Estimated Blood Loss (EBL): * No values recorded between 12/23/2024 12:18 PM and 12/23/2024  1:34 PM *           Implants: * No implants in log *    Specimens:   Specimen (24h ago, onward)       Start     Ordered    12/23/24 3139  Specimen to  Pathology  RELEASE UPON ORDERING        References:    Click here for ordering Quick Tip   Question:  Release to patient  Answer:  Immediate    12/23/24 1244                            Condition: Stable    Disposition: PACU - hemodynamically stable.

## 2024-12-27 LAB
ESTROGEN SERPL-MCNC: NORMAL PG/ML
INSULIN SERPL-ACNC: NORMAL U[IU]/ML
LAB AP CLINICAL INFORMATION: NORMAL
LAB AP GROSS DESCRIPTION: NORMAL
LAB AP REPORT FOOTNOTES: NORMAL
T3RU NFR SERPL: NORMAL %

## 2024-12-30 ENCOUNTER — PATIENT OUTREACH (OUTPATIENT)
Dept: ADMINISTRATIVE | Facility: CLINIC | Age: 74
End: 2024-12-30
Payer: MEDICARE

## 2024-12-30 LAB — POCT GLUCOSE: 154 MG/DL (ref 70–110)

## 2024-12-30 NOTE — PROGRESS NOTES
C3 nurse spoke with Rogelio Johnson  for a TCC post hospital discharge follow up call. The patient has a scheduled HOSFU appointment with [facility] on 01/07/2024 @ 10 am. Unable to message Yolanda Mendoza NP staff.

## 2025-01-07 ENCOUNTER — OFFICE VISIT (OUTPATIENT)
Dept: SURGERY | Facility: CLINIC | Age: 75
End: 2025-01-07
Payer: MEDICARE

## 2025-01-07 VITALS
DIASTOLIC BLOOD PRESSURE: 64 MMHG | WEIGHT: 200 LBS | HEART RATE: 65 BPM | OXYGEN SATURATION: 100 % | SYSTOLIC BLOOD PRESSURE: 146 MMHG | TEMPERATURE: 98 F | RESPIRATION RATE: 18 BRPM | HEIGHT: 72 IN | BODY MASS INDEX: 27.09 KG/M2

## 2025-01-07 DIAGNOSIS — K80.20 CALCULUS OF GALLBLADDER WITHOUT CHOLECYSTITIS WITHOUT OBSTRUCTION: Primary | ICD-10-CM

## 2025-01-07 PROCEDURE — 99214 OFFICE O/P EST MOD 30 MIN: CPT | Mod: PBBFAC

## 2025-01-07 NOTE — PROGRESS NOTES
I have reviewed the notes, assessments, and/or procedures performed by Dr Powell, I concur with her/his documentation of Rogelio Johnson.  Date of Service: 1/7/2025    [facility]

## 2025-01-24 ENCOUNTER — TELEPHONE (OUTPATIENT)
Dept: INTERNAL MEDICINE | Facility: CLINIC | Age: 75
End: 2025-01-24
Payer: MEDICARE

## 2025-01-24 DIAGNOSIS — C61 PROSTATE CANCER: ICD-10-CM

## 2025-01-24 DIAGNOSIS — I10 HYPERTENSION, UNSPECIFIED TYPE: ICD-10-CM

## 2025-01-24 DIAGNOSIS — E11.9 TYPE 2 DIABETES MELLITUS WITHOUT COMPLICATION, WITH LONG-TERM CURRENT USE OF INSULIN: Primary | ICD-10-CM

## 2025-01-24 DIAGNOSIS — Z79.4 TYPE 2 DIABETES MELLITUS WITHOUT COMPLICATION, WITH LONG-TERM CURRENT USE OF INSULIN: Primary | ICD-10-CM

## 2025-01-24 NOTE — TELEPHONE ENCOUNTER
----- Message from Nurse Evelia sent at 1/24/2025  9:56 AM CST -----  Regarding: jamie Mcgovern 01/27 @3:20  Fasting labs needed.

## 2025-01-27 ENCOUNTER — OFFICE VISIT (OUTPATIENT)
Dept: INTERNAL MEDICINE | Facility: CLINIC | Age: 75
End: 2025-01-27
Payer: MEDICARE

## 2025-01-27 VITALS
BODY MASS INDEX: 28.04 KG/M2 | OXYGEN SATURATION: 99 % | DIASTOLIC BLOOD PRESSURE: 70 MMHG | WEIGHT: 207 LBS | HEART RATE: 54 BPM | RESPIRATION RATE: 18 BRPM | HEIGHT: 72 IN | SYSTOLIC BLOOD PRESSURE: 142 MMHG

## 2025-01-27 DIAGNOSIS — Z79.4 TYPE 2 DIABETES MELLITUS WITHOUT COMPLICATION, WITH LONG-TERM CURRENT USE OF INSULIN: ICD-10-CM

## 2025-01-27 DIAGNOSIS — N40.0 BENIGN PROSTATIC HYPERPLASIA, UNSPECIFIED WHETHER LOWER URINARY TRACT SYMPTOMS PRESENT: ICD-10-CM

## 2025-01-27 DIAGNOSIS — R91.8 LUNG FIELD ABNORMAL FINDING ON EXAMINATION: ICD-10-CM

## 2025-01-27 DIAGNOSIS — I44.30 AV BLOCK: ICD-10-CM

## 2025-01-27 DIAGNOSIS — E78.5 DYSLIPIDEMIA: ICD-10-CM

## 2025-01-27 DIAGNOSIS — C61 PROSTATE CANCER: ICD-10-CM

## 2025-01-27 DIAGNOSIS — E11.9 TYPE 2 DIABETES MELLITUS WITHOUT COMPLICATION, WITH LONG-TERM CURRENT USE OF INSULIN: ICD-10-CM

## 2025-01-27 DIAGNOSIS — I10 HYPERTENSION, UNSPECIFIED TYPE: ICD-10-CM

## 2025-01-27 DIAGNOSIS — K80.20 CALCULUS OF GALLBLADDER WITHOUT CHOLECYSTITIS WITHOUT OBSTRUCTION: ICD-10-CM

## 2025-01-27 DIAGNOSIS — Z00.00 WELLNESS EXAMINATION: Primary | ICD-10-CM

## 2025-01-27 RX ORDER — FINASTERIDE 5 MG/1
5 TABLET, FILM COATED ORAL DAILY
Qty: 90 TABLET | Refills: 3 | Status: SHIPPED | OUTPATIENT
Start: 2025-01-27

## 2025-01-27 NOTE — LETTER
AUTHORIZATION FOR RELEASE OF   CONFIDENTIAL INFORMATION    Dear Staff,    We are seeing Rogelio Johnson, date of birth 1950, in the clinic at [place] [address] [facility]. Chele Velarde MD is the patient's PCP. Rogelio Johnson has an outstanding lab/procedure at the time we reviewed his chart. In order to help keep his health information updated, he has authorized us to request the following medical record(s):        (  )  MAMMOGRAM                                      (  )  COLONOSCOPY      (  )  PAP SMEAR                                          (  )  OUTSIDE LAB RESULTS     (  )  DEXA SCAN                                          (  )  EYE EXAM            (  )  FOOT EXAM                                          (  )  ENTIRE RECORD     (  )  OUTSIDE IMMUNIZATIONS                 ( x )  Biopsy/pathology report from right colectomy         Please fax records to [facility], Alexander, Michael S, MD, [phone]     If you have any questions, please contact Evelia at [phone].           Patient Name: Rogelio Johnson  : 1950  Patient Phone #: [phone]

## 2025-01-27 NOTE — PROGRESS NOTES
Internal Medicine      Patient ID: [id number]     Chief Complaint: Medicare Annual Wellness     HPI:     Rogelio Johnson is a 74 y.o. male here today for a Medicare Annual Wellness visit and comprehensive Health Risk Assessment.  New patient in to get established.  He feels okay overall considering he had acute cholecystitis with laparoscopic cholecystectomy done about a month ago.  He is now eating well.  He noticed that he has some fecal urgency that was not present prior to surgery.  No real diarrhea.  Weight has been stable.  He has been relatively inactive because of the recent surgery.  He was very active before that with routine exercise.    Of note is the fact that he has had chronic lower extremity aches and pains that improved over the past 2 weeks when his Lipitor was held.    Medications reviewed.      No known allergies.      Nonsmoker.  That has not use alcohol.      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  He also worked as a  for a bank.  He lives at home with his wife.          The following components were reviewed and updated:  Medical history  Family History  Social history  Allergies  Immunizations  Health Maintenance  Patient Care Team  Current Outpatient Medications   Medication Instructions    amLODIPine (NORVASC) 2.5 mg, Oral, Daily    aspirin (ECOTRIN) 81 mg, Oral, Daily    atorvastatin (LIPITOR) 80 mg, Oral, Daily    busPIRone (BUSPAR) 7.5 mg, Oral, 2 times daily PRN    carvediloL (COREG) 6.25 mg, 2 times daily    docusate sodium (COLACE) 100 mg    empagliflozin (JARDIANCE) 10 mg, Oral, Every morning    ezetimibe (ZETIA) 10 mg, Oral, Daily    ferrous sulfate (FEROSUL) 325 mg, Oral, Daily    finasteride (PROSCAR) 5 mg, Oral, Daily    fluticasone propionate (FLONASE) 100 mcg, Each Nostril, Daily    hydrALAZINE (APRESOLINE) 25 mg, Oral, Every 12 hours    insulin glargine U-100, Lantus, (LANTUS SOLOSTAR U-100 INSULIN) 100 unit/mL  (3 mL) InPn pen Administer 68 units SC q AM and 62 units SC q HS. Rotate injection sites.    metFORMIN (GLUCOPHAGE) 1,000 mg, Oral, 2 times daily with meals    mupirocin (BACTROBAN) 2 % ointment Topical (Top), 2 times daily    nitroGLYCERIN (NITROSTAT) 0.4 mg, Every 5 min PRN    tadalafiL (CIALIS) 10 mg, Oral, Daily PRN    TRADJENTA 5 mg, Oral, Daily       Subjective:     Review of Systems   Cardiovascular:         Very vague slight chest discomfort in the mornings recently.  No anginal-type chest pain.  No orthopnea PND or pedal edema.   All other systems reviewed and are negative.      12 point review of systems conducted, negative except as stated in the history of present illness. See HPI for details.    Objective:     Visit Vitals  BP (!) 142/70 (BP Location: Right arm, Patient Position: Sitting)   Pulse (!) 54   Resp 18   Ht 6' (1.829 m)   Wt 93.9 kg (207 lb)   SpO2 99%   BMI 28.07 kg/m²       Physical Exam  Vitals reviewed.   Constitutional:       Appearance: Normal appearance.   HENT:      Head: Normocephalic and atraumatic.      Right Ear: Tympanic membrane normal.      Left Ear: Tympanic membrane normal.      Mouth/Throat:      Pharynx: Oropharynx is clear.   Eyes:      Pupils: Pupils are equal, round, and reactive to light.   Neck:      Vascular: No carotid bruit.   Cardiovascular:      Rate and Rhythm: Normal rate and regular rhythm.      Pulses: Normal pulses.      Heart sounds: Normal heart sounds.      Comments: 2/6 systolic murmur left sternal border.  Pulmonary:      Effort: Pulmonary effort is normal.      Breath sounds: Normal breath sounds.      Comments: Rales in right base noted  Abdominal:      General: Abdomen is flat.      Palpations: Abdomen is soft. There is no mass.      Tenderness: There is no abdominal tenderness. There is no guarding.   Musculoskeletal:         General: No swelling.      Cervical back: Neck supple.   Lymphadenopathy:      Cervical: No cervical adenopathy.   Skin:      General: Skin is warm and dry.   Neurological:      General: No focal deficit present.      Mental Status: He is alert and oriented to person, place, and time.       Hospital records reviewed.  Assessment:     1. Wellness     2. Diabetes mellitus.  Status not known.  On very high dose insulin.    3. History of coronary disease.  A few years status post coronary artery bypass grafting.  Likely a good candidate for Ozempic due to improve cardiovascular outcomes as well as improved all cause mortality     4. Hypertension.  Mildly elevated     5. Hyperlipidemia.  On high-dose statins until recently.    6. Relative statin intolerance/statin myalgias     7. History of pacemaker for AV block    8. Recent acute cholecystitis status post cholecystectomy    9. Rales noted in the right base on routine physical exam today.      10. History of prostate cancer status post radiation therapy.  This has about a year ago.    Plan:     Update lab work with CBC CMP lipid TSH hemoglobin A1c and urine for microalbumin.  Will also get a two-view chest x-ray.    If no surprises with blood work I will discontinue Tradjenta and replace it with Ozempic.  We will also reduce his insulin dose.  Will also increase the dose of his Jardiance.    Tentative follow-up with me 3 months, no lab work ordered for that visit yet.    [] ACP Discussed - Has Living Will and HCPOA on file. Will provide our office with copies.  [x] ACP Discussion declined.    A comprehensive HEALTH RISK ASSESSMENT was completed today. Results are summarized below:                  The patient is NOT A TOBACCO USER.  The patient reports NO SIGNIFICANT ALCOHOL USE.     All Questions regarding food, transportation or housing were not answered today.    Orders Placed This Encounter   Procedures    X-Ray Chest PA And Lateral     Standing Status:   Future     Standing Expiration Date:   1/27/2026     Order Specific Question:   May the Radiologist modify the order per protocol to  meet the clinical needs of the patient?     Answer:   Yes     Order Specific Question:   Release to patient     Answer:   Immediate    CBC Auto Differential     Standing Status:   Future     Standing Expiration Date:   4/27/2025    Comprehensive Metabolic Panel     Standing Status:   Future     Standing Expiration Date:   4/27/2025    Lipid Panel     Standing Status:   Future     Standing Expiration Date:   4/27/2025    TSH     Standing Status:   Future     Standing Expiration Date:   4/27/2025    Hemoglobin A1C     Standing Status:   Future     Standing Expiration Date:   4/27/2025    Microalbumin/Creatinine Ratio, Urine     Standing Status:   Future     Standing Expiration Date:   1/27/2026     Order Specific Question:   Specimen Source     Answer:   Urine        Medication List with Changes/Refills   Current Medications    AMLODIPINE (NORVASC) 2.5 MG TABLET    Take 1 tablet (2.5 mg total) by mouth once daily.       Start Date: 10/31/2024End Date: 10/31/2025    ASPIRIN (ECOTRIN) 81 MG EC TABLET    Take 1 tablet (81 mg total) by mouth once daily.       Start Date: 10/15/2023End Date: --    ATORVASTATIN (LIPITOR) 80 MG TABLET    Take 1 tablet (80 mg total) by mouth once daily.       Start Date: 9/23/2024 End Date: --    BUSPIRONE (BUSPAR) 7.5 MG TABLET    Take 1 tablet (7.5 mg total) by mouth 2 (two) times daily as needed (anxiety).       Start Date: 11/25/2024End Date: 11/25/2025    CARVEDILOL (COREG) 6.25 MG TABLET    Take 6.25 mg by mouth 2 (two) times daily.       Start Date: 12/29/2023End Date: --    DOCUSATE SODIUM (COLACE) 100 MG CAPSULE    Take 100 mg by mouth.       Start Date: --        End Date: --    EMPAGLIFLOZIN (JARDIANCE) 10 MG TABLET    Take 1 tablet (10 mg total) by mouth every morning.       Start Date: 6/18/2024 End Date: --    EZETIMIBE (ZETIA) 10 MG TABLET    Take 1 tablet (10 mg total) by mouth once daily.       Start Date: 6/18/2024 End Date: 6/18/2025    FERROUS SULFATE (FEROSUL) 325 MG (65  MG IRON) TAB TABLET    Take 1 tablet (325 mg total) by mouth once daily.       Start Date: 6/18/2024 End Date: --    FLUTICASONE PROPIONATE (FLONASE) 50 MCG/ACTUATION NASAL SPRAY    2 sprays (100 mcg total) by Each Nostril route once daily.       Start Date: 9/13/2023 End Date: --    HYDRALAZINE (APRESOLINE) 25 MG TABLET    Take 1 tablet (25 mg total) by mouth every 12 (twelve) hours.       Start Date: 11/25/2024End Date: 11/25/2025    INSULIN GLARGINE U-100, LANTUS, (LANTUS SOLOSTAR U-100 INSULIN) 100 UNIT/ML (3 ML) INPN PEN    Administer 68 units SC q AM and 62 units SC q HS. Rotate injection sites.       Start Date: 9/10/2024 End Date: --    METFORMIN (GLUCOPHAGE) 1000 MG TABLET    Take 1 tablet (1,000 mg total) by mouth 2 (two) times daily with meals.       Start Date: 6/18/2024 End Date: --    MUPIROCIN (BACTROBAN) 2 % OINTMENT    Apply topically 2 (two) times daily.       Start Date: 11/6/2024 End Date: --    NITROGLYCERIN (NITROSTAT) 0.4 MG SL TABLET    Place 0.4 mg under the tongue every 5 (five) minutes as needed for Chest pain.       Start Date: 5/7/2024  End Date: --    TADALAFIL (CIALIS) 10 MG TABLET    Take 1 tablet (10 mg total) by mouth daily as needed for Erectile Dysfunction.       Start Date: 10/17/2024End Date: 10/17/2025    TRADJENTA 5 MG TAB TABLET    Take 1 tablet (5 mg total) by mouth once daily.       Start Date: 6/18/2024 End Date: --   Changed and/or Refilled Medications    Modified Medication Previous Medication    FINASTERIDE (PROSCAR) 5 MG TABLET finasteride (PROSCAR) 5 mg tablet       Take 1 tablet (5 mg total) by mouth once daily.    Take 1 tablet (5 mg total) by mouth once daily.       Start Date: 1/27/2025 End Date: --    Start Date: 12/10/2024End Date: 1/27/2025   Discontinued Medications    OXYCODONE (ROXICODONE) 5 MG IMMEDIATE RELEASE TABLET    Take 1 tablet (5 mg total) by mouth every 4 (four) hours as needed for Pain.       Start Date: 12/24/2024End Date: 1/27/2025         Provided patient with a 5-10 year written screening schedule and personal prevention plan. Recommendations were developed using the USPSTF age appropriate recommendations. Education, counseling, and referrals were provided as needed. After Visit Summary printed and given to patient, which includes a list of additional screenings\tests needed.    Follow up in about 3 months (around 4/27/2025). In addition to their scheduled follow up, the patient has also been instructed to follow up on as needed basis.     Future Appointments   Date Time Provider Department Center   2/6/2025  8:00 AM Yolanda Mendoza NP [facility] INTMED [place]    5/7/2025 10:00 AM Chele Velarde MD [facility] [id number] IM [place]   5/12/2025  8:30 AM Miracle Pierre DO [facility] UROLO [place]    6/10/2025  8:30 AM SURGERY CLINIC, [facility] GENERAL SURGERY [facility] NESTOR Velarde MD

## 2025-01-27 NOTE — LETTER
AUTHORIZATION FOR RELEASE OF   CONFIDENTIAL INFORMATION    Dear Staff,    We are seeing Rogelio Johnson, date of birth 1950, in the clinic at [place] [address] INTERNAL [facility]. Chele Velarde MD is the patient's PCP. Rogelio Johnson has an outstanding lab/procedure at the time we reviewed his chart. In order to help keep his health information updated, he has authorized us to request the following medical record(s):        (  )  MAMMOGRAM                                      (  )  COLONOSCOPY      (  )  PAP SMEAR                                          (  )  OUTSIDE LAB RESULTS     (  )  DEXA SCAN                                          (  )  EYE EXAM            (  )  FOOT EXAM                                          (  )  ENTIRE RECORD     (  )  OUTSIDE IMMUNIZATIONS                 ( x )  last office visit note and imaging         Please fax records to [facility], Alexander, Michael S, MD, [phone]     If you have any questions, please contact Evelia at [phone].           Patient Name: Rogelio Johnson  : 1950  Patient Phone #: [phone]

## 2025-01-28 ENCOUNTER — HOSPITAL ENCOUNTER (OUTPATIENT)
Dept: RADIOLOGY | Facility: HOSPITAL | Age: 75
Discharge: HOME | End: 2025-01-28
Attending: INTERNAL MEDICINE
Payer: MEDICARE

## 2025-01-28 ENCOUNTER — TELEPHONE (OUTPATIENT)
Dept: INTERNAL MEDICINE | Facility: CLINIC | Age: 75
End: 2025-01-28
Payer: MEDICARE

## 2025-01-28 ENCOUNTER — DOCUMENTATION ONLY (OUTPATIENT)
Dept: INTERNAL MEDICINE | Facility: CLINIC | Age: 75
End: 2025-01-28
Payer: MEDICARE

## 2025-01-28 DIAGNOSIS — R91.8 LUNG FIELD ABNORMAL FINDING ON EXAMINATION: ICD-10-CM

## 2025-01-28 DIAGNOSIS — Z00.00 WELLNESS EXAMINATION: ICD-10-CM

## 2025-01-28 DIAGNOSIS — I10 PRIMARY HYPERTENSION: ICD-10-CM

## 2025-01-28 DIAGNOSIS — E11.9 TYPE 2 DIABETES MELLITUS WITHOUT COMPLICATION, WITH LONG-TERM CURRENT USE OF INSULIN: ICD-10-CM

## 2025-01-28 DIAGNOSIS — E78.5 DYSLIPIDEMIA: ICD-10-CM

## 2025-01-28 DIAGNOSIS — I44.30 AV BLOCK: ICD-10-CM

## 2025-01-28 DIAGNOSIS — I10 HYPERTENSION, UNSPECIFIED TYPE: ICD-10-CM

## 2025-01-28 DIAGNOSIS — C61 PROSTATE CANCER: ICD-10-CM

## 2025-01-28 DIAGNOSIS — E78.5 DYSLIPIDEMIA: Primary | ICD-10-CM

## 2025-01-28 DIAGNOSIS — K80.20 CALCULUS OF GALLBLADDER WITHOUT CHOLECYSTITIS WITHOUT OBSTRUCTION: ICD-10-CM

## 2025-01-28 DIAGNOSIS — Z79.4 TYPE 2 DIABETES MELLITUS WITHOUT COMPLICATION, WITH LONG-TERM CURRENT USE OF INSULIN: ICD-10-CM

## 2025-01-28 DIAGNOSIS — I10 PRIMARY HYPERTENSION: Primary | ICD-10-CM

## 2025-01-28 PROCEDURE — 71046 X-RAY EXAM CHEST 2 VIEWS: CPT | Mod: TC

## 2025-01-28 RX ORDER — INSULIN GLARGINE 100 [IU]/ML
50 INJECTION, SOLUTION SUBCUTANEOUS 2 TIMES DAILY
Qty: 126 ML | Refills: 1 | Status: SHIPPED | OUTPATIENT
Start: 2025-01-28 | End: 2025-01-29 | Stop reason: SDUPTHER

## 2025-01-28 RX ORDER — SEMAGLUTIDE 0.68 MG/ML
INJECTION, SOLUTION SUBCUTANEOUS
Qty: 3 ML | Refills: 11 | Status: SHIPPED | OUTPATIENT
Start: 2025-01-28 | End: 2025-03-25

## 2025-01-28 RX ORDER — LOSARTAN POTASSIUM 25 MG/1
25 TABLET ORAL 2 TIMES DAILY
Qty: 60 TABLET | Status: SHIPPED | OUTPATIENT
Start: 2025-01-28

## 2025-01-28 NOTE — TELEPHONE ENCOUNTER
The patient also needs to be on an ARB because of the proteinuria.  Recommend we stopped the hydralazine and add losartan 25 b.i.d..  Please send that prescription in.  One month supply with p.r.n. refills

## 2025-01-28 NOTE — TELEPHONE ENCOUNTER
Test results reviewed.  Lipid profile shows a cholesterol has gone up but it is not terrible.  I think we can keep him on Zetia and stay off the statin drug for now.      He does have elevated microalbumin in the definitely needs to increase the dose of his Jardiance.  He needs to go up to 25 daily.      Ozempic is indicated and I will start him on 0.25 mg subQ weekly and after 1 month go up to 0.5 mg weekly.    Reduce insulin to 50 units subQ b.i.d.    Follow-up with me as scheduled in 3 months but I will put in orders for updated lab work to include a CBC CMP lipid and hemoglobin A1c and urine for microalbumin.    New prescription sent to pharmacy.

## 2025-01-29 DIAGNOSIS — Z79.4 TYPE 2 DIABETES MELLITUS WITHOUT COMPLICATION, WITH LONG-TERM CURRENT USE OF INSULIN: ICD-10-CM

## 2025-01-29 DIAGNOSIS — E11.9 TYPE 2 DIABETES MELLITUS WITHOUT COMPLICATION, WITH LONG-TERM CURRENT USE OF INSULIN: ICD-10-CM

## 2025-01-29 RX ORDER — INSULIN GLARGINE 100 [IU]/ML
50 INJECTION, SOLUTION SUBCUTANEOUS 2 TIMES DAILY
Qty: 3 ML | Status: SHIPPED | OUTPATIENT
Start: 2025-01-29 | End: 2025-03-06

## 2025-02-06 DIAGNOSIS — N40.0 BENIGN PROSTATIC HYPERPLASIA, UNSPECIFIED WHETHER LOWER URINARY TRACT SYMPTOMS PRESENT: Primary | ICD-10-CM

## 2025-02-14 DIAGNOSIS — Z79.4 TYPE 2 DIABETES MELLITUS WITHOUT COMPLICATION, WITH LONG-TERM CURRENT USE OF INSULIN: ICD-10-CM

## 2025-02-14 DIAGNOSIS — R45.89 ANXIETY ABOUT HEALTH: ICD-10-CM

## 2025-02-14 DIAGNOSIS — I10 PRIMARY HYPERTENSION: ICD-10-CM

## 2025-02-14 DIAGNOSIS — E11.9 TYPE 2 DIABETES MELLITUS WITHOUT COMPLICATION, WITH LONG-TERM CURRENT USE OF INSULIN: ICD-10-CM

## 2025-02-14 DIAGNOSIS — E78.5 DYSLIPIDEMIA: ICD-10-CM

## 2025-02-14 RX ORDER — AMLODIPINE BESYLATE 2.5 MG/1
2.5 TABLET ORAL DAILY
Qty: 90 TABLET | Refills: 1 | Status: CANCELLED | OUTPATIENT
Start: 2025-02-14 | End: 2026-02-14

## 2025-02-14 RX ORDER — METFORMIN HYDROCHLORIDE 1000 MG/1
1000 TABLET ORAL 2 TIMES DAILY WITH MEALS
Qty: 180 TABLET | Refills: 2 | Status: CANCELLED | OUTPATIENT
Start: 2025-02-14

## 2025-02-14 RX ORDER — BUSPIRONE HYDROCHLORIDE 7.5 MG/1
7.5 TABLET ORAL 2 TIMES DAILY PRN
Qty: 60 TABLET | Refills: 6 | Status: CANCELLED | OUTPATIENT
Start: 2025-02-14 | End: 2026-02-14

## 2025-02-14 RX ORDER — EZETIMIBE 10 MG/1
10 TABLET ORAL DAILY
Qty: 90 TABLET | Refills: 2 | Status: CANCELLED | OUTPATIENT
Start: 2025-02-14 | End: 2026-02-14

## 2025-02-18 DIAGNOSIS — R45.89 ANXIETY ABOUT HEALTH: ICD-10-CM

## 2025-02-18 DIAGNOSIS — E78.5 DYSLIPIDEMIA: ICD-10-CM

## 2025-02-18 DIAGNOSIS — E11.9 TYPE 2 DIABETES MELLITUS WITHOUT COMPLICATION, WITH LONG-TERM CURRENT USE OF INSULIN: ICD-10-CM

## 2025-02-18 DIAGNOSIS — Z79.4 TYPE 2 DIABETES MELLITUS WITHOUT COMPLICATION, WITH LONG-TERM CURRENT USE OF INSULIN: ICD-10-CM

## 2025-02-18 DIAGNOSIS — I10 PRIMARY HYPERTENSION: ICD-10-CM

## 2025-02-18 RX ORDER — BUSPIRONE HYDROCHLORIDE 7.5 MG/1
7.5 TABLET ORAL 2 TIMES DAILY PRN
Qty: 60 TABLET | Refills: 6 | Status: SHIPPED | OUTPATIENT
Start: 2025-02-18 | End: 2026-02-18

## 2025-02-18 RX ORDER — METFORMIN HYDROCHLORIDE 1000 MG/1
1000 TABLET ORAL 2 TIMES DAILY WITH MEALS
Qty: 180 TABLET | Refills: 2 | Status: SHIPPED | OUTPATIENT
Start: 2025-02-18

## 2025-02-18 RX ORDER — EZETIMIBE 10 MG/1
10 TABLET ORAL DAILY
Qty: 90 TABLET | Refills: 3 | Status: SHIPPED | OUTPATIENT
Start: 2025-02-18 | End: 2026-02-18

## 2025-02-18 RX ORDER — AMLODIPINE BESYLATE 2.5 MG/1
2.5 TABLET ORAL DAILY
Qty: 90 TABLET | Refills: 3 | Status: SHIPPED | OUTPATIENT
Start: 2025-02-18 | End: 2026-02-18

## 2025-02-18 NOTE — TELEPHONE ENCOUNTER
Pt is now seeing Dr. Chele Velarde as his PCP. Messaged the pt and advised him to contact his office to address refills.

## 2025-02-19 ENCOUNTER — DOCUMENTATION ONLY (OUTPATIENT)
Dept: INTERNAL MEDICINE | Facility: CLINIC | Age: 75
End: 2025-02-19
Payer: MEDICARE

## 2025-02-19 LAB
LEFT EYE DM RETINOPATHY: POSITIVE
RIGHT EYE DM RETINOPATHY: POSITIVE

## 2025-02-25 ENCOUNTER — DOCUMENTATION ONLY (OUTPATIENT)
Dept: INTERNAL MEDICINE | Facility: CLINIC | Age: 75
End: 2025-02-25
Payer: MEDICARE

## 2025-03-07 DIAGNOSIS — F43.9 STRESS AT HOME: Primary | ICD-10-CM

## 2025-03-07 DIAGNOSIS — R45.89 ANXIETY ABOUT HEALTH: ICD-10-CM

## 2025-03-07 RX ORDER — LORAZEPAM 0.5 MG/1
0.5 TABLET ORAL 2 TIMES DAILY
Qty: 60 TABLET | Refills: 3 | Status: SHIPPED | OUTPATIENT
Start: 2025-03-07

## 2025-03-20 DIAGNOSIS — E11.9 TYPE 2 DIABETES MELLITUS WITHOUT COMPLICATION, WITH LONG-TERM CURRENT USE OF INSULIN: Primary | ICD-10-CM

## 2025-03-20 DIAGNOSIS — Z79.4 TYPE 2 DIABETES MELLITUS WITHOUT COMPLICATION, WITH LONG-TERM CURRENT USE OF INSULIN: Primary | ICD-10-CM

## 2025-03-20 RX ORDER — SEMAGLUTIDE 0.68 MG/ML
0.5 INJECTION, SOLUTION SUBCUTANEOUS
Qty: 6 ML | Refills: 0 | Status: SHIPPED | OUTPATIENT
Start: 2025-03-20 | End: 2025-05-19

## 2025-03-24 ENCOUNTER — HOSPITAL ENCOUNTER (EMERGENCY)
Facility: HOSPITAL | Age: 75
Discharge: HOME | End: 2025-03-24
Attending: EMERGENCY MEDICINE
Payer: MEDICARE

## 2025-03-24 VITALS
DIASTOLIC BLOOD PRESSURE: 72 MMHG | TEMPERATURE: 98 F | BODY MASS INDEX: 28.04 KG/M2 | WEIGHT: 207 LBS | SYSTOLIC BLOOD PRESSURE: 136 MMHG | RESPIRATION RATE: 18 BRPM | HEIGHT: 72 IN | HEART RATE: 60 BPM | OXYGEN SATURATION: 99 %

## 2025-03-24 DIAGNOSIS — T50.901A MEDICATION OVERDOSE, ACCIDENTAL OR UNINTENTIONAL, INITIAL ENCOUNTER: Primary | ICD-10-CM

## 2025-03-24 LAB
ALBUMIN SERPL-MCNC: 3.1 G/DL (ref 3.4–4.8)
ALBUMIN/GLOB SERPL: 0.8 RATIO (ref 1.1–2)
ALP SERPL-CCNC: 81 UNIT/L (ref 40–150)
ALT SERPL-CCNC: 18 UNIT/L (ref 0–55)
ANION GAP SERPL CALC-SCNC: 8 MEQ/L
APTT PPP: 30.7 SECONDS (ref 23.2–33.7)
AST SERPL-CCNC: 24 UNIT/L (ref 11–45)
BASOPHILS # BLD AUTO: 0.03 X10(3)/MCL
BASOPHILS NFR BLD AUTO: 0.7 %
BILIRUB SERPL-MCNC: 0.3 MG/DL
BUN SERPL-MCNC: 15 MG/DL (ref 8.4–25.7)
CALCIUM SERPL-MCNC: 8.6 MG/DL (ref 8.8–10)
CHLORIDE SERPL-SCNC: 108 MMOL/L (ref 98–107)
CO2 SERPL-SCNC: 20 MMOL/L (ref 23–31)
CREAT SERPL-MCNC: 0.89 MG/DL (ref 0.72–1.25)
CREAT/UREA NIT SERPL: 17
EOSINOPHIL # BLD AUTO: 0.08 X10(3)/MCL (ref 0–0.9)
EOSINOPHIL NFR BLD AUTO: 1.9 %
ERYTHROCYTE [DISTWIDTH] IN BLOOD BY AUTOMATED COUNT: 13.2 % (ref 11.5–17)
GFR SERPLBLD CREATININE-BSD FMLA CKD-EPI: >60 ML/MIN/1.73/M2
GLOBULIN SER-MCNC: 3.8 GM/DL (ref 2.4–3.5)
GLUCOSE SERPL-MCNC: 126 MG/DL (ref 82–115)
HCT VFR BLD AUTO: 31 % (ref 42–52)
HGB BLD-MCNC: 10.6 G/DL (ref 14–18)
IMM GRANULOCYTES # BLD AUTO: 0.01 X10(3)/MCL (ref 0–0.04)
IMM GRANULOCYTES NFR BLD AUTO: 0.2 %
INR PPP: 1.1
LYMPHOCYTES # BLD AUTO: 1.66 X10(3)/MCL (ref 0.6–4.6)
LYMPHOCYTES NFR BLD AUTO: 39.5 %
MCH RBC QN AUTO: 32.2 PG (ref 27–31)
MCHC RBC AUTO-ENTMCNC: 34.2 G/DL (ref 33–36)
MCV RBC AUTO: 94.2 FL (ref 80–94)
MONOCYTES # BLD AUTO: 0.42 X10(3)/MCL (ref 0.1–1.3)
MONOCYTES NFR BLD AUTO: 10 %
NEUTROPHILS # BLD AUTO: 2 X10(3)/MCL (ref 2.1–9.2)
NEUTROPHILS NFR BLD AUTO: 47.7 %
NRBC BLD AUTO-RTO: 0 %
PLATELET # BLD AUTO: 255 X10(3)/MCL (ref 130–400)
PMV BLD AUTO: 9.9 FL (ref 7.4–10.4)
POTASSIUM SERPL-SCNC: 4.2 MMOL/L (ref 3.5–5.1)
PROT SERPL-MCNC: 6.9 GM/DL (ref 5.8–7.6)
PROTHROMBIN TIME: 14.4 SECONDS (ref 12.5–14.5)
RBC # BLD AUTO: 3.29 X10(6)/MCL (ref 4.7–6.1)
SODIUM SERPL-SCNC: 136 MMOL/L (ref 136–145)
WBC # BLD AUTO: 4.2 X10(3)/MCL (ref 4.5–11.5)

## 2025-03-24 PROCEDURE — 85730 THROMBOPLASTIN TIME PARTIAL: CPT | Performed by: NURSE PRACTITIONER

## 2025-03-24 PROCEDURE — 85610 PROTHROMBIN TIME: CPT | Performed by: NURSE PRACTITIONER

## 2025-03-24 PROCEDURE — 85025 COMPLETE CBC W/AUTO DIFF WBC: CPT | Performed by: NURSE PRACTITIONER

## 2025-03-24 PROCEDURE — 80053 COMPREHEN METABOLIC PANEL: CPT | Performed by: NURSE PRACTITIONER

## 2025-03-24 PROCEDURE — 99283 EMERGENCY DEPT VISIT LOW MDM: CPT

## 2025-03-24 NOTE — ED PROVIDER NOTES
Encounter Date: 3/24/2025       History     Chief Complaint   Patient presents with    Medical Evaluation     Pt arrives via [facility]. Pt states that around noon he accidentally took 75 mg of plavix and 0.2 of clonidine that was his wife's medications. Pt reports generalized weakness. GCS 15     See MDM    The history is provided by the patient. No  was used.     Review of patient's allergies indicates:  No Known Allergies  Past Medical History:   Diagnosis Date    Coronary artery disease     Diabetes mellitus     Hyperlipidemia     Hypertension     Personal history of colonic polyps     Sleep apnea, unspecified      Past Surgical History:   Procedure Laterality Date    A-V CARDIAC PACEMAKER INSERTION N/A 11/09/2023    Procedure: INSERTION, CARDIAC PACEMAKER, DUAL CHAMBER;  Surgeon: Baljit Sánchez MD;  Location: [facility] CATH LAB;  Service: Cardiology;  Laterality: N/A;  DUAL CHAMBER PPM (SJM)    COLON RESECTION      COLONOSCOPY      CORONARY ARTERY BYPASS GRAFT (CABG)      LAPAROSCOPIC CHOLECYSTECTOMY N/A 12/23/2024    Procedure: CHOLECYSTECTOMY, LAPAROSCOPIC;  Surgeon: Donn Cheema Jr., MD;  Location: [facility] OR;  Service: General;  Laterality: N/A;    TRANSRECTAL BIOPSY OF PROSTATE WITH ULTRASOUND GUIDANCE Bilateral 01/02/2024    Procedure: BIOPSY, PROSTATE, RECTAL APPROACH, WITH US GUIDANCE;  Surgeon: Miracle Pierre DO;  Location: [facility] ENDOSCOPY;  Service: Urology;  Laterality: Bilateral;     Family History   Problem Relation Name Age of Onset    Other (Bile Duct) Mother      Gallbladder disease Mother      Diabetes Mellitus Mother      Hypertension Mother      Cataracts Father      Heart attack Father      Heart disease Father       Social History[1]  Review of Systems   Constitutional:         Took wife's noon medications instead of his    All other systems reviewed and are negative.      Physical Exam     Initial Vitals [03/24/25 1601]   BP Pulse Resp Temp SpO2   96/68 60 18 98.4 °F (36.9 °C)  99 %      MAP       --         Physical Exam    Nursing note and vitals reviewed.  Constitutional: He appears well-developed and well-nourished.   Eyes: Conjunctivae are normal.   Cardiovascular:  Normal rate, regular rhythm and normal heart sounds.           Pulmonary/Chest: Breath sounds normal. No respiratory distress.   pacemaker   Abdominal: Abdomen is soft. He exhibits no distension. There is no abdominal tenderness.   Musculoskeletal:         General: Normal range of motion.     Neurological: He is alert and oriented to person, place, and time.   Skin: Skin is warm and dry.   Psychiatric: He has a normal mood and affect.         ED Course   Procedures  Labs Reviewed   CBC WITH DIFFERENTIAL - Abnormal       Result Value    WBC 4.20 (*)     RBC 3.29 (*)     Hgb 10.6 (*)     Hct 31.0 (*)     MCV 94.2 (*)     MCH 32.2 (*)     MCHC 34.2      RDW 13.2      Platelet 255      MPV 9.9      Neut % 47.7      Lymph % 39.5      Mono % 10.0      Eos % 1.9      Basophil % 0.7      Imm Grans % 0.2      Neut # 2.00 (*)     Lymph # 1.66      Mono # 0.42      Eos # 0.08      Baso # 0.03      Imm Gran # 0.01      NRBC% 0.0     COMPREHENSIVE METABOLIC PANEL - Abnormal    Sodium 136      Potassium 4.2      Chloride 108 (*)     CO2 20 (*)     Glucose 126 (*)     Blood Urea Nitrogen 15.0      Creatinine 0.89      Calcium 8.6 (*)     Protein Total 6.9      Albumin 3.1 (*)     Globulin 3.8 (*)     Albumin/Globulin Ratio 0.8 (*)     Bilirubin Total 0.3      ALP 81      ALT 18      AST 24      eGFR >60      Anion Gap 8.0      BUN/Creatinine Ratio 17     PROTIME-INR - Normal    PT 14.4      INR 1.1      Narrative:     Protimes are used to monitor anticoagulant agents such as warfarin. PT INR values are based on the current patient normal mean and the GRETA value for the specific instrument reagent used.  **Routine theraputic target values for the INR are 2.0-3.0**   APTT - Normal    PTT 30.7            Imaging Results    None           Medications - No data to display  Medical Decision Making  73 y/o male presents with taking his wife's noon medications instead of his on accident. He took her clonidine 0.2mg and a plavix 75mg. He does have hx htn. He states he has been monitoring blood pressure at home and it got a little low so daughter wanted him to come get checked. He initially felt a little sleepy but now feels just fine. No cp/sob/weakness.     Labs unremarkable for acute findings. Remained normotensive here. Discussed continue to monitor at home 1-2 more times or if he feels bad. Hold bp night time meds if bp below 110. Resume normal medication regimen tomorrow. Half life of clonidine 6-20 hours     Amount and/or Complexity of Data Reviewed  Labs: ordered. Decision-making details documented in ED Course.      Additional MDM:   Differential Diagnosis:   Other: The following diagnoses were also considered and will be evaluated: hypotension, medication overdose and karri.                                   Clinical Impression:  Final diagnoses:  [T50.901A] Medication overdose, accidental or unintentional, initial encounter (Primary)          ED Disposition Condition    Discharge Stable          ED Prescriptions    None       Follow-up Information       Follow up With Specialties Details Why Contact Info    Chele Velarde MD Internal Medicine Call in 1 week As needed [address] [facility] [address]  [phone]                 [1]   Social History  Tobacco Use    Smoking status: Never     Passive exposure: Never    Smokeless tobacco: Never   Substance Use Topics    Alcohol use: Not Currently    Drug use: Never        Felicia Leslie FNP  03/24/25 [id number]

## 2025-03-28 DIAGNOSIS — I10 PRIMARY HYPERTENSION: ICD-10-CM

## 2025-03-31 RX ORDER — LOSARTAN POTASSIUM 25 MG/1
25 TABLET ORAL 2 TIMES DAILY
Qty: 60 TABLET | Status: SHIPPED | OUTPATIENT
Start: 2025-03-31

## 2025-04-07 ENCOUNTER — OFFICE VISIT (OUTPATIENT)
Dept: INTERNAL MEDICINE | Facility: CLINIC | Age: 75
End: 2025-04-07
Payer: MEDICARE

## 2025-04-07 VITALS
HEART RATE: 67 BPM | DIASTOLIC BLOOD PRESSURE: 63 MMHG | RESPIRATION RATE: 18 BRPM | OXYGEN SATURATION: 97 % | SYSTOLIC BLOOD PRESSURE: 119 MMHG | WEIGHT: 201 LBS | BODY MASS INDEX: 27.22 KG/M2 | HEIGHT: 72 IN

## 2025-04-07 DIAGNOSIS — I10 PRIMARY HYPERTENSION: Primary | ICD-10-CM

## 2025-04-07 DIAGNOSIS — L30.9 DERMATITIS: ICD-10-CM

## 2025-04-07 DIAGNOSIS — D53.8 OTHER SPECIFIED NUTRITIONAL ANEMIAS: ICD-10-CM

## 2025-04-07 DIAGNOSIS — E78.5 DYSLIPIDEMIA: ICD-10-CM

## 2025-04-07 DIAGNOSIS — N40.0 BENIGN PROSTATIC HYPERPLASIA, UNSPECIFIED WHETHER LOWER URINARY TRACT SYMPTOMS PRESENT: ICD-10-CM

## 2025-04-07 DIAGNOSIS — Z79.4 TYPE 2 DIABETES MELLITUS WITHOUT COMPLICATION, WITH LONG-TERM CURRENT USE OF INSULIN: ICD-10-CM

## 2025-04-07 DIAGNOSIS — E11.9 TYPE 2 DIABETES MELLITUS WITHOUT COMPLICATION, WITH LONG-TERM CURRENT USE OF INSULIN: ICD-10-CM

## 2025-04-07 DIAGNOSIS — C61 PROSTATE CANCER: ICD-10-CM

## 2025-04-07 LAB
FERRITIN SERPL-MCNC: 383.81 NG/ML (ref 21.81–274.66)
FOLATE SERPL-MCNC: 10.2 NG/ML (ref 7–31.4)
IRON SATN MFR SERPL: 31 % (ref 20–50)
IRON SERPL-MCNC: 77 UG/DL (ref 65–175)
RET# (OHS): 0.06 X10E6/UL (ref 0.03–0.1)
RETICULOCYTE COUNT AUTOMATED (OLG): 1.62 % (ref 1.1–2.1)
TIBC SERPL-MCNC: 172 UG/DL (ref 60–240)
TIBC SERPL-MCNC: 249 UG/DL (ref 250–450)
TRANSFERRIN SERPL-MCNC: 212 MG/DL (ref 163–344)
VIT B12 SERPL-MCNC: 207 PG/ML (ref 213–816)

## 2025-04-07 PROCEDURE — 3078F DIAST BP <80 MM HG: CPT | Mod: CPTII,,, | Performed by: INTERNAL MEDICINE

## 2025-04-07 PROCEDURE — 1126F AMNT PAIN NOTED NONE PRSNT: CPT | Mod: CPTII,,, | Performed by: INTERNAL MEDICINE

## 2025-04-07 PROCEDURE — 3074F SYST BP LT 130 MM HG: CPT | Mod: CPTII,,, | Performed by: INTERNAL MEDICINE

## 2025-04-07 PROCEDURE — 1101F PT FALLS ASSESS-DOCD LE1/YR: CPT | Mod: CPTII,,, | Performed by: INTERNAL MEDICINE

## 2025-04-07 PROCEDURE — 82607 VITAMIN B-12: CPT | Performed by: INTERNAL MEDICINE

## 2025-04-07 PROCEDURE — 3051F HG A1C>EQUAL 7.0%<8.0%: CPT | Mod: CPTII,,, | Performed by: INTERNAL MEDICINE

## 2025-04-07 PROCEDURE — 85045 AUTOMATED RETICULOCYTE COUNT: CPT | Performed by: INTERNAL MEDICINE

## 2025-04-07 PROCEDURE — 99214 OFFICE O/P EST MOD 30 MIN: CPT | Mod: ,,, | Performed by: INTERNAL MEDICINE

## 2025-04-07 PROCEDURE — 3288F FALL RISK ASSESSMENT DOCD: CPT | Mod: CPTII,,, | Performed by: INTERNAL MEDICINE

## 2025-04-07 PROCEDURE — 82728 ASSAY OF FERRITIN: CPT | Performed by: INTERNAL MEDICINE

## 2025-04-07 PROCEDURE — 82746 ASSAY OF FOLIC ACID SERUM: CPT | Performed by: INTERNAL MEDICINE

## 2025-04-07 PROCEDURE — 36415 COLL VENOUS BLD VENIPUNCTURE: CPT | Performed by: INTERNAL MEDICINE

## 2025-04-07 PROCEDURE — 1159F MED LIST DOCD IN RCRD: CPT | Mod: CPTII,,, | Performed by: INTERNAL MEDICINE

## 2025-04-07 PROCEDURE — 83550 IRON BINDING TEST: CPT | Performed by: INTERNAL MEDICINE

## 2025-04-07 NOTE — PROGRESS NOTES
Patient ID: [id number]      Subjective:     Chief Complaint: Follow-up (Sore to upper extremity)      Rogelio Johnson is a 74 y.o. male.  The patient is a 74-year-old man in for follow-up of multiple problems.  He has been doing well except for some generalized fatigue.  Also he developed a rash on his right arm over the past few weeks.  Not painful and not pruritic.  Six or 7 lesions that have popped up on the right arm only.    He has been taking iron tablets daily because of the past history of iron-deficiency.  He also is taking B12 in the past but he stopped.    Recent ER visit for accidental use of his wife's meds, namely clonidine 0.2 mg and Plavix 75.  He was seen in the hospital and blood work was done.  Of note is the fact that his anemia has progressed from last time.    Follow-up        Review of Systems    Outpatient Medications Marked as Taking for the 4/7/25 encounter (Office Visit) with Chele Velarde MD   Medication Sig Dispense Refill    amLODIPine (NORVASC) 2.5 MG tablet Take 1 tablet (2.5 mg total) by mouth once daily. 90 tablet 3    aspirin (ECOTRIN) 81 MG EC tablet Take 1 tablet (81 mg total) by mouth once daily.  0    carvediloL (COREG) 6.25 MG tablet Take 6.25 mg by mouth 2 (two) times daily.      docusate sodium (COLACE) 100 MG capsule Take 100 mg by mouth.      empagliflozin (JARDIANCE) 25 mg tablet Take 1 tablet (25 mg total) by mouth once daily. 30 tablet 3    ezetimibe (ZETIA) 10 mg tablet Take 1 tablet (10 mg total) by mouth once daily. 90 tablet 3    ferrous sulfate (FEROSUL) 325 mg (65 mg iron) Tab tablet Take 1 tablet (325 mg total) by mouth once daily. 90 tablet 2    finasteride (PROSCAR) 5 mg tablet Take 1 tablet (5 mg total) by mouth once daily. 90 tablet 3    fluticasone propionate (FLONASE) 50 mcg/actuation nasal spray 2 sprays (100 mcg total) by Each Nostril route once daily. (Patient taking differently: 2 sprays by Each Nostril route as needed.) 15.8 mL 6    LORazepam  (ATIVAN) 0.5 MG tablet Take 1 tablet (0.5 mg total) by mouth 2 (two) times daily. 60 tablet 3    losartan (COZAAR) 25 MG tablet Take 1 tablet (25 mg total) by mouth 2 (two) times a day. 60 tablet PRN    metFORMIN (GLUCOPHAGE) 1000 MG tablet Take 1 tablet (1,000 mg total) by mouth 2 (two) times daily with meals. 180 tablet 2    mupirocin (BACTROBAN) 2 % ointment Apply topically 2 (two) times daily. 30 g 1    nitroGLYCERIN (NITROSTAT) 0.4 MG SL tablet Place 0.4 mg under the tongue every 5 (five) minutes as needed for Chest pain.      tadalafiL (CIALIS) 10 MG tablet Take 1 tablet (10 mg total) by mouth daily as needed for Erectile Dysfunction. 10 tablet 11    [DISCONTINUED] semaglutide (OZEMPIC) 0.25 mg or 0.5 mg (2 mg/3 mL) pen injector Inject 0.5 mg into the skin every 7 days. 6 mL 0       Objective:     /63 (BP Location: Right arm, Patient Position: Sitting)   Pulse 67   Resp 18   Ht 6' (1.829 m)   Wt 91.2 kg (201 lb)   SpO2 97%   BMI 27.26 kg/m²     Physical Exam  Vitals reviewed.   Constitutional:       Appearance: Normal appearance.   HENT:      Head: Normocephalic and atraumatic.      Mouth/Throat:      Pharynx: Oropharynx is clear.   Eyes:      Pupils: Pupils are equal, round, and reactive to light.   Cardiovascular:      Rate and Rhythm: Normal rate and regular rhythm.      Pulses: Normal pulses.      Heart sounds: Normal heart sounds.   Pulmonary:      Breath sounds: Normal breath sounds.   Abdominal:      General: Abdomen is flat.      Palpations: Abdomen is soft.   Musculoskeletal:      Cervical back: Neck supple.   Skin:     General: Skin is warm and dry.      Comments: Six or 7 superficial round lesions on the right arm.  All 2-3 mm in diameter and flat.  Some appeared to be in perhaps slightly ulcerated.  One was very dark.   Neurological:      Mental Status: He is alert.     Recent lab work reviewed.    Assessment:     1. Anemia.  Past history of iron-deficiency.  This states about 4 years  ago.  He has been on iron tablets routinely for quite some time.  He is still on them.      2. Decreased albumin.  Nutrition maybe the problem.  Perhaps exacerbated by Ozempic.      3. Hypertension.  Excellent control     4. Hyperlipidemia.  Statins not checked recently     5. History of prostate cancer.  Last PSA was in August of 2024 and it was very low     6. Dermatitis.  Looks benign.  Asymptomatic.  We will hold off on referral for biopsy at this point.    7. Diabetes mellitus.  Good control    Plan:   Check serum iron TIBC ferritin B12 folic acid now.  Discontinue Ozempic.  Increase red meat in the diet.  Follow-up 6 weeks with CBC CMP lipid hemoglobin A1c prior  Problem List Items Addressed This Visit          Cardiac/Vascular    Dyslipidemia    Relevant Orders    Iron and TIBC    Ferritin    Reticulocytes    Folate    Vitamin B12    CBC Auto Differential    Comprehensive Metabolic Panel    Lipid Panel    TSH    Hemoglobin A1C    Hypertension - Primary    Relevant Orders    Iron and TIBC    Ferritin    Reticulocytes    Folate    Vitamin B12    CBC Auto Differential    Comprehensive Metabolic Panel    Lipid Panel    TSH    Hemoglobin A1C       Renal/    Benign prostatic hyperplasia    Relevant Orders    Iron and TIBC    Ferritin    Reticulocytes    Folate    Vitamin B12       Oncology    Prostate cancer    Overview   Est with Urology   Completed radiation therapy and on Lupron every 6 months          Relevant Orders    Iron and TIBC    Ferritin    Reticulocytes    Folate    Vitamin B12    CBC Auto Differential    Comprehensive Metabolic Panel    Lipid Panel    TSH    Hemoglobin A1C       Endocrine    Type 2 diabetes mellitus without complication, with long-term current use of insulin    Relevant Orders    Iron and TIBC    Ferritin    Reticulocytes    Folate    Vitamin B12    CBC Auto Differential    Comprehensive Metabolic Panel    Lipid Panel    TSH    Hemoglobin A1C     Other Visit Diagnoses         Other  specified nutritional anemias        Relevant Orders    Iron and TIBC    Ferritin    Reticulocytes    Folate    Vitamin B12      Dermatitis                 Orders Placed This Encounter   Procedures    Iron and TIBC     Standing Status:   Future     Expected Date:   4/7/2025     Expiration Date:   4/7/2026    Ferritin     Standing Status:   Future     Expected Date:   4/7/2025     Expiration Date:   6/6/2026    Reticulocytes     Standing Status:   Future     Expected Date:   4/7/2025     Expiration Date:   6/6/2026    Folate     Standing Status:   Future     Expected Date:   4/7/2025     Expiration Date:   6/6/2026    Vitamin B12     Standing Status:   Future     Expected Date:   4/7/2025     Expiration Date:   6/6/2026    CBC Auto Differential     Standing Status:   Future     Expected Date:   5/12/2025     Expiration Date:   4/7/2026    Comprehensive Metabolic Panel     Standing Status:   Future     Expected Date:   5/12/2025     Expiration Date:   4/7/2026    Lipid Panel     Standing Status:   Future     Expected Date:   5/12/2025     Expiration Date:   4/7/2026    TSH     Standing Status:   Future     Expected Date:   5/12/2025     Expiration Date:   4/7/2026    Hemoglobin A1C     Standing Status:   Future     Expected Date:   5/12/2025     Expiration Date:   4/7/2026        Medication List with Changes/Refills   Current Medications    AMLODIPINE (NORVASC) 2.5 MG TABLET    Take 1 tablet (2.5 mg total) by mouth once daily.       Start Date: 2/18/2025 End Date: 2/18/2026    ASPIRIN (ECOTRIN) 81 MG EC TABLET    Take 1 tablet (81 mg total) by mouth once daily.       Start Date: 10/15/2023End Date: --    ATORVASTATIN (LIPITOR) 80 MG TABLET    Take 1 tablet (80 mg total) by mouth once daily.       Start Date: 9/23/2024 End Date: --    CARVEDILOL (COREG) 6.25 MG TABLET    Take 6.25 mg by mouth 2 (two) times daily.       Start Date: 12/29/2023End Date: --    DOCUSATE SODIUM (COLACE) 100 MG CAPSULE    Take 100 mg by  mouth.       Start Date: --        End Date: --    EMPAGLIFLOZIN (JARDIANCE) 25 MG TABLET    Take 1 tablet (25 mg total) by mouth once daily.       Start Date: 1/29/2025 End Date: --    EZETIMIBE (ZETIA) 10 MG TABLET    Take 1 tablet (10 mg total) by mouth once daily.       Start Date: 2/18/2025 End Date: 2/18/2026    FERROUS SULFATE (FEROSUL) 325 MG (65 MG IRON) TAB TABLET    Take 1 tablet (325 mg total) by mouth once daily.       Start Date: 6/18/2024 End Date: --    FINASTERIDE (PROSCAR) 5 MG TABLET    Take 1 tablet (5 mg total) by mouth once daily.       Start Date: 1/27/2025 End Date: --    FLUTICASONE PROPIONATE (FLONASE) 50 MCG/ACTUATION NASAL SPRAY    2 sprays (100 mcg total) by Each Nostril route once daily.       Start Date: 9/13/2023 End Date: --    INSULIN GLARGINE U-100, LANTUS, (LANTUS SOLOSTAR U-100 INSULIN) 100 UNIT/ML (3 ML) INPN PEN    Inject 50 Units into the skin 2 (two) times a day.       Start Date: 1/29/2025 End Date: 3/6/2025    LORAZEPAM (ATIVAN) 0.5 MG TABLET    Take 1 tablet (0.5 mg total) by mouth 2 (two) times daily.       Start Date: 3/7/2025  End Date: --    LOSARTAN (COZAAR) 25 MG TABLET    Take 1 tablet (25 mg total) by mouth 2 (two) times a day.       Start Date: 3/31/2025 End Date: --    METFORMIN (GLUCOPHAGE) 1000 MG TABLET    Take 1 tablet (1,000 mg total) by mouth 2 (two) times daily with meals.       Start Date: 2/18/2025 End Date: --    MUPIROCIN (BACTROBAN) 2 % OINTMENT    Apply topically 2 (two) times daily.       Start Date: 11/6/2024 End Date: --    NITROGLYCERIN (NITROSTAT) 0.4 MG SL TABLET    Place 0.4 mg under the tongue every 5 (five) minutes as needed for Chest pain.       Start Date: 5/7/2024  End Date: --    TADALAFIL (CIALIS) 10 MG TABLET    Take 1 tablet (10 mg total) by mouth daily as needed for Erectile Dysfunction.       Start Date: 10/17/2024End Date: 10/17/2025   Discontinued Medications    BUSPIRONE (BUSPAR) 7.5 MG TABLET    Take 1 tablet (7.5 mg total) by  mouth 2 (two) times daily as needed (anxiety).       Start Date: 2/18/2025 End Date: 4/7/2025    SEMAGLUTIDE (OZEMPIC) 0.25 MG OR 0.5 MG (2 MG/3 ML) PEN INJECTOR    Inject 0.5 mg into the skin every 7 days.       Start Date: 3/20/2025 End Date: 4/7/2025            Follow up in about 6 weeks (around 5/19/2025). In addition to their scheduled follow up, the patient has also been instructed to follow up on as needed basis.       Chele Velarde

## 2025-04-08 ENCOUNTER — RESULTS FOLLOW-UP (OUTPATIENT)
Dept: INTERNAL MEDICINE | Facility: CLINIC | Age: 75
End: 2025-04-08

## 2025-04-08 ENCOUNTER — TELEPHONE (OUTPATIENT)
Dept: INTERNAL MEDICINE | Facility: CLINIC | Age: 75
End: 2025-04-08
Payer: MEDICARE

## 2025-04-08 DIAGNOSIS — D50.9 IRON DEFICIENCY ANEMIA, UNSPECIFIED IRON DEFICIENCY ANEMIA TYPE: Primary | ICD-10-CM

## 2025-04-08 RX ORDER — CYANOCOBALAMIN 1000 UG/ML
1000 INJECTION, SOLUTION INTRAMUSCULAR; SUBCUTANEOUS WEEKLY
Qty: 4 ML | Status: SHIPPED | OUTPATIENT
Start: 2025-04-08 | End: 2025-05-06

## 2025-04-08 NOTE — TELEPHONE ENCOUNTER
----- Message from Chele Velarde MD sent at 4/8/2025  4:52 PM CDT -----  Tell him B12  Level is low.  Recommend injections 1000 mcg weekly x4 and then 1000 mcg monthly thereafter.  Follow-up with me as scheduled.  ----- Message -----  From: Lab, Background User  Sent: 4/7/2025   5:56 PM CDT  To: Chele Velarde MD

## 2025-04-24 DIAGNOSIS — Z79.4 TYPE 2 DIABETES MELLITUS WITHOUT COMPLICATION, WITH LONG-TERM CURRENT USE OF INSULIN: ICD-10-CM

## 2025-04-24 DIAGNOSIS — E11.9 TYPE 2 DIABETES MELLITUS WITHOUT COMPLICATION, WITH LONG-TERM CURRENT USE OF INSULIN: ICD-10-CM

## 2025-04-24 RX ORDER — INSULIN GLARGINE 100 [IU]/ML
50 INJECTION, SOLUTION SUBCUTANEOUS 2 TIMES DAILY
Qty: 3 ML | Status: SHIPPED | OUTPATIENT
Start: 2025-04-24 | End: 2025-05-30

## 2025-05-02 DIAGNOSIS — C61 PROSTATE CANCER: Primary | ICD-10-CM

## 2025-05-08 ENCOUNTER — CLINICAL SUPPORT (OUTPATIENT)
Dept: INTERNAL MEDICINE | Facility: CLINIC | Age: 75
End: 2025-05-08
Payer: MEDICARE

## 2025-05-08 DIAGNOSIS — Z79.4 TYPE 2 DIABETES MELLITUS WITHOUT COMPLICATION, WITH LONG-TERM CURRENT USE OF INSULIN: ICD-10-CM

## 2025-05-08 DIAGNOSIS — I10 HYPERTENSION, UNSPECIFIED TYPE: ICD-10-CM

## 2025-05-08 DIAGNOSIS — D64.9 NORMOCYTIC NORMOCHROMIC ANEMIA: ICD-10-CM

## 2025-05-08 DIAGNOSIS — E78.5 DYSLIPIDEMIA: ICD-10-CM

## 2025-05-08 DIAGNOSIS — I10 PRIMARY HYPERTENSION: ICD-10-CM

## 2025-05-08 DIAGNOSIS — E11.9 TYPE 2 DIABETES MELLITUS WITHOUT COMPLICATION, WITH LONG-TERM CURRENT USE OF INSULIN: ICD-10-CM

## 2025-05-08 DIAGNOSIS — Z79.4 TYPE 2 DIABETES MELLITUS WITHOUT COMPLICATION, WITH LONG-TERM CURRENT USE OF INSULIN: Primary | ICD-10-CM

## 2025-05-08 DIAGNOSIS — C61 PROSTATE CANCER: ICD-10-CM

## 2025-05-08 DIAGNOSIS — E11.9 TYPE 2 DIABETES MELLITUS WITHOUT COMPLICATION, WITH LONG-TERM CURRENT USE OF INSULIN: Primary | ICD-10-CM

## 2025-05-08 LAB
ALBUMIN SERPL-MCNC: 3.3 G/DL (ref 3.4–4.8)
ALBUMIN/GLOB SERPL: 0.8 RATIO (ref 1.1–2)
ALP SERPL-CCNC: 103 UNIT/L (ref 40–150)
ALT SERPL-CCNC: 23 UNIT/L (ref 0–55)
ANION GAP SERPL CALC-SCNC: 7 MEQ/L
AST SERPL-CCNC: 18 UNIT/L (ref 11–45)
BASOPHILS # BLD AUTO: 0.02 X10(3)/MCL
BASOPHILS NFR BLD AUTO: 0.4 %
BILIRUB SERPL-MCNC: 0.2 MG/DL
BUN SERPL-MCNC: 11.8 MG/DL (ref 8.4–25.7)
CALCIUM SERPL-MCNC: 8.8 MG/DL (ref 8.8–10)
CHLORIDE SERPL-SCNC: 110 MMOL/L (ref 98–107)
CHOLEST SERPL-MCNC: 195 MG/DL
CHOLEST/HDLC SERPL: 4 {RATIO} (ref 0–5)
CO2 SERPL-SCNC: 23 MMOL/L (ref 23–31)
CREAT SERPL-MCNC: 0.98 MG/DL (ref 0.72–1.25)
CREAT/UREA NIT SERPL: 12
EOSINOPHIL # BLD AUTO: 0.04 X10(3)/MCL (ref 0–0.9)
EOSINOPHIL NFR BLD AUTO: 0.9 %
ERYTHROCYTE [DISTWIDTH] IN BLOOD BY AUTOMATED COUNT: 12.8 % (ref 11.5–17)
EST. AVERAGE GLUCOSE BLD GHB EST-MCNC: 122.6 MG/DL
GFR SERPLBLD CREATININE-BSD FMLA CKD-EPI: >60 ML/MIN/1.73/M2
GLOBULIN SER-MCNC: 3.9 GM/DL (ref 2.4–3.5)
GLUCOSE SERPL-MCNC: 108 MG/DL (ref 82–115)
HBA1C MFR BLD: 5.9 %
HCT VFR BLD AUTO: 34.7 % (ref 42–52)
HDLC SERPL-MCNC: 50 MG/DL (ref 35–60)
HGB BLD-MCNC: 11.6 G/DL (ref 14–18)
IMM GRANULOCYTES # BLD AUTO: 0.03 X10(3)/MCL (ref 0–0.04)
IMM GRANULOCYTES NFR BLD AUTO: 0.6 %
LDLC SERPL CALC-MCNC: 94 MG/DL (ref 50–140)
LYMPHOCYTES # BLD AUTO: 2.13 X10(3)/MCL (ref 0.6–4.6)
LYMPHOCYTES NFR BLD AUTO: 45.5 %
MCH RBC QN AUTO: 31.4 PG (ref 27–31)
MCHC RBC AUTO-ENTMCNC: 33.4 G/DL (ref 33–36)
MCV RBC AUTO: 93.8 FL (ref 80–94)
MONOCYTES # BLD AUTO: 0.54 X10(3)/MCL (ref 0.1–1.3)
MONOCYTES NFR BLD AUTO: 11.5 %
NEUTROPHILS # BLD AUTO: 1.92 X10(3)/MCL (ref 2.1–9.2)
NEUTROPHILS NFR BLD AUTO: 41.1 %
NRBC BLD AUTO-RTO: 0 %
PLATELET # BLD AUTO: 248 X10(3)/MCL (ref 130–400)
PMV BLD AUTO: 10.2 FL (ref 7.4–10.4)
POTASSIUM SERPL-SCNC: 3.3 MMOL/L (ref 3.5–5.1)
PROT SERPL-MCNC: 7.2 GM/DL (ref 5.8–7.6)
PSA SERPL-MCNC: <0.1 NG/ML
RBC # BLD AUTO: 3.7 X10(6)/MCL (ref 4.7–6.1)
SODIUM SERPL-SCNC: 140 MMOL/L (ref 136–145)
TRIGL SERPL-MCNC: 253 MG/DL (ref 34–140)
VLDLC SERPL CALC-MCNC: 51 MG/DL
WBC # BLD AUTO: 4.68 X10(3)/MCL (ref 4.5–11.5)

## 2025-05-08 PROCEDURE — 80053 COMPREHEN METABOLIC PANEL: CPT | Performed by: INTERNAL MEDICINE

## 2025-05-08 PROCEDURE — 84153 ASSAY OF PSA TOTAL: CPT | Performed by: INTERNAL MEDICINE

## 2025-05-08 PROCEDURE — 85025 COMPLETE CBC W/AUTO DIFF WBC: CPT | Performed by: INTERNAL MEDICINE

## 2025-05-08 PROCEDURE — 36415 COLL VENOUS BLD VENIPUNCTURE: CPT | Mod: ,,, | Performed by: INTERNAL MEDICINE

## 2025-05-08 PROCEDURE — 83036 HEMOGLOBIN GLYCOSYLATED A1C: CPT | Performed by: INTERNAL MEDICINE

## 2025-05-08 PROCEDURE — 80061 LIPID PANEL: CPT | Performed by: INTERNAL MEDICINE

## 2025-05-09 DIAGNOSIS — R45.89 ANXIETY ABOUT HEALTH: ICD-10-CM

## 2025-05-09 DIAGNOSIS — I10 PRIMARY HYPERTENSION: ICD-10-CM

## 2025-05-09 DIAGNOSIS — N40.0 BENIGN PROSTATIC HYPERPLASIA, UNSPECIFIED WHETHER LOWER URINARY TRACT SYMPTOMS PRESENT: ICD-10-CM

## 2025-05-09 DIAGNOSIS — F43.9 STRESS AT HOME: ICD-10-CM

## 2025-05-09 RX ORDER — LORAZEPAM 0.5 MG/1
0.5 TABLET ORAL 2 TIMES DAILY
Qty: 60 TABLET | Refills: 1 | Status: SHIPPED | OUTPATIENT
Start: 2025-05-09

## 2025-05-09 RX ORDER — FINASTERIDE 5 MG/1
5 TABLET, FILM COATED ORAL DAILY
Qty: 90 TABLET | Refills: 3 | Status: SHIPPED | OUTPATIENT
Start: 2025-05-09

## 2025-05-09 RX ORDER — LOSARTAN POTASSIUM 25 MG/1
25 TABLET ORAL 2 TIMES DAILY
Qty: 180 TABLET | Status: SHIPPED | OUTPATIENT
Start: 2025-05-09

## 2025-05-12 ENCOUNTER — OFFICE VISIT (OUTPATIENT)
Dept: UROLOGY | Facility: CLINIC | Age: 75
End: 2025-05-12
Payer: MEDICARE

## 2025-05-12 VITALS
DIASTOLIC BLOOD PRESSURE: 66 MMHG | RESPIRATION RATE: 20 BRPM | OXYGEN SATURATION: 97 % | HEART RATE: 64 BPM | WEIGHT: 207 LBS | SYSTOLIC BLOOD PRESSURE: 138 MMHG | BODY MASS INDEX: 28.04 KG/M2 | HEIGHT: 72 IN | TEMPERATURE: 98 F

## 2025-05-12 DIAGNOSIS — C61 ADENOCARCINOMA OF PROSTATE: Primary | ICD-10-CM

## 2025-05-12 DIAGNOSIS — N52.35 ERECTILE DYSFUNCTION FOLLOWING RADIATION THERAPY: ICD-10-CM

## 2025-05-12 LAB
BILIRUB SERPL-MCNC: NORMAL MG/DL
BLOOD URINE, POC: NORMAL
COLOR, POC UA: YELLOW
GLUCOSE UR QL STRIP: 500
KETONES UR QL STRIP: NORMAL
LEUKOCYTE ESTERASE URINE, POC: NORMAL
NITRITE, POC UA: NORMAL
PH, POC UA: 6.5
PROTEIN, POC: 30
SPECIFIC GRAVITY, POC UA: 1.02
UROBILINOGEN, POC UA: 0.2

## 2025-05-12 PROCEDURE — 1126F AMNT PAIN NOTED NONE PRSNT: CPT | Mod: CPTII,,, | Performed by: UROLOGY

## 2025-05-12 PROCEDURE — 81001 URINALYSIS AUTO W/SCOPE: CPT | Mod: PBBFAC | Performed by: UROLOGY

## 2025-05-12 PROCEDURE — 99215 OFFICE O/P EST HI 40 MIN: CPT | Mod: PBBFAC,25 | Performed by: UROLOGY

## 2025-05-12 PROCEDURE — 1160F RVW MEDS BY RX/DR IN RCRD: CPT | Mod: CPTII,,, | Performed by: UROLOGY

## 2025-05-12 PROCEDURE — 3044F HG A1C LEVEL LT 7.0%: CPT | Mod: CPTII,,, | Performed by: UROLOGY

## 2025-05-12 PROCEDURE — 1159F MED LIST DOCD IN RCRD: CPT | Mod: CPTII,,, | Performed by: UROLOGY

## 2025-05-12 PROCEDURE — 3075F SYST BP GE 130 - 139MM HG: CPT | Mod: CPTII,,, | Performed by: UROLOGY

## 2025-05-12 PROCEDURE — 3078F DIAST BP <80 MM HG: CPT | Mod: CPTII,,, | Performed by: UROLOGY

## 2025-05-12 PROCEDURE — 96402 CHEMO HORMON ANTINEOPL SQ/IM: CPT | Mod: PBBFAC

## 2025-05-12 PROCEDURE — 99214 OFFICE O/P EST MOD 30 MIN: CPT | Mod: S$PBB,,, | Performed by: UROLOGY

## 2025-05-12 PROCEDURE — 3288F FALL RISK ASSESSMENT DOCD: CPT | Mod: CPTII,,, | Performed by: UROLOGY

## 2025-05-12 PROCEDURE — 1101F PT FALLS ASSESS-DOCD LE1/YR: CPT | Mod: CPTII,,, | Performed by: UROLOGY

## 2025-05-12 RX ADMIN — LEUPROLIDE ACETATE 45 MG: KIT at 09:05

## 2025-05-12 NOTE — PROGRESS NOTES
Seen by Dr. Pierre.  Lupron 45mg IM given Right Dorsal Gluteal.  Lot [id number]  Exp [id number].  Tolerated well.  RTC in November as scheduled.

## 2025-05-12 NOTE — PROGRESS NOTES
CC:  Lupron injection    HPI:  Rogelio Johnson is a 74 y.o. male seen for follow-up of adenocarcinoma of prostate.  He had a prostate biopsy on 2 January 2024 for a PSA of 8.02.  The biopsy showed [place] 4+3 in the left base and apex and [place] 4+4 in the left mid.  He is on Lupron in conjunction with radiation which he had from 27 March to 7 May 2024.  First Lupron was on 28 February 2024.  He is here today for the third of four injections.   No urinary complaints. He is on finasteride.    He was given Tadalafil for erectile dysfunction 20 mg and that is not working.      Urinalysis:  Results for orders placed or performed in visit on 05/12/25   POCT URINE DIPSTICK WITH MICROSCOPE, AUTOMATED   Result Value Ref Range    Color, UA Yellow     Spec Grav UA 1.020     pH, UA 6.5     WBC, UA neg     Nitrite, UA neg     Protein, POC 30     Glucose,      Ketones, UA neg     Urobilinogen, UA 0.2     Bilirubin, POC neg     Blood, UA neg      Microscopic Urinalysis:  WBC:   None per HPF     RBC:    None per HPF     Bacteria:    None per HPF     Squamous epithelial cells:  None per HPF      Crystals:   None    Lab Results:  PSA History:    01/16/18 09:12 04/03/19 10:25 12/30/20 10:13 01/12/22 08:40 11/07/23 10:47 08/01/24 09:18 05/08/25 08:18   1.2 1.6 2.57 5.13 (H) 8.02 (H) <0.10 <0.10     Recent Labs     05/08/25  0818   CREATININE 0.98     ROS:  All systems reviewed and are negative except as documented in HPI and/or Assessment and Plan.     Patient Active Problem List:     Problem List[1]     Past Medical History:  Past Medical History:   Diagnosis Date    Coronary artery disease     Diabetes mellitus     Hyperlipidemia     Hypertension     Personal history of colonic polyps     Prostate cancer     Sleep apnea, unspecified         Past Surgical History:  Past Surgical History:   Procedure Laterality Date    A-V CARDIAC PACEMAKER INSERTION N/A 11/09/2023    Procedure: INSERTION, CARDIAC PACEMAKER, DUAL CHAMBER;   Surgeon: Baljit Sánchez MD;  Location: [facility] CATH LAB;  Service: Cardiology;  Laterality: N/A;  DUAL CHAMBER PPM (SJM)    COLON RESECTION      COLONOSCOPY      CORONARY ARTERY BYPASS GRAFT (CABG)      LAPAROSCOPIC CHOLECYSTECTOMY N/A 12/23/2024    Procedure: CHOLECYSTECTOMY, LAPAROSCOPIC;  Surgeon: Donn Cheema Jr., MD;  Location: [facility] OR;  Service: General;  Laterality: N/A;    TRANSRECTAL BIOPSY OF PROSTATE WITH ULTRASOUND GUIDANCE Bilateral 01/02/2024    Procedure: BIOPSY, PROSTATE, RECTAL APPROACH, WITH US GUIDANCE;  Surgeon: Miracle Pierre DO;  Location: [facility] ENDOSCOPY;  Service: Urology;  Laterality: Bilateral;        Family History:  Family History   Problem Relation Name Age of Onset    Other (Bile Duct) Mother      Gallbladder disease Mother      Diabetes Mellitus Mother      Hypertension Mother      Cataracts Father      Heart attack Father      Heart disease Father          Social History:  Social History     Socioeconomic History    Marital status:    Tobacco Use    Smoking status: Never     Passive exposure: Never    Smokeless tobacco: Never   Substance and Sexual Activity    Alcohol use: Not Currently    Drug use: Never    Sexual activity: Not Currently     Social Drivers of Health     Financial Resource Strain: Low Risk  (12/23/2024)    Overall Financial Resource Strain (CARDIA)     Difficulty of Paying Living Expenses: Not very hard   Food Insecurity: No Food Insecurity (12/23/2024)    Hunger Vital Sign     Worried About Running Out of Food in the Last Year: Never true     Ran Out of Food in the Last Year: Never true   Transportation Needs: No Transportation Needs (12/23/2024)    TRANSPORTATION NEEDS     Transportation : No   Physical Activity: Insufficiently Active (12/23/2024)    Exercise Vital Sign     Days of Exercise per Week: 3 days     Minutes of Exercise per Session: 20 min   Stress: No Stress Concern Present (12/23/2024)    [employer] - Occupational  Stress Questionnaire     Feeling of Stress : Not at all   Housing Stability: Low Risk  (2/18/2025)    Housing Stability Vital Sign     Unable to Pay for Housing in the Last Year: No     Number of Times Moved in the Last Year: 0     Homeless in the Last Year: No        Allergies:  Review of patient's allergies indicates:  No Known Allergies     Objective:  Vitals:    05/12/25 0855   BP: 138/66   Pulse: 64   Resp: 20   Temp: 97.5 °F (36.4 °C)     General:  Well developed, well nourished adult male in no acute distress  Abdomen: Soft, nontender, no masses  Extremities:  No clubbing, cyanosis, or edema  Neurologic:  Grossly intact  Musculoskeletal:  Normal tone    Assessment:  1. Adenocarcinoma of prostate  - POCT URINE DIPSTICK WITH MICROSCOPE, AUTOMATED  - leuprolide acetate (6 month) injection 45 mg  - PSA, Total (Diagnostic); Future    2. Erectile dysfunction following radiation therapy    Plan:  He was given Lupron today.  This is the 3rd injection of a total of four.  PSA has remained undetectable.  I explained to him the mechanism of the erectile dysfunction taking into account the Lupron injection.  He can continue to try Tadalafil to see if that will work.    Follow-up tests needed:   PSA in six months.      Return appointment:  Six months with above lab for Lupron injection.                   [1]   Patient Active Problem List  Diagnosis    Type 2 diabetes mellitus without complication, with long-term current use of insulin    Other male erectile dysfunction    Adenomatous polyp of ascending colon    Arteriosclerosis of coronary artery    Atrioventricular block    Benign prostatic hyperplasia    Chronic low back pain    Dyslipidemia    Hypertension    Polyp of colon    Normocytic normochromic anemia    Obstructive sleep apnea syndrome    Stress at home    Mobitz type 1 second degree AV block    Elevated PSA    AV block    Anxiety about health    Prostate cancer    Pain in both lower extremities    JAMIL (acute  kidney injury)    Choledocholithiasis    Callus

## 2025-05-13 ENCOUNTER — TELEPHONE (OUTPATIENT)
Dept: INTERNAL MEDICINE | Facility: CLINIC | Age: 75
End: 2025-05-13
Payer: MEDICARE

## 2025-05-13 NOTE — TELEPHONE ENCOUNTER
----- Message from Nurse Altamirano sent at 5/13/2025  8:38 AM CDT -----  Regarding: jamie Mcgovern 05/19 @11:00  Labs completed.

## 2025-05-19 ENCOUNTER — OFFICE VISIT (OUTPATIENT)
Dept: INTERNAL MEDICINE | Facility: CLINIC | Age: 75
End: 2025-05-19
Payer: MEDICARE

## 2025-05-19 VITALS
BODY MASS INDEX: 28.56 KG/M2 | WEIGHT: 204 LBS | RESPIRATION RATE: 18 BRPM | SYSTOLIC BLOOD PRESSURE: 118 MMHG | HEIGHT: 71 IN | HEART RATE: 66 BPM | DIASTOLIC BLOOD PRESSURE: 68 MMHG | OXYGEN SATURATION: 98 %

## 2025-05-19 DIAGNOSIS — E11.9 TYPE 2 DIABETES MELLITUS WITHOUT COMPLICATION, WITH LONG-TERM CURRENT USE OF INSULIN: Primary | ICD-10-CM

## 2025-05-19 DIAGNOSIS — Z79.4 TYPE 2 DIABETES MELLITUS WITHOUT COMPLICATION, WITH LONG-TERM CURRENT USE OF INSULIN: Primary | ICD-10-CM

## 2025-05-19 DIAGNOSIS — I25.10 ARTERIOSCLEROSIS OF CORONARY ARTERY: ICD-10-CM

## 2025-05-19 DIAGNOSIS — D50.9 IRON DEFICIENCY ANEMIA, UNSPECIFIED IRON DEFICIENCY ANEMIA TYPE: ICD-10-CM

## 2025-05-19 DIAGNOSIS — L30.9 DERMATITIS: ICD-10-CM

## 2025-05-19 DIAGNOSIS — E11.3313 TYPE 2 DIABETES MELLITUS WITH BOTH EYES AFFECTED BY MODERATE NONPROLIFERATIVE RETINOPATHY AND MACULAR EDEMA, WITHOUT LONG-TERM CURRENT USE OF INSULIN: ICD-10-CM

## 2025-05-19 DIAGNOSIS — N40.0 BENIGN PROSTATIC HYPERPLASIA, UNSPECIFIED WHETHER LOWER URINARY TRACT SYMPTOMS PRESENT: ICD-10-CM

## 2025-05-19 DIAGNOSIS — I10 PRIMARY HYPERTENSION: ICD-10-CM

## 2025-05-19 DIAGNOSIS — E78.5 DYSLIPIDEMIA: ICD-10-CM

## 2025-05-19 DIAGNOSIS — D51.9 ANEMIA DUE TO VITAMIN B12 DEFICIENCY, UNSPECIFIED B12 DEFICIENCY TYPE: ICD-10-CM

## 2025-05-19 PROCEDURE — 1126F AMNT PAIN NOTED NONE PRSNT: CPT | Mod: CPTII,,, | Performed by: INTERNAL MEDICINE

## 2025-05-19 PROCEDURE — 1159F MED LIST DOCD IN RCRD: CPT | Mod: CPTII,,, | Performed by: INTERNAL MEDICINE

## 2025-05-19 PROCEDURE — 3044F HG A1C LEVEL LT 7.0%: CPT | Mod: CPTII,,, | Performed by: INTERNAL MEDICINE

## 2025-05-19 PROCEDURE — 3078F DIAST BP <80 MM HG: CPT | Mod: CPTII,,, | Performed by: INTERNAL MEDICINE

## 2025-05-19 PROCEDURE — 99214 OFFICE O/P EST MOD 30 MIN: CPT | Mod: ,,, | Performed by: INTERNAL MEDICINE

## 2025-05-19 PROCEDURE — 3074F SYST BP LT 130 MM HG: CPT | Mod: CPTII,,, | Performed by: INTERNAL MEDICINE

## 2025-05-19 RX ORDER — FERROUS SULFATE 325(65) MG
325 TABLET ORAL
Qty: 90 TABLET | Refills: 2 | Status: SHIPPED | OUTPATIENT
Start: 2025-05-19

## 2025-05-19 NOTE — PROGRESS NOTES
Patient ID: [id number]      Subjective:     Chief Complaint: Follow-up      Rogelio Johnson is a 74 y.o. male.  That has a 74-year-old man who comes in for follow-up of numerous medical problems including generalized fatigue and weakness in his legs when he cuts the grass.  No chest pain or shortness for breath.  He was found to be mildly anemic and did have a B12 deficiency.  He was started on B12 shots about a month ago and tolerates them well.    He also has a rash on his arm that was nonpruritic and consists of several lesions that would wax and wane over time.  He now has developed a few lesions in his right leg.    Follow-up        Review of Systems    Outpatient Medications Marked as Taking for the 5/19/25 encounter (Office Visit) with Chele Velarde MD   Medication Sig Dispense Refill    amLODIPine (NORVASC) 2.5 MG tablet Take 1 tablet (2.5 mg total) by mouth once daily. 90 tablet 3    aspirin (ECOTRIN) 81 MG EC tablet Take 1 tablet (81 mg total) by mouth once daily.  0    carvediloL (COREG) 6.25 MG tablet Take 6.25 mg by mouth 2 (two) times daily.      docusate sodium (COLACE) 100 MG capsule Take 100 mg by mouth.      empagliflozin (JARDIANCE) 25 mg tablet Take 1 tablet (25 mg total) by mouth once daily. 30 tablet 3    ezetimibe (ZETIA) 10 mg tablet Take 1 tablet (10 mg total) by mouth once daily. 90 tablet 3    finasteride (PROSCAR) 5 mg tablet Take 1 tablet (5 mg total) by mouth once daily. 90 tablet 3    fluticasone propionate (FLONASE) 50 mcg/actuation nasal spray 2 sprays (100 mcg total) by Each Nostril route once daily. 15.8 mL 6    insulin glargine U-100, Lantus, (LANTUS SOLOSTAR U-100 INSULIN) 100 unit/mL (3 mL) InPn pen Inject 50 Units into the skin 2 (two) times a day. 3 mL PRN    LORazepam (ATIVAN) 0.5 MG tablet Take 1 tablet (0.5 mg total) by mouth 2 (two) times daily. 60 tablet 1    losartan (COZAAR) 25 MG tablet Take 1 tablet (25 mg total) by mouth 2 (two) times a day. 180 tablet PRN     "metFORMIN (GLUCOPHAGE) 1000 MG tablet Take 1 tablet (1,000 mg total) by mouth 2 (two) times daily with meals. 180 tablet 2    mupirocin (BACTROBAN) 2 % ointment Apply topically 2 (two) times daily. 30 g 1    nitroGLYCERIN (NITROSTAT) 0.4 MG SL tablet Place 0.4 mg under the tongue every 5 (five) minutes as needed for Chest pain.      tadalafiL (CIALIS) 10 MG tablet Take 1 tablet (10 mg total) by mouth daily as needed for Erectile Dysfunction. 10 tablet 11    [DISCONTINUED] ferrous sulfate (FEROSUL) 325 mg (65 mg iron) Tab tablet Take 1 tablet (325 mg total) by mouth once daily. 90 tablet 2       Objective:     /68 (BP Location: Right arm, Patient Position: Sitting)   Pulse 66   Resp 18   Ht 5' 11" (1.803 m)   Wt 92.5 kg (204 lb)   SpO2 98%   BMI 28.45 kg/m²     Physical Exam  Vitals reviewed.   Constitutional:       Appearance: Normal appearance.   HENT:      Head: Normocephalic and atraumatic.      Mouth/Throat:      Pharynx: Oropharynx is clear.   Eyes:      Pupils: Pupils are equal, round, and reactive to light.   Cardiovascular:      Rate and Rhythm: Normal rate and regular rhythm.      Pulses: Normal pulses.      Heart sounds: Normal heart sounds.   Pulmonary:      Breath sounds: Normal breath sounds.   Abdominal:      General: Abdomen is flat.      Palpations: Abdomen is soft.   Musculoskeletal:      Cervical back: Neck supple.   Skin:     General: Skin is warm and dry.      Comments: Several lesions on his right arm in a few more in his right lower leg.  Some slightly raised and some hyperpigmented.  All relatively small and mostly macular.  A few was tiny ulcerated centers.  No surrounding redness.   Neurological:      Mental Status: He is alert.     Lab work reviewed    Assessment:     1. Generalized fatigue.  Perhaps better     2. Anemia.  B12 deficiency.  Improved with B12 injections already     3. Hypertension.  Well controlled     4. Hyperlipidemia.  Stable     5. Diabetes mellitus.  " Excellent control     6. Low albumin.  Improving with improved nutrition    7. History of iron-deficiency anemia.  Okay to reduce iron to 3 days a week     8. Dermatitis of unknown origin/significance.    9. History of coronary disease    10. Remote history prostate cancer.  Very low PSA persists    Plan:   Refer to Dermatology to assess the rash.  Continue current meds and follow-up with me 3 months with CBC CMP lipid TSH hemoglobin A1c prior  Problem List Items Addressed This Visit          Cardiac/Vascular    Arteriosclerosis of coronary artery    Dyslipidemia    Relevant Orders    CBC Auto Differential    Comprehensive Metabolic Panel    Lipid Panel    TSH    Hemoglobin A1C    Hypertension    Relevant Orders    CBC Auto Differential    Comprehensive Metabolic Panel    Lipid Panel    TSH    Hemoglobin A1C       Renal/    Benign prostatic hyperplasia    Relevant Orders    CBC Auto Differential    Comprehensive Metabolic Panel    Lipid Panel    TSH    Hemoglobin A1C       Oncology    Anemia due to vitamin B12 deficiency    Relevant Orders    CBC Auto Differential    Comprehensive Metabolic Panel    Lipid Panel    TSH    Hemoglobin A1C       Endocrine    Type 2 diabetes mellitus without complication, with long-term current use of insulin - Primary    Relevant Orders    CBC Auto Differential    Comprehensive Metabolic Panel    Lipid Panel    TSH    Hemoglobin A1C    Type 2 diabetes mellitus with both eyes affected by moderate nonproliferative retinopathy and macular edema, without long-term current use of insulin     Other Visit Diagnoses         Iron deficiency anemia, unspecified iron deficiency anemia type        Relevant Medications    ferrous sulfate (FEROSUL) 325 mg (65 mg iron) Tab tablet      Dermatitis        Relevant Orders    Ambulatory referral/consult to Dermatology             Orders Placed This Encounter   Procedures    CBC Auto Differential     Standing Status:   Future     Expected Date:    8/19/2025     Expiration Date:   5/19/2026    Comprehensive Metabolic Panel     Standing Status:   Future     Expected Date:   8/19/2025     Expiration Date:   5/19/2026    Lipid Panel     Standing Status:   Future     Expected Date:   8/19/2025     Expiration Date:   5/19/2026    TSH     Standing Status:   Future     Expected Date:   8/19/2025     Expiration Date:   5/19/2026    Hemoglobin A1C     Standing Status:   Future     Expected Date:   8/19/2025     Expiration Date:   5/19/2026    Ambulatory referral/consult to Dermatology     Standing Status:   Future     Expected Date:   5/26/2025     Expiration Date:   6/19/2026     Referral Priority:   Routine     Referral Type:   Consultation     Referral Reason:   Specialty Services Required     Referred to Provider:   Anai Tineo MD     Requested Specialty:   Dermatology     Number of Visits Requested:   1        Medication List with Changes/Refills   Current Medications    AMLODIPINE (NORVASC) 2.5 MG TABLET    Take 1 tablet (2.5 mg total) by mouth once daily.       Start Date: 2/18/2025 End Date: 2/18/2026    ASPIRIN (ECOTRIN) 81 MG EC TABLET    Take 1 tablet (81 mg total) by mouth once daily.       Start Date: 10/15/2023End Date: --    ATORVASTATIN (LIPITOR) 80 MG TABLET    Take 1 tablet (80 mg total) by mouth once daily.       Start Date: 9/23/2024 End Date: --    CARVEDILOL (COREG) 6.25 MG TABLET    Take 6.25 mg by mouth 2 (two) times daily.       Start Date: 12/29/2023End Date: --    DOCUSATE SODIUM (COLACE) 100 MG CAPSULE    Take 100 mg by mouth.       Start Date: --        End Date: --    EMPAGLIFLOZIN (JARDIANCE) 25 MG TABLET    Take 1 tablet (25 mg total) by mouth once daily.       Start Date: 1/29/2025 End Date: --    EZETIMIBE (ZETIA) 10 MG TABLET    Take 1 tablet (10 mg total) by mouth once daily.       Start Date: 2/18/2025 End Date: 2/18/2026    FINASTERIDE (PROSCAR) 5 MG TABLET    Take 1 tablet (5 mg total) by mouth once daily.       Start  Date: 5/9/2025  End Date: --    FLUTICASONE PROPIONATE (FLONASE) 50 MCG/ACTUATION NASAL SPRAY    2 sprays (100 mcg total) by Each Nostril route once daily.       Start Date: 9/13/2023 End Date: --    INSULIN GLARGINE U-100, LANTUS, (LANTUS SOLOSTAR U-100 INSULIN) 100 UNIT/ML (3 ML) INPN PEN    Inject 50 Units into the skin 2 (two) times a day.       Start Date: 4/24/2025 End Date: 5/30/2025    LORAZEPAM (ATIVAN) 0.5 MG TABLET    Take 1 tablet (0.5 mg total) by mouth 2 (two) times daily.       Start Date: 5/9/2025  End Date: --    LOSARTAN (COZAAR) 25 MG TABLET    Take 1 tablet (25 mg total) by mouth 2 (two) times a day.       Start Date: 5/9/2025  End Date: --    METFORMIN (GLUCOPHAGE) 1000 MG TABLET    Take 1 tablet (1,000 mg total) by mouth 2 (two) times daily with meals.       Start Date: 2/18/2025 End Date: --    MUPIROCIN (BACTROBAN) 2 % OINTMENT    Apply topically 2 (two) times daily.       Start Date: 11/6/2024 End Date: --    NITROGLYCERIN (NITROSTAT) 0.4 MG SL TABLET    Place 0.4 mg under the tongue every 5 (five) minutes as needed for Chest pain.       Start Date: 5/7/2024  End Date: --    TADALAFIL (CIALIS) 10 MG TABLET    Take 1 tablet (10 mg total) by mouth daily as needed for Erectile Dysfunction.       Start Date: 10/17/2024End Date: 10/17/2025   Changed and/or Refilled Medications    Modified Medication Previous Medication    FERROUS SULFATE (FEROSUL) 325 MG (65 MG IRON) TAB TABLET ferrous sulfate (FEROSUL) 325 mg (65 mg iron) Tab tablet       Take 1 tablet (325 mg total) by mouth every Mon, Wed, Fri.    Take 1 tablet (325 mg total) by mouth once daily.       Start Date: 5/19/2025 End Date: --    Start Date: 6/18/2024 End Date: 5/19/2025            Follow up in about 3 months (around 8/19/2025). In addition to their scheduled follow up, the patient has also been instructed to follow up on as needed basis.       Chele Velarde

## 2025-05-20 ENCOUNTER — TELEPHONE (OUTPATIENT)
Dept: INTERNAL MEDICINE | Facility: CLINIC | Age: 75
End: 2025-05-20
Payer: MEDICARE

## 2025-05-20 NOTE — TELEPHONE ENCOUNTER
Additional Information: Caller stated that referral is being denied due to not accepting pt insurance.     **Referral was sent to Carly Gamboa to change referral to Dr. Vince Mcintosh?

## 2025-05-20 NOTE — TELEPHONE ENCOUNTER
Copied from CRM #[id number]. Topic: General Inquiry - Patient Advice  >> May 19, 2025  1:38 PM Susan wrote:  Who [facility]: [facility]    Caller is requesting assistance/information from provider's office.    Symptoms (please be specific): n/a   How long has patient had these symptoms:  n/a  List of preferred pharmacies on file (remove unneeded):n/a    Preferred Method of Contact: Phone Call  Patient's Preferred Phone Number on File: [phone]   Best Call Back Number, if different:  Additional Information: Caller stated that referral is being denied due to not accepting pt insurance.

## 2025-05-22 ENCOUNTER — TELEPHONE (OUTPATIENT)
Dept: INTERNAL MEDICINE | Facility: CLINIC | Age: 75
End: 2025-05-22
Payer: MEDICARE

## 2025-05-22 DIAGNOSIS — R21 RASH: Primary | ICD-10-CM

## 2025-05-22 DIAGNOSIS — L98.9 SKIN LESIONS: ICD-10-CM

## 2025-05-22 NOTE — TELEPHONE ENCOUNTER
Copied from CRM #[id number]. Topic: General Inquiry - Patient Advice  >> May 22, 2025  9:49 AM Susan wrote:  Who Called: [facility] Dr. Vince Caceres-    Caller is requesting assistance/information from provider's office.    Symptoms (please be specific): n/a   How long has patient had these symptoms:  n/a  List of preferred pharmacies on file (remove unneeded): n/a      Preferred Method of Contact: Phone Call  Patient's Preferred Phone Number on File: [phone]   Best Call Back Number, if different:[phone]  Additional Information: Caller stated that they are not taking new peoples health insurance pts.  Please advise.

## 2025-06-10 ENCOUNTER — OFFICE VISIT (OUTPATIENT)
Dept: SURGERY | Facility: CLINIC | Age: 75
End: 2025-06-10
Payer: MEDICARE

## 2025-06-10 VITALS
HEART RATE: 75 BPM | DIASTOLIC BLOOD PRESSURE: 89 MMHG | WEIGHT: 209 LBS | RESPIRATION RATE: 18 BRPM | SYSTOLIC BLOOD PRESSURE: 131 MMHG | OXYGEN SATURATION: 99 % | BODY MASS INDEX: 29.26 KG/M2 | HEIGHT: 71 IN

## 2025-06-10 DIAGNOSIS — K80.20 CALCULUS OF GALLBLADDER WITHOUT CHOLECYSTITIS WITHOUT OBSTRUCTION: Primary | ICD-10-CM

## 2025-06-10 PROCEDURE — 99214 OFFICE O/P EST MOD 30 MIN: CPT | Mod: PBBFAC

## 2025-06-10 NOTE — PROGRESS NOTES
[facility] Note    HPI:   Rogelio Johnson is a 75 yo male with PMHx of prostate cancer (s/p Lupron, radiation), DM on insulin, and HTN presenting today for postop visit after lap cholecystectomy 12/23/24 for symptomatic cholelithiasis. Tolerating diet, ambulating, having BMs. Denies RUQ pain, nausea or vomiting.     PMH:   Past Medical History:   Diagnosis Date    Coronary artery disease     Diabetes mellitus     Hyperlipidemia     Hypertension     Personal history of colonic polyps     Prostate cancer     Sleep apnea, unspecified       Meds:   Current Outpatient Medications:     amLODIPine (NORVASC) 2.5 MG tablet, Take 1 tablet (2.5 mg total) by mouth once daily., Disp: 90 tablet, Rfl: 3    aspirin (ECOTRIN) 81 MG EC tablet, Take 1 tablet (81 mg total) by mouth once daily., Disp: , Rfl: 0    atorvastatin (LIPITOR) 80 MG tablet, Take 1 tablet (80 mg total) by mouth once daily. (Patient not taking: Reported on 4/7/2025), Disp: 90 tablet, Rfl: 2    carvediloL (COREG) 6.25 MG tablet, Take 6.25 mg by mouth 2 (two) times daily., Disp: , Rfl:     docusate sodium (COLACE) 100 MG capsule, Take 100 mg by mouth., Disp: , Rfl:     empagliflozin (JARDIANCE) 25 mg tablet, Take 1 tablet (25 mg total) by mouth once daily., Disp: 30 tablet, Rfl: 3    ezetimibe (ZETIA) 10 mg tablet, Take 1 tablet (10 mg total) by mouth once daily., Disp: 90 tablet, Rfl: 3    ferrous sulfate (FEROSUL) 325 mg (65 mg iron) Tab tablet, Take 1 tablet (325 mg total) by mouth every Mon, Wed, Fri., Disp: 90 tablet, Rfl: 2    finasteride (PROSCAR) 5 mg tablet, Take 1 tablet (5 mg total) by mouth once daily., Disp: 90 tablet, Rfl: 3    fluticasone propionate (FLONASE) 50 mcg/actuation nasal spray, 2 sprays (100 mcg total) by Each Nostril route once daily., Disp: 15.8 mL, Rfl: 6    insulin glargine U-100, Lantus, (LANTUS SOLOSTAR U-100 INSULIN) 100 unit/mL (3 mL) InPn pen, Inject 50 Units into the skin 2 (two) times a day., Disp: 3 mL, Rfl:  PRN    LORazepam (ATIVAN) 0.5 MG tablet, Take 1 tablet (0.5 mg total) by mouth 2 (two) times daily., Disp: 60 tablet, Rfl: 1    losartan (COZAAR) 25 MG tablet, Take 1 tablet (25 mg total) by mouth 2 (two) times a day., Disp: 180 tablet, Rfl: PRN    metFORMIN (GLUCOPHAGE) 1000 MG tablet, Take 1 tablet (1,000 mg total) by mouth 2 (two) times daily with meals., Disp: 180 tablet, Rfl: 2    mupirocin (BACTROBAN) 2 % ointment, Apply topically 2 (two) times daily., Disp: 30 g, Rfl: 1    nitroGLYCERIN (NITROSTAT) 0.4 MG SL tablet, Place 0.4 mg under the tongue every 5 (five) minutes as needed for Chest pain., Disp: , Rfl:     tadalafiL (CIALIS) 10 MG tablet, Take 1 tablet (10 mg total) by mouth daily as needed for Erectile Dysfunction., Disp: 10 tablet, Rfl: 11  No current facility-administered medications for this visit.    Facility-Administered Medications Ordered in Other Visits:     0.9%  NaCl infusion, , Intravenous, Once, Baljit Sánchez MD  Allergies: Review of patient's allergies indicates:  No Known Allergies  Social History:   Social History     Tobacco Use    Smoking status: Never     Passive exposure: Never    Smokeless tobacco: Never   Substance Use Topics    Alcohol use: Not Currently    Drug use: Never     Family History:   Family History   Problem Relation Name Age of Onset    Other (Bile Duct) Mother      Gallbladder disease Mother      Diabetes Mellitus Mother      Hypertension Mother      Cataracts Father      Heart attack Father      Heart disease Father       Surgical History:   Past Surgical History:   Procedure Laterality Date    A-V CARDIAC PACEMAKER INSERTION N/A 11/09/2023    Procedure: INSERTION, CARDIAC PACEMAKER, DUAL CHAMBER;  Surgeon: Baljit Sánchez MD;  Location: [facility] CATH LAB;  Service: Cardiology;  Laterality: N/A;  DUAL CHAMBER PPM (SJM)    COLON RESECTION      COLONOSCOPY      CORONARY ARTERY BYPASS GRAFT (CABG)      LAPAROSCOPIC CHOLECYSTECTOMY N/A 12/23/2024    Procedure: CHOLECYSTECTOMY,  LAPAROSCOPIC;  Surgeon: Donn Cheema Jr., MD;  Location: [facility] OR;  Service: General;  Laterality: N/A;    TRANSRECTAL BIOPSY OF PROSTATE WITH ULTRASOUND GUIDANCE Bilateral 01/02/2024    Procedure: BIOPSY, PROSTATE, RECTAL APPROACH, WITH US GUIDANCE;  Surgeon: Miracle Pierre DO;  Location: [facility] ENDOSCOPY;  Service: Urology;  Laterality: Bilateral;     Objective:    Vitals:  There were no vitals filed for this visit.       Physical Exam:  Gen: NAD  Neuro: awake, alert, answering questions appropriately  CV: RR  Resp: non-labored breathing on RA  Abd: soft, ND, NT.   Ext: moves all 4 spontaneously and purposefully  Skin: warm, well perfused    Assessment/Plan:  Rogelio Johnson is a 75 yo male with PMHx of prostate cancer (s/p Lupron, radiation), DM on insulin, and HTN presenting today for postop visit after lap cholecystectomy 12/23/24 for symptomatic cholelithiasis. Patient was unsure if he needed to follow up again after his last visit so he presented today for re-evaluation. Continues to do well.    - RTC PRN    Dean Rooney MD  [employer] General Surgery, PGY3

## 2025-06-12 DIAGNOSIS — D50.9 IRON DEFICIENCY ANEMIA, UNSPECIFIED IRON DEFICIENCY ANEMIA TYPE: ICD-10-CM

## 2025-06-12 DIAGNOSIS — I10 PRIMARY HYPERTENSION: ICD-10-CM

## 2025-06-12 DIAGNOSIS — F43.9 STRESS AT HOME: ICD-10-CM

## 2025-06-12 DIAGNOSIS — R45.89 ANXIETY ABOUT HEALTH: ICD-10-CM

## 2025-06-12 NOTE — PROGRESS NOTES
I have reviewed the notes, assessments, and/or procedures performed by Dr Rooney, I concur with her/his documentation of Rogelio Johnson.  Date of Service: 6/10/2025    [facility]

## 2025-06-13 RX ORDER — LORAZEPAM 0.5 MG/1
0.5 TABLET ORAL 2 TIMES DAILY
Qty: 60 TABLET | Refills: 3 | Status: SHIPPED | OUTPATIENT
Start: 2025-06-13

## 2025-06-13 RX ORDER — AMLODIPINE BESYLATE 2.5 MG/1
2.5 TABLET ORAL DAILY
Qty: 90 TABLET | Refills: 3 | Status: SHIPPED | OUTPATIENT
Start: 2025-06-13 | End: 2026-06-13

## 2025-06-13 RX ORDER — FERROUS SULFATE 325(65) MG
325 TABLET ORAL
Qty: 12 TABLET | Refills: 6 | Status: SHIPPED | OUTPATIENT
Start: 2025-06-13

## 2025-06-23 DIAGNOSIS — I10 PRIMARY HYPERTENSION: Primary | ICD-10-CM

## 2025-06-23 RX ORDER — CARVEDILOL 6.25 MG/1
6.25 TABLET ORAL 2 TIMES DAILY
Qty: 180 TABLET | Refills: 3 | Status: SHIPPED | OUTPATIENT
Start: 2025-06-23

## 2025-07-07 DIAGNOSIS — D50.9 IRON DEFICIENCY ANEMIA, UNSPECIFIED IRON DEFICIENCY ANEMIA TYPE: ICD-10-CM

## 2025-07-07 DIAGNOSIS — R45.89 ANXIETY ABOUT HEALTH: ICD-10-CM

## 2025-07-07 DIAGNOSIS — E11.9 TYPE 2 DIABETES MELLITUS WITHOUT COMPLICATION, WITH LONG-TERM CURRENT USE OF INSULIN: ICD-10-CM

## 2025-07-07 DIAGNOSIS — Z79.4 TYPE 2 DIABETES MELLITUS WITHOUT COMPLICATION, WITH LONG-TERM CURRENT USE OF INSULIN: ICD-10-CM

## 2025-07-07 DIAGNOSIS — F43.9 STRESS AT HOME: ICD-10-CM

## 2025-07-07 RX ORDER — LORAZEPAM 0.5 MG/1
0.5 TABLET ORAL 2 TIMES DAILY
Qty: 60 TABLET | Refills: 3 | Status: SHIPPED | OUTPATIENT
Start: 2025-07-07

## 2025-07-07 RX ORDER — FERROUS SULFATE 325(65) MG
325 TABLET ORAL
Qty: 12 TABLET | Refills: 6 | Status: SHIPPED | OUTPATIENT
Start: 2025-07-07

## 2025-07-07 RX ORDER — INSULIN GLARGINE 100 [IU]/ML
50 INJECTION, SOLUTION SUBCUTANEOUS 2 TIMES DAILY
Qty: 3 ML | Status: SHIPPED | OUTPATIENT
Start: 2025-07-07 | End: 2025-08-12

## 2025-07-08 ENCOUNTER — HOSPITAL ENCOUNTER (EMERGENCY)
Facility: HOSPITAL | Age: 75
Discharge: HOME | End: 2025-07-08
Attending: FAMILY MEDICINE
Payer: MEDICARE

## 2025-07-08 ENCOUNTER — PATIENT MESSAGE (OUTPATIENT)
Dept: UROLOGY | Facility: CLINIC | Age: 75
End: 2025-07-08
Payer: MEDICARE

## 2025-07-08 VITALS
RESPIRATION RATE: 18 BRPM | DIASTOLIC BLOOD PRESSURE: 48 MMHG | HEIGHT: 72 IN | BODY MASS INDEX: 28.17 KG/M2 | OXYGEN SATURATION: 97 % | TEMPERATURE: 98 F | SYSTOLIC BLOOD PRESSURE: 117 MMHG | HEART RATE: 71 BPM | WEIGHT: 208 LBS

## 2025-07-08 DIAGNOSIS — R73.9 HYPERGLYCEMIA: ICD-10-CM

## 2025-07-08 DIAGNOSIS — R31.0 GROSS HEMATURIA: Primary | ICD-10-CM

## 2025-07-08 DIAGNOSIS — R31.9 HEMATURIA, UNSPECIFIED TYPE: Primary | ICD-10-CM

## 2025-07-08 LAB
ANION GAP SERPL CALC-SCNC: 13 MEQ/L
BACTERIA #/AREA URNS AUTO: NORMAL /HPF
BASOPHILS # BLD AUTO: 0.02 X10(3)/MCL
BASOPHILS NFR BLD AUTO: 0.4 %
BILIRUB UR QL STRIP.AUTO: NEGATIVE
BUN SERPL-MCNC: 21.2 MG/DL (ref 8.4–25.7)
CALCIUM SERPL-MCNC: 8.8 MG/DL (ref 8.8–10)
CHLORIDE SERPL-SCNC: 106 MMOL/L (ref 98–107)
CLARITY UR: CLEAR
CO2 SERPL-SCNC: 16 MMOL/L (ref 23–31)
COLOR UR AUTO: ABNORMAL
CREAT SERPL-MCNC: 1.54 MG/DL (ref 0.72–1.25)
CREAT/UREA NIT SERPL: 14
EOSINOPHIL # BLD AUTO: 0 X10(3)/MCL (ref 0–0.9)
EOSINOPHIL NFR BLD AUTO: 0 %
ERYTHROCYTE [DISTWIDTH] IN BLOOD BY AUTOMATED COUNT: 13.1 % (ref 11.5–17)
GFR SERPLBLD CREATININE-BSD FMLA CKD-EPI: 46 ML/MIN/1.73/M2
GLUCOSE SERPL-MCNC: 412 MG/DL (ref 82–115)
GLUCOSE UR QL STRIP: >=1000
HCT VFR BLD AUTO: 31.8 % (ref 42–52)
HGB BLD-MCNC: 10.9 G/DL (ref 14–18)
HGB UR QL STRIP: ABNORMAL
IMM GRANULOCYTES # BLD AUTO: 0.02 X10(3)/MCL (ref 0–0.04)
IMM GRANULOCYTES NFR BLD AUTO: 0.4 %
KETONES UR QL STRIP: NEGATIVE
LEUKOCYTE ESTERASE UR QL STRIP: NEGATIVE
LYMPHOCYTES # BLD AUTO: 1.57 X10(3)/MCL (ref 0.6–4.6)
LYMPHOCYTES NFR BLD AUTO: 33.9 %
MCH RBC QN AUTO: 32.3 PG (ref 27–31)
MCHC RBC AUTO-ENTMCNC: 34.3 G/DL (ref 33–36)
MCV RBC AUTO: 94.4 FL (ref 80–94)
MONOCYTES # BLD AUTO: 0.27 X10(3)/MCL (ref 0.1–1.3)
MONOCYTES NFR BLD AUTO: 5.8 %
NEUTROPHILS # BLD AUTO: 2.75 X10(3)/MCL (ref 2.1–9.2)
NEUTROPHILS NFR BLD AUTO: 59.5 %
NITRITE UR QL STRIP: NEGATIVE
NRBC BLD AUTO-RTO: 0 %
PH UR STRIP: 6 [PH]
PLATELET # BLD AUTO: 228 X10(3)/MCL (ref 130–400)
PMV BLD AUTO: 9.9 FL (ref 7.4–10.4)
POCT GLUCOSE: 262 MG/DL (ref 70–110)
POTASSIUM SERPL-SCNC: 4.2 MMOL/L (ref 3.5–5.1)
PROT UR QL STRIP: NEGATIVE
RBC # BLD AUTO: 3.37 X10(6)/MCL (ref 4.7–6.1)
RBC #/AREA URNS AUTO: NORMAL /HPF
SODIUM SERPL-SCNC: 135 MMOL/L (ref 136–145)
SP GR UR STRIP.AUTO: <=1.005 (ref 1–1.03)
SQUAMOUS #/AREA URNS AUTO: NORMAL /HPF
UROBILINOGEN UR STRIP-ACNC: 0.2
WBC # BLD AUTO: 4.63 X10(3)/MCL (ref 4.5–11.5)
WBC #/AREA URNS AUTO: NORMAL /HPF

## 2025-07-08 PROCEDURE — 99284 EMERGENCY DEPT VISIT MOD MDM: CPT | Mod: 25

## 2025-07-08 PROCEDURE — 96374 THER/PROPH/DIAG INJ IV PUSH: CPT

## 2025-07-08 PROCEDURE — 25000003 PHARM REV CODE 250: Performed by: FAMILY MEDICINE

## 2025-07-08 PROCEDURE — 81003 URINALYSIS AUTO W/O SCOPE: CPT | Performed by: FAMILY MEDICINE

## 2025-07-08 PROCEDURE — 82962 GLUCOSE BLOOD TEST: CPT

## 2025-07-08 PROCEDURE — 63600175 PHARM REV CODE 636 W HCPCS: Performed by: FAMILY MEDICINE

## 2025-07-08 PROCEDURE — 80048 BASIC METABOLIC PNL TOTAL CA: CPT | Performed by: FAMILY MEDICINE

## 2025-07-08 PROCEDURE — 96361 HYDRATE IV INFUSION ADD-ON: CPT

## 2025-07-08 PROCEDURE — 85025 COMPLETE CBC W/AUTO DIFF WBC: CPT | Performed by: FAMILY MEDICINE

## 2025-07-08 RX ADMIN — SODIUM CHLORIDE 1000 ML: 9 INJECTION, SOLUTION INTRAVENOUS at 07:07

## 2025-07-08 RX ADMIN — HUMAN INSULIN 10 UNITS: 100 INJECTION, SOLUTION SUBCUTANEOUS at 07:07

## 2025-07-08 NOTE — ED PROVIDER NOTES
Encounter Date: 7/8/2025       History     Chief Complaint   Patient presents with    Hematuria     Noticed blood in urine this morning; denies other symptoms; hx of prostate CA     Patient presented to the emergency room with a trace of blood in his urine this morning which has since cleared.  He will not be able to get in touch with his urologist until tomorrow, so he presented to the emergency room tonight.  There was no fever no chills.  And he is feeling fine.  There is no abdominal pain and other than noting blood in his urine this morning, there was no dysuria or frequency of urination.  He has been treated for prostate cancer by his urologist.    The history is provided by the patient.     Review of patient's allergies indicates:  No Known Allergies  Past Medical History:   Diagnosis Date    Coronary artery disease     Diabetes mellitus     Hyperlipidemia     Hypertension     Personal history of colonic polyps     Prostate cancer     Sleep apnea, unspecified      Past Surgical History:   Procedure Laterality Date    A-V CARDIAC PACEMAKER INSERTION N/A 11/09/2023    Procedure: INSERTION, CARDIAC PACEMAKER, DUAL CHAMBER;  Surgeon: Baljit Sánchez MD;  Location: [facility] CATH LAB;  Service: Cardiology;  Laterality: N/A;  DUAL CHAMBER PPM (SJM)    COLON RESECTION      COLONOSCOPY      CORONARY ARTERY BYPASS GRAFT (CABG)      LAPAROSCOPIC CHOLECYSTECTOMY N/A 12/23/2024    Procedure: CHOLECYSTECTOMY, LAPAROSCOPIC;  Surgeon: Donn Cheema Jr., MD;  Location: [facility] OR;  Service: General;  Laterality: N/A;    TRANSRECTAL BIOPSY OF PROSTATE WITH ULTRASOUND GUIDANCE Bilateral 01/02/2024    Procedure: BIOPSY, PROSTATE, RECTAL APPROACH, WITH US GUIDANCE;  Surgeon: Miracle Pierre DO;  Location: [facility] ENDOSCOPY;  Service: Urology;  Laterality: Bilateral;     Family History   Problem Relation Name Age of Onset    Other (Bile Duct) Mother      Gallbladder disease Mother      Diabetes Mellitus Mother      Hypertension Mother       Cataracts Father      Heart attack Father      Heart disease Father       Social History[1]  Review of Systems   Constitutional:  Negative for fever.   HENT:  Negative for sore throat.    Respiratory:  Negative for shortness of breath.    Cardiovascular:  Negative for chest pain.   Gastrointestinal:  Negative for nausea.   Genitourinary:  Positive for hematuria. Negative for dysuria.   Musculoskeletal:  Negative for back pain.   Skin:  Negative for rash.   Neurological:  Negative for weakness.   Hematological:  Does not bruise/bleed easily.   All other systems reviewed and are negative.      Physical Exam     Initial Vitals [07/08/25 1802]   BP Pulse Resp Temp SpO2   (!) 117/48 71 18 97.9 °F (36.6 °C) 97 %      MAP       --         Physical Exam    Nursing note and vitals reviewed.  Constitutional: Vital signs are normal. He appears well-developed and well-nourished. He is cooperative.  Non-toxic appearance. He does not appear ill.   Eyes: Lids are normal.   Neck: Trachea normal.   Cardiovascular:  Normal rate and regular rhythm.  No extrasystoles are present.          Pulmonary/Chest: Breath sounds normal. No respiratory distress. He has no wheezes.   Abdominal: Abdomen is soft. Bowel sounds are normal. He exhibits no distension. There is no abdominal tenderness. There is no rebound and no guarding.   Musculoskeletal:         General: Normal range of motion.     Neurological: He is alert and oriented to person, place, and time. He displays a negative Romberg sign.   Skin: Skin is warm, dry and intact. Capillary refill takes less than 2 seconds.   Psychiatric: He has a normal mood and affect. His speech is normal. He is not actively hallucinating. He is attentive.         ED Course   Procedures  Labs Reviewed   URINALYSIS, REFLEX TO URINE CULTURE - Abnormal       Result Value    Color, UA Straw      Appearance, UA Clear      Specific Gravity, UA <=1.005      pH, UA 6.0      Protein, UA Negative      Glucose, UA  >=1000 (*)     Ketones, UA Negative      Blood, UA Small (*)     Bilirubin, UA Negative      Urobilinogen, UA 0.2      Nitrites, UA Negative      Leukocyte Esterase, UA Negative     BASIC METABOLIC PANEL - Abnormal    Sodium 135 (*)     Potassium 4.2      Chloride 106      CO2 16 (*)     Glucose 412 (*)     Blood Urea Nitrogen 21.2      Creatinine 1.54 (*)     BUN/Creatinine Ratio 14      Calcium 8.8      Anion Gap 13.0      eGFR 46     CBC WITH DIFFERENTIAL - Abnormal    WBC 4.63      RBC 3.37 (*)     Hgb 10.9 (*)     Hct 31.8 (*)     MCV 94.4 (*)     MCH 32.3 (*)     MCHC 34.3      RDW 13.1      Platelet 228      MPV 9.9      Neut % 59.5      Lymph % 33.9      Mono % 5.8      Eos % 0.0      Basophil % 0.4      Imm Grans % 0.4      Neut # 2.75      Lymph # 1.57      Mono # 0.27      Eos # 0.00      Baso # 0.02      Imm Gran # 0.02      NRBC% 0.0     POCT GLUCOSE - Abnormal    POCT Glucose 262 (*)    URINALYSIS, MICROSCOPIC - Normal    Bacteria, UA None Seen      RBC, UA 3-5      WBC, UA None Seen      Squamous Epithelial Cells, UA None Seen     CBC W/ AUTO DIFFERENTIAL    Narrative:     The following orders were created for panel order CBC Auto Differential.  Procedure                               Abnormality         Status                     ---------                               -----------         ------                     CBC with Differential[[id number]]       Abnormal            Final result                 Please view results for these tests on the individual orders.   POCT GLUCOSE MONITORING CONTINUOUS          Imaging Results    None          Medications   sodium chloride 0.9% bolus 1,000 mL 1,000 mL (1,000 mLs Intravenous New Bag 7/8/25 1920)   insulin regular injection 10 Units 0.1 mL (10 Units Intravenous Given 7/8/25 1918)     Medical Decision Making  Differential diagnosis includes urinary tract infection, prostate irritation, hemorrhagic cystitis, bladder cancer    Amount and/or Complexity of  Data Reviewed  Labs: ordered.    Risk  OTC drugs.               ED Course as of 07/08/25 2037 Tue Jul 08, 2025 1913 Glucose(!): 412 [WC]   1913 CO2(!): 16 [WC]   1913 Creatinine(!): 1.54 [WC]   1913 Bacteria, UA: None Seen [WC]   1913 RBC, UA: 3-5 [WC]   1913 WBC, UA: None Seen [WC]   2028 POCT Glucose(!): 262 [WC]      ED Course User Index  [WC] Sergio Gleason MD                           Clinical Impression:  Final diagnoses:  [R31.9] Hematuria, unspecified type (Primary)  [R73.9] Hyperglycemia          ED Disposition Condition    Discharge Stable          ED Prescriptions    None       Follow-up Information       Follow up With Specialties Details Why Contact Info    Chele Velarde MD Internal Medicine   [address] [place] [id number]  [phone]                     [1]   Social History  Tobacco Use    Smoking status: Never     Passive exposure: Never    Smokeless tobacco: Never   Substance Use Topics    Alcohol use: Not Currently    Drug use: Never        Sergio Gleason MD  07/08/25 2037

## 2025-07-09 DIAGNOSIS — E11.9 TYPE 2 DIABETES MELLITUS WITHOUT COMPLICATION, WITH LONG-TERM CURRENT USE OF INSULIN: Primary | ICD-10-CM

## 2025-07-09 DIAGNOSIS — Z79.4 TYPE 2 DIABETES MELLITUS WITHOUT COMPLICATION, WITH LONG-TERM CURRENT USE OF INSULIN: Primary | ICD-10-CM

## 2025-07-09 RX ORDER — BLOOD-GLUCOSE SENSOR
3 EACH MISCELLANEOUS CONTINUOUS
Qty: 3 EACH | Status: SHIPPED | OUTPATIENT
Start: 2025-07-09 | End: 2026-07-09

## 2025-07-09 RX ORDER — FLASH GLUCOSE SCANNING READER
1 EACH MISCELLANEOUS CONTINUOUS
Qty: 1 EACH | Status: SHIPPED | OUTPATIENT
Start: 2025-07-09

## 2025-07-11 ENCOUNTER — OFFICE VISIT (OUTPATIENT)
Dept: UROLOGY | Facility: CLINIC | Age: 75
End: 2025-07-11
Payer: MEDICARE

## 2025-07-11 VITALS
BODY MASS INDEX: 28.85 KG/M2 | DIASTOLIC BLOOD PRESSURE: 68 MMHG | OXYGEN SATURATION: 99 % | WEIGHT: 213 LBS | HEIGHT: 72 IN | HEART RATE: 60 BPM | TEMPERATURE: 98 F | RESPIRATION RATE: 20 BRPM | SYSTOLIC BLOOD PRESSURE: 117 MMHG

## 2025-07-11 DIAGNOSIS — R31.0 GROSS HEMATURIA: Primary | ICD-10-CM

## 2025-07-11 DIAGNOSIS — C61: ICD-10-CM

## 2025-07-11 LAB
BILIRUB SERPL-MCNC: NEGATIVE MG/DL
BLOOD URINE, POC: NEGATIVE
COLOR, POC UA: YELLOW
GLUCOSE UR QL STRIP: 500
KETONES UR QL STRIP: NEGATIVE
LEUKOCYTE ESTERASE URINE, POC: NEGATIVE
NITRITE, POC UA: NEGATIVE
PH, POC UA: 6
PROTEIN, POC: NORMAL
SPECIFIC GRAVITY, POC UA: 1.01
UROBILINOGEN, POC UA: 0.2

## 2025-07-11 PROCEDURE — 99215 OFFICE O/P EST HI 40 MIN: CPT | Mod: PBBFAC | Performed by: UROLOGY

## 2025-07-11 NOTE — PROGRESS NOTES
CC:  Gross hematuria    HPI:  Rogelio Johnson is a 75 y.o. male seen for follow-up of gross hematuria.  He began with gross hematuria about a week ago.  It has persisted intermittently since then.  This is otherwise asymptomatic.    He had a prostate biopsy on 2 January 2024 for a PSA of 8.02.  The biopsy showed [facility] 4+3 in the left base and apex and [place] 4+4 in the left mid.  He is on Lupron in conjunction with radiation which he had from 27 March to 7 May 2024.  First Lupron was on 28 February 2024.  The third injection was given on 12 May 2025.    No urinary complaints. He is on finasteride.    He was given Tadalafil for erectile dysfunction 20 mg and that is not working.     Urinalysis:  Results for orders placed or performed in visit on 07/11/25   POCT URINE DIPSTICK WITH MICROSCOPE, AUTOMATED   Result Value Ref Range    Color, UA Yellow     Spec Grav UA 1.015     pH, UA 6.0     WBC, UA Negative     Nitrite, UA Negative     Protein, POC Trace     Glucose,      Ketones, UA Negative     Urobilinogen, UA 0.2     Bilirubin, POC Negative     Blood, UA Negative      Microscopic Urinalysis:  WBC:   None per HPF     RBC:    None per HPF     Bacteria:    None per HPF     Squamous epithelial cells:  None per HPF      Crystals:   None    Lab Results:  PSA History:    01/16/18 09:12 04/03/19 10:25 12/30/20 10:13 01/12/22 08:40 11/07/23 10:47 08/01/24 09:18 05/08/25 08:18   1.2 1.6 2.57 5.13 (H) 8.02 (H) <0.10 <0.10     Recent Labs     07/08/25  1844   CREATININE 1.54*     Data Review:  Creatinine.    ROS:  All systems reviewed and are negative except as documented in HPI and/or Assessment and Plan.     Patient Active Problem List:     Problem List[1]     Past Medical History:  Past Medical History:   Diagnosis Date    Coronary artery disease     Diabetes mellitus     Hyperlipidemia     Hypertension     Personal history of colonic polyps     Prostate cancer     Sleep apnea, unspecified         Past Surgical  History:  Past Surgical History:   Procedure Laterality Date    A-V CARDIAC PACEMAKER INSERTION N/A 11/09/2023    Procedure: INSERTION, CARDIAC PACEMAKER, DUAL CHAMBER;  Surgeon: Baljit Sánchez MD;  Location: [facility] CATH LAB;  Service: Cardiology;  Laterality: N/A;  DUAL CHAMBER PPM (SJM)    COLON RESECTION      COLONOSCOPY      CORONARY ARTERY BYPASS GRAFT (CABG)      LAPAROSCOPIC CHOLECYSTECTOMY N/A 12/23/2024    Procedure: CHOLECYSTECTOMY, LAPAROSCOPIC;  Surgeon: Donn Cheema Jr., MD;  Location: [facility] OR;  Service: General;  Laterality: N/A;    TRANSRECTAL BIOPSY OF PROSTATE WITH ULTRASOUND GUIDANCE Bilateral 01/02/2024    Procedure: BIOPSY, PROSTATE, RECTAL APPROACH, WITH US GUIDANCE;  Surgeon: Miracle Pierre DO;  Location: [facility] ENDOSCOPY;  Service: Urology;  Laterality: Bilateral;        Family History:  Family History   Problem Relation Name Age of Onset    Other (Bile Duct) Mother      Gallbladder disease Mother      Diabetes Mellitus Mother      Hypertension Mother      Cataracts Father      Heart attack Father      Heart disease Father          Social History:  Social History     Socioeconomic History    Marital status:    Tobacco Use    Smoking status: Never     Passive exposure: Never    Smokeless tobacco: Never   Substance and Sexual Activity    Alcohol use: Not Currently    Drug use: Never    Sexual activity: Not Currently     Social Drivers of Health     Financial Resource Strain: Low Risk  (5/18/2025)    Overall Financial Resource Strain (CARDIA)     Difficulty of Paying Living Expenses: Not very hard   Food Insecurity: No Food Insecurity (5/18/2025)    Hunger Vital Sign     Worried About Running Out of Food in the Last Year: Never true     Ran Out of Food in the Last Year: Never true   Transportation Needs: No Transportation Needs (5/18/2025)    PRAPARE - Transportation     Lack of Transportation (Medical): No     Lack of Transportation (Non-Medical): No   Physical Activity: Insufficiently  Active (5/18/2025)    Exercise Vital Sign     Days of Exercise per Week: 4 days     Minutes of Exercise per Session: 30 min   Stress: No Stress Concern Present (5/18/2025)    [employer] - Occupational Stress Questionnaire     Feeling of Stress : Only a little   Housing Stability: Low Risk  (5/18/2025)    Housing Stability Vital Sign     Unable to Pay for Housing in the Last Year: No     Number of Times Moved in the Last Year: 0     Homeless in the Last Year: No        Allergies:  Review of patient's allergies indicates:  No Known Allergies     Objective:  Vitals:    07/11/25 0848   BP: 117/68   Pulse: 60   Resp: 20   Temp: 97.9 °F (36.6 °C)     General:  Well developed, well nourished adult male in no acute distress  Abdomen: Soft, nontender, no masses  Extremities:  No clubbing, cyanosis, or edema  Neurologic:  Grossly intact  Musculoskeletal:  Normal tone     Assessment:  1. Gross hematuria  - CT Abdomen Pelvis W Wo Contrast; Future  - Creatinine, serum; Future    2. [place] grade group 4 adenocarcinoma of prostate  - POCT URINE DIPSTICK WITH MICROSCOPE, AUTOMATED    Plan:  He needs a workup for gross hematuria including a CT scan and cystoscopy.  He needs one more Lupron prior to stopping that is already arranged.    Follow-up tests needed:   CT scan.     Return appointment:  After CT for cystoscopy.                   [1]   Patient Active Problem List  Diagnosis    Type 2 diabetes mellitus without complication, with long-term current use of insulin    Other male erectile dysfunction    Adenomatous polyp of ascending colon    Arteriosclerosis of coronary artery    Atrioventricular block    Benign prostatic hyperplasia    Chronic low back pain    Dyslipidemia    Hypertension    Polyp of colon    Normocytic normochromic anemia    Obstructive sleep apnea syndrome    Stress at home    Mobitz type 1 second degree AV block    Elevated PSA    AV block    Anxiety about health    Prostate  cancer    Pain in both lower extremities    JAMIL (acute kidney injury)    Choledocholithiasis    Callus    Anemia due to vitamin B12 deficiency    Type 2 diabetes mellitus with both eyes affected by moderate nonproliferative retinopathy and macular edema, without long-term current use of insulin

## 2025-07-15 ENCOUNTER — HOSPITAL ENCOUNTER (OUTPATIENT)
Dept: RADIOLOGY | Facility: HOSPITAL | Age: 75
Discharge: HOME | End: 2025-07-15
Attending: UROLOGY
Payer: MEDICARE

## 2025-07-15 DIAGNOSIS — R31.0 GROSS HEMATURIA: ICD-10-CM

## 2025-07-15 PROCEDURE — 25500020 PHARM REV CODE 255: Performed by: UROLOGY

## 2025-07-15 PROCEDURE — 74178 CT ABD&PLV WO CNTR FLWD CNTR: CPT | Mod: TC

## 2025-07-15 RX ADMIN — IOHEXOL 100 ML: 350 INJECTION, SOLUTION INTRAVENOUS at 12:07

## 2025-07-17 ENCOUNTER — PROCEDURE VISIT (OUTPATIENT)
Dept: UROLOGY | Facility: CLINIC | Age: 75
End: 2025-07-17
Payer: MEDICARE

## 2025-07-17 VITALS
DIASTOLIC BLOOD PRESSURE: 75 MMHG | TEMPERATURE: 98 F | HEART RATE: 60 BPM | WEIGHT: 211 LBS | BODY MASS INDEX: 28.58 KG/M2 | SYSTOLIC BLOOD PRESSURE: 136 MMHG | OXYGEN SATURATION: 98 % | HEIGHT: 72 IN | RESPIRATION RATE: 20 BRPM

## 2025-07-17 DIAGNOSIS — C61: ICD-10-CM

## 2025-07-17 DIAGNOSIS — R31.0 GROSS HEMATURIA: Primary | ICD-10-CM

## 2025-07-17 PROCEDURE — 52000 CYSTOURETHROSCOPY: CPT | Mod: PBBFAC | Performed by: UROLOGY

## 2025-07-17 PROCEDURE — 81001 URINALYSIS AUTO W/SCOPE: CPT | Mod: PBBFAC | Performed by: UROLOGY

## 2025-07-17 RX ORDER — CIPROFLOXACIN 500 MG/1
500 TABLET, FILM COATED ORAL
Status: COMPLETED | OUTPATIENT
Start: 2025-07-17 | End: 2025-07-17

## 2025-07-17 RX ORDER — LIDOCAINE HYDROCHLORIDE 20 MG/ML
JELLY TOPICAL
Status: COMPLETED | OUTPATIENT
Start: 2025-07-17 | End: 2025-07-17

## 2025-07-17 RX ADMIN — CIPROFLOXACIN 500 MG: 500 TABLET, FILM COATED ORAL at 08:07

## 2025-07-17 RX ADMIN — LIDOCAINE HYDROCHLORIDE: 20 JELLY TOPICAL at 08:07

## 2025-07-17 NOTE — PROGRESS NOTES
Seen by  Dr. Pierre.  Procedure consent signed and witnessed.  Cipro 500mg po given.  Lidocaine urojet applied.  Time out performed.  Assisted with procedure.  Tolerated well.  Keep upcoming followup appt in November. Discharge instructions verbal and written given.

## 2025-07-17 NOTE — PROCEDURES
CC:  Cystoscopy    HPI:  Rogelio Johnson is a 75 y.o. male here for a cystoscopy for gross hematuria.  He began with gross hematuria about a week ago.  It has persisted intermittently since then.  He does state today that this occurred after mowing his lawn with a push mower.  This is otherwise asymptomatic.    He had a prostate biopsy on 2 January 2024 for a PSA of 8.02.  The biopsy showed [place] 4+3 in the left base and apex and [place] 4+4 in the left mid.  He is on Lupron in conjunction with radiation which he had from 27 March to 7 May 2024.  First Lupron was on 28 February 2024.  The third injection was given on 12 May 2025.    No urinary complaints. He is on finasteride.    He was given Tadalafil for erectile dysfunction 20 mg and that is not working.    Urinalysis:  Results for orders placed or performed in visit on 07/11/25   POCT URINE DIPSTICK WITH MICROSCOPE, AUTOMATED   Result Value Ref Range    Color, UA Yellow     Spec Grav UA 1.015     pH, UA 6.0     WBC, UA Negative     Nitrite, UA Negative     Protein, POC Trace     Glucose,      Ketones, UA Negative     Urobilinogen, UA 0.2     Bilirubin, POC Negative     Blood, UA Negative       Microscopic Urinalysis:  WBC:   None per HPF    RBC:    None per HPF    Bacteria:    None per HPF    Squamous epithelial cells:  None per HPF      Crystals:   None    Lab Results:    Recent Labs     07/08/25  1844   CREATININE 1.54*      Imaging:  CT - 15 July 2025:  Negative    ROS:  All systems reviewed and are negative except as documented in HPI and/or Assessment and Plan.     Patient Active Problem List:     Problem List[1]     Past Medical History:  Past Medical History:   Diagnosis Date    Coronary artery disease     Diabetes mellitus     Hyperlipidemia     Hypertension     Personal history of colonic polyps     Prostate cancer     Sleep apnea, unspecified         Past Surgical History:  Past Surgical History:   Procedure Laterality Date    A-V CARDIAC  PACEMAKER INSERTION N/A 11/09/2023    Procedure: INSERTION, CARDIAC PACEMAKER, DUAL CHAMBER;  Surgeon: Baljit Sánchez MD;  Location: [facility] CATH LAB;  Service: Cardiology;  Laterality: N/A;  DUAL CHAMBER PPM (SJM)    COLON RESECTION      COLONOSCOPY      CORONARY ARTERY BYPASS GRAFT (CABG)      LAPAROSCOPIC CHOLECYSTECTOMY N/A 12/23/2024    Procedure: CHOLECYSTECTOMY, LAPAROSCOPIC;  Surgeon: Donn Cheema Jr., MD;  Location: [facility] OR;  Service: General;  Laterality: N/A;    TRANSRECTAL BIOPSY OF PROSTATE WITH ULTRASOUND GUIDANCE Bilateral 01/02/2024    Procedure: BIOPSY, PROSTATE, RECTAL APPROACH, WITH US GUIDANCE;  Surgeon: Miracle Pierre DO;  Location: [facility] ENDOSCOPY;  Service: Urology;  Laterality: Bilateral;        Family History:  Family History   Problem Relation Name Age of Onset    Other (Bile Duct) Mother      Gallbladder disease Mother      Diabetes Mellitus Mother      Hypertension Mother      Cataracts Father      Heart attack Father      Heart disease Father          Social History:  Social History     Socioeconomic History    Marital status:    Tobacco Use    Smoking status: Never     Passive exposure: Never    Smokeless tobacco: Never   Substance and Sexual Activity    Alcohol use: Not Currently    Drug use: Never    Sexual activity: Not Currently     Social Drivers of Health     Financial Resource Strain: Low Risk  (5/18/2025)    Overall Financial Resource Strain (CARDIA)     Difficulty of Paying Living Expenses: Not very hard   Food Insecurity: No Food Insecurity (5/18/2025)    Hunger Vital Sign     Worried About Running Out of Food in the Last Year: Never true     Ran Out of Food in the Last Year: Never true   Transportation Needs: No Transportation Needs (5/18/2025)    PRAPARE - Transportation     Lack of Transportation (Medical): No     Lack of Transportation (Non-Medical): No   Physical Activity: Insufficiently Active (5/18/2025)    Exercise Vital Sign     Days of Exercise per Week: 4  days     Minutes of Exercise per Session: 30 min   Stress: No Stress Concern Present (5/18/2025)    [employer] - Occupational Stress Questionnaire     Feeling of Stress : Only a little   Housing Stability: Low Risk  (5/18/2025)    Housing Stability Vital Sign     Unable to Pay for Housing in the Last Year: No     Number of Times Moved in the Last Year: 0     Homeless in the Last Year: No        Allergies:  Review of patient's allergies indicates:  No Known Allergies     Objective:  Vitals:    07/17/25 0750   BP: 136/75   Pulse: 60   Resp: 20   Temp: 97.5 °F (36.4 °C)     General:  Well developed, well nourished adult male in no acute distress  Abdomen: Soft, nontender, no masses  Extremities:  No clubbing, cyanosis, or edema  Neurologic:  Grossly intact  Musculoskeletal:  Normal tone    Cystoscopy:        - Penis:  Circumcised, no lesions         - Urethral meatus:  No strictures        - Urethra:  Normal without strictures or lesions           - Prostate:  Moderately enlarged        - Bladder neck:  Normal        - Bladder:  No mucosal abnormalities        - Ureteral orifices:  On the trigone with clear efflux bilaterally    The patient tolerated the procedure well without complications.  He was given Cipro 500mg, one tablet in the clinic.   The urethra was anesthetized with 2% Lidocaine Jelly, Urojet.      Assessment:  1. Gross hematuria  - POCT URINE DIPSTICK WITH MICROSCOPE, AUTOMATED    2. [place] grade group 4 adenocarcinoma of prostate     Plan:  The cystoscopy is negative for cause of the gross hematuria.  The most likely cause is the prostate.      Follow-up tests needed:   PSA as previously arrange.       Return appointment:  As previously arranged.                         [1]   Patient Active Problem List  Diagnosis    Type 2 diabetes mellitus without complication, with long-term current use of insulin    Other male erectile dysfunction    Adenomatous polyp of ascending colon     Arteriosclerosis of coronary artery    Atrioventricular block    Benign prostatic hyperplasia    Chronic low back pain    Dyslipidemia    Hypertension    Polyp of colon    Normocytic normochromic anemia    Obstructive sleep apnea syndrome    Stress at home    Mobitz type 1 second degree AV block    Elevated PSA    AV block    Anxiety about health    Prostate cancer    Pain in both lower extremities    JAMIL (acute kidney injury)    Choledocholithiasis    Callus    Anemia due to vitamin B12 deficiency    Type 2 diabetes mellitus with both eyes affected by moderate nonproliferative retinopathy and macular edema, without long-term current use of insulin

## 2025-08-18 ENCOUNTER — TELEPHONE (OUTPATIENT)
Dept: INTERNAL MEDICINE | Facility: CLINIC | Age: 75
End: 2025-08-18
Payer: MEDICARE

## 2025-08-19 DIAGNOSIS — I10 PRIMARY HYPERTENSION: ICD-10-CM

## 2025-08-19 DIAGNOSIS — R45.89 ANXIETY ABOUT HEALTH: ICD-10-CM

## 2025-08-19 DIAGNOSIS — Z79.4 TYPE 2 DIABETES MELLITUS WITHOUT COMPLICATION, WITH LONG-TERM CURRENT USE OF INSULIN: ICD-10-CM

## 2025-08-19 DIAGNOSIS — F43.9 STRESS AT HOME: ICD-10-CM

## 2025-08-19 DIAGNOSIS — N40.0 BENIGN PROSTATIC HYPERPLASIA, UNSPECIFIED WHETHER LOWER URINARY TRACT SYMPTOMS PRESENT: ICD-10-CM

## 2025-08-19 DIAGNOSIS — E11.9 TYPE 2 DIABETES MELLITUS WITHOUT COMPLICATION, WITH LONG-TERM CURRENT USE OF INSULIN: ICD-10-CM

## 2025-08-19 RX ORDER — METFORMIN HYDROCHLORIDE 1000 MG/1
1000 TABLET ORAL 2 TIMES DAILY WITH MEALS
Qty: 180 TABLET | Refills: 2 | Status: SHIPPED | OUTPATIENT
Start: 2025-08-19

## 2025-08-19 RX ORDER — FINASTERIDE 5 MG/1
5 TABLET, FILM COATED ORAL DAILY
Qty: 90 TABLET | Refills: 3 | Status: SHIPPED | OUTPATIENT
Start: 2025-08-19

## 2025-08-19 RX ORDER — LORAZEPAM 0.5 MG/1
0.5 TABLET ORAL 2 TIMES DAILY
Qty: 60 TABLET | Refills: 3 | Status: SHIPPED | OUTPATIENT
Start: 2025-08-19

## 2025-08-19 RX ORDER — LOSARTAN POTASSIUM 25 MG/1
25 TABLET ORAL 2 TIMES DAILY
Qty: 180 TABLET | Status: SHIPPED | OUTPATIENT
Start: 2025-08-19

## 2025-08-19 RX ORDER — INSULIN GLARGINE 100 [IU]/ML
50 INJECTION, SOLUTION SUBCUTANEOUS 2 TIMES DAILY
Qty: 3 ML | Status: SHIPPED | OUTPATIENT
Start: 2025-08-19 | End: 2025-09-24

## 2025-08-21 ENCOUNTER — DOCUMENTATION ONLY (OUTPATIENT)
Dept: INTERNAL MEDICINE | Facility: CLINIC | Age: 75
End: 2025-08-21
Payer: MEDICARE

## 2025-08-21 DIAGNOSIS — E78.5 DYSLIPIDEMIA: ICD-10-CM

## 2025-08-21 DIAGNOSIS — E11.9 TYPE 2 DIABETES MELLITUS WITHOUT COMPLICATION, WITH LONG-TERM CURRENT USE OF INSULIN: ICD-10-CM

## 2025-08-21 DIAGNOSIS — D50.9 IRON DEFICIENCY ANEMIA, UNSPECIFIED IRON DEFICIENCY ANEMIA TYPE: ICD-10-CM

## 2025-08-21 DIAGNOSIS — K59.00 CONSTIPATION, UNSPECIFIED CONSTIPATION TYPE: Primary | ICD-10-CM

## 2025-08-21 DIAGNOSIS — Z79.4 TYPE 2 DIABETES MELLITUS WITHOUT COMPLICATION, WITH LONG-TERM CURRENT USE OF INSULIN: ICD-10-CM

## 2025-08-21 RX ORDER — DOCUSATE SODIUM 100 MG/1
100 CAPSULE, LIQUID FILLED ORAL NIGHTLY
Qty: 90 CAPSULE | Refills: 3 | Status: SHIPPED | OUTPATIENT
Start: 2025-08-21

## 2025-08-21 RX ORDER — EZETIMIBE 10 MG/1
10 TABLET ORAL DAILY
Qty: 90 TABLET | Refills: 3 | Status: SHIPPED | OUTPATIENT
Start: 2025-08-21 | End: 2026-08-21

## 2025-08-21 RX ORDER — FERROUS SULFATE 325(65) MG
325 TABLET ORAL
Qty: 12 TABLET | Refills: 6 | Status: SHIPPED | OUTPATIENT
Start: 2025-08-22

## 2025-08-22 ENCOUNTER — LAB VISIT (OUTPATIENT)
Dept: LAB | Facility: HOSPITAL | Age: 75
End: 2025-08-22
Attending: INTERNAL MEDICINE
Payer: MEDICARE

## 2025-08-22 DIAGNOSIS — N40.0 BENIGN PROSTATIC HYPERPLASIA, UNSPECIFIED WHETHER LOWER URINARY TRACT SYMPTOMS PRESENT: ICD-10-CM

## 2025-08-22 DIAGNOSIS — E11.9 TYPE 2 DIABETES MELLITUS WITHOUT COMPLICATION, WITH LONG-TERM CURRENT USE OF INSULIN: ICD-10-CM

## 2025-08-22 DIAGNOSIS — I10 PRIMARY HYPERTENSION: ICD-10-CM

## 2025-08-22 DIAGNOSIS — D51.9 ANEMIA DUE TO VITAMIN B12 DEFICIENCY, UNSPECIFIED B12 DEFICIENCY TYPE: ICD-10-CM

## 2025-08-22 DIAGNOSIS — Z79.4 TYPE 2 DIABETES MELLITUS WITHOUT COMPLICATION, WITH LONG-TERM CURRENT USE OF INSULIN: ICD-10-CM

## 2025-08-22 DIAGNOSIS — E78.5 DYSLIPIDEMIA: ICD-10-CM

## 2025-08-22 LAB
ALBUMIN SERPL-MCNC: 3.7 G/DL (ref 3.4–4.8)
ALBUMIN/GLOB SERPL: 0.9 RATIO (ref 1.1–2)
ALP SERPL-CCNC: 101 UNIT/L (ref 40–150)
ALT SERPL-CCNC: 32 UNIT/L (ref 0–55)
ANION GAP SERPL CALC-SCNC: 7 MEQ/L
AST SERPL-CCNC: 27 UNIT/L (ref 11–45)
BASOPHILS # BLD AUTO: 0.02 X10(3)/MCL
BASOPHILS NFR BLD AUTO: 0.4 %
BILIRUB SERPL-MCNC: 0.3 MG/DL
BUN SERPL-MCNC: 13.3 MG/DL (ref 8.4–25.7)
CALCIUM SERPL-MCNC: 9.2 MG/DL (ref 8.8–10)
CHLORIDE SERPL-SCNC: 108 MMOL/L (ref 98–107)
CHOLEST SERPL-MCNC: 177 MG/DL
CHOLEST/HDLC SERPL: 3 {RATIO} (ref 0–5)
CO2 SERPL-SCNC: 27 MMOL/L (ref 23–31)
CREAT SERPL-MCNC: 0.99 MG/DL (ref 0.72–1.25)
CREAT/UREA NIT SERPL: 13
EOSINOPHIL # BLD AUTO: 0.09 X10(3)/MCL (ref 0–0.9)
EOSINOPHIL NFR BLD AUTO: 1.6 %
ERYTHROCYTE [DISTWIDTH] IN BLOOD BY AUTOMATED COUNT: 13 % (ref 11.5–17)
EST. AVERAGE GLUCOSE BLD GHB EST-MCNC: 148.5 MG/DL
GFR SERPLBLD CREATININE-BSD FMLA CKD-EPI: >60 ML/MIN/1.73/M2
GLOBULIN SER-MCNC: 4.1 GM/DL (ref 2.4–3.5)
GLUCOSE SERPL-MCNC: 115 MG/DL (ref 82–115)
HBA1C MFR BLD: 6.8 %
HCT VFR BLD AUTO: 36.1 % (ref 42–52)
HDLC SERPL-MCNC: 58 MG/DL (ref 35–60)
HGB BLD-MCNC: 12.2 G/DL (ref 14–18)
IMM GRANULOCYTES # BLD AUTO: 0.03 X10(3)/MCL (ref 0–0.04)
IMM GRANULOCYTES NFR BLD AUTO: 0.5 %
LDLC SERPL CALC-MCNC: 86 MG/DL (ref 50–140)
LYMPHOCYTES # BLD AUTO: 2.43 X10(3)/MCL (ref 0.6–4.6)
LYMPHOCYTES NFR BLD AUTO: 42.9 %
MCH RBC QN AUTO: 32.1 PG (ref 27–31)
MCHC RBC AUTO-ENTMCNC: 33.8 G/DL (ref 33–36)
MCV RBC AUTO: 95 FL (ref 80–94)
MONOCYTES # BLD AUTO: 0.65 X10(3)/MCL (ref 0.1–1.3)
MONOCYTES NFR BLD AUTO: 11.5 %
NEUTROPHILS # BLD AUTO: 2.44 X10(3)/MCL (ref 2.1–9.2)
NEUTROPHILS NFR BLD AUTO: 43.1 %
NRBC BLD AUTO-RTO: 0 %
PLATELET # BLD AUTO: 272 X10(3)/MCL (ref 130–400)
PMV BLD AUTO: 9.9 FL (ref 7.4–10.4)
POTASSIUM SERPL-SCNC: 3.9 MMOL/L (ref 3.5–5.1)
PROT SERPL-MCNC: 7.8 GM/DL (ref 5.8–7.6)
RBC # BLD AUTO: 3.8 X10(6)/MCL (ref 4.7–6.1)
SODIUM SERPL-SCNC: 142 MMOL/L (ref 136–145)
TRIGL SERPL-MCNC: 167 MG/DL (ref 34–140)
TSH SERPL-ACNC: 1.29 UIU/ML (ref 0.35–4.94)
VLDLC SERPL CALC-MCNC: 33 MG/DL
WBC # BLD AUTO: 5.66 X10(3)/MCL (ref 4.5–11.5)

## 2025-08-22 PROCEDURE — 80061 LIPID PANEL: CPT

## 2025-08-22 PROCEDURE — 36415 COLL VENOUS BLD VENIPUNCTURE: CPT

## 2025-08-22 PROCEDURE — 85025 COMPLETE CBC W/AUTO DIFF WBC: CPT

## 2025-08-22 PROCEDURE — 83036 HEMOGLOBIN GLYCOSYLATED A1C: CPT

## 2025-08-22 PROCEDURE — 84443 ASSAY THYROID STIM HORMONE: CPT

## 2025-08-22 PROCEDURE — 80053 COMPREHEN METABOLIC PANEL: CPT

## 2025-08-25 ENCOUNTER — OFFICE VISIT (OUTPATIENT)
Dept: INTERNAL MEDICINE | Facility: CLINIC | Age: 75
End: 2025-08-25
Payer: MEDICARE

## 2025-08-25 VITALS
WEIGHT: 212 LBS | OXYGEN SATURATION: 97 % | HEART RATE: 60 BPM | RESPIRATION RATE: 18 BRPM | DIASTOLIC BLOOD PRESSURE: 60 MMHG | BODY MASS INDEX: 29.68 KG/M2 | SYSTOLIC BLOOD PRESSURE: 100 MMHG | HEIGHT: 71 IN

## 2025-08-25 DIAGNOSIS — E11.9 TYPE 2 DIABETES MELLITUS WITHOUT COMPLICATION, WITH LONG-TERM CURRENT USE OF INSULIN: Primary | ICD-10-CM

## 2025-08-25 DIAGNOSIS — I25.10 ARTERIOSCLEROSIS OF CORONARY ARTERY: ICD-10-CM

## 2025-08-25 DIAGNOSIS — D50.9 IRON DEFICIENCY ANEMIA, UNSPECIFIED IRON DEFICIENCY ANEMIA TYPE: ICD-10-CM

## 2025-08-25 DIAGNOSIS — Z79.4 TYPE 2 DIABETES MELLITUS WITHOUT COMPLICATION, WITH LONG-TERM CURRENT USE OF INSULIN: Primary | ICD-10-CM

## 2025-08-25 DIAGNOSIS — C61 PROSTATE CA: ICD-10-CM

## 2025-08-25 DIAGNOSIS — E78.5 DYSLIPIDEMIA: ICD-10-CM

## 2025-08-25 DIAGNOSIS — K59.00 CONSTIPATION, UNSPECIFIED CONSTIPATION TYPE: ICD-10-CM

## 2025-08-25 DIAGNOSIS — R31.9 HEMATURIA, UNSPECIFIED TYPE: ICD-10-CM

## 2025-08-25 PROCEDURE — 3074F SYST BP LT 130 MM HG: CPT | Mod: CPTII,,, | Performed by: INTERNAL MEDICINE

## 2025-08-25 PROCEDURE — 3078F DIAST BP <80 MM HG: CPT | Mod: CPTII,,, | Performed by: INTERNAL MEDICINE

## 2025-08-25 PROCEDURE — 3288F FALL RISK ASSESSMENT DOCD: CPT | Mod: CPTII,,, | Performed by: INTERNAL MEDICINE

## 2025-08-25 PROCEDURE — 1126F AMNT PAIN NOTED NONE PRSNT: CPT | Mod: CPTII,,, | Performed by: INTERNAL MEDICINE

## 2025-08-25 PROCEDURE — 1101F PT FALLS ASSESS-DOCD LE1/YR: CPT | Mod: CPTII,,, | Performed by: INTERNAL MEDICINE

## 2025-08-25 PROCEDURE — 1159F MED LIST DOCD IN RCRD: CPT | Mod: CPTII,,, | Performed by: INTERNAL MEDICINE

## 2025-08-25 PROCEDURE — 99214 OFFICE O/P EST MOD 30 MIN: CPT | Mod: ,,, | Performed by: INTERNAL MEDICINE

## 2025-08-25 RX ORDER — LANCETS
1 EACH MISCELLANEOUS 2 TIMES DAILY WITH MEALS
Qty: 100 EACH | Refills: 11 | Status: SHIPPED | OUTPATIENT
Start: 2025-08-25

## 2025-08-25 RX ORDER — LANCING DEVICE
1 EACH MISCELLANEOUS 2 TIMES DAILY WITH MEALS
Qty: 1 EACH | Refills: 0 | Status: SHIPPED | OUTPATIENT
Start: 2025-08-25 | End: 2026-08-25

## 2025-08-25 RX ORDER — INSULIN PUMP SYRINGE, 3 ML
EACH MISCELLANEOUS
Qty: 1 EACH | Refills: 0 | Status: SHIPPED | OUTPATIENT
Start: 2025-08-25 | End: 2026-08-25

## 2025-08-25 RX ORDER — ALIROCUMAB 75 MG/ML
75 INJECTION, SOLUTION SUBCUTANEOUS
Start: 2025-08-25 | End: 2026-08-25

## (undated) DEVICE — IRRIGATOR SUCTION W/TIP

## (undated) DEVICE — SUT VICRYL 2-0 36 CT-1

## (undated) DEVICE — GOWN POLY REINF BRTH SLV XL

## (undated) DEVICE — TROCAR ENDOPATH ECEL

## (undated) DEVICE — SUT CTD VICRYL PLUS 4/0

## (undated) DEVICE — SUT SILK 0 SH 30IN BLK BR

## (undated) DEVICE — APPLICATOR SURGICEL ENDOSCOPIC

## (undated) DEVICE — GLOVE SIGNATURE ESSNTL LTX 6.5

## (undated) DEVICE — SYR 10CC LUER LOCK

## (undated) DEVICE — TROCAR ENDOPATH XCEL 12X100MM

## (undated) DEVICE — GLOVE PROTEXIS PF LATEX 7.0

## (undated) DEVICE — ADHESIVE DERMABOND ADVANCED

## (undated) DEVICE — APPLIER CLIP ENDO MED/LG 10MM

## (undated) DEVICE — SYR 30CC LUER LOCK

## (undated) DEVICE — ELECTRODE REM PLYHSV RETURN 9

## (undated) DEVICE — KIT SURGICAL TURNOVER

## (undated) DEVICE — HEMOSTAT SURGICEL 2X3IN

## (undated) DEVICE — NDL HYPO REG 25G X 1 1/2

## (undated) DEVICE — HEMOSTAT SURGICEL PWD 3G

## (undated) DEVICE — Device

## (undated) DEVICE — NDL ANES SPINAL 18X3.5ST 18G

## (undated) DEVICE — PENCIL ELECSURG ROCKER 15FT

## (undated) DEVICE — BAG TISS RETRV MONARCH 10MM

## (undated) DEVICE — SOL IRRI STRL WATER 1000ML

## (undated) DEVICE — CONTRAST ISOVUE 370 500ML MULT

## (undated) DEVICE — GLOVE SIGNATURE ESSNTL LTX 8

## (undated) DEVICE — ELECTRODE PATIENT RETURN DISP

## (undated) DEVICE — SCISSOR 5MMX35CM DIRECT DRIVE

## (undated) DEVICE — DRAPE INCISE IOBAN 2 23X17IN

## (undated) DEVICE — PAD HEARTSTART DEFIB ADULT

## (undated) DEVICE — GLOVE SENSICARE PI GRN 7

## (undated) DEVICE — MANIFOLD 4 PORT

## (undated) DEVICE — TOWEL OR DISP STRL BLUE 4/PK

## (undated) DEVICE — TROCAR ENDOPATH EXCEL DILATING

## (undated) DEVICE — GLOVE SIGNATURE ESSNTL LTX 7

## (undated) DEVICE — APPLICATOR CHLORAPREP ORN 26ML

## (undated) DEVICE — PACK PACER PERMANENT

## (undated) DEVICE — CANNULA ENDOPATH XCEL 5X100MM

## (undated) DEVICE — KIT LAPAROSCOPY UNIVERSITY

## (undated) DEVICE — SUT VICRYL+ 27 UR-6 VIOL

## (undated) DEVICE — POSITIONER HEEL FOAM CONVOLTD

## (undated) DEVICE — NDL PNEUMO INSUFFLATI 120MM

## (undated) DEVICE — SUT MCRYL PLUS 4-0 PS2 27IN

## (undated) DEVICE — APPLICATOR STRL COT 2INNR 6IN